# Patient Record
Sex: FEMALE | Race: WHITE | NOT HISPANIC OR LATINO | Employment: UNEMPLOYED | URBAN - METROPOLITAN AREA
[De-identification: names, ages, dates, MRNs, and addresses within clinical notes are randomized per-mention and may not be internally consistent; named-entity substitution may affect disease eponyms.]

---

## 2019-07-30 ENCOUNTER — HOSPITAL ENCOUNTER (OUTPATIENT)
Dept: URGENT CARE | Facility: CLINIC | Age: 64
Discharge: HOME | End: 2019-07-30

## 2019-08-06 ENCOUNTER — CONVERSION ENCOUNTER (OUTPATIENT)
Dept: FAMILY MEDICINE CLINIC | Facility: CLINIC | Age: 64
End: 2019-08-06

## 2019-08-06 ENCOUNTER — OFFICE VISIT CONVERTED (OUTPATIENT)
Dept: FAMILY MEDICINE CLINIC | Facility: CLINIC | Age: 64
End: 2019-08-06
Attending: NURSE PRACTITIONER

## 2019-08-12 ENCOUNTER — HOSPITAL ENCOUNTER (OUTPATIENT)
Dept: LAB | Facility: HOSPITAL | Age: 64
Discharge: HOME | End: 2019-08-12
Attending: NURSE PRACTITIONER

## 2019-08-12 LAB
25(OH)D3 SERPL-MCNC: 35.7 NG/ML (ref 30–100)
ALBUMIN SERPL-MCNC: 4.9 G/DL (ref 3.5–5)
ALBUMIN/GLOB SERPL: 1.7 {RATIO} (ref 1.4–2.6)
ALP SERPL-CCNC: 58 U/L (ref 43–160)
ALT SERPL-CCNC: 29 U/L (ref 10–40)
ANION GAP SERPL CALC-SCNC: 19 MMOL/L (ref 8–19)
AST SERPL-CCNC: 42 U/L (ref 15–50)
BASOPHILS # BLD AUTO: 0.1 10*3/UL (ref 0–0.2)
BASOPHILS NFR BLD AUTO: 1.6 % (ref 0–3)
BILIRUB SERPL-MCNC: 0.4 MG/DL (ref 0.2–1.3)
BUN SERPL-MCNC: 7 MG/DL (ref 5–25)
BUN/CREAT SERPL: 10 {RATIO} (ref 6–20)
CALCIUM SERPL-MCNC: 9.5 MG/DL (ref 8.7–10.4)
CHLORIDE SERPL-SCNC: 103 MMOL/L (ref 99–111)
CHOLEST SERPL-MCNC: 203 MG/DL (ref 107–200)
CHOLEST/HDLC SERPL: 4 {RATIO} (ref 3–6)
CONV ABS IMM GRAN: 0.02 10*3/UL (ref 0–0.2)
CONV CO2: 27 MMOL/L (ref 22–32)
CONV IMMATURE GRAN: 0.3 % (ref 0–1.8)
CONV TOTAL PROTEIN: 7.8 G/DL (ref 6.3–8.2)
CREAT UR-MCNC: 0.67 MG/DL (ref 0.5–0.9)
DEPRECATED RDW RBC AUTO: 46.1 FL (ref 36.4–46.3)
EOSINOPHIL # BLD AUTO: 0.18 10*3/UL (ref 0–0.7)
EOSINOPHIL # BLD AUTO: 2.9 % (ref 0–7)
ERYTHROCYTE [DISTWIDTH] IN BLOOD BY AUTOMATED COUNT: 12.3 % (ref 11.7–14.4)
EST. AVERAGE GLUCOSE BLD GHB EST-MCNC: 111 MG/DL
GFR SERPLBLD BASED ON 1.73 SQ M-ARVRAT: >60 ML/MIN/{1.73_M2}
GLOBULIN UR ELPH-MCNC: 2.9 G/DL (ref 2–3.5)
GLUCOSE SERPL-MCNC: 86 MG/DL (ref 65–99)
HBA1C MFR BLD: 5.5 % (ref 3.5–5.7)
HCT VFR BLD AUTO: 45.4 % (ref 37–47)
HDLC SERPL-MCNC: 51 MG/DL (ref 40–60)
HGB BLD-MCNC: 15.1 G/DL (ref 12–16)
LDLC SERPL CALC-MCNC: 118 MG/DL (ref 70–100)
LYMPHOCYTES # BLD AUTO: 1.54 10*3/UL (ref 1–5)
LYMPHOCYTES NFR BLD AUTO: 24.8 % (ref 20–45)
MCH RBC QN AUTO: 33.6 PG (ref 27–31)
MCHC RBC AUTO-ENTMCNC: 33.3 G/DL (ref 33–37)
MCV RBC AUTO: 100.9 FL (ref 81–99)
MONOCYTES # BLD AUTO: 0.77 10*3/UL (ref 0.2–1.2)
MONOCYTES NFR BLD AUTO: 12.4 % (ref 3–10)
NEUTROPHILS # BLD AUTO: 3.59 10*3/UL (ref 2–8)
NEUTROPHILS NFR BLD AUTO: 58 % (ref 30–85)
NRBC CBCN: 0 % (ref 0–0.7)
OSMOLALITY SERPL CALC.SUM OF ELEC: 295 MOSM/KG (ref 273–304)
PLATELET # BLD AUTO: 429 10*3/UL (ref 130–400)
PMV BLD AUTO: 10.3 FL (ref 9.4–12.3)
POTASSIUM SERPL-SCNC: 4.8 MMOL/L (ref 3.5–5.3)
RBC # BLD AUTO: 4.5 10*6/UL (ref 4.2–5.4)
SODIUM SERPL-SCNC: 144 MMOL/L (ref 135–147)
T4 FREE SERPL-MCNC: 1.3 NG/DL (ref 0.9–1.8)
TRIGL SERPL-MCNC: 171 MG/DL (ref 40–150)
TSH SERPL-ACNC: 2.29 M[IU]/L (ref 0.27–4.2)
VLDLC SERPL-MCNC: 34 MG/DL (ref 5–37)
WBC # BLD AUTO: 6.2 10*3/UL (ref 4.8–10.8)

## 2019-08-16 ENCOUNTER — CONVERSION ENCOUNTER (OUTPATIENT)
Dept: FAMILY MEDICINE CLINIC | Facility: CLINIC | Age: 64
End: 2019-08-16

## 2019-08-16 ENCOUNTER — HOSPITAL ENCOUNTER (OUTPATIENT)
Dept: FAMILY MEDICINE CLINIC | Facility: CLINIC | Age: 64
Discharge: HOME | End: 2019-08-16
Attending: NURSE PRACTITIONER

## 2019-08-16 ENCOUNTER — OFFICE VISIT CONVERTED (OUTPATIENT)
Dept: FAMILY MEDICINE CLINIC | Facility: CLINIC | Age: 64
End: 2019-08-16
Attending: NURSE PRACTITIONER

## 2019-08-20 LAB
CONV LAST MENSTURAL PERIOD: NORMAL
SPECIMEN SOURCE: NORMAL
SPECIMEN SOURCE: NORMAL
THIN PREP CVX: NORMAL

## 2019-09-20 ENCOUNTER — HOSPITAL ENCOUNTER (OUTPATIENT)
Dept: GENERAL RADIOLOGY | Facility: HOSPITAL | Age: 64
Discharge: HOME | End: 2019-09-20
Attending: NURSE PRACTITIONER

## 2019-09-24 ENCOUNTER — CONVERSION ENCOUNTER (OUTPATIENT)
Dept: FAMILY MEDICINE CLINIC | Facility: CLINIC | Age: 64
End: 2019-09-24

## 2019-09-24 ENCOUNTER — OFFICE VISIT CONVERTED (OUTPATIENT)
Dept: FAMILY MEDICINE CLINIC | Facility: CLINIC | Age: 64
End: 2019-09-24
Attending: NURSE PRACTITIONER

## 2019-09-25 ENCOUNTER — HOSPITAL ENCOUNTER (OUTPATIENT)
Dept: LAB | Facility: HOSPITAL | Age: 64
Discharge: HOME | End: 2019-09-25
Attending: NURSE PRACTITIONER

## 2019-09-25 LAB
BASOPHILS # BLD AUTO: 0.08 10*3/UL (ref 0–0.2)
BASOPHILS NFR BLD AUTO: 1 % (ref 0–3)
CHOLEST SERPL-MCNC: 227 MG/DL (ref 107–200)
CHOLEST/HDLC SERPL: 4.4 {RATIO} (ref 3–6)
CONV ABS IMM GRAN: 0.02 10*3/UL (ref 0–0.2)
CONV IMMATURE GRAN: 0.2 % (ref 0–1.8)
DEPRECATED RDW RBC AUTO: 46.8 FL (ref 36.4–46.3)
EOSINOPHIL # BLD AUTO: 0.14 10*3/UL (ref 0–0.7)
EOSINOPHIL # BLD AUTO: 1.7 % (ref 0–7)
ERYTHROCYTE [DISTWIDTH] IN BLOOD BY AUTOMATED COUNT: 12.7 % (ref 11.7–14.4)
HCT VFR BLD AUTO: 44 % (ref 37–47)
HDLC SERPL-MCNC: 52 MG/DL (ref 40–60)
HGB BLD-MCNC: 14.7 G/DL (ref 12–16)
LDLC SERPL CALC-MCNC: 139 MG/DL (ref 70–100)
LYMPHOCYTES # BLD AUTO: 1.88 10*3/UL (ref 1–5)
LYMPHOCYTES NFR BLD AUTO: 22.5 % (ref 20–45)
MCH RBC QN AUTO: 33.3 PG (ref 27–31)
MCHC RBC AUTO-ENTMCNC: 33.4 G/DL (ref 33–37)
MCV RBC AUTO: 99.5 FL (ref 81–99)
MONOCYTES # BLD AUTO: 0.81 10*3/UL (ref 0.2–1.2)
MONOCYTES NFR BLD AUTO: 9.7 % (ref 3–10)
NEUTROPHILS # BLD AUTO: 5.43 10*3/UL (ref 2–8)
NEUTROPHILS NFR BLD AUTO: 64.9 % (ref 30–85)
NRBC CBCN: 0 % (ref 0–0.7)
PLATELET # BLD AUTO: 487 10*3/UL (ref 130–400)
PMV BLD AUTO: 10.2 FL (ref 9.4–12.3)
RBC # BLD AUTO: 4.42 10*6/UL (ref 4.2–5.4)
TRIGL SERPL-MCNC: 181 MG/DL (ref 40–150)
VLDLC SERPL-MCNC: 36 MG/DL (ref 5–37)
WBC # BLD AUTO: 8.36 10*3/UL (ref 4.8–10.8)

## 2019-10-10 ENCOUNTER — OFFICE VISIT CONVERTED (OUTPATIENT)
Dept: FAMILY MEDICINE CLINIC | Facility: CLINIC | Age: 64
End: 2019-10-10
Attending: NURSE PRACTITIONER

## 2019-10-10 ENCOUNTER — CONVERSION ENCOUNTER (OUTPATIENT)
Dept: FAMILY MEDICINE CLINIC | Facility: CLINIC | Age: 64
End: 2019-10-10

## 2019-10-21 ENCOUNTER — HOSPITAL ENCOUNTER (OUTPATIENT)
Dept: GENERAL RADIOLOGY | Facility: HOSPITAL | Age: 64
Discharge: HOME | End: 2019-10-21
Attending: NURSE PRACTITIONER

## 2019-11-08 ENCOUNTER — CONVERSION ENCOUNTER (OUTPATIENT)
Dept: FAMILY MEDICINE CLINIC | Facility: CLINIC | Age: 64
End: 2019-11-08

## 2019-11-08 ENCOUNTER — OFFICE VISIT CONVERTED (OUTPATIENT)
Dept: FAMILY MEDICINE CLINIC | Facility: CLINIC | Age: 64
End: 2019-11-08
Attending: NURSE PRACTITIONER

## 2021-04-06 ENCOUNTER — CONVERSION ENCOUNTER (OUTPATIENT)
Dept: FAMILY MEDICINE CLINIC | Facility: CLINIC | Age: 66
End: 2021-04-06

## 2021-04-06 ENCOUNTER — OFFICE VISIT CONVERTED (OUTPATIENT)
Dept: FAMILY MEDICINE CLINIC | Facility: CLINIC | Age: 66
End: 2021-04-06
Attending: NURSE PRACTITIONER

## 2021-05-11 NOTE — H&P
History and Physical      Patient Name: Kiesha Pan   Patient ID: [id number]   Sex: Female   YOB: 1955        Visit Date: April 6, 2021    Provider: MIHAI Delgado   [place] [facility] [place] [address] [place] [address], [address] [place] [address]  [id number]   Location Phone: [phone]          Chief Complaint  · Follow up office visit within 7 calendar days of discharge from inpatient status (high complexity).      History Of Present Illness  FOLLOW UP OFFICE VISIT WITHIN 7 CALENDAR DAYS OF INPATIENT STATUS (SEVERE COMPLEXITY)  Kiesha Pan presents to office for follow up post discharge from inpatient status within 7 calendar days. Patient was contacted within 2 business days via phone conversation. Documentation of that phone contact is present in the patient's electronic chart. Patient was admitted to an inpatient faciliity on 03/25/2021 and discharged on 04/02/2021 due to: sepsis, uti, joni   Admitting MD: Severo Chin MD   Her discharge summary has been reviewed and placed in the patient's electronic chart.   Patient's problem list is: Allergic rhinitis due to allergen, Allergies, Anxiety, Asthma, C. difficile diarrhea, Cataracts, bilateral, COPD, COVID-19, Depression, Essential hypertension, Hernia, History of stroke, History of thyroid disorder, Hypertension, Hypothyroid, Reflux, Ringing in the ears, Shortness of Breath, and Sinus trouble   Patient's outpatient medication list has been reconciled with the medication list from the discharge summary and has been reviewed with the patient. Current medication list is: atorvastatin 40 mg oral tablet, carvedilol 3.125 mg oral tablet, lisinopril 20 mg oral tablet, and vancomycin 125 mg oral capsule   Kiesha Pan is a 65 year old /White female who presents for evaluation and treatment of:      She presents today as a new patient and a hospital follow-up.  She has not seen a primary care provider in a  long time.  She presented to the emergency room on 3/25/2021 after she fell and hit her head.  Her son brought her in because she was acting confused and weaker than normal.  She was positive for screening for sepsis with a white count of 18,400, fever, and a UTI.  She was also very hypertensive with her blood pressure being as high as 232/138.  She was admitted for further work-up and monitoring.  In the ER a CT scan of the head was negative.  She later had an MRI of her brain which showed a 5 cm subacute infarct of the right parietaloccipital region.  Neurology was consulted.  She denies any difficulty with ambulating or moving about.  She does complain that she is having difficulty finding her words.  Labs during her hospital visit did show elevated glucose and she was started on insulin per sliding scale however an A1c that was obtained was only 4.9%.  She has a history of thyroid disorder.  TSH in the hospital was 8.3 with a normal free T4 1.2.  She is not currently on any medication for thyroid.  While she was in the hospital she was tested for COVID-19 and was positive so she was put in isolation for Covid.  She denies any cough or shortness of breath.  She does have a history of COPD and asthma, she was a former smoker.  She denies any difficulty with breathing and she is not on any inhalers.  She also had been having diarrhea and she tested positive for C. difficile.  She was put on vancomycin and has responded well to it.  She is currently still taking vancomycin.  She denies any diarrhea now.    Hypertension: Her blood pressure today was slightly elevated at 162/98.  She is currently on Lisinopril 20 mg 2 tablets daily and carvedilol 3.125 mg 3 tablets twice daily.  Her  has a blood pressure log which shows her blood pressures in the 130s over 80s.  She was also started on atorvastatin 40 mg in the hospital and increased to 2 tablets at bedtime.    It was also noted on her discharge note that she  should continue aspirin 81 mg, vitamin B complex daily, vitamin E 400 units daily, calcium with vitamin D 617322 once daily and fexofenadine 180 mg daily.  The patient and  state that they did not get prescriptions for these but I advised them that these are over-the-counter medications that they need to get.  I wrote down these medications for them so that they can get them over-the-counter.  He was also noted that she was supposed to be continuing Nexium 20 mg daily but she states she does not need this medication now.       Past Medical History  Disease Name Date Onset Notes   Alcohol dependence --  --    Allergic rhinitis due to allergen 09/24/2019 --    Allergies --  --    Anxiety --  --    Asthma --  --    Cataracts, bilateral --  --    COPD --  --    Depression --  --    Hernia --  --    Hypertension 09/24/2019 --    Hypothyroid --  --    Reflux --  --    Ringing in the ears --  --    Shortness of Breath --  --    Sinus trouble --  --    Stroke --  --          Past Surgical History  Procedure Name Date Notes   Gallbladder removal --  --    nose --  --    Tubal ligation --  --          Medication List  Name Date Started Instructions   atorvastatin 40 mg oral tablet  take 1 tablet (40 mg) by oral route once daily at bedtime   carvedilol 3.125 mg oral tablet  take 3 tablets by oral route 2 times a day   lisinopril 20 mg oral tablet  take 1 tablet (20 mg) by oral route once daily   vancomycin 125 mg oral capsule  take 1 capsule (125 mg) by oral route every 6 hours         Allergy List  Allergen Name Date Reaction Notes   Benadryl --  --  --    Keflex --  --  --    Levaquin --  --  --    PENICILLINS --  --  --        Allergies Reconciled  Family Medical History  Disease Name Relative/Age Notes   Stroke Mother/   --    Heart Disease Father/   --    Diabetes Father/   --          Social History  Finding Status Start/Stop Quantity Notes   Tobacco Former --/-- --  --          [place]  [place] [facility]  "Trade Name Lot Number Route Inj VIS Given VIS Publication   InfluenzaRefused 10/10/2019 NE Not Entered  NE NE     Comments:    Tdap09/24/2019 [facility] BOOSTRIX [id number] IM RD 09/24/2019    Comments: pt tolerated injection well         Review of Systems  · Constitutional  o Denies  o : fever, weight gain, weight loss, fatigue  · Cardiovascular  o Denies  o : palpitation, chest pain, claudication, lower extremity edema  · Respiratory  o Denies  o : shortness of breath, hemoptysis, wheezing, dry cough, productive cough  · Gastrointestinal  o Denies  o : nausea, vomiting, diarrhea, constipation, abdominal pain  · Genitourinary  o Denies  o : urgency, frequency, dysuria  · Integument  o Denies  o : rash, itching  · Neurologic  o Admits  o : memory difficulties, speech delay  o Denies  o : altered mental status, speech difficulties, seizures  · Musculoskeletal  o Denies  o : joint pain, joint swelling  · Endocrine  o Denies  o : cold intolerance, central obesity  · Psychiatric  o Denies  o : anxiety, depression, suicidal ideation, homicidal ideation  · Allergic-Immunologic  o Admits  o : sinus allergy symptoms  o Denies  o : frequent illnesses      Vitals  Date Time BP Position Site L\R Cuff Size HR RR TEMP (F) WT  HT  BMI kg/m2 BSA m2 O2 Sat FR L/min FiO2 HC       04/06/2021 09:14 /98 Sitting    88 - R  98 149lbs 6oz 5'  5\" 24.86 1.76 97 %            Physical Examination  · Constitutional  o Appearance  o : no acute distress, well-nourished  · Head and Face  o Head  o :   § Inspection  § : atraumatic, normocephalic  · Neck  o Thyroid  o : thyromegaly present, right nodule present, symmetric  · Respiratory  o Respiratory Effort  o : breathing comfortably, symmetric chest rise  o Auscultation of Lungs  o : clear to asculatation bilaterally, no wheezes, rales, or rhonchii  · Cardiovascular  o Heart  o :   § Auscultation of Heart  § : regular rate and rhythm, no murmurs, rubs, or gallops  o Peripheral Vascular System  o : "   § Extremities  § : no edema  · Lymphatic  o Neck  o : no lymphadenopathy present  · Neurologic  o Mental Status Examination  o :   § Orientation  § : grossly oriented to person, place and time  o Gait and Station  o :   § Gait Screening  § : normal gait  · Psychiatric  o General  o : normal mood and affect          Assessment  · Essential hypertension     401.9/I10  Blood pressure slightly elevated in the office today but has been normal at home, will have her continue current doses of Lisinopril and carvedilol as prescribed per the hospital.  · Hospital discharge follow-up     V67.59/Z09  · C. difficile diarrhea     008.45/A04.72  Instructed patient to continue/finish all of her vancomycin. I discussed with her that if she starts having diarrhea again let me know and we would recheck her for C. difficile. I also discussed with her that this is a very contagious infection and advised them on how to prevent spread.  · COVID-19     079.89/U07.1  Discussed with patient to quarantine for at least 10 days from onset of symptoms and symptoms have resolved. COVID-19 care packet given to patient and discussed. Patient instructed not to go anywhere except to seek medical care. Instructed to seek medical care for any difficulty of breathing, unable to keep fluids down, or worsening of symptoms.  · History of stroke     V12.54/Z86.73  I will get her referred to neurology to follow-up on her stroke. I discussed with her that she may need to have some speech therapy. I advised her to start taking aspirin 81 mg once daily.  · Thyroid enlarged     240.9/E04.9  · History of thyroid disorder     V12.29/Z86.39  Due to her history of thyroid disorder and it was noted that her thyroid was slightly enlarged on exam today, I will order a thyroid ultrasound. We will plan to recheck her thyroid profile and thyroid antibodies on her follow-up.      Plan  · Orders  o Discharge medications reconciled with the current medication list (1111F)  - - 04/06/2021  o ACO-18: Negative screen for clinical depression using a standardized tool () - - 04/06/2021   4 pts  o ACO-39: Current medications updated and reviewed (, 1159F) - - 04/06/2021  o Thyroid Ultrasound. (38645) - V12.29/Z86.39, 240.9/E04.9 - 04/06/2021   Do not schedule until after 4/12/21 because pt is in quarantine d/t current covid  o NEUROLOGY CONSULTATION. (NEURO) - V12.54/Z86.73 - 04/06/2021   Estela MIHAI Adan, Do NOT schedule until after 4/12/21 d/t covid infection  · Medications  o Medications have been Reconciled  o Transition of Care or Provider Policy  · Instructions  o Patient advised to monitor blood pressure (B/P) at home and journal readings. Patient informed that a B/P reading at home of more than 130/80 is considered hypertension. For readings greater avma114/90 or higher patient is advised to follow up in the office with readings for management. Patient advised to limit sodium intake.  o Patient discharge summary has been reviewed and placed in patient's electronic medical record.  o Patient received a phone call from my office within 2 business days of discharge from inpatient status, and documented within the patient's chart.  o Also patient was seen (face to face) for follow up evaluation within 7 calendar days of discharge from inpatient status for a high complexity issue.  o Patient was educated on their diagnosis, treatment and any medication changes while being evaluated today.  o Patient was educated/instructed on their diagnosis, treatment and medications prior to discharge from the clinic today.  o Patient instructed to seek medical attention urgently for new or worsening symptoms.  o Call the office with any concerns or questions.  o Time spent with the patient was minutes, more than 50% face to face.  o Time In: 0845  o Time Out: 1020  o Discussed Covid-19 precautions including, but not limited to, social distancing, avoid touching your face, and hand  washing.   · Disposition  o Return to clinic in 3 weeks            Electronically Signed by: Kiesha Amaya APRN -Author on April 6, 2021 01:22:34 PM

## 2021-05-14 VITALS
HEART RATE: 88 BPM | HEIGHT: 65 IN | BODY MASS INDEX: 24.89 KG/M2 | DIASTOLIC BLOOD PRESSURE: 98 MMHG | SYSTOLIC BLOOD PRESSURE: 162 MMHG | WEIGHT: 149.37 LBS | OXYGEN SATURATION: 97 % | TEMPERATURE: 98 F

## 2021-05-15 VITALS
TEMPERATURE: 97.8 F | SYSTOLIC BLOOD PRESSURE: 211 MMHG | WEIGHT: 164.37 LBS | DIASTOLIC BLOOD PRESSURE: 88 MMHG | HEIGHT: 67 IN | DIASTOLIC BLOOD PRESSURE: 101 MMHG | SYSTOLIC BLOOD PRESSURE: 160 MMHG | OXYGEN SATURATION: 98 % | HEART RATE: 87 BPM | BODY MASS INDEX: 25.8 KG/M2

## 2021-05-15 VITALS
HEIGHT: 67 IN | SYSTOLIC BLOOD PRESSURE: 180 MMHG | OXYGEN SATURATION: 98 % | DIASTOLIC BLOOD PRESSURE: 121 MMHG | DIASTOLIC BLOOD PRESSURE: 92 MMHG | SYSTOLIC BLOOD PRESSURE: 230 MMHG | BODY MASS INDEX: 26.68 KG/M2 | TEMPERATURE: 97.5 F | WEIGHT: 170 LBS | HEART RATE: 78 BPM | HEART RATE: 94 BPM

## 2021-05-15 VITALS
DIASTOLIC BLOOD PRESSURE: 116 MMHG | WEIGHT: 163.56 LBS | SYSTOLIC BLOOD PRESSURE: 226 MMHG | TEMPERATURE: 98.8 F | HEIGHT: 67 IN | HEART RATE: 89 BPM | BODY MASS INDEX: 25.67 KG/M2 | OXYGEN SATURATION: 98 %

## 2021-05-15 VITALS
HEART RATE: 75 BPM | SYSTOLIC BLOOD PRESSURE: 189 MMHG | RESPIRATION RATE: 21 BRPM | HEIGHT: 67 IN | OXYGEN SATURATION: 97 % | TEMPERATURE: 98.3 F | WEIGHT: 165.37 LBS | DIASTOLIC BLOOD PRESSURE: 99 MMHG | BODY MASS INDEX: 25.96 KG/M2

## 2021-05-15 VITALS
TEMPERATURE: 98.5 F | SYSTOLIC BLOOD PRESSURE: 182 MMHG | DIASTOLIC BLOOD PRESSURE: 98 MMHG | HEART RATE: 87 BPM | OXYGEN SATURATION: 98 % | DIASTOLIC BLOOD PRESSURE: 96 MMHG | BODY MASS INDEX: 25.83 KG/M2 | WEIGHT: 164.56 LBS | SYSTOLIC BLOOD PRESSURE: 188 MMHG | HEIGHT: 67 IN

## 2021-05-15 VITALS — DIASTOLIC BLOOD PRESSURE: 88 MMHG | SYSTOLIC BLOOD PRESSURE: 180 MMHG

## 2021-10-02 ENCOUNTER — APPOINTMENT (OUTPATIENT)
Dept: CT IMAGING | Facility: HOSPITAL | Age: 66
End: 2021-10-02

## 2021-10-02 ENCOUNTER — HOSPITAL ENCOUNTER (EMERGENCY)
Facility: HOSPITAL | Age: 66
Discharge: HOME | End: 2021-10-02
Attending: EMERGENCY MEDICINE | Admitting: EMERGENCY MEDICINE

## 2021-10-02 ENCOUNTER — APPOINTMENT (OUTPATIENT)
Dept: GENERAL RADIOLOGY | Facility: HOSPITAL | Age: 66
End: 2021-10-02

## 2021-10-02 VITALS
OXYGEN SATURATION: 97 % | SYSTOLIC BLOOD PRESSURE: 148 MMHG | HEIGHT: 65 IN | BODY MASS INDEX: 24.61 KG/M2 | TEMPERATURE: 98.8 F | DIASTOLIC BLOOD PRESSURE: 92 MMHG | WEIGHT: 147.71 LBS | RESPIRATION RATE: 14 BRPM | HEART RATE: 82 BPM

## 2021-10-02 DIAGNOSIS — I10 ESSENTIAL HYPERTENSION: Primary | ICD-10-CM

## 2021-10-02 LAB
ALBUMIN SERPL-MCNC: 4.9 G/DL (ref 3.5–5.2)
ALBUMIN/GLOB SERPL: 1.7 G/DL
ALP SERPL-CCNC: 52 U/L (ref 39–117)
ALT SERPL W P-5'-P-CCNC: 17 U/L (ref 1–33)
ANION GAP SERPL CALCULATED.3IONS-SCNC: 14.1 MMOL/L (ref 5–15)
AST SERPL-CCNC: 33 U/L (ref 1–32)
BASOPHILS # BLD AUTO: 0.08 10*3/MM3 (ref 0–0.2)
BASOPHILS NFR BLD AUTO: 1.1 % (ref 0–1.5)
BILIRUB SERPL-MCNC: 0.9 MG/DL (ref 0–1.2)
BUN SERPL-MCNC: 9 MG/DL (ref 8–23)
BUN/CREAT SERPL: 13.2 (ref 7–25)
CALCIUM SPEC-SCNC: 9.6 MG/DL (ref 8.6–10.5)
CHLORIDE SERPL-SCNC: 95 MMOL/L (ref 98–107)
CO2 SERPL-SCNC: 25.9 MMOL/L (ref 22–29)
CREAT SERPL-MCNC: 0.68 MG/DL (ref 0.57–1)
DEPRECATED RDW RBC AUTO: 46.1 FL (ref 37–54)
EOSINOPHIL # BLD AUTO: 0 10*3/MM3 (ref 0–0.4)
EOSINOPHIL NFR BLD AUTO: 0 % (ref 0.3–6.2)
ERYTHROCYTE [DISTWIDTH] IN BLOOD BY AUTOMATED COUNT: 12.9 % (ref 12.3–15.4)
GFR SERPL CREATININE-BSD FRML MDRD: 87 ML/MIN/1.73
GLOBULIN UR ELPH-MCNC: 2.9 GM/DL
GLUCOSE SERPL-MCNC: 132 MG/DL (ref 65–99)
HCT VFR BLD AUTO: 43.8 % (ref 34–46.6)
HGB BLD-MCNC: 15.2 G/DL (ref 12–15.9)
HOLD SPECIMEN: NORMAL
HOLD SPECIMEN: NORMAL
IMM GRANULOCYTES # BLD AUTO: 0.01 10*3/MM3 (ref 0–0.05)
IMM GRANULOCYTES NFR BLD AUTO: 0.1 % (ref 0–0.5)
INR PPP: 1 (ref 2–3)
LYMPHOCYTES # BLD AUTO: 1.88 10*3/MM3 (ref 0.7–3.1)
LYMPHOCYTES NFR BLD AUTO: 26.1 % (ref 19.6–45.3)
MCH RBC QN AUTO: 34 PG (ref 26.6–33)
MCHC RBC AUTO-ENTMCNC: 34.7 G/DL (ref 31.5–35.7)
MCV RBC AUTO: 98 FL (ref 79–97)
MONOCYTES # BLD AUTO: 0.97 10*3/MM3 (ref 0.1–0.9)
MONOCYTES NFR BLD AUTO: 13.5 % (ref 5–12)
NEUTROPHILS NFR BLD AUTO: 4.26 10*3/MM3 (ref 1.7–7)
NEUTROPHILS NFR BLD AUTO: 59.2 % (ref 42.7–76)
NRBC BLD AUTO-RTO: 0.3 /100 WBC (ref 0–0.2)
PLATELET # BLD AUTO: 391 10*3/MM3 (ref 140–450)
PMV BLD AUTO: 10 FL (ref 6–12)
POTASSIUM SERPL-SCNC: 4.3 MMOL/L (ref 3.5–5.2)
PROT SERPL-MCNC: 7.8 G/DL (ref 6–8.5)
PROTHROMBIN TIME: 10.9 SECONDS (ref 9.4–12)
RBC # BLD AUTO: 4.47 10*6/MM3 (ref 3.77–5.28)
SODIUM SERPL-SCNC: 135 MMOL/L (ref 136–145)
WBC # BLD AUTO: 7.2 10*3/MM3 (ref 3.4–10.8)
WHOLE BLOOD HOLD SPECIMEN: NORMAL
WHOLE BLOOD HOLD SPECIMEN: NORMAL

## 2021-10-02 PROCEDURE — 70450 CT HEAD/BRAIN W/O DYE: CPT

## 2021-10-02 PROCEDURE — 71045 X-RAY EXAM CHEST 1 VIEW: CPT

## 2021-10-02 PROCEDURE — 96374 THER/PROPH/DIAG INJ IV PUSH: CPT

## 2021-10-02 PROCEDURE — 96375 TX/PRO/DX INJ NEW DRUG ADDON: CPT

## 2021-10-02 PROCEDURE — 99284 EMERGENCY DEPT VISIT MOD MDM: CPT

## 2021-10-02 PROCEDURE — 93005 ELECTROCARDIOGRAM TRACING: CPT | Performed by: EMERGENCY MEDICINE

## 2021-10-02 PROCEDURE — 25010000002 ONDANSETRON PER 1 MG: Performed by: EMERGENCY MEDICINE

## 2021-10-02 PROCEDURE — 85025 COMPLETE CBC W/AUTO DIFF WBC: CPT | Performed by: EMERGENCY MEDICINE

## 2021-10-02 PROCEDURE — 0 IOPAMIDOL PER 1 ML: Performed by: EMERGENCY MEDICINE

## 2021-10-02 PROCEDURE — 80053 COMPREHEN METABOLIC PANEL: CPT | Performed by: EMERGENCY MEDICINE

## 2021-10-02 PROCEDURE — 93010 ELECTROCARDIOGRAM REPORT: CPT | Performed by: INTERNAL MEDICINE

## 2021-10-02 PROCEDURE — 85610 PROTHROMBIN TIME: CPT | Performed by: EMERGENCY MEDICINE

## 2021-10-02 RX ORDER — CARVEDILOL 3.12 MG/1
TABLET ORAL
Qty: 180 TABLET | Refills: 1 | Status: SHIPPED | OUTPATIENT
Start: 2021-10-02 | End: 2021-12-03 | Stop reason: SDUPTHER

## 2021-10-02 RX ORDER — ATORVASTATIN CALCIUM 80 MG/1
TABLET, FILM COATED ORAL
Qty: 30 TABLET | Refills: 1 | Status: SHIPPED | OUTPATIENT
Start: 2021-10-02 | End: 2021-12-03 | Stop reason: SDUPTHER

## 2021-10-02 RX ORDER — SODIUM CHLORIDE 0.9 % (FLUSH) 0.9 %
10 SYRINGE (ML) INJECTION AS NEEDED
Status: DISCONTINUED | OUTPATIENT
Start: 2021-10-02 | End: 2021-10-02 | Stop reason: HOSPADM

## 2021-10-02 RX ORDER — LISINOPRIL 20 MG/1
40 TABLET ORAL DAILY
Qty: 60 TABLET | Refills: 1 | Status: SHIPPED | OUTPATIENT
Start: 2021-10-02 | End: 2021-12-03 | Stop reason: SDUPTHER

## 2021-10-02 RX ORDER — ONDANSETRON 2 MG/ML
4 INJECTION INTRAMUSCULAR; INTRAVENOUS ONCE
Status: COMPLETED | OUTPATIENT
Start: 2021-10-02 | End: 2021-10-02

## 2021-10-02 RX ORDER — LABETALOL HYDROCHLORIDE 5 MG/ML
20 INJECTION, SOLUTION INTRAVENOUS ONCE
Status: COMPLETED | OUTPATIENT
Start: 2021-10-02 | End: 2021-10-02

## 2021-10-02 RX ADMIN — ONDANSETRON 4 MG: 2 INJECTION INTRAMUSCULAR; INTRAVENOUS at 10:01

## 2021-10-02 RX ADMIN — LABETALOL 20 MG/4 ML (5 MG/ML) INTRAVENOUS SYRINGE 20 MG: at 10:01

## 2021-10-02 NOTE — ED PROVIDER NOTES
"Time: 9:33 AM EDT  Arrived by: Private car  Chief Complaint:   Chief Complaint   Patient presents with   • Hypertension     History provided by: Patient and relative  History is limited by: N/A     History of Present Illness:    Kiesha Pan is a 66 y.o. female with a history of hypertension who presents to the emergency department today with complaints of hypertension. She is accompanied by family today. The patient reports that she has not taken her blood pressure medication consistently ever since her stroke in March. She has not taken the medication at all for the past several months. The patient is unsure which medication she was taking previously.    Per triage, the patient seen by her dentist yesterday, at which time she was advised that her blood pressure was significantly elevated. However, in the room the patient states that she had checked her blood pressure herself and did have an elevated reading at that time. She then decided to come to the ED for further evaluation. Upon arrival, her blood pressure was found to be 232/134.    The patient presently denies a headache but has felt nauseous recently.    Along with her stroke and hypertension, the patient also has a history of asthma. She is a former smoker but denies alcohol or drug use. The patient is unsure who her PCP is. There are no other acute complaints at this time.      History provided by:  Patient and relative   used: No    Hypertension  Severity:  Moderate  Onset quality:  Sudden  Duration:  1 day  Timing:  Constant  Progression:  Unchanged  Chronicity:  Recurrent  Time since last dose of antihypertensive: \"Months\"  Notable PTA blood pressures:  232/134 upon arrival  Context: noncompliance    Relieved by:  None tried  Worsened by:  Nothing  Ineffective treatments:  None tried  Associated symptoms: nausea    Associated symptoms: no chest pain, no epistaxis, no fever, no headaches, no neck pain, no shortness of breath and not " "vomiting    Risk factors: no alcohol use and no tobacco use (former)        Similar Symptoms Previously: Yes.  Recently seen: Per triage, the patient went to the dentist yesterday and was informed that her blood pressure was elevated. She had been previously admitted for a stroke on 3/25/2021 and had a follow up visit with Kiesha Amaya on 2021.      Patient Care Team  Primary Care Provider: Provider, No Known    Past Medical History:     Allergies   Allergen Reactions   • Cephalexin Unknown - Low Severity   • Diphenhydramine Unknown - Low Severity   • Levofloxacin Unknown - Low Severity   • Penicillins Unknown - Low Severity     Past Medical History:   Diagnosis Date   • Asthma    • Hypertension    • Stroke (HCC) 2021     Past Surgical History:   Procedure Laterality Date   • CHOLECYSTECTOMY       History reviewed. No pertinent family history.    Home Medications:  Prior to Admission medications    Not on File        Social History:   Social History     Tobacco Use   • Smoking status: Former Smoker     Quit date:      Years since quittin.7   Substance Use Topics   • Alcohol use: Not Currently   • Drug use: Not Currently     Recent travel: not applicable     Review of Systems:  Review of Systems   Constitutional: Negative for chills and fever.   HENT: Negative for nosebleeds.    Eyes: Negative for redness.   Respiratory: Negative for cough and shortness of breath.    Cardiovascular: Negative for chest pain.   Gastrointestinal: Positive for nausea. Negative for diarrhea and vomiting.   Genitourinary: Negative for dysuria and frequency.   Musculoskeletal: Negative for back pain and neck pain.   Skin: Negative for rash.   Neurological: Negative for seizures, syncope and headaches.        Physical Exam:  /92   Pulse 82   Temp 98.8 °F (37.1 °C)   Resp 14   Ht 165.1 cm (65\")   Wt 67 kg (147 lb 11.3 oz)   SpO2 97%   BMI 24.58 kg/m²     Physical Exam  Vitals and nursing note reviewed. "   Constitutional:       General: She is not in acute distress.  HENT:      Head: Normocephalic and atraumatic.      Nose: Nose normal.      Mouth/Throat:      Mouth: Mucous membranes are moist.   Eyes:      General: No scleral icterus.  Cardiovascular:      Rate and Rhythm: Normal rate and regular rhythm.      Heart sounds: Normal heart sounds. No murmur heard.     Pulmonary:      Effort: No respiratory distress.      Breath sounds: Normal breath sounds.   Abdominal:      Palpations: Abdomen is soft.      Tenderness: There is no abdominal tenderness.   Musculoskeletal:         General: No tenderness. Normal range of motion.      Cervical back: Normal range of motion and neck supple. No tenderness.      Right lower leg: No edema.      Left lower leg: No edema.   Skin:     General: Skin is warm and dry.   Neurological:      Mental Status: She is alert. Mental status is at baseline.   Psychiatric:         Behavior: Behavior normal.                Medications in the Emergency Department:  Medications   labetalol (NORMODYNE,TRANDATE) injection 20 mg (20 mg Intravenous Given 10/2/21 1001)   ondansetron (ZOFRAN) injection 4 mg (4 mg Intravenous Given 10/2/21 1001)        Labs  Lab Results (last 24 hours)     ** No results found for the last 24 hours. **           Imaging:  No Radiology Exams Resulted Within Past 24 Hours    Procedures:  Critical Care  Performed by: Richard Urban DO  Authorized by: Richard Urban DO     Critical care provider statement:     Critical care time (minutes):  35    Critical care time was exclusive of:  Separately billable procedures and treating other patients    Critical care was necessary to treat or prevent imminent or life-threatening deterioration of the following conditions:  CNS failure or compromise    Critical care was time spent personally by me on the following activities:  Evaluation of patient's response to treatment, examination of patient, obtaining history from patient or  surrogate, ordering and performing treatments and interventions, ordering and review of laboratory studies, pulse oximetry, re-evaluation of patient's condition and review of old charts    I assumed direction of critical care for this patient from another provider in my specialty: no          Progress  ED Course as of Oct 04 1531   Sat Oct 02, 2021   0937 EKG:    Rhythm: nsr  Rate: 100  Intervals: normal  T-wave: normal  ST Segment: normal    Artifact present    EKG Comparison: 3/26/21 similar    Interpreted by me      [NL]   1000 Old records reviewed. Patient was considered to be non-compliant with her medications even prior to her admission for a stroke in March.    [RF]   1111 At bedside reevaluating the patient and updating on lab and imaging results. All questions addressed. Patient is agreeable with the plan to resume her medications. Will provide list of primary care providers as well as a refill for her prescriptions due to possible delay seeing a PCP.    [RF]      ED Course User Index  [NL] Richard Urban DO  [RF] Carmen Salgado                            Medical Decision Making:  MDM   66-year-old female patient presented with dangerously elevated blood pressure.  Patient had been admitted in late March of this year after being uroseptic and having a stroke due to uncontrolled blood pressure due to medication noncompliance.  Patient had been discharged with a regiment of blood pressure medication which she subsequently ran out of and has not followed up with a physician and has been out of her meds for many months.  Patient recently was taking her blood pressure and noted it was extremely elevated and came to the emergency department today.  On arrival the patient's blood pressure was 232/134.  Patient had an IV placed and was given labetalol IV which improved her blood pressure to 148/92. Patient will be discharged woth prescriptions for her blood pressure medications and was given a list of PCP's she can  follow up with.        Final diagnoses:   Essential hypertension        Disposition:  ED Disposition     ED Disposition Condition Comment    Discharge Stable           Documentation assistance provided by Carmen Salgado acting as scribe for Richard Urban DO. Information recorded by the scribe was done at my direction and has been verified and validated by me.        Carmen Salgado  10/02/21 0957       Carmen Salgado  10/02/21 1112       Carmen Salgado  10/02/21 1116       Richard Urban DO  10/04/21 1531

## 2021-10-03 LAB — QT INTERVAL: 385 MS

## 2021-11-06 ENCOUNTER — HOSPITAL ENCOUNTER (OUTPATIENT)
Facility: HOSPITAL | Age: 66
Setting detail: OBSERVATION
Discharge: HOME HEALTH | End: 2021-11-09
Attending: EMERGENCY MEDICINE | Admitting: INTERNAL MEDICINE

## 2021-11-06 ENCOUNTER — APPOINTMENT (OUTPATIENT)
Dept: GENERAL RADIOLOGY | Facility: HOSPITAL | Age: 66
End: 2021-11-06

## 2021-11-06 ENCOUNTER — APPOINTMENT (OUTPATIENT)
Dept: CT IMAGING | Facility: HOSPITAL | Age: 66
End: 2021-11-06

## 2021-11-06 DIAGNOSIS — Z78.9 DECREASED ACTIVITIES OF DAILY LIVING (ADL): ICD-10-CM

## 2021-11-06 DIAGNOSIS — I48.0 PAROXYSMAL ATRIAL FIBRILLATION WITH RVR (HCC): ICD-10-CM

## 2021-11-06 DIAGNOSIS — R11.2 NAUSEA VOMITING AND DIARRHEA: Primary | ICD-10-CM

## 2021-11-06 DIAGNOSIS — R19.7 NAUSEA VOMITING AND DIARRHEA: Primary | ICD-10-CM

## 2021-11-06 DIAGNOSIS — R13.12 DYSPHAGIA, OROPHARYNGEAL: ICD-10-CM

## 2021-11-06 DIAGNOSIS — R26.2 DIFFICULTY IN WALKING: ICD-10-CM

## 2021-11-06 DIAGNOSIS — E86.0 DEHYDRATION: ICD-10-CM

## 2021-11-06 DIAGNOSIS — N28.9 ACUTE RENAL INSUFFICIENCY: ICD-10-CM

## 2021-11-06 DIAGNOSIS — F10.931 ALCOHOL WITHDRAWAL SYNDROME, WITH DELIRIUM (HCC): ICD-10-CM

## 2021-11-06 LAB
ALBUMIN SERPL-MCNC: 4.3 G/DL (ref 3.5–5.2)
ALBUMIN/GLOB SERPL: 1.5 G/DL
ALP SERPL-CCNC: 62 U/L (ref 39–117)
ALT SERPL W P-5'-P-CCNC: 64 U/L (ref 1–33)
ANION GAP SERPL CALCULATED.3IONS-SCNC: 15.9 MMOL/L (ref 5–15)
ANION GAP SERPL CALCULATED.3IONS-SCNC: 18.9 MMOL/L (ref 5–15)
ARTERIAL PATENCY WRIST A: POSITIVE
AST SERPL-CCNC: 95 U/L (ref 1–32)
BASE EXCESS BLDA CALC-SCNC: -7.2 MMOL/L (ref -2–2)
BASOPHILS # BLD AUTO: 0.09 10*3/MM3 (ref 0–0.2)
BASOPHILS # BLD AUTO: 0.1 10*3/MM3 (ref 0–0.2)
BASOPHILS NFR BLD AUTO: 1 % (ref 0–1.5)
BASOPHILS NFR BLD AUTO: 1.1 % (ref 0–1.5)
BDY SITE: ABNORMAL
BILIRUB SERPL-MCNC: 0.3 MG/DL (ref 0–1.2)
BUN SERPL-MCNC: 14 MG/DL (ref 8–23)
BUN SERPL-MCNC: 15 MG/DL (ref 8–23)
BUN/CREAT SERPL: 10 (ref 7–25)
BUN/CREAT SERPL: 14.7 (ref 7–25)
CA-I BLDA-SCNC: 1.05 MMOL/L (ref 1.13–1.32)
CALCIUM SPEC-SCNC: 7.7 MG/DL (ref 8.6–10.5)
CALCIUM SPEC-SCNC: 9 MG/DL (ref 8.6–10.5)
CHLORIDE BLDA-SCNC: 103 MMOL/L (ref 98–106)
CHLORIDE SERPL-SCNC: 103 MMOL/L (ref 98–107)
CHLORIDE SERPL-SCNC: 97 MMOL/L (ref 98–107)
CK MB SERPL-CCNC: 1.69 NG/ML
CK SERPL-CCNC: 92 U/L (ref 20–180)
CO2 SERPL-SCNC: 22.1 MMOL/L (ref 22–29)
CO2 SERPL-SCNC: 24.1 MMOL/L (ref 22–29)
COHGB MFR BLD: 0.3 % (ref 0–1.5)
CREAT SERPL-MCNC: 1.02 MG/DL (ref 0.57–1)
CREAT SERPL-MCNC: 1.4 MG/DL (ref 0.57–1)
DEPRECATED RDW RBC AUTO: 48.7 FL (ref 37–54)
DEPRECATED RDW RBC AUTO: 50.4 FL (ref 37–54)
EOSINOPHIL # BLD AUTO: 0 10*3/MM3 (ref 0–0.4)
EOSINOPHIL # BLD AUTO: 0 10*3/MM3 (ref 0–0.4)
EOSINOPHIL NFR BLD AUTO: 0 % (ref 0.3–6.2)
EOSINOPHIL NFR BLD AUTO: 0 % (ref 0.3–6.2)
ERYTHROCYTE [DISTWIDTH] IN BLOOD BY AUTOMATED COUNT: 13.2 % (ref 12.3–15.4)
ERYTHROCYTE [DISTWIDTH] IN BLOOD BY AUTOMATED COUNT: 13.3 % (ref 12.3–15.4)
ETHANOL BLD-MCNC: 245 MG/DL (ref 0–10)
ETHANOL UR QL: 0.24 %
FHHB: 4 % (ref 0–5)
GAS FLOW AIRWAY: 2 LPM
GFR SERPL CREATININE-BSD FRML MDRD: 38 ML/MIN/1.73
GFR SERPL CREATININE-BSD FRML MDRD: 54 ML/MIN/1.73
GLOBULIN UR ELPH-MCNC: 2.9 GM/DL
GLUCOSE BLDA-MCNC: 75 MMOL/L (ref 65–99)
GLUCOSE BLDC GLUCOMTR-MCNC: 70 MG/DL (ref 70–99)
GLUCOSE SERPL-MCNC: 64 MG/DL (ref 65–99)
GLUCOSE SERPL-MCNC: 84 MG/DL (ref 65–99)
HCO3 BLDA-SCNC: 18 MMOL/L (ref 22–26)
HCT VFR BLD AUTO: 40.1 % (ref 34–46.6)
HCT VFR BLD AUTO: 44.4 % (ref 34–46.6)
HGB BLD-MCNC: 13.7 G/DL (ref 12–15.9)
HGB BLD-MCNC: 15.4 G/DL (ref 12–15.9)
HGB BLDA-MCNC: 15 G/DL (ref 11.7–14.6)
HOLD SPECIMEN: NORMAL
HOLD SPECIMEN: NORMAL
IMM GRANULOCYTES # BLD AUTO: 0.02 10*3/MM3 (ref 0–0.05)
IMM GRANULOCYTES # BLD AUTO: 0.03 10*3/MM3 (ref 0–0.05)
IMM GRANULOCYTES NFR BLD AUTO: 0.2 % (ref 0–0.5)
IMM GRANULOCYTES NFR BLD AUTO: 0.3 % (ref 0–0.5)
INHALED O2 CONCENTRATION: 28 %
INR PPP: 0.97 (ref 2–3)
LACTATE BLDA-SCNC: 3.4 MMOL/L (ref 0.5–2)
LIPASE SERPL-CCNC: 32 U/L (ref 13–60)
LYMPHOCYTES # BLD AUTO: 1.77 10*3/MM3 (ref 0.7–3.1)
LYMPHOCYTES # BLD AUTO: 2.82 10*3/MM3 (ref 0.7–3.1)
LYMPHOCYTES NFR BLD AUTO: 21.3 % (ref 19.6–45.3)
LYMPHOCYTES NFR BLD AUTO: 29 % (ref 19.6–45.3)
MAGNESIUM SERPL-MCNC: 1.4 MG/DL (ref 1.6–2.4)
MAGNESIUM SERPL-MCNC: 1.6 MG/DL (ref 1.6–2.4)
MCH RBC QN AUTO: 34.5 PG (ref 26.6–33)
MCH RBC QN AUTO: 34.9 PG (ref 26.6–33)
MCHC RBC AUTO-ENTMCNC: 34.2 G/DL (ref 31.5–35.7)
MCHC RBC AUTO-ENTMCNC: 34.7 G/DL (ref 31.5–35.7)
MCV RBC AUTO: 102 FL (ref 79–97)
MCV RBC AUTO: 99.3 FL (ref 79–97)
METHGB BLD QL: 0.4 % (ref 0–1.5)
MODALITY: ABNORMAL
MONOCYTES # BLD AUTO: 0.53 10*3/MM3 (ref 0.1–0.9)
MONOCYTES # BLD AUTO: 0.77 10*3/MM3 (ref 0.1–0.9)
MONOCYTES NFR BLD AUTO: 6.4 % (ref 5–12)
MONOCYTES NFR BLD AUTO: 7.9 % (ref 5–12)
NEUTROPHILS NFR BLD AUTO: 5.89 10*3/MM3 (ref 1.7–7)
NEUTROPHILS NFR BLD AUTO: 6 10*3/MM3 (ref 1.7–7)
NEUTROPHILS NFR BLD AUTO: 61.8 % (ref 42.7–76)
NEUTROPHILS NFR BLD AUTO: 71 % (ref 42.7–76)
NOTE: ABNORMAL
NRBC BLD AUTO-RTO: 0 /100 WBC (ref 0–0.2)
NRBC BLD AUTO-RTO: 0 /100 WBC (ref 0–0.2)
NT-PROBNP SERPL-MCNC: 391.2 PG/ML (ref 0–900)
OXYHGB MFR BLDV: 95.3 % (ref 94–99)
PCO2 BLDA: 35.4 MM HG (ref 35–45)
PH BLDA: 7.32 PH UNITS (ref 7.35–7.45)
PHOSPHATE SERPL-MCNC: 4.6 MG/DL (ref 2.5–4.5)
PLATELET # BLD AUTO: 323 10*3/MM3 (ref 140–450)
PLATELET # BLD AUTO: 366 10*3/MM3 (ref 140–450)
PMV BLD AUTO: 10 FL (ref 6–12)
PMV BLD AUTO: 10.1 FL (ref 6–12)
PO2 BLD: 356 MM[HG] (ref 0–500)
PO2 BLDA: 99.8 MM HG (ref 80–100)
POTASSIUM BLDA-SCNC: 3.26 MMOL/L (ref 3.5–5)
POTASSIUM SERPL-SCNC: 3.6 MMOL/L (ref 3.5–5.2)
POTASSIUM SERPL-SCNC: 3.9 MMOL/L (ref 3.5–5.2)
PROT SERPL-MCNC: 7.2 G/DL (ref 6–8.5)
PROTHROMBIN TIME: 10.2 SECONDS (ref 9.4–12)
RBC # BLD AUTO: 3.93 10*6/MM3 (ref 3.77–5.28)
RBC # BLD AUTO: 4.47 10*6/MM3 (ref 3.77–5.28)
SAO2 % BLDCOA: 96 % (ref 95–99)
SODIUM BLDA-SCNC: 139.6 MMOL/L (ref 136–146)
SODIUM SERPL-SCNC: 140 MMOL/L (ref 136–145)
SODIUM SERPL-SCNC: 141 MMOL/L (ref 136–145)
TROPONIN I SERPL-MCNC: 0.01 NG/ML (ref 0–0.6)
TROPONIN I SERPL-MCNC: 0.06 NG/ML (ref 0–0.6)
WBC # BLD AUTO: 8.3 10*3/MM3 (ref 3.4–10.8)
WBC # BLD AUTO: 9.72 10*3/MM3 (ref 3.4–10.8)
WHOLE BLOOD HOLD SPECIMEN: NORMAL
WHOLE BLOOD HOLD SPECIMEN: NORMAL

## 2021-11-06 PROCEDURE — 99284 EMERGENCY DEPT VISIT MOD MDM: CPT

## 2021-11-06 PROCEDURE — 83050 HGB METHEMOGLOBIN QUAN: CPT | Performed by: EMERGENCY MEDICINE

## 2021-11-06 PROCEDURE — 82553 CREATINE MB FRACTION: CPT | Performed by: EMERGENCY MEDICINE

## 2021-11-06 PROCEDURE — 82962 GLUCOSE BLOOD TEST: CPT

## 2021-11-06 PROCEDURE — 82375 ASSAY CARBOXYHB QUANT: CPT | Performed by: EMERGENCY MEDICINE

## 2021-11-06 PROCEDURE — 83690 ASSAY OF LIPASE: CPT | Performed by: EMERGENCY MEDICINE

## 2021-11-06 PROCEDURE — 84484 ASSAY OF TROPONIN QUANT: CPT

## 2021-11-06 PROCEDURE — 93005 ELECTROCARDIOGRAM TRACING: CPT | Performed by: EMERGENCY MEDICINE

## 2021-11-06 PROCEDURE — 36600 WITHDRAWAL OF ARTERIAL BLOOD: CPT | Performed by: EMERGENCY MEDICINE

## 2021-11-06 PROCEDURE — 25010000002 LORAZEPAM PER 2 MG: Performed by: EMERGENCY MEDICINE

## 2021-11-06 PROCEDURE — 96375 TX/PRO/DX INJ NEW DRUG ADDON: CPT

## 2021-11-06 PROCEDURE — 96361 HYDRATE IV INFUSION ADD-ON: CPT

## 2021-11-06 PROCEDURE — 84484 ASSAY OF TROPONIN QUANT: CPT | Performed by: GENERAL PRACTICE

## 2021-11-06 PROCEDURE — 94799 UNLISTED PULMONARY SVC/PX: CPT

## 2021-11-06 PROCEDURE — 84443 ASSAY THYROID STIM HORMONE: CPT | Performed by: GENERAL PRACTICE

## 2021-11-06 PROCEDURE — G0378 HOSPITAL OBSERVATION PER HR: HCPCS

## 2021-11-06 PROCEDURE — 80053 COMPREHEN METABOLIC PANEL: CPT | Performed by: EMERGENCY MEDICINE

## 2021-11-06 PROCEDURE — 84100 ASSAY OF PHOSPHORUS: CPT | Performed by: GENERAL PRACTICE

## 2021-11-06 PROCEDURE — 99220 PR INITIAL OBSERVATION CARE/DAY 70 MINUTES: CPT | Performed by: GENERAL PRACTICE

## 2021-11-06 PROCEDURE — 82077 ASSAY SPEC XCP UR&BREATH IA: CPT | Performed by: EMERGENCY MEDICINE

## 2021-11-06 PROCEDURE — 94761 N-INVAS EAR/PLS OXIMETRY MLT: CPT

## 2021-11-06 PROCEDURE — 71045 X-RAY EXAM CHEST 1 VIEW: CPT

## 2021-11-06 PROCEDURE — 74176 CT ABD & PELVIS W/O CONTRAST: CPT

## 2021-11-06 PROCEDURE — 25010000002 ONDANSETRON PER 1 MG: Performed by: GENERAL PRACTICE

## 2021-11-06 PROCEDURE — 93005 ELECTROCARDIOGRAM TRACING: CPT

## 2021-11-06 PROCEDURE — 96365 THER/PROPH/DIAG IV INF INIT: CPT

## 2021-11-06 PROCEDURE — 36415 COLL VENOUS BLD VENIPUNCTURE: CPT

## 2021-11-06 PROCEDURE — 83735 ASSAY OF MAGNESIUM: CPT | Performed by: EMERGENCY MEDICINE

## 2021-11-06 PROCEDURE — 82805 BLOOD GASES W/O2 SATURATION: CPT | Performed by: EMERGENCY MEDICINE

## 2021-11-06 PROCEDURE — 85610 PROTHROMBIN TIME: CPT | Performed by: GENERAL PRACTICE

## 2021-11-06 PROCEDURE — 83880 ASSAY OF NATRIURETIC PEPTIDE: CPT | Performed by: EMERGENCY MEDICINE

## 2021-11-06 PROCEDURE — 83735 ASSAY OF MAGNESIUM: CPT | Performed by: GENERAL PRACTICE

## 2021-11-06 PROCEDURE — 85025 COMPLETE CBC W/AUTO DIFF WBC: CPT | Performed by: EMERGENCY MEDICINE

## 2021-11-06 PROCEDURE — 82550 ASSAY OF CK (CPK): CPT | Performed by: EMERGENCY MEDICINE

## 2021-11-06 PROCEDURE — 85025 COMPLETE CBC W/AUTO DIFF WBC: CPT | Performed by: GENERAL PRACTICE

## 2021-11-06 PROCEDURE — 70450 CT HEAD/BRAIN W/O DYE: CPT

## 2021-11-06 RX ORDER — DILTIAZEM HCL IN NACL,ISO-OSM 125 MG/125
5-15 PLASTIC BAG, INJECTION (ML) INTRAVENOUS
Status: DISCONTINUED | OUTPATIENT
Start: 2021-11-06 | End: 2021-11-09

## 2021-11-06 RX ORDER — LORAZEPAM 2 MG/ML
1 INJECTION INTRAMUSCULAR
Status: DISCONTINUED | OUTPATIENT
Start: 2021-11-06 | End: 2021-11-09

## 2021-11-06 RX ORDER — ASPIRIN 81 MG/1
81 TABLET ORAL DAILY
COMMUNITY

## 2021-11-06 RX ORDER — SODIUM CHLORIDE 0.9 % (FLUSH) 0.9 %
10 SYRINGE (ML) INJECTION AS NEEDED
Status: DISCONTINUED | OUTPATIENT
Start: 2021-11-06 | End: 2021-11-09 | Stop reason: HOSPADM

## 2021-11-06 RX ORDER — LORAZEPAM 0.5 MG/1
1 TABLET ORAL
Status: DISCONTINUED | OUTPATIENT
Start: 2021-11-06 | End: 2021-11-09

## 2021-11-06 RX ORDER — AMOXICILLIN 250 MG
2 CAPSULE ORAL 2 TIMES DAILY
Status: DISCONTINUED | OUTPATIENT
Start: 2021-11-06 | End: 2021-11-09 | Stop reason: HOSPADM

## 2021-11-06 RX ORDER — ONDANSETRON 2 MG/ML
4 INJECTION INTRAMUSCULAR; INTRAVENOUS ONCE
Status: COMPLETED | OUTPATIENT
Start: 2021-11-06 | End: 2021-11-06

## 2021-11-06 RX ORDER — SODIUM CHLORIDE 9 MG/ML
75 INJECTION, SOLUTION INTRAVENOUS CONTINUOUS
Status: DISCONTINUED | OUTPATIENT
Start: 2021-11-06 | End: 2021-11-09

## 2021-11-06 RX ORDER — POLYETHYLENE GLYCOL 3350 17 G/17G
17 POWDER, FOR SOLUTION ORAL DAILY PRN
Status: DISCONTINUED | OUTPATIENT
Start: 2021-11-06 | End: 2021-11-09 | Stop reason: HOSPADM

## 2021-11-06 RX ORDER — ASPIRIN 81 MG/1
324 TABLET, CHEWABLE ORAL ONCE
Status: COMPLETED | OUTPATIENT
Start: 2021-11-06 | End: 2021-11-08

## 2021-11-06 RX ORDER — LORAZEPAM 2 MG/ML
0.5 INJECTION INTRAMUSCULAR
Status: DISCONTINUED | OUTPATIENT
Start: 2021-11-06 | End: 2021-11-09

## 2021-11-06 RX ORDER — ONDANSETRON 4 MG/1
4 TABLET, FILM COATED ORAL EVERY 6 HOURS PRN
Status: DISCONTINUED | OUTPATIENT
Start: 2021-11-06 | End: 2021-11-09 | Stop reason: HOSPADM

## 2021-11-06 RX ORDER — SODIUM CHLORIDE 0.9 % (FLUSH) 0.9 %
10 SYRINGE (ML) INJECTION EVERY 12 HOURS SCHEDULED
Status: DISCONTINUED | OUTPATIENT
Start: 2021-11-06 | End: 2021-11-09 | Stop reason: HOSPADM

## 2021-11-06 RX ORDER — LORAZEPAM 0.5 MG/1
0.5 TABLET ORAL
Status: DISCONTINUED | OUTPATIENT
Start: 2021-11-06 | End: 2021-11-09

## 2021-11-06 RX ORDER — BISACODYL 10 MG
10 SUPPOSITORY, RECTAL RECTAL DAILY PRN
Status: DISCONTINUED | OUTPATIENT
Start: 2021-11-06 | End: 2021-11-09 | Stop reason: HOSPADM

## 2021-11-06 RX ORDER — BISACODYL 5 MG/1
5 TABLET, DELAYED RELEASE ORAL DAILY PRN
Status: DISCONTINUED | OUTPATIENT
Start: 2021-11-06 | End: 2021-11-09 | Stop reason: HOSPADM

## 2021-11-06 RX ORDER — LORAZEPAM 2 MG/ML
1 INJECTION INTRAMUSCULAR ONCE
Status: COMPLETED | OUTPATIENT
Start: 2021-11-06 | End: 2021-11-06

## 2021-11-06 RX ORDER — SODIUM CHLORIDE 9 MG/ML
INJECTION, SOLUTION INTRAVENOUS
Status: COMPLETED
Start: 2021-11-06 | End: 2021-11-06

## 2021-11-06 RX ADMIN — Medication 5 MG/HR: at 19:31

## 2021-11-06 RX ADMIN — SODIUM CHLORIDE 125 ML/HR: 9 INJECTION, SOLUTION INTRAVENOUS at 23:17

## 2021-11-06 RX ADMIN — SODIUM CHLORIDE 2000 ML: 9 INJECTION, SOLUTION INTRAVENOUS at 18:38

## 2021-11-06 RX ADMIN — ONDANSETRON 4 MG: 2 INJECTION INTRAMUSCULAR; INTRAVENOUS at 23:17

## 2021-11-06 RX ADMIN — LORAZEPAM 1 MG: 2 INJECTION INTRAMUSCULAR; INTRAVENOUS at 19:29

## 2021-11-06 NOTE — ED PROVIDER NOTES
Caitlyn,Time: 6:27 PM EDT  Arrived by: private car  Chief Complaint: Altered Mental Ststus  History provided by: Patient, pt's family  History is limited by: Altered Mental Status     History of Present Illness:  Patient is a 66 y.o. year old female that presents to the emergency department with altered mental status that began today. Over the past few days, the patient has had nausea, vomiting, generalized weakness, diarrhea, and fevers.     She is usually an alcohol drinker but has not been able to drink today. Pt's  did not smell alcohol on her.     She has a history of stroke in 2021.     [facility]    Patient Care Team  Primary Care Provider: Provider, No Known    Past Medical History:     Allergies   Allergen Reactions   • Cephalexin Unknown - Low Severity   • Diphenhydramine Unknown - Low Severity   • Levofloxacin Unknown - Low Severity   • Penicillins Unknown - Low Severity     Past Medical History:   Diagnosis Date   • Asthma    • Hypertension    • Stroke (HCC) 2021     Past Surgical History:   Procedure Laterality Date   • CHOLECYSTECTOMY       History reviewed. No pertinent family history.    Home Medications:  Prior to Admission medications    Medication Sig Start Date End Date Taking? Authorizing Provider   atorvastatin (LIPITOR) 80 MG tablet Take 1 tablet nightly at bedtime 10/2/21   Richard Urban DO   carvedilol (COREG) 3.125 MG tablet Take 3 tablets by mouth twice daily 10/2/21   Richard Urban DO   lisinopril (PRINIVIL,ZESTRIL) 20 MG tablet Take 2 tablets by mouth Daily. 10/2/21   Richard Urban DO        Social History:   Social History     Tobacco Use   • Smoking status: Former Smoker     Quit date:      Years since quittin.8   • Smokeless tobacco: Never Used   Substance Use Topics   • Alcohol use: Yes   • Drug use: Not Currently     Recent travel: not applicable     Review of Systems:  Review of Systems   Constitutional: Positive for fever. Negative for chills.   HENT: Negative for  "congestion, ear pain and sore throat.    Eyes: Negative for pain.   Respiratory: Negative for cough, chest tightness and shortness of breath.    Cardiovascular: Positive for chest pain.   Gastrointestinal: Positive for diarrhea, nausea and vomiting. Negative for abdominal pain.   Genitourinary: Negative for flank pain and hematuria.   Musculoskeletal: Negative for joint swelling.   Skin: Negative for pallor.   Neurological: Positive for weakness (generalized). Negative for seizures and headaches.        Altered Mental Status   All other systems reviewed and are negative.       Physical Exam:  /73   Pulse 89   Temp 97.8 °F (36.6 °C) (Oral)   Resp 20   Ht 167.6 cm (66\")   Wt 66 kg (145 lb 8.1 oz)   LMP  (LMP Unknown)   SpO2 97%   Breastfeeding No   BMI 23.48 kg/m²     Physical Exam  Vitals and nursing note reviewed.   Constitutional:       Appearance: She is not toxic-appearing.   HENT:      Head: Normocephalic and atraumatic.      Mouth/Throat:      Mouth: Mucous membranes are dry.   Eyes:      General: No scleral icterus.     Extraocular Movements: Extraocular movements intact.      Pupils: Pupils are equal, round, and reactive to light.   Cardiovascular:      Rate and Rhythm: Tachycardia present.      Pulses: Normal pulses.      Heart sounds: Normal heart sounds.      Comments: Irregularrly irregular  Pulmonary:      Effort: Pulmonary effort is normal. No respiratory distress.      Breath sounds: Normal breath sounds.   Abdominal:      General: Abdomen is flat.      Palpations: Abdomen is soft.      Tenderness: There is abdominal tenderness (mild RUQ).   Musculoskeletal:         General: Normal range of motion.      Cervical back: Normal range of motion and neck supple.   Skin:     General: Skin is warm and dry.   Neurological:      Mental Status: She is alert.      Comments: Lethargic but arousable                Medications in the Emergency Department:  Medications   sodium chloride 0.9 % flush 10 " mL (has no administration in time range)   aspirin chewable tablet 324 mg (0 mg Oral Hold 11/6/21 1839)   dilTIAZem (CARDIZEM) 125 mg in 125 mL 0.7% sodium chloride  infusion (0 mg/hr Intravenous Stopped 11/6/21 2010)   sodium chloride 0.9 % flush 10 mL (10 mL Intravenous Not Given 11/6/21 2257)   sodium chloride 0.9 % flush 10 mL (has no administration in time range)   sennosides-docusate (PERICOLACE) 8.6-50 MG per tablet 2 tablet (2 tablets Oral Not Given 11/6/21 2256)     And   polyethylene glycol (MIRALAX) packet 17 g (has no administration in time range)     And   bisacodyl (DULCOLAX) EC tablet 5 mg (has no administration in time range)     And   bisacodyl (DULCOLAX) suppository 10 mg (has no administration in time range)   ondansetron (ZOFRAN) tablet 4 mg (has no administration in time range)   sodium chloride 0.9 % infusion (125 mL/hr Intravenous New Bag 11/6/21 2317)   LORazepam (ATIVAN) tablet 0.5 mg (has no administration in time range)     Or   LORazepam (ATIVAN) injection 0.5 mg (has no administration in time range)     Or   LORazepam (ATIVAN) tablet 1 mg (has no administration in time range)     Or   LORazepam (ATIVAN) injection 1 mg (has no administration in time range)     Or   LORazepam (ATIVAN) injection 1 mg (has no administration in time range)     Or   LORazepam (ATIVAN) injection 1 mg (has no administration in time range)   sodium chloride 0.9 % bolus 2,000 mL (0 mL Intravenous Stopped 11/6/21 2017)   LORazepam (ATIVAN) injection 1 mg (1 mg Intravenous Given 11/6/21 1929)   dilTIAZem (CARDIZEM) bolus from bag 1 mg/mL 15 mg (15 mg Intravenous Bolus from Bag 11/6/21 1931)   ondansetron (ZOFRAN) injection 4 mg (4 mg Intravenous Given 11/6/21 2317)        Labs  Lab Results (last 24 hours)     Procedure Component Value Units Date/Time    POC Troponin I with Hold Tube [[id number]] Collected: 11/06/21 1828    Specimen: Blood Updated: 11/06/21 1932    Narrative:      The following orders were created for  panel order POC Troponin I with Hold Tube.  Procedure                               Abnormality         Status                     ---------                               -----------         ------                     POC Troponin I[[id number]]                                                              HOLD Troponin-I Tube[[id number]]                                                          Please view results for these tests on the individual orders.    CBC & Differential [[id number]]  (Abnormal) Collected: 11/06/21 1829    Specimen: Blood Updated: 11/06/21 1845    Narrative:      The following orders were created for panel order CBC & Differential.  Procedure                               Abnormality         Status                     ---------                               -----------         ------                     CBC Auto Differential[[id number]]        Abnormal            Final result                 Please view results for these tests on the individual orders.    Comprehensive Metabolic Panel [[id number]]  (Abnormal) Collected: 11/06/21 1829    Specimen: Blood Updated: 11/06/21 1916     Glucose 84 mg/dL      BUN 14 mg/dL      Creatinine 1.40 mg/dL      Sodium 140 mmol/L      Potassium 3.6 mmol/L      Comment: Slight hemolysis detected by analyzer. Results may be affected.        Chloride 97 mmol/L      CO2 24.1 mmol/L      Calcium 9.0 mg/dL      Total Protein 7.2 g/dL      Albumin 4.30 g/dL      ALT (SGPT) 64 U/L      AST (SGOT) 95 U/L      Alkaline Phosphatase 62 U/L      Total Bilirubin 0.3 mg/dL      eGFR Non African Amer 38 mL/min/1.73      Globulin 2.9 gm/dL      A/G Ratio 1.5 g/dL      BUN/Creatinine Ratio 10.0     Anion Gap 18.9 mmol/L     Narrative:      GFR Normal >60  Chronic Kidney Disease <60  Kidney Failure <15      Lipase [[id number]]  (Normal) Collected: 11/06/21 1829    Specimen: Blood Updated: 11/06/21 1916     Lipase 32 U/L     BNP [[id number]]  (Normal) Collected: 11/06/21 1829    Specimen:  Blood Updated: 11/06/21 1913     proBNP 391.2 pg/mL     Narrative:      Among patients with dyspnea, NT-proBNP is highly sensitive for the detection of acute congestive heart failure. In addition NT-proBNP of <300 pg/ml effectively rules out acute congestive heart failure with 99% negative predictive value.    Results may be falsely decreased if patient taking Biotin.      Magnesium [[id number]]  (Normal) Collected: 11/06/21 1829    Specimen: Blood Updated: 11/06/21 1916     Magnesium 1.6 mg/dL     CK Total & CKMB [[id number]]  (Normal) Collected: 11/06/21 1829    Specimen: Blood Updated: 11/06/21 1916     CKMB 1.69 ng/mL      Creatine Kinase 92 U/L     Narrative:      CKMB results may be falsely decreased if patient taking Biotin.    CBC Auto Differential [[id number]]  (Abnormal) Collected: 11/06/21 1829    Specimen: Blood Updated: 11/06/21 1845     WBC 9.72 10*3/mm3      RBC 4.47 10*6/mm3      Hemoglobin 15.4 g/dL      Hematocrit 44.4 %      MCV 99.3 fL      MCH 34.5 pg      MCHC 34.7 g/dL      RDW 13.2 %      RDW-SD 48.7 fl      MPV 10.1 fL      Platelets 366 10*3/mm3      Neutrophil % 61.8 %      Lymphocyte % 29.0 %      Monocyte % 7.9 %      Eosinophil % 0.0 %      Basophil % 1.0 %      Immature Grans % 0.3 %      Neutrophils, Absolute 6.00 10*3/mm3      Lymphocytes, Absolute 2.82 10*3/mm3      Monocytes, Absolute 0.77 10*3/mm3      Eosinophils, Absolute 0.00 10*3/mm3      Basophils, Absolute 0.10 10*3/mm3      Immature Grans, Absolute 0.03 10*3/mm3      nRBC 0.0 /100 WBC     Ethanol [[id number]]  (Abnormal) Collected: 11/06/21 1829    Specimen: Blood Updated: 11/06/21 1923     Ethanol 245 mg/dL      Ethanol % 0.245 %     Narrative:      Ethanol (Plasma)  <10 Essentially Negative    Toxic Concentrations           mg/dL    Flushing, slowing of reflexes    Impaired visual activity         Depression of CNS              >100  Possible Coma                  >300       POC Glucose Once [[id number]]  (Normal)  Collected: 11/06/21 1831    Specimen: Blood Updated: 11/06/21 1832     Glucose 70 mg/dL      Comment: Serial Number: [id number]:  [id number]       POC Troponin I [[id number]]  (Normal) Collected: 11/06/21 1832    Specimen: Blood Updated: 11/06/21 1844     Troponin I 0.01 ng/mL      Comment: Serial Number: [id number]:  [id number]       ABG with Co-Ox and Electrolytes [[id number]]  (Abnormal) Collected: 11/06/21 1852    Specimen: Arterial Blood from Arm, Right Updated: 11/06/21 1856     pH, Arterial 7.323 pH units      pCO2, Arterial 35.4 mm Hg      pO2, Arterial 99.8 mm Hg      HCO3, Arterial 18.0 mmol/L      Base Excess, Arterial -7.2 mmol/L      O2 Saturation, Arterial 96.0 %      Hemoglobin, Blood Gas 15.0 g/dL      Carboxyhemoglobin 0.3 %      Methemoglobin 0.40 %      Oxyhemoglobin 95.3 %      FHHB 4.0 %      Pantera's Test Positive     Note --     Site Arterial: right radial     Modality Nasal Cannula     FIO2 28 %      Flow Rate 2 lpm      Sodium, Arterial 139.6 mmol/L      Potassium, Arterial 3.26 mmol/L      Ionized Calcium, Arterial 1.05 mmol/L      Chloride, Arterial 103 mmol/L      Glucose, Arterial 75 mmol/L      Lactate, Arterial 3.40 mmol/L      PO2/FIO2 356    POC Troponin I [[id number]]  (Normal) Collected: 11/06/21 2015    Specimen: Blood Updated: 11/06/21 2027     Troponin I 0.06 ng/mL      Comment: Serial Number: [id number]:  [id number]       Basic Metabolic Panel [[id number]]  (Abnormal) Collected: 11/06/21 2209    Specimen: Blood Updated: 11/06/21 2241     Glucose 64 mg/dL      BUN 15 mg/dL      Creatinine 1.02 mg/dL      Sodium 141 mmol/L      Potassium 3.9 mmol/L      Chloride 103 mmol/L      CO2 22.1 mmol/L      Calcium 7.7 mg/dL      eGFR Non African Amer 54 mL/min/1.73      BUN/Creatinine Ratio 14.7     Anion Gap 15.9 mmol/L     Narrative:      GFR Normal >60  Chronic Kidney Disease <60  Kidney Failure <15      CBC & Differential [[id number]]  (Abnormal) Collected: 11/06/21 2209    Specimen:  Blood Updated: 11/06/21 2217    Narrative:      The following orders were created for panel order CBC & Differential.  Procedure                               Abnormality         Status                     ---------                               -----------         ------                     CBC Auto Differential[[id number]]        Abnormal            Final result                 Please view results for these tests on the individual orders.    Magnesium [[id number]]  (Abnormal) Collected: 11/06/21 2209    Specimen: Blood Updated: 11/06/21 2241     Magnesium 1.4 mg/dL     Phosphorus [[id number]]  (Abnormal) Collected: 11/06/21 2209    Specimen: Blood Updated: 11/06/21 2241     Phosphorus 4.6 mg/dL     Protime-INR [[id number]]  (Abnormal) Collected: 11/06/21 2209    Specimen: Blood Updated: 11/06/21 2222     Protime 10.2 Seconds      INR 0.97    Narrative:      Suggested Therapeutic Ranges For Oral Anticoagulant Therapy:  Level of Therapy                      INR Target Range  Standard Dose                            2.0-3.0  High Dose                                2.5-3.5  Patients not receiving anticoagulant  Therapy Normal Range                     0.6-1.2    CBC Auto Differential [[id number]]  (Abnormal) Collected: 11/06/21 2209    Specimen: Blood Updated: 11/06/21 2217     WBC 8.30 10*3/mm3      RBC 3.93 10*6/mm3      Hemoglobin 13.7 g/dL      Hematocrit 40.1 %      .0 fL      MCH 34.9 pg      MCHC 34.2 g/dL      RDW 13.3 %      RDW-SD 50.4 fl      MPV 10.0 fL      Platelets 323 10*3/mm3      Neutrophil % 71.0 %      Lymphocyte % 21.3 %      Monocyte % 6.4 %      Eosinophil % 0.0 %      Basophil % 1.1 %      Immature Grans % 0.2 %      Neutrophils, Absolute 5.89 10*3/mm3      Lymphocytes, Absolute 1.77 10*3/mm3      Monocytes, Absolute 0.53 10*3/mm3      Eosinophils, Absolute 0.00 10*3/mm3      Basophils, Absolute 0.09 10*3/mm3      Immature Grans, Absolute 0.02 10*3/mm3      nRBC 0.0 /100 WBC             Imaging:  CT Abdomen Pelvis Without Contrast    Result Date: 11/6/2021  PROCEDURE: CT ABDOMEN PELVIS WO CONTRAST  COMPARISON: [facility], CT, CHEST W/ CONTRAST, 2/13/2005, 19:02.  INDICATIONS: Nausea/vomiting.  TECHNIQUE: 751 CT images were created without intravenous contrast.  No oral contrast agent was administered for the study.  Patient's upper extremities in the scan field of view obscure detail.  PROTOCOL:   Standard imaging protocol performed    RADIATION:   DLP: 572.8 mGy*cm   Automated exposure control was utilized to minimize radiation dose.  FINDINGS: The patient has undergone cholecystectomy.  There is diffuse hepatic steatosis.  There may be borderline hepatomegaly.  There is hyposplenism.  The spleen is diffusely small and diffusely calcified, as before (and as with autosplenectomy).  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  Renal cysts are suspected, which measure about 2.2 cm in greatest diameter.  These findings are incompletely evaluated by nonenhanced CT examination.  There may be nonobstructing left nephrolithiasis.  No ureterolithiasis.  No definite right nephrolithiasis.  No hydronephrosis or obstructive uropathy is suggested.  Renal cortical scarring is seen, greater on the left.  There is a small to moderate hiatal hernia.  Atherosclerotic change is seen, including involvement of the coronary arteries.  No cardiac enlargement.  No acute infiltrate is seen within the partially imaged lung bases.  There may be mild atelectasis.  No acute pancreatitis.  No mechanical bowel obstruction.  No definite bowel wall thickening.  The appendix is without acute abnormality.  No acute colitis or diverticulitis.  There are scattered colonic diverticula.  No pneumoperitoneum or pneumatosis.  No portal or mesenteric venous gas.  The uterus is enlarged and retroverted.  Uterine fibroids are suspected.  For instance, there may be an intramural 6 cm uterine fundal mass.  There may be  calcified uterine fibroids, as well, especially on the left.  Nonemergent pelvic ultrasound examination may be helpful in further imaging assessment if clinically indicated.  Consider Gynecology consultation.  Mild degenerative changes are seen throughout the imaged spine.  No acute fracture or aggressive osseous lesion is appreciated.  Again, the patient's upper extremities in the scan field of view obscure detail.  Atherosclerotic change involves the aortoiliac arterial system without aneurysmal dilatation.  No urinary bladder calculi are seen.  No urinary bladder wall thickening.  CONCLUSION: No acute findings are seen.  Prior cholecystectomy.  Autosplenectomy.  Uterine fibroids are suspected.  There is a small to moderate sized hiatal hernia.  Please see above comments for further detail.     MIRIAM CAMARA JR, MD       Electronically Signed and Approved By: MIRIAM CAMARA JR, MD on 11/06/2021 at 19:47             CT Head Without Contrast    Result Date: 11/6/2021  PROCEDURE: CT HEAD WO CONTRAST  COMPARISONS: [facility], CT, CT HEAD WO CONTRAST STROKE PROTOCOL, 10/02/2021, 10:34.    [facility], CT, HEAD W/O CONTRAST, 3/25/2021, 9:04.  INDICATIONS: Mental status change, unknown cause.  Uncontrollable shaking.  PROTOCOL:   Standard imaging protocol performed    RADIATION:   DLP: 2034.2 mGy*cm   MA and/or KV was adjusted to minimize radiation dose.    TECHNIQUE: After obtaining the patient's consent, 258 CT images were obtained without non-ionic intravenous contrast material.  Images were repeated due to motion artifact.  DISCUSSION: A routine nonenhanced head CT was performed. No acute brain abnormality is identified. No acute intracranial hemorrhage. No acute infarction. No acute skull fracture. No midline shift or acute intracranial mass effect is seen.  Mild chronic small vessel ischemia/infarction is suspected. There are arterial calcifications. The extra-axial spaces and the  ventricular system are prominent.  The patient has undergone left-sided cataract extraction with intra-ocular lens implantation.  The imaged middle ear clefts (that is, the bilateral mastoid air cell complexes, bilateral middle ear cavities, and bilateral external auditory canals) are well aerated.  No air-fluid interfaces are seen within the imaged paranasal sinuses.  There may be mild age-indeterminate mucosal thickening.  CONCLUSION: No acute brain abnormality is seen.     MIRIAM CAMARA JR, MD       Electronically Signed and Approved By: MIRIAM CAMARA JR, MD on 11/06/2021 at 19:38             XR Chest 1 View    Result Date: 11/6/2021  PROCEDURE: XR CHEST 1 VW  COMPARISON: [facility], CR, XR CHEST 1 VW, 10/02/2021, 9:41.  INDICATIONS: Chest Pain triage protocol/chest pain starting today.  FINDINGS:  A single AP semiupright portable chest radiograph was performed.  No cardiac enlargement is seen.  No acute infiltrate is appreciated.  No pleural effusion or pneumothorax is identified.  The thoracic aorta is atherosclerotic. External artifacts obscure detail. Chronic calcified granulomatous disease involves the chest.  There is slight pulmonary hypoinflation.  Otherwise, no significant interval change is seen since the prior study.  CONCLUSION:  No acute infiltrate is appreciated.       MIRIAM CAMARA JR, MD       Electronically Signed and Approved By: MIRIAM CAMARA JR, MD on 11/06/2021 at 19:18               Procedures:  Procedures    Progress  ED Course as of 11/06/21 2320   Sat Nov 06, 2021   1837 EKG: EKG interpreted by independently by Dr. Hu shows a Afib RVR with a rate of 155. No p-waves. QRS nml. Mild ST depression in V2-V6 and I. Changed since previous EKG on 10/2021.  [LG]      ED Course User Index  [LG] Paras Strong                            Medical Decision Making:  MDM  Number of Diagnoses or Management Options     Amount and/or Complexity of Data Reviewed  Clinical lab tests:  reviewed  Tests in the radiology section of CPT®: reviewed  Tests in the medicine section of CPT®: reviewed  Decide to obtain previous medical records or to obtain history from someone other than the patient: yes    Critical Care  Total time providing critical care: 30-74 minutes (I spent greater than 35 minutes in critical care for this patient, excluding any time spent on any billable procedures.    I reviewed the blood work and vital signs including pulse oximetry, cardiac output, chest x-ray and EKG.      I started IV diltiazem bolus and titrated diltiazem drip for rate control of her A. fib with RVR, and she converted to a sinus rhythm here.    I aggressively bolused her IV fluids for her dehydration and acute renal insufficiency.    I reassessed the patient at the bedside and she looks mild to moderately improved but still needs to be admitted, and I consulted another physician to be involved in this patient's medical care.)             For my differential diagnosis in this patient with altered mental status, I considered low blood sugar, head injury, alcohol intoxication, substance abuse, toxic or metabolic encephalopathy, less likely seizure.      This patient is a 66-year-old female with previous history of stroke and alcohol dependence who presents with nausea and vomiting and diarrhea the past 2 days and unable to tolerate p.o. including any alcohol.    She is presenting with some altered mental status and tachycardic and low blood pressure and looks severely dehydrated.    Lab work shows some signs of dehydration and some acute renal insufficiency, and we are aggressively hydrating her with fluids here.    On reassessment her blood pressure is, but I am giving her a dose of Ativan for presumed alcohol withdrawal symptoms.    CT of the head was performed for some altered mental status, and CT abdomen and pelvis performed for her acute vomiting and diarrhea.    Her CT scans of come back negative for any  acute findings.    I started her on IV diltiazem bolus and diltiazem drip and titrated upwards to achieve rate control of her paroxysmal rapid atrial fibrillation with RVR, and she ended up converting back to a sinus rhythm here in the ED.    She looks clinically stable at this time and will be admitted to a monitored bed.          Final diagnoses:   Nausea vomiting and diarrhea   Dehydration   Acute renal insufficiency   Alcohol withdrawal syndrome, with delirium (HCC)   Paroxysmal atrial fibrillation with RVR (HCC)        Disposition:  ED Disposition     ED Disposition Condition Comment    Decision to Admit  Level of Care: Med/Surg [1]   Diagnosis: Nausea vomiting and diarrhea [[id number]]   Admitting Physician: JAIRO JOSUE [[id number]]   Attending Physician: JAIRO JOSUE [[id number]]            This medical record created using voice recognition software and may contain unintended errors.    Documentation assistance provided by Paras Strong acting as scribe for Bonifacio Hu MD. Information recorded by the scribe was done at my direction and has been verified and validated by me.        Paras Strong  11/06/21 1840       Bonifacio Hu MD  11/06/21 0927

## 2021-11-07 LAB
ALBUMIN SERPL-MCNC: 3.7 G/DL (ref 3.5–5.2)
ALBUMIN/GLOB SERPL: 1.9 G/DL
ALP SERPL-CCNC: 47 U/L (ref 39–117)
ALT SERPL W P-5'-P-CCNC: 46 U/L (ref 1–33)
ANION GAP SERPL CALCULATED.3IONS-SCNC: 18.6 MMOL/L (ref 5–15)
APTT PPP: 23.7 SECONDS (ref 22.2–34.2)
AST SERPL-CCNC: 56 U/L (ref 1–32)
BACTERIA UR QL AUTO: ABNORMAL /HPF
BASOPHILS # BLD AUTO: 0.12 10*3/MM3 (ref 0–0.2)
BASOPHILS NFR BLD AUTO: 1.2 % (ref 0–1.5)
BILIRUB SERPL-MCNC: 0.6 MG/DL (ref 0–1.2)
BILIRUB UR QL STRIP: NEGATIVE
BUN SERPL-MCNC: 19 MG/DL (ref 8–23)
BUN/CREAT SERPL: 23.5 (ref 7–25)
CALCIUM SPEC-SCNC: 7.5 MG/DL (ref 8.6–10.5)
CHLORIDE SERPL-SCNC: 100 MMOL/L (ref 98–107)
CHOLEST SERPL-MCNC: 113 MG/DL (ref 0–200)
CLARITY UR: ABNORMAL
CO2 SERPL-SCNC: 17.4 MMOL/L (ref 22–29)
COD CRY URNS QL: ABNORMAL /HPF
COLOR UR: YELLOW
CREAT SERPL-MCNC: 0.81 MG/DL (ref 0.57–1)
DEPRECATED RDW RBC AUTO: 49 FL (ref 37–54)
EOSINOPHIL # BLD AUTO: 0 10*3/MM3 (ref 0–0.4)
EOSINOPHIL NFR BLD AUTO: 0 % (ref 0.3–6.2)
ERYTHROCYTE [DISTWIDTH] IN BLOOD BY AUTOMATED COUNT: 13.2 % (ref 12.3–15.4)
FOLATE SERPL-MCNC: 11.6 NG/ML (ref 4.78–24.2)
GFR SERPL CREATININE-BSD FRML MDRD: 71 ML/MIN/1.73
GLOBULIN UR ELPH-MCNC: 2 GM/DL
GLUCOSE SERPL-MCNC: 62 MG/DL (ref 65–99)
GLUCOSE UR STRIP-MCNC: NEGATIVE MG/DL
HBA1C MFR BLD: 5.1 % (ref 4.8–5.6)
HCT VFR BLD AUTO: 36.7 % (ref 34–46.6)
HDLC SERPL-MCNC: 47 MG/DL (ref 40–60)
HGB BLD-MCNC: 12.5 G/DL (ref 12–15.9)
HGB UR QL STRIP.AUTO: NEGATIVE
HYALINE CASTS UR QL AUTO: ABNORMAL /LPF
IMM GRANULOCYTES # BLD AUTO: 0.03 10*3/MM3 (ref 0–0.05)
IMM GRANULOCYTES NFR BLD AUTO: 0.3 % (ref 0–0.5)
INR PPP: 0.98 (ref 2–3)
KETONES UR QL STRIP: ABNORMAL
LDLC SERPL CALC-MCNC: 50 MG/DL (ref 0–100)
LDLC/HDLC SERPL: 1.07 {RATIO}
LEUKOCYTE ESTERASE UR QL STRIP.AUTO: ABNORMAL
LYMPHOCYTES # BLD AUTO: 2.36 10*3/MM3 (ref 0.7–3.1)
LYMPHOCYTES NFR BLD AUTO: 23.3 % (ref 19.6–45.3)
MAGNESIUM SERPL-MCNC: 1.2 MG/DL (ref 1.6–2.4)
MCH RBC QN AUTO: 34.2 PG (ref 26.6–33)
MCHC RBC AUTO-ENTMCNC: 34.1 G/DL (ref 31.5–35.7)
MCV RBC AUTO: 100.3 FL (ref 79–97)
MONOCYTES # BLD AUTO: 1.04 10*3/MM3 (ref 0.1–0.9)
MONOCYTES NFR BLD AUTO: 10.3 % (ref 5–12)
NEUTROPHILS NFR BLD AUTO: 6.57 10*3/MM3 (ref 1.7–7)
NEUTROPHILS NFR BLD AUTO: 64.9 % (ref 42.7–76)
NITRITE UR QL STRIP: POSITIVE
NRBC BLD AUTO-RTO: 0 /100 WBC (ref 0–0.2)
PH UR STRIP.AUTO: 5.5 [PH] (ref 5–8)
PHOSPHATE SERPL-MCNC: 4.1 MG/DL (ref 2.5–4.5)
PLATELET # BLD AUTO: 314 10*3/MM3 (ref 140–450)
PMV BLD AUTO: 10.4 FL (ref 6–12)
POTASSIUM SERPL-SCNC: 3.6 MMOL/L (ref 3.5–5.2)
PROT SERPL-MCNC: 5.7 G/DL (ref 6–8.5)
PROT UR QL STRIP: ABNORMAL
PROTHROMBIN TIME: 10.4 SECONDS (ref 9.4–12)
QT INTERVAL: 310 MS
QT INTERVAL: 420 MS
QT INTERVAL: 432 MS
RBC # BLD AUTO: 3.66 10*6/MM3 (ref 3.77–5.28)
RBC # UR: ABNORMAL /HPF
REF LAB TEST METHOD: ABNORMAL
SODIUM SERPL-SCNC: 136 MMOL/L (ref 136–145)
SP GR UR STRIP: 1.01 (ref 1–1.03)
SQUAMOUS #/AREA URNS HPF: ABNORMAL /HPF
TRIGL SERPL-MCNC: 79 MG/DL (ref 0–150)
TROPONIN T SERPL-MCNC: 0.08 NG/ML (ref 0–0.03)
TROPONIN T SERPL-MCNC: 0.09 NG/ML (ref 0–0.03)
TSH SERPL DL<=0.05 MIU/L-ACNC: 1.38 UIU/ML (ref 0.27–4.2)
UROBILINOGEN UR QL STRIP: ABNORMAL
VIT B12 BLD-MCNC: 235 PG/ML (ref 211–946)
VLDLC SERPL-MCNC: 16 MG/DL (ref 5–40)
WBC # BLD AUTO: 10.12 10*3/MM3 (ref 3.4–10.8)
WBC UR QL AUTO: ABNORMAL /HPF

## 2021-11-07 PROCEDURE — 36415 COLL VENOUS BLD VENIPUNCTURE: CPT | Performed by: GENERAL PRACTICE

## 2021-11-07 PROCEDURE — 87040 BLOOD CULTURE FOR BACTERIA: CPT | Performed by: GENERAL PRACTICE

## 2021-11-07 PROCEDURE — 83036 HEMOGLOBIN GLYCOSYLATED A1C: CPT | Performed by: GENERAL PRACTICE

## 2021-11-07 PROCEDURE — 25010000002 LORAZEPAM PER 2 MG: Performed by: GENERAL PRACTICE

## 2021-11-07 PROCEDURE — 80053 COMPREHEN METABOLIC PANEL: CPT | Performed by: GENERAL PRACTICE

## 2021-11-07 PROCEDURE — 93005 ELECTROCARDIOGRAM TRACING: CPT | Performed by: GENERAL PRACTICE

## 2021-11-07 PROCEDURE — 83735 ASSAY OF MAGNESIUM: CPT | Performed by: GENERAL PRACTICE

## 2021-11-07 PROCEDURE — 97161 PT EVAL LOW COMPLEX 20 MIN: CPT

## 2021-11-07 PROCEDURE — 96375 TX/PRO/DX INJ NEW DRUG ADDON: CPT

## 2021-11-07 PROCEDURE — 99226 PR SBSQ OBSERVATION CARE/DAY 35 MINUTES: CPT | Performed by: INTERNAL MEDICINE

## 2021-11-07 PROCEDURE — 85610 PROTHROMBIN TIME: CPT | Performed by: GENERAL PRACTICE

## 2021-11-07 PROCEDURE — 25010000002 CEFTRIAXONE PER 250 MG: Performed by: GENERAL PRACTICE

## 2021-11-07 PROCEDURE — 84100 ASSAY OF PHOSPHORUS: CPT | Performed by: GENERAL PRACTICE

## 2021-11-07 PROCEDURE — 97116 GAIT TRAINING THERAPY: CPT

## 2021-11-07 PROCEDURE — 85730 THROMBOPLASTIN TIME PARTIAL: CPT | Performed by: GENERAL PRACTICE

## 2021-11-07 PROCEDURE — G0378 HOSPITAL OBSERVATION PER HR: HCPCS

## 2021-11-07 PROCEDURE — 84484 ASSAY OF TROPONIN QUANT: CPT | Performed by: GENERAL PRACTICE

## 2021-11-07 PROCEDURE — 25010000002 HYDRALAZINE PER 20 MG: Performed by: GENERAL PRACTICE

## 2021-11-07 PROCEDURE — 81001 URINALYSIS AUTO W/SCOPE: CPT | Performed by: GENERAL PRACTICE

## 2021-11-07 PROCEDURE — 80061 LIPID PANEL: CPT | Performed by: GENERAL PRACTICE

## 2021-11-07 PROCEDURE — 82746 ASSAY OF FOLIC ACID SERUM: CPT | Performed by: GENERAL PRACTICE

## 2021-11-07 PROCEDURE — 0 MAGNESIUM SULFATE 4 GM/100ML SOLUTION: Performed by: INTERNAL MEDICINE

## 2021-11-07 PROCEDURE — 96361 HYDRATE IV INFUSION ADD-ON: CPT

## 2021-11-07 PROCEDURE — 25010000002 ENOXAPARIN PER 10 MG: Performed by: INTERNAL MEDICINE

## 2021-11-07 PROCEDURE — 25010000002 CALCIUM GLUCONATE-NACL 1-0.675 GM/50ML-% SOLUTION: Performed by: INTERNAL MEDICINE

## 2021-11-07 PROCEDURE — 82607 VITAMIN B-12: CPT | Performed by: GENERAL PRACTICE

## 2021-11-07 PROCEDURE — 63710000001 ONDANSETRON PER 8 MG: Performed by: GENERAL PRACTICE

## 2021-11-07 PROCEDURE — 96372 THER/PROPH/DIAG INJ SC/IM: CPT

## 2021-11-07 PROCEDURE — 96376 TX/PRO/DX INJ SAME DRUG ADON: CPT

## 2021-11-07 PROCEDURE — 85025 COMPLETE CBC W/AUTO DIFF WBC: CPT | Performed by: GENERAL PRACTICE

## 2021-11-07 RX ORDER — ASPIRIN 81 MG/1
81 TABLET ORAL DAILY
Status: DISCONTINUED | OUTPATIENT
Start: 2021-11-08 | End: 2021-11-09 | Stop reason: HOSPADM

## 2021-11-07 RX ORDER — HEPARIN SODIUM 10000 [USP'U]/100ML
12 INJECTION, SOLUTION INTRAVENOUS
Status: DISCONTINUED | OUTPATIENT
Start: 2021-11-07 | End: 2021-11-07

## 2021-11-07 RX ORDER — LORAZEPAM 2 MG/ML
1 INJECTION INTRAMUSCULAR ONCE
Status: COMPLETED | OUTPATIENT
Start: 2021-11-07 | End: 2021-11-07

## 2021-11-07 RX ORDER — ASPIRIN 81 MG/1
81 TABLET ORAL ONCE
Status: COMPLETED | OUTPATIENT
Start: 2021-11-07 | End: 2021-11-07

## 2021-11-07 RX ORDER — ACETAMINOPHEN 325 MG/1
650 TABLET ORAL EVERY 4 HOURS PRN
Status: DISCONTINUED | OUTPATIENT
Start: 2021-11-07 | End: 2021-11-09 | Stop reason: HOSPADM

## 2021-11-07 RX ORDER — CEFTRIAXONE SODIUM 1 G/50ML
1 INJECTION, SOLUTION INTRAVENOUS NIGHTLY
Status: DISCONTINUED | OUTPATIENT
Start: 2021-11-07 | End: 2021-11-09 | Stop reason: HOSPADM

## 2021-11-07 RX ORDER — ATORVASTATIN CALCIUM 40 MG/1
80 TABLET, FILM COATED ORAL NIGHTLY
Status: DISCONTINUED | OUTPATIENT
Start: 2021-11-07 | End: 2021-11-09 | Stop reason: HOSPADM

## 2021-11-07 RX ORDER — CALCIUM GLUCONATE 20 MG/ML
1 INJECTION, SOLUTION INTRAVENOUS ONCE
Status: COMPLETED | OUTPATIENT
Start: 2021-11-07 | End: 2021-11-07

## 2021-11-07 RX ORDER — HYDRALAZINE HYDROCHLORIDE 20 MG/ML
5 INJECTION INTRAMUSCULAR; INTRAVENOUS EVERY 4 HOURS PRN
Status: DISCONTINUED | OUTPATIENT
Start: 2021-11-07 | End: 2021-11-09

## 2021-11-07 RX ORDER — MAGNESIUM SULFATE HEPTAHYDRATE 40 MG/ML
4 INJECTION, SOLUTION INTRAVENOUS ONCE
Status: COMPLETED | OUTPATIENT
Start: 2021-11-07 | End: 2021-11-07

## 2021-11-07 RX ADMIN — CALCIUM GLUCONATE 1 G: 20 INJECTION, SOLUTION INTRAVENOUS at 08:13

## 2021-11-07 RX ADMIN — MAGNESIUM SULFATE 4 G: 4 INJECTION INTRAVENOUS at 08:13

## 2021-11-07 RX ADMIN — ASPIRIN 81 MG: 81 TABLET, COATED ORAL at 11:55

## 2021-11-07 RX ADMIN — CEFTRIAXONE SODIUM 1 G: 1 INJECTION, SOLUTION INTRAVENOUS at 21:14

## 2021-11-07 RX ADMIN — ACETAMINOPHEN 650 MG: 325 TABLET ORAL at 04:05

## 2021-11-07 RX ADMIN — SODIUM CHLORIDE 125 ML/HR: 9 INJECTION, SOLUTION INTRAVENOUS at 21:14

## 2021-11-07 RX ADMIN — ENOXAPARIN SODIUM 40 MG: 40 INJECTION SUBCUTANEOUS at 11:55

## 2021-11-07 RX ADMIN — ATORVASTATIN CALCIUM 80 MG: 40 TABLET, FILM COATED ORAL at 21:14

## 2021-11-07 RX ADMIN — LORAZEPAM 0.5 MG: 2 INJECTION INTRAMUSCULAR; INTRAVENOUS at 04:05

## 2021-11-07 RX ADMIN — CEFTRIAXONE SODIUM 1 G: 1 INJECTION, SOLUTION INTRAVENOUS at 06:03

## 2021-11-07 RX ADMIN — SODIUM CHLORIDE, PRESERVATIVE FREE 10 ML: 5 INJECTION INTRAVENOUS at 08:13

## 2021-11-07 RX ADMIN — SODIUM CHLORIDE 125 ML/HR: 9 INJECTION, SOLUTION INTRAVENOUS at 05:36

## 2021-11-07 RX ADMIN — ONDANSETRON HYDROCHLORIDE 4 MG: 4 TABLET, FILM COATED ORAL at 04:05

## 2021-11-07 RX ADMIN — LORAZEPAM 1 MG: 2 INJECTION INTRAMUSCULAR; INTRAVENOUS at 06:04

## 2021-11-07 RX ADMIN — HYDRALAZINE HYDROCHLORIDE 5 MG: 20 INJECTION INTRAMUSCULAR; INTRAVENOUS at 21:14

## 2021-11-07 RX ADMIN — SODIUM CHLORIDE 125 ML/HR: 9 INJECTION, SOLUTION INTRAVENOUS at 14:43

## 2021-11-07 RX ADMIN — SODIUM CHLORIDE, PRESERVATIVE FREE 10 ML: 5 INJECTION INTRAVENOUS at 21:14

## 2021-11-07 RX ADMIN — HYDRALAZINE HYDROCHLORIDE 5 MG: 20 INJECTION INTRAMUSCULAR; INTRAVENOUS at 11:27

## 2021-11-07 NOTE — THERAPY EVALUATION
Acute Care - Physical Therapy Initial Evaluation   Allie     Patient Name: Kiesha Pan  : 1955  MRN: [id number]  Today's Date: 2021   Onset of Illness/Injury or Date of Surgery: 21  Visit Dx:     ICD-10-CM ICD-9-CM   1. Nausea vomiting and diarrhea  R11.2 787.91    R19.7 787.01   2. Dehydration  E86.0 276.51   3. Acute renal insufficiency  N28.9 593.9   4. Alcohol withdrawal syndrome, with delirium ([facility])  F10.231 291.0   5. Paroxysmal atrial fibrillation with RVR ([facility])  I48.0 427.31   6. Difficulty in walking  R26.2 719.7     Patient Active Problem List   Diagnosis   • Nausea vomiting and diarrhea     Past Medical History:   Diagnosis Date   • Asthma    • Hypertension    • Stroke ([facility]) 2021     Past Surgical History:   Procedure Laterality Date   • CHOLECYSTECTOMY       PT Assessment (last 12 hours)     PT Evaluation and Treatment     Row Name 21 1058          Physical Therapy Time and Intention    Subjective Information complains of; fatigue  -DW     Document Type evaluation  -DW     Mode of Treatment individual therapy  -DW     Patient Effort good  -DW     Row Name 21 1058          General Information    Patient Profile Reviewed yes  -DW     Onset of Illness/Injury or Date of Surgery 21  -DW     Referring Physician Mike Stout  -DW     Patient Observations alert  -DW     Prior Level of Function independent:  -DW     Equipment Currently Used at Home cane, straight  -DW     Risks Reviewed patient:  -DW     Benefits Reviewed patient:; improve function; increase independence; increase strength; increase balance  -DW     Barriers to Rehab none identified  -DW     Row Name 21 1058          Previous Level of Function/Home Environm    BADLs, Premorbid Functional Level needs assistive device for safe performance  -DW     IADLs, Premorbid Functional Level needs assistive device for safe performance  -DW     Household Ambulation, Premorbid Functional Level needs assistive  device for safe performance  -     Community Ambulation, Premorbid Functional Level needs assistive device for safe performance  -     Row Name 11/07/21 1058          Living Environment    Current Living Arrangements home/apartment/condo  -     Row Name 11/07/21 1058          Home Use of Assistive/Adaptive Equipment    Equipment Currently Used at Home cane, straight  -     Row Name 11/07/21 1058          Cognition    Affect/Mental Status (Cognitive) WNL  -     Orientation Status (Cognition) oriented x 3  -     Row Name 11/07/21 1058          Range of Motion Comprehensive    General Range of Motion no range of motion deficits identified  -[facility] Name 11/07/21 1058          Strength Comprehensive (MMT)    General Manual Muscle Testing (MMT) Assessment lower extremity strength deficits identified  -     Comment, General Manual Muscle Testing (MMT) Assessment 4/5  -[facility] Name 11/07/21 1058          Bed Mobility    Bed Mobility bed mobility (all) activities  -     All Activities, [place] (Bed Mobility) minimum assist (75% patient effort); moderate assist (50% patient effort)  -[facility] Name 11/07/21 1058          Transfers    Transfers bed-chair transfer  -     Bed-Chair Burt (Transfers) minimum assist (75% patient effort); moderate assist (50% patient effort)  -     Row Name 11/07/21 1058          Gait/Stairs (Locomotion)    Gait/Stairs Locomotion gait/ambulation independence; gait/ambulation assistive device; distance ambulated  -     [place] Level (Gait) minimum assist (75% patient effort); moderate assist (50% patient effort)  -     Assistive Device (Gait) walker, front-wheeled  -     Distance in Feet (Gait) 20  -     Row Name 11/07/21 1058          Safety Issues, Functional Mobility    Impairments Affecting Function (Mobility) balance; endurance/activity tolerance; strength  -[facility] Name 11/07/21 1058          Balance    Balance Assessment standing dynamic  balance  -DW     Dynamic Standing Balance mild impairment  -DW     Row Name 11/07/21 1058          Plan of Care Review    Plan of Care Reviewed With patient  -DW     Progress improving  -DW     Outcome Summary Pt presents with impairments that contribute to poor functional mobility.  skilled PT needed to restore to PLOF  -DW     Row Name 11/07/21 1058          Physical Therapy Goals    Bed Mobility Goal Selection (PT) bed mobility, PT goal 1  -DW     Transfer Goal Selection (PT) transfer, PT goal 1  -DW     Gait Training Goal Selection (PT) gait training, PT goal 1  -DW     Row Name 11/07/21 1058          Bed Mobility Goal 1 (PT)    Activity/Assistive Device (Bed Mobility Goal 1, PT) bed mobility activities, all  -DW     [place] Level/Cues Needed (Bed Mobility Goal 1, PT) independent  -DW     Time Frame (Bed Mobility Goal 1, PT) long term goal (LTG); 10 days  -DW     Row Name 11/07/21 1058          Transfer Goal 1 (PT)    Activity/Assistive Device (Transfer Goal 1, PT) transfers, all  -DW     [place] Level/Cues Needed (Transfer Goal 1, PT) independent  -DW     Time Frame (Transfer Goal 1, PT) long term goal (LTG); 10 days  -DW     Row Name 11/07/21 1058          Gait Training Goal 1 (PT)    Activity/Assistive Device (Gait Training Goal 1, PT) gait (walking locomotion); assistive device use  -DW     [place] Level (Gait Training Goal 1, PT) standby assist  -DW     Distance (Gait Training Goal 1, PT) 20  -DW     Time Frame (Gait Training Goal 1, PT) long term goal (LTG); 10 days  -     Row Name 11/07/21 1058          Therapy Assessment/Plan (PT)    PT Diagnosis (PT) Difficulty in walking  -DW     Rehab Potential (PT) good, to achieve stated therapy goals  -DW     Criteria for Skilled Interventions Met (PT) yes; meets criteria; skilled treatment is necessary  -DW     Problem List (PT) problems related to; balance; strength  -DW     Activity Limitations Related to Problem List (PT) unable to ambulate  safely; unable to transfer safely; BADLs not performed adequately or safely; IADLs not performed adequately or safely  -     Row Name 11/07/21 1058          PT Evaluation Complexity    History, PT Evaluation Complexity no personal factors and/or comorbidities  -     Examination of Body Systems (PT Eval Complexity) 1-2 elements  -DW     Clinical Presentation (PT Evaluation Complexity) stable  -     Clinical Decision Making (PT Evaluation Complexity) low complexity  -     Overall Complexity (PT Evaluation Complexity) low complexity  -     Row Name 11/07/21 1058          Therapy Plan Review/Discharge Plan (PT)    Therapy Plan Review (PT) evaluation/treatment results reviewed  -           User Key  (r) = Recorded By, (t) = Taken By, (c) = Cosigned By    Initials Name Provider Type    Ori Moreno, KACEY Physical Therapist              Physical Therapy Education                 Title: PT OT SLP Therapies (Done)     Topic: Physical Therapy (Done)     Point: Mobility training (Done)     Learning Progress Summary           Patient Acceptance, E,TB, VU by  at 11/7/2021 1118                   Point: Home exercise program (Done)     Learning Progress Summary           Patient Acceptance, E,TB, VU by  at 11/7/2021 1118                   Point: Body mechanics (Done)     Learning Progress Summary           Patient Acceptance, E,TB, VU by  at 11/7/2021 1118                   Point: Precautions (Done)     Learning Progress Summary           Patient Acceptance, E,TB, VU by  at 11/7/2021 1118                               User Key     Initials Effective Dates Name Provider Type Discipline     04/25/21 -  Ori Hays, PT Physical Therapist PT              PT Recommendation and Plan  Anticipated Discharge Disposition (PT): home with home health  Planned Therapy Interventions (PT): balance training, gait training, strengthening, transfer training  Therapy Frequency (PT): daily  Plan of Care Reviewed With:  patient  Progress: improving  Outcome Summary: Pt presents with impairments that contribute to poor functional mobility.  skilled PT needed to restore to PLOF   Outcome Measures     Row Name 11/07/21 1100             How much help from another person do you currently need...    Turning from your back to your side while in flat bed without using bedrails? 3  -DW      Moving from lying on back to sitting on the side of a flat bed without bedrails? 3  -DW      Moving to and from a bed to a chair (including a wheelchair)? 3  -DW      Standing up from a chair using your arms (e.g., wheelchair, bedside chair)? 3  -DW      Climbing 3-5 steps with a railing? 2  -DW      To walk in hospital room? 2  -DW      AM-PAC 6 Clicks Score (PT) 16  -DW              Functional Assessment    Outcome Measure Options AM-PAC 6 Clicks Basic Mobility (PT)  -DW            User Key  (r) = Recorded By, (t) = Taken By, (c) = Cosigned By    Initials Name Provider Type     Ori Hays PT Physical Therapist                 Time Calculation:    PT Charges     Row Name 11/07/21 1120             Time Calculation    PT Received On 11/07/21  -DW      PT Goal Re-Cert Due Date 11/16/21  -DW              Timed Charges    23163 - Gait Training Minutes  15  -DW              Untimed Charges    PT Eval/Re-eval Minutes 10  -DW              Total Minutes    Timed Charges Total Minutes 15  -DW      Untimed Charges Total Minutes 10  -DW       Total Minutes 25  -DW            User Key  (r) = Recorded By, (t) = Taken By, (c) = Cosigned By    Initials Name Provider Type     Ori Hays, KACEY Physical Therapist              Therapy Charges for Today     Code Description Service Date Service Provider Modifiers Qty    93209010698 HC GAIT TRAINING EA 15 MIN 11/7/2021 Ori Hays, PT GP 1    56477978313 HC PT EVAL LOW COMPLEXITY 2 11/7/2021 Ori Hays, PT GP 1          PT G-Codes  Outcome Measure Options: AM-PAC 6 Clicks Basic Mobility (PT)  AM-PAC 6  Clicks Score (PT): 16    Ori Hays, PT  11/7/2021

## 2021-11-07 NOTE — PROGRESS NOTES
[facility]   Hospitalist Progress Note  Date: 2021  Patient Name: Kiesha Pan  : 1955  MRN: [id number]  Date of admission: 2021  Consultants:   -Cardiology: Dr. Josué Purdy    Subjective   Subjective     Chief Complaint: Nausea, vomiting and diarrhea    Summary:   Kiesha Pan is a 66 y.o. female with past medical history significant for hypertension, CVA, asthma, C. difficile colitis and alcohol abuse who presented to the ED with complaints of nausea, vomiting and diarrhea.  Patient also found to have altered mental status upon arrival.  Evaluation in the ED significant for labs showing elevated alcohol level, elevated creatinine, elevated troponin and electrolyte abnormalities.  Hospitalist service contacted for further evaluation and management.  Cardiology consulted to assist in care.  Initially patient started on heparin drip pending cardiology evaluation.  Urinalysis felt to be consistent with UTI per admitting physician, patient started on Rocephin.  Patient also started on CIWA protocol for alcohol withdrawal.    Interval Followup:   No acute events since admission.  Patient's mentation improving.  Troponin uptrending.  Patient denied any chest pain, shortness of breath, abdominal pain, nausea or vomiting.  Patient endorsed feeling very fatigued and weak.  Magnesium low on a.m. labs.  Nursing with no additional acute issues to report.    Antibiotics:   -Rocephin    Review of Systems   All systems reviewed and negative unless stated otherwise under subjective.    Objective   Objective     Vitals:   Temp:  [97.4 °F (36.3 °C)-99 °F (37.2 °C)] 98.2 °F (36.8 °C)  Heart Rate:  [] 84  Resp:  [16-22] 16  BP: ()/(40-78) 151/68  Flow (L/min):  [1.5-2] 1.5  Physical Exam   Gen: No acute distress, easily arousable, resting comfortably in bed  HEENT: MMM, Atraumatic  Neck: Supple, Trachea midline  Resp: CTAB, good aeration, no w/r/r, No respiratory distress appreciated, equal chest  rise bilaterally  Card: RRR, No m/r/g  Abd: Soft, Nontender, Nondistended, + bowel sounds  Ext: No cyanosis, No clubbing  Neuro: CN II-XII grossly intact, No focal deficits appreciated  Psych: AAO x 3, Normal mood, Normal affect    Result Review    Result Review:  I have personally reviewed the results from the time of this admission to 11/7/2021 11:05 EST and agree with these findings:  [x]  Laboratory: Magnesium low.  Troponin uptrending.  Calcium low.  WBC and hemoglobin stable.  [x]  Microbiology: Blood culture (11/07/2021): Pending  []  Radiology:   [x]  EKG/Telemetry:    []  Cardiology/Vascular:    []  Pathology:  []  Old records:  []  Other:    Assessment/Plan   Assessment / Plan     Assessment:  Acute metabolic encephalopathy  Urinary tract infection due to unknown organism  Acute renal failure  Elevated troponin  History of hypertension  Chronic alcohol abuse and concern for alcohol withdrawal  Hypocalcemia  Hypomagnesemia    Plan:  -Cardiology consulted and following, appreciate assistance and recommendations in the care of this patient.  -Discontinue heparin drip.  Trend troponins.  Patient will have stress test on 11/08/2021 per cardiology  -Replace calcium and magnesium IV  -Continue CIWA protocol  -Continue ceftriaxone, plan to treat for total of 5 days (Day 1/5)  -Continue IV fluids  -Creatinine improved on repeat labs, continue to monitor  -C. difficile testing pending  -Start appropriate home medications  -Will monitor electrolytes and renal function with BMP and magnesium level in the AM  -Will monitor WBC and Hgb with CBC in the AM  -Clinical course will dictate further management     DVT Prophylaxis: Lovenox  Diet: Cardiac  Dispo: PT/OT consulted  Code Status: Full code     Personally reviewed patients labs and imaging, discussed with patient and nurse at bedside. Discussed case with the following consultants: Cardiology.     Part of this note may be an electronic transcription/translation of  spoken language to printed text using the Dragon dictation system.    DVT prophylaxis:  Mechanical DVT prophylaxis orders are present.    CODE STATUS:   Code Status (Patient has no pulse and is not breathing): CPR (Attempt to Resuscitate)  Medical Interventions (Patient has pulse or is breathing): Full Support        Electronically signed by Mike García MD, 11/07/21, 11:05 AM EST.

## 2021-11-07 NOTE — CASE MANAGEMENT/SOCIAL WORK
Discharge Planning Assessment   Allie     Patient Name: Kiesha Pan  MRN: [id number]  Today's Date: 11/7/2021    Admit Date: 11/6/2021     Discharge Needs Assessment     Row Name 11/07/21 1118       Living Environment    Lives With spouse    Current Living Arrangements home/apartment/condo    Primary Care Provided by self    Family Caregiver if Needed spouse    Quality of Family Relationships helpful; involved; supportive    Able to Return to Prior Arrangements yes    Living Arrangement Comments Patient lives at home with her . Patient reported that she is independent at home.       Resource/Environmental Concerns    Resource/Environmental Concerns none       Transition Planning    Patient/Family Anticipates Transition to home; home with family    Patient/Family Anticipated Services at Transition none    Transportation Anticipated family or friend will provide       Discharge Needs Assessment    Equipment Currently Used at Home cane, quad    Anticipated Changes Related to Illness none    Equipment Needed After Discharge walker, rolling    Discharge Facility/Level of Care Needs home with home health    Discharge Coordination/Progress Patient with complaints of nausea vomiting and diarrhea. SW spoke with patient and  at bedside. Patient is alert and oriented. Patient very weak. Patient lives at home with her . Patient  reported that patient is slow and weak at home. Patient  reported that he assists patient with her needs. Patient  reported that they plan to return home at discharge.               Discharge Plan    No documentation.               Continued Care and Services - Admitted Since 11/6/2021    Coordination has not been started for this encounter.          Demographic Summary     Row Name 11/07/21 1116       General Information    Admission Type observation    Arrived From home    Referral Source admission list    Reason for Consult discharge planning    Preferred  Language English     Used During This Interaction no       Contact Information    Permission Granted to Share Info With                Functional Status     Row Name 11/07/21 1117       Functional Status    Usual Activity Tolerance excellent    Current Activity Tolerance excellent       Functional Status, IADL    Medications independent    Meal Preparation independent    Housekeeping independent    Laundry independent       Mental Status    General Appearance WDL WDL       Mental Status Summary    Recent Changes in Mental Status/Cognitive Functioning no changes       Employment/    Employment Status unemployed               Psychosocial    No documentation.                Abuse/Neglect    No documentation.                Legal     Row Name 11/07/21 1118       Legal    Criminal Activity/Legal Involvement none               Substance Abuse    No documentation.                Patient Forms    No documentation.                   JAYNE Alvarado

## 2021-11-07 NOTE — PLAN OF CARE
Goal Outcome Evaluation: Patient received hydralazine once today for increased B/P. Patient sat up in chair most of the day. No complaints or concerns at this time. Patients CIWA score at 4, no prn ativan needed this shift.

## 2021-11-07 NOTE — H&P
[facility] HISTORY AND PHYSICAL  Date: 2021   Patient Name: Kiesha Pan  : 1955  MRN: [id number]  Primary Care Physician:  Debra, No Known  Date of admission: 2021    Subjective   Subjective     Chief Complaint: Nausea vomiting and diarrhea.    HPI: Kiesha Pan is a 66 y.o. female who presents with complaints of nausea vomiting and diarrhea.  Patient has altered mental status lethargic and drowsy.  Patient typically drinks alcohol and had elevated alcohol level on admission.  Patient denies abdominal pain.  She was drowsy.  She denies suicidal ideations.  States she has had multiple days of diarrhea and decreased p.o. intake.  Patient does have a past medical history significant for C. difficile colitis.  She states she has been feeling weaker than normal she is slow to respond.         Personal History   ROS     Constitutional: No fever, unintentional weight loss, malaise  HEENT: No vision changes, loss of vision, double vision, difficulty swallowing, painful swallowing, or tinnitus  Respiratory: No cough, shortness of breath, sputum production, or hemoptysis  Cardiovascular: No chest pain, palpitations, orthopnea, or paroxysmal nocturnal dyspnea  Gastrointestinal: Abdominal pain, nausea, vomiting, diarrhea.  Genitourinary: No dysuria, hematuria, bladder incontinence, frequency, nocturia, or hesitancy  Musculoskeletal: No arthritis, joint swelling, deformities or joint pain  Endocrine: No fatigue, heat or cold intolerance  Hematologic: No excessive bruising or bleeding  Psychiatric: No Anxiety or depression  Neurologic: No Confusion, numbness, or weakness  Skin: No rash or open wounds         PMH  Past Medical History:   Diagnosis Date   • Asthma    • Hypertension    • Stroke ([facility]) 2021         [facility]  Past Surgical History:   Procedure Laterality Date   • CHOLECYSTECTOMY         FH  History reviewed. No pertinent family history.    SOCIAL HISTORY   reports that she quit  smoking about 24 years ago. She has never used smokeless tobacco. She reports current alcohol use. She reports previous drug use.     ALLERGIES   Allergies   Allergen Reactions   • Cephalexin Unknown - Low Severity   • Diphenhydramine Unknown - Low Severity   • Levofloxacin Unknown - Low Severity   • Penicillins Unknown - Low Severity       HOME MEDICINES  Prior to Admission Medications   Prescriptions Last Dose Informant Patient Reported? Taking?   aspirin 81 MG EC tablet 11/6/2021 at Unknown time  Yes Yes   Sig: Take 81 mg by mouth Daily.   atorvastatin (LIPITOR) 80 MG tablet 11/5/2021 at Unknown time  No Yes   Sig: Take 1 tablet nightly at bedtime   carvedilol (COREG) 3.125 MG tablet 11/6/2021 at Unknown time  No Yes   Sig: Take 3 tablets by mouth twice daily   lisinopril (PRINIVIL,ZESTRIL) 20 MG tablet 11/6/2021 at Unknown time  No Yes   Sig: Take 2 tablets by mouth Daily.      Facility-Administered Medications: None       Objective     Vitals:   Temp:  [97.4 °F (36.3 °C)-97.8 °F (36.6 °C)] 97.8 °F (36.6 °C)  Heart Rate:  [] 89  Resp:  [20-22] 20  BP: ()/(40-78) 131/73  Flow (L/min):  [2] 2    Physical Exam  Vital signs were reviewed.  General appearance - Patient appears well-developed and well-nourished.    Head - Normocephalic, atraumatic.  Pupils - Equal, round, reactive to light.  Extraocular muscles are intact.  Conjunctive is clear  Oral mucosa - Pink and moist without lesions or erythema.  Uvula is midline.  Neck - Supple.  Trachea was midline.  There is no palpable lymphadenopathy or thyromegaly.  There are no meningeal signs  Lungs -Breath sounds equal bilateral.  No crackles no rails.  Heart -Regular rate and rhythm no murmurs no gallops.  Abdomen -     There is no rebound, guarding, or rigidity.  There are no palpable masses.  There are no pulsatile masses.  Back - Spine is straight and midline.  There is no CVA tenderness.  Extremities - Intact x4 with full range of motion.  There is no  palpable edema.  Neurologic - Cranial nerves II through XII are grossly intact.  Patient has right-sided residual hemiparesis.  Cerebellar function was normal.  Integument - There are no rashes.  There are no petechia or purpura lesions noted.  There are no vesicular lesions noted.           Assessment / Plan     Assessment/Plan:   Metabolic encephalopathy.  -Multifactorial  -Patient has an alcohol level of 245.  -Urinary tract infection, was placed on Rocephin  -Acute dehydration and possible gastroenteritis    Acute renal failure.   -IV fluids ordered.    History of hypertension  -Hydralazine as needed.    UTI   -Rocephin and blood cultures obtained.    Elevated troponin  -Demand ischemia?  -Trend troponins  -Serial EKGs  -Deferred need for cardiology consult to a.. hospitalist.    Elevated MCV with a history of alcohol use.  -We will check vitamin B12 and folate.    Chronic history of alcohol use.  -We will start CIWA protocol with Ativan as needed.      DVT prophylaxis:  Mechanical DVT prophylaxis orders are present.    CODE STATUS:       Result Review:    I have personally reviewed the results from the time of this admission to 6/11/2021 06:16 EDT and agree with these findings:  [x]  Laboratory  [x]  Microbiology  [x]  Radiology  [x]  EKG/Telemetry   []  Cardiology/Vascular   []  Pathology  []  Old records  []  Other:    Admission Status:  I believe this patient meets inpatient status.    Electronically signed by Melissa David MD, 11/06/21, 11:49 PM EDT.

## 2021-11-08 ENCOUNTER — APPOINTMENT (OUTPATIENT)
Dept: NUCLEAR MEDICINE | Facility: HOSPITAL | Age: 66
End: 2021-11-08

## 2021-11-08 LAB
027 TOXIN: NORMAL
ANION GAP SERPL CALCULATED.3IONS-SCNC: 9.9 MMOL/L (ref 5–15)
BASOPHILS # BLD AUTO: 0.09 10*3/MM3 (ref 0–0.2)
BASOPHILS NFR BLD AUTO: 1.1 % (ref 0–1.5)
BH CV IMMEDIATE POST TECH DATA BLOOD PRESSURE: NORMAL MMHG
BH CV IMMEDIATE POST TECH DATA HEART RATE: 115 BPM
BH CV IMMEDIATE POST TECH DATA OXYGEN SATS: 97 %
BH CV REST NUCLEAR ISOTOPE DOSE: 9.8 MCI
BH CV SIX MINUTE RECOVERY TECH DATA BLOOD PRESSURE: NORMAL
BH CV SIX MINUTE RECOVERY TECH DATA HEART RATE: 118 BPM
BH CV SIX MINUTE RECOVERY TECH DATA OXYGEN SATURATION: 94 %
BH CV SIX MINUTE RECOVERY TECH DATA SYMPTOMS: NORMAL
BH CV STRESS BP STAGE 1: NORMAL
BH CV STRESS COMMENTS STAGE 1: NORMAL
BH CV STRESS DOSE REGADENOSON STAGE 1: 0.4
BH CV STRESS DURATION MIN STAGE 1: 0
BH CV STRESS DURATION SEC STAGE 1: 10
BH CV STRESS HR STAGE 1: 102
BH CV STRESS NUCLEAR ISOTOPE DOSE: 37.5 MCI
BH CV STRESS O2 STAGE 1: 94
BH CV STRESS PROTOCOL 1: NORMAL
BH CV STRESS RECOVERY BP: NORMAL MMHG
BH CV STRESS RECOVERY HR: 118 BPM
BH CV STRESS RECOVERY O2: 94 %
BH CV STRESS STAGE 1: 1
BH CV THREE MINUTE POST TECH DATA BLOOD PRESSURE: NORMAL MMHG
BH CV THREE MINUTE POST TECH DATA HEART RATE: 120 BPM
BH CV THREE MINUTE POST TECH DATA OXYGEN SATURATION: 95 %
BUN SERPL-MCNC: 12 MG/DL (ref 8–23)
BUN/CREAT SERPL: 16.4 (ref 7–25)
C DIFF TOX GENS STL QL NAA+PROBE: NEGATIVE
CALCIUM SPEC-SCNC: 8.3 MG/DL (ref 8.6–10.5)
CHLORIDE SERPL-SCNC: 105 MMOL/L (ref 98–107)
CO2 SERPL-SCNC: 22.1 MMOL/L (ref 22–29)
CREAT SERPL-MCNC: 0.73 MG/DL (ref 0.57–1)
DEPRECATED RDW RBC AUTO: 47.8 FL (ref 37–54)
EOSINOPHIL # BLD AUTO: 0 10*3/MM3 (ref 0–0.4)
EOSINOPHIL NFR BLD AUTO: 0 % (ref 0.3–6.2)
ERYTHROCYTE [DISTWIDTH] IN BLOOD BY AUTOMATED COUNT: 12.9 % (ref 12.3–15.4)
GFR SERPL CREATININE-BSD FRML MDRD: 80 ML/MIN/1.73
GLUCOSE SERPL-MCNC: 149 MG/DL (ref 65–99)
HCT VFR BLD AUTO: 34.7 % (ref 34–46.6)
HGB BLD-MCNC: 12.1 G/DL (ref 12–15.9)
IMM GRANULOCYTES # BLD AUTO: 0.03 10*3/MM3 (ref 0–0.05)
IMM GRANULOCYTES NFR BLD AUTO: 0.4 % (ref 0–0.5)
LV EF NUC BP: 63 %
LYMPHOCYTES # BLD AUTO: 1.36 10*3/MM3 (ref 0.7–3.1)
LYMPHOCYTES NFR BLD AUTO: 16.5 % (ref 19.6–45.3)
MAGNESIUM SERPL-MCNC: 1.8 MG/DL (ref 1.6–2.4)
MAXIMAL PREDICTED HEART RATE: 154 BPM
MCH RBC QN AUTO: 35 PG (ref 26.6–33)
MCHC RBC AUTO-ENTMCNC: 34.9 G/DL (ref 31.5–35.7)
MCV RBC AUTO: 100.3 FL (ref 79–97)
MONOCYTES # BLD AUTO: 0.87 10*3/MM3 (ref 0.1–0.9)
MONOCYTES NFR BLD AUTO: 10.5 % (ref 5–12)
NEUTROPHILS NFR BLD AUTO: 5.91 10*3/MM3 (ref 1.7–7)
NEUTROPHILS NFR BLD AUTO: 71.5 % (ref 42.7–76)
NRBC BLD AUTO-RTO: 0 /100 WBC (ref 0–0.2)
PERCENT MAX PREDICTED HR: 77.27 %
PHOSPHATE SERPL-MCNC: 2.1 MG/DL (ref 2.5–4.5)
PLATELET # BLD AUTO: 297 10*3/MM3 (ref 140–450)
PMV BLD AUTO: 10.6 FL (ref 6–12)
POTASSIUM SERPL-SCNC: 3.6 MMOL/L (ref 3.5–5.2)
RBC # BLD AUTO: 3.46 10*6/MM3 (ref 3.77–5.28)
SODIUM SERPL-SCNC: 137 MMOL/L (ref 136–145)
STRESS BASELINE BP: NORMAL MMHG
STRESS BASELINE HR: 99 BPM
STRESS O2 SAT REST: 93 %
STRESS PERCENT HR: 91 %
STRESS POST O2 SAT PEAK: 97 %
STRESS POST PEAK BP: NORMAL MMHG
STRESS POST PEAK HR: 119 BPM
STRESS TARGET HR: 131 BPM
WBC # BLD AUTO: 8.26 10*3/MM3 (ref 3.4–10.8)

## 2021-11-08 PROCEDURE — 97116 GAIT TRAINING THERAPY: CPT

## 2021-11-08 PROCEDURE — G0378 HOSPITAL OBSERVATION PER HR: HCPCS

## 2021-11-08 PROCEDURE — 99214 OFFICE O/P EST MOD 30 MIN: CPT | Performed by: SPECIALIST

## 2021-11-08 PROCEDURE — 92610 EVALUATE SWALLOWING FUNCTION: CPT

## 2021-11-08 PROCEDURE — 99226 PR SBSQ OBSERVATION CARE/DAY 35 MINUTES: CPT | Performed by: INTERNAL MEDICINE

## 2021-11-08 PROCEDURE — 80048 BASIC METABOLIC PNL TOTAL CA: CPT | Performed by: GENERAL PRACTICE

## 2021-11-08 PROCEDURE — 78452 HT MUSCLE IMAGE SPECT MULT: CPT

## 2021-11-08 PROCEDURE — 25010000002 ENOXAPARIN PER 10 MG: Performed by: INTERNAL MEDICINE

## 2021-11-08 PROCEDURE — 78452 HT MUSCLE IMAGE SPECT MULT: CPT | Performed by: SPECIALIST

## 2021-11-08 PROCEDURE — 25010000002 CEFTRIAXONE PER 250 MG: Performed by: GENERAL PRACTICE

## 2021-11-08 PROCEDURE — 87493 C DIFF AMPLIFIED PROBE: CPT | Performed by: INTERNAL MEDICINE

## 2021-11-08 PROCEDURE — 97165 OT EVAL LOW COMPLEX 30 MIN: CPT

## 2021-11-08 PROCEDURE — 0 TECHNETIUM TETROFOSMIN KIT: Performed by: INTERNAL MEDICINE

## 2021-11-08 PROCEDURE — 93016 CV STRESS TEST SUPVJ ONLY: CPT | Performed by: NURSE PRACTITIONER

## 2021-11-08 PROCEDURE — 96376 TX/PRO/DX INJ SAME DRUG ADON: CPT

## 2021-11-08 PROCEDURE — 84100 ASSAY OF PHOSPHORUS: CPT | Performed by: GENERAL PRACTICE

## 2021-11-08 PROCEDURE — 96361 HYDRATE IV INFUSION ADD-ON: CPT

## 2021-11-08 PROCEDURE — 93017 CV STRESS TEST TRACING ONLY: CPT

## 2021-11-08 PROCEDURE — 25010000002 HYDRALAZINE PER 20 MG: Performed by: GENERAL PRACTICE

## 2021-11-08 PROCEDURE — 83735 ASSAY OF MAGNESIUM: CPT | Performed by: GENERAL PRACTICE

## 2021-11-08 PROCEDURE — 85025 COMPLETE CBC W/AUTO DIFF WBC: CPT | Performed by: GENERAL PRACTICE

## 2021-11-08 PROCEDURE — A9502 TC99M TETROFOSMIN: HCPCS | Performed by: INTERNAL MEDICINE

## 2021-11-08 PROCEDURE — 25010000002 REGADENOSON 0.4 MG/5ML SOLUTION

## 2021-11-08 PROCEDURE — 96372 THER/PROPH/DIAG INJ SC/IM: CPT

## 2021-11-08 PROCEDURE — 93018 CV STRESS TEST I&R ONLY: CPT | Performed by: SPECIALIST

## 2021-11-08 RX ORDER — CARVEDILOL 6.25 MG/1
6.25 TABLET ORAL 2 TIMES DAILY
Status: DISCONTINUED | OUTPATIENT
Start: 2021-11-08 | End: 2021-11-09 | Stop reason: HOSPADM

## 2021-11-08 RX ORDER — LISINOPRIL 20 MG/1
40 TABLET ORAL DAILY
Status: DISCONTINUED | OUTPATIENT
Start: 2021-11-08 | End: 2021-11-09 | Stop reason: HOSPADM

## 2021-11-08 RX ADMIN — HYDRALAZINE HYDROCHLORIDE 5 MG: 20 INJECTION INTRAMUSCULAR; INTRAVENOUS at 10:00

## 2021-11-08 RX ADMIN — TETROFOSMIN 1 DOSE: 1.38 INJECTION, POWDER, LYOPHILIZED, FOR SOLUTION INTRAVENOUS at 09:30

## 2021-11-08 RX ADMIN — SODIUM CHLORIDE, PRESERVATIVE FREE 10 ML: 5 INJECTION INTRAVENOUS at 20:16

## 2021-11-08 RX ADMIN — ATORVASTATIN CALCIUM 80 MG: 40 TABLET, FILM COATED ORAL at 20:15

## 2021-11-08 RX ADMIN — REGADENOSON 0.4 MG: 0.08 INJECTION, SOLUTION INTRAVENOUS at 09:30

## 2021-11-08 RX ADMIN — CEFTRIAXONE SODIUM 1 G: 1 INJECTION, SOLUTION INTRAVENOUS at 20:16

## 2021-11-08 RX ADMIN — LISINOPRIL 40 MG: 20 TABLET ORAL at 11:55

## 2021-11-08 RX ADMIN — HYDRALAZINE HYDROCHLORIDE 5 MG: 20 INJECTION INTRAMUSCULAR; INTRAVENOUS at 16:44

## 2021-11-08 RX ADMIN — ASPIRIN 324 MG: 81 TABLET, CHEWABLE ORAL at 10:53

## 2021-11-08 RX ADMIN — SODIUM CHLORIDE 125 ML/HR: 9 INJECTION, SOLUTION INTRAVENOUS at 04:28

## 2021-11-08 RX ADMIN — CARVEDILOL 6.25 MG: 6.25 TABLET, FILM COATED ORAL at 20:15

## 2021-11-08 RX ADMIN — SODIUM CHLORIDE, PRESERVATIVE FREE 10 ML: 5 INJECTION INTRAVENOUS at 10:54

## 2021-11-08 RX ADMIN — TETROFOSMIN 1 DOSE: 1.38 INJECTION, POWDER, LYOPHILIZED, FOR SOLUTION INTRAVENOUS at 08:00

## 2021-11-08 RX ADMIN — HYDRALAZINE HYDROCHLORIDE 5 MG: 20 INJECTION INTRAMUSCULAR; INTRAVENOUS at 02:00

## 2021-11-08 RX ADMIN — ENOXAPARIN SODIUM 40 MG: 40 INJECTION SUBCUTANEOUS at 10:54

## 2021-11-08 RX ADMIN — CARVEDILOL 6.25 MG: 6.25 TABLET, FILM COATED ORAL at 11:55

## 2021-11-08 RX ADMIN — SODIUM CHLORIDE 75 ML/HR: 9 INJECTION, SOLUTION INTRAVENOUS at 16:44

## 2021-11-08 NOTE — CONSULTS
"Nutrition Services    Patient Name: Kiesha Pan  YOB: 1955  MRN: [id number]  Admission date: 11/6/2021      CLINICAL NUTRITION ASSESSMENT      Reason for Assessment  MST score 2+     H&P:    Past Medical History:   Diagnosis Date   • Asthma    • Hypertension    • Stroke (HCC) 03/2021        Current Problems:   Active Hospital Problems    Diagnosis    • Nausea vomiting and diarrhea         Nutrition/Diet History         Narrative     AMS/metabolic encephalopathy, EtOH abuse. history of C. difficile colitis.  CIWA protocol.  Pt states she is having so many problems with her  creating stress and it is affecting her intake; she is for example eating at [facility] one plate instead of two  RN notes 50% or less since admission. She anticipates d/c and is packing her things. It is difficult to get pt redirected to talk about nutritional intake.  Will trial Boost + bid     Anthropometrics        Current Height, Weight Height: 167.6 cm (66\")  Weight: 67.4 kg (148 lb 9.4 oz)   Current BMI Body mass index is 23.98 kg/m².       Weight Hx  Wt Readings from Last 30 Encounters:   11/06/21 2315 67.4 kg (148 lb 9.4 oz)   11/06/21 1824 66 kg (145 lb 8.1 oz)   11/06/21 1822 66 kg (145 lb 8.1 oz)   10/02/21 0925 67 kg (147 lb 11.3 oz)   04/06/21 0000 67.8 kg (149 lb 6 oz)   11/08/19 0000 77.1 kg (170 lb)   10/10/19 0000 74.6 kg (164 lb 9 oz)   09/24/19 0000 74.2 kg (163 lb 9 oz)   08/16/19 0000 74.6 kg (164 lb 6 oz)   08/06/19 0000 75 kg (165 lb 6 oz)            Wt Change Observation Wt stable since April when she was dx with covid 19.     Estimated/Assessed Needs       Energy Requirements    EST Needs (kcal/day) 1800kcal       Protein Requirements    EST Daily Needs (g/day) 67-80g       Fluid Requirements     Estimated Needs (mL/day) 1800ml     Labs/Medications         Pertinent Labs Reviewed.   Results from last 7 days   Lab Units 11/08/21  0426 11/07/21  0440 11/06/21  2209 11/06/21  1852 11/06/21  1829 "   SODIUM mmol/L 137 136 141   < > 140   SODIUM, ARTERIAL   --   --   --    < >  --    POTASSIUM mmol/L 3.6 3.6 3.9   < > 3.6   CHLORIDE mmol/L 105 100 103   < > 97*   CO2 mmol/L 22.1 17.4* 22.1   < > 24.1   BUN mg/dL 12 19 15   < > 14   CREATININE mg/dL 0.73 0.81 1.02*   < > 1.40*   CALCIUM mg/dL 8.3* 7.5* 7.7*   < > 9.0   BILIRUBIN mg/dL  --  0.6  --   --  0.3   ALK PHOS U/L  --  47  --   --  62   ALT (SGPT) U/L  --  46*  --   --  64*   AST (SGOT) U/L  --  56*  --   --  95*   GLUCOSE mg/dL 149* 62* 64*   < > 84   GLUCOSE, ARTERIAL   --   --   --    < >  --     < > = values in this interval not displayed.     Results from last 7 days   Lab Units 11/08/21  0426 11/07/21  0440 11/07/21  0440 11/06/21  2209 11/06/21  2209   MAGNESIUM mg/dL 1.8  --  1.2*  --  1.4*   PHOSPHORUS mg/dL 2.1*   < > 4.1   < > 4.6*   HEMOGLOBIN g/dL 12.1   < > 12.5   < > 13.7   HEMATOCRIT % 34.7   < > 36.7   < > 40.1   TRIGLYCERIDES mg/dL  --   --  79  --   --     < > = values in this interval not displayed.     Coronavirus (COVID-19)   Date Value Ref Range Status   04/01/2021 DETECTED (A) NA Final     Comment:     The SARS-CoV-2 assay is a real-time, RT-PCR test intended  for the qualitative detection of nucleic acid from the  SARS-CoV-2 in respiratory specimens from individuals,  testing performed at [facility].       Lab Results   Component Value Date    HGBA1C 5.10 11/07/2021         Pertinent Medications Reviewed.     Current Nutrition Orders & Evaluation of Intake       Oral Nutrition     Current PO Diet Diet Regular; Cardiac   Supplement No active supplement orders       Nutrition Diagnosis         Nutrition Dx Problem 1 Inadequate oral Intake related to decreased ability to consume sufficient energy 2' AMS as evidenced by patient report.       Nutrition Intervention         Trial of boost Plus (14g protein 360kcal) twice daily.  Patient asked to discontinue complains of lactose intolerance.     Medical Nutrition  Therapy/Nutrition Education          Learner     Readiness Patient  Non-acceptance     Method     Response Explanation  Other     Monitor/Evaluation        Monitor PO intake       Nutrition Discharge Plan         regular diet       Electronically signed by:  Chen Torrez RD  11/08/21 07:13 EST

## 2021-11-08 NOTE — PROGRESS NOTES
[facility]   Hospitalist Progress Note  Date: 2021  Patient Name: Kiesha Pan  : 1955  MRN: [id number]  Date of admission: 2021  Consultants:   -Cardiology: Dr. Josué Purdy    Subjective   Subjective     Chief Complaint: Nausea, vomiting and diarrhea    Summary:   Kiesha Pan is a 66 y.o. female with past medical history significant for hypertension, CVA, asthma, C. difficile colitis and alcohol abuse who presented to the ED with complaints of nausea, vomiting and diarrhea.  Patient also found to have altered mental status upon arrival.  Evaluation in the ED significant for labs showing elevated alcohol level, elevated creatinine, elevated troponin and electrolyte abnormalities.  Hospitalist service contacted for further evaluation and management.  Cardiology consulted to assist in care.  Initially patient started on heparin drip pending cardiology evaluation.  Urinalysis felt to be consistent with UTI per admitting physician, patient started on Rocephin.  Patient also started on CIWA protocol for alcohol withdrawal.    Interval Followup:   No acute events overnight.  Patient hypertensive.  Patient denied any chest pain, shortness breath, abdominal pain.  She did endorse nausea.  Patient continue to have loose bowel movements.  Patient had a stress test done today (2021).  Nursing with no additional acute issues to report.    Antibiotics:   -Rocephin    Review of Systems   All systems reviewed and negative unless stated otherwise under subjective.    Objective   Objective     Vitals:   Temp:  [98 °F (36.7 °C)-99.6 °F (37.6 °C)] 99.6 °F (37.6 °C)  Heart Rate:  [] 120  Resp:  [18] 18  BP: (149-210)/() 190/117  Physical Exam   Gen: No acute distress, easily arousable, sitting up in bed  HEENT: MMM, Atraumatic  Neck: Supple, Trachea midline  Resp: CTAB, good aeration, no w/r/r, equal chest rise bilaterally, no increased work of breathing  Card: Regular rhythm, tachycardic, No  m/r/g  Abd: Soft, Nontender, Nondistended, + bowel sounds  Ext: No cyanosis, No clubbing  Neuro: CN II-XII grossly intact, No focal deficits appreciated  Psych: AAO x 3, Normal mood, Normal affect    Result Review    Result Review:  I have personally reviewed the results from the time of this admission to 11/8/2021 12:14 EST and agree with these findings:  [x]  Laboratory: Creatinine and electrolytes within normal limits.  WBC and hemoglobin stable.  [x]  Microbiology: Blood culture (11/07/2021): No growth to date.  []  Radiology:   [x]  EKG/Telemetry: Tachycardia.  Sinus rhythm.  []  Cardiology/Vascular:    []  Pathology:  []  Old records:  []  Other:    Assessment/Plan   Assessment / Plan     Assessment:  Acute metabolic encephalopathy  Urinary tract infection due to unknown organism  Acute renal failure  Elevated troponin  History of hypertension  Chronic alcohol abuse and concern for alcohol withdrawal  Acute alcohol intoxication  Hypocalcemia  Hypomagnesemia    Plan:  -Cardiology consulted and following, appreciate assistance and recommendations in the care of this patient.  -Patient had stress test today and per cardiology no evidence of ischemia  -Start home carvedilol and lisinopril.  Monitor blood pressure closely.  -Continue CIWA protocol  -Continue ceftriaxone, plan to treat for total of 5 days (Day 2/5)  -Continue IV fluids, decrease rate  -C. difficile testing pending  -Continue appropriate home medications  -Monitor electrolytes and renal function with BMP and magnesium level in the AM  -Monitor WBC and Hgb with CBC in the AM  -Clinical course will dictate further management     DVT Prophylaxis: Lovenox  Diet: Cardiac  Dispo: PT/OT consulted  Code Status: Full code     Personally reviewed patients labs and imaging, discussed with patient and nurse at bedside. Discussed case with the following consultants: Cardiology.     Part of this note may be an electronic transcription/translation of spoken  language to printed text using the Dragon dictation system.    DVT prophylaxis:  Medical and mechanical DVT prophylaxis orders are present.    CODE STATUS:   Code Status (Patient has no pulse and is not breathing): CPR (Attempt to Resuscitate)  Medical Interventions (Patient has pulse or is breathing): Full Support      Electronically signed by Mike García MD, 11/08/21, 12:14 PM EST.

## 2021-11-08 NOTE — PROGRESS NOTES
[facility]     Cardiology Progress Note    Patient Name: Kiesha Pan  : 1955  MRN: [id number]  Primary Care Physician:  Provider, No Known  Date of admission: 2021    Subjective   Subjective   CC: Nausea vomiting, diarrhea, atypical chest pain    HPI:    Kiesha Pan is a 66 y.o. female with history of history of hypertension, C. difficile colitis, heavy alcohol use.  Admitted with nausea vomiting and diarrhea.  Had some atypical chest pain.  Troponins are slightly increased.  No chest pain at present.    Objective     ROS:  Respiratory: No Cough, No Dyspnea  Cardiovascular: No CP Classic for Angina    Vitals:   Temp:  [98 °F (36.7 °C)-98.6 °F (37 °C)] 98.2 °F (36.8 °C)  Heart Rate:  [] 116  Resp:  [18] 18  BP: (149-186)/() 149/73  Flow (L/min):  [1.5] 1.5  Physical Exam    Constitutional: Awake, alert, No acute distress   Eyes: PERRLA, sclerae anicteric, no conjunctival injection   HENT: NCAT, mucous membranes moist   Neck: Supple, no thyromegaly, no lymphadenopathy, trachea midline   Respiratory: Clear to auscultation bilaterally, nonlabored respirations    Cardiovascular: RRR, no murmurs, rubs, or gallops, palpable pedal pulses bilaterally   Musculoskeletal: No bilateral ankle edema, no clubbing or cyanosis to extremities   Neurologic: Oriented x 3, strength symmetric in all extremities, speech clear  Current medications:  aspirin, 324 mg, Oral, Once  aspirin, 81 mg, Oral, Daily  atorvastatin, 80 mg, Oral, Nightly  cefTRIAXone, 1 g, Intravenous, Nightly  enoxaparin, 40 mg, Subcutaneous, Daily  regadenoson, , ,   senna-docusate sodium, 2 tablet, Oral, BID  sodium chloride, 10 mL, Intravenous, Q12H      Current IV drips:  dilTIAZem, 5-15 mg/hr, Last Rate: Stopped (21)  sodium chloride, 125 mL/hr, Last Rate: 125 mL/hr (21 0428)         Result Review    Result Review:  I have personally reviewed the results from the time of this admission to 2021 09:04 EST and agree  with these findings:  [x]  Laboratory  []  Microbiology  [x]  Radiology  [x]  EKG  [x]  Cardiology/Vascular   CBC    CBC 11/6/21 11/6/21 11/7/21 11/8/21    1829 2209     WBC 9.72 8.30 10.12 8.26   RBC 4.47 3.93 3.66 (A) 3.46 (A)   Hemoglobin 15.4 13.7 12.5 12.1   Hematocrit 44.4 40.1 36.7 34.7   MCV 99.3 (A) 102.0 (A) 100.3 (A) 100.3 (A)   MCH 34.5 (A) 34.9 (A) 34.2 (A) 35.0 (A)   MCHC 34.7 34.2 34.1 34.9   RDW 13.2 13.3 13.2 12.9   Platelets 366 323 314 297   (A) Abnormal value            CMP    CMP 11/6/21 11/6/21 11/6/21 11/7/21 11/8/21    1829 1852 2209     Glucose 84 75 64 (A) 62 (A) 149 (A)   BUN 14  15 19 12   Creatinine 1.40 (A)  1.02 (A) 0.81 0.73   eGFR Non African Am 38 (A)  54 (A) 71 80   Sodium 140 139.6 141 136 137   Potassium 3.6  3.9 3.6 3.6   Chloride 97 (A)  103 100 105   Calcium 9.0  7.7 (A) 7.5 (A) 8.3 (A)   Albumin 4.30   3.70    Total Bilirubin 0.3   0.6    Alkaline Phosphatase 62   47    AST (SGOT) 95 (A)   56 (A)    ALT (SGPT) 64 (A)   46 (A)    (A) Abnormal value       Comments are available for some flowsheets but are not being displayed.                      Telemetry reviewed shows sinus rhythm.  Cardiac Medications Reviewed.    Assessment/Plan   Assessment / Plan   1.  Acute metabolic encephalopathy.  2.  Acute renal failure.  3.  Chronic alcohol use.  4.  Slightly increased troponin is probably secondary to above.  5.  UTI/colitis.  Plan:  1.  Sestamibi stress test to evaluate for any significant ischemia.  2.  Echocardiogram.  3.  Continue current management.         Electronically signed by Josué Purdy MD, 11/08/21, 9:04 AM EST.

## 2021-11-08 NOTE — THERAPY EVALUATION
Acute Care - Speech Language Pathology   Swallow Initial Evaluation  [facility]     Patient Name: Kiesha Pan  : 1955  MRN: [id number]  Today's Date: 2021  Onset of Illness/Injury or Date of Surgery: 21     Referring Physician: Mike Stout      Admit Date: 2021    Visit Dx:     ICD-10-CM ICD-9-CM   1. Nausea vomiting and diarrhea  R11.2 787.91    R19.7 787.01   2. Dehydration  E86.0 276.51   3. Acute renal insufficiency  N28.9 593.9   4. Alcohol withdrawal syndrome, with delirium ([facility])  F10.231 291.0   5. Paroxysmal atrial fibrillation with RVR ([facility])  I48.0 427.31   6. Difficulty in walking  R26.2 719.7   7. Dysphagia, oropharyngeal  R13.12 787.22     Patient Active Problem List   Diagnosis   • Nausea vomiting and diarrhea     Past Medical History:   Diagnosis Date   • Asthma    • Hypertension    • Stroke ([facility]) 2021     Past Surgical History:   Procedure Laterality Date   • CHOLECYSTECTOMY       PAIN SCALE: None reported    PRECAUTIONS/CONTRAINDICATIONS: Contact precautions    SUSPECTED ABUSE/NEGLECT/EXPLOITATION: None noted    SOCIAL/PSYCHOLOGICAL NEEDS/BARRIERS: None noted    PAST SOCIAL HISTORY: Daily alcohol use-CIWA protocol currently    PRIOR FUNCTION: Independent    PATIENT GOALS/EXPECTATIONS: Did not report    HISTORY: This patient is a 66-year-old white female admitted with nausea vomiting and diarrhea.    CURRENT DIET LEVEL: Regular diet    OBJECTIVE:    TEST ADMINISTERED: Clinical dysphagia evaluation    COGNITION/SAFETY AWARENESS: Alert and oriented    BEHAVIORAL OBSERVATIONS: Pleasant cooperative    ORAL MOTOR EXAM: Lingual and labial strength and range of motion within functional limits.    VOICE QUALITY: Adequate voicing    REFLEX EXAM: Deferred    POSTURE: 90 degrees upright    FEEDING/SWALLOWING FUNCTION: Assessed with thin liquids from spoon cup and straw, purée consistencies and regular solids    CLINICAL OBSERVATIONS: Oral stage is characterized by mildly prolonged  mastication likely due to missing dentition.  Appears to have timely oral transit.  Pharyngeal phase is timely with good laryngeal elevation per palpation.  No clinical signs or symptoms of aspiration are noted.  Vocal quality remained clear to cervical auscultation.    DYSPHAGIA CRITERIA: N/A    FUNCTIONAL ASSESSMENT INSTRUMENT: Patient currently scored a level 7 of 7 on Functional Communication Measures for swallowing indicating a 0% limitation in function.    ASSESSMENT/ PLAN OF CARE:  Pt presents with functional oropharyngeal swallow.  No clinical signs or symptoms of aspiration are noted.      PROBLEMS:  1.  N/A   LTG 1: N/A   STG 1a:N/A    FREQUENCY/DURATION: N/A    REHAB POTENTIAL:  Pt has good rehab potential.  The following limitations may influence improvement/ length of tx medical status.    RECOMMENDATIONS:   1.   DIET: Regular diet-thin liquids, cut solids into small bites    2.  POSITION: 90 degrees upright    3.  COMPENSATORY STRATEGIES: Alternate solids and liquids, small bites/drinks, oral meds in applesauce if needed    YES: Pt/responsible party agrees with plan of care and has been informed of all alternatives, risks and benefits.      EDUCATION  The patient has been educated in the following areas:   Dysphagia (Swallowing Impairment).                Marcie Gutierrez, SLP  11/8/2021

## 2021-11-08 NOTE — THERAPY TREATMENT NOTE
Acute Care - Physical Therapy Treatment Note  [facility]     Patient Name: Kiesha Pan  : 1955  MRN: [id number]  Today's Date: 2021   Onset of Illness/Injury or Date of Surgery: 21  Visit Dx:     ICD-10-CM ICD-9-CM   1. Nausea vomiting and diarrhea  R11.2 787.91    R19.7 787.01   2. Dehydration  E86.0 276.51   3. Acute renal insufficiency  N28.9 593.9   4. Alcohol withdrawal syndrome, with delirium ([facility])  F10.231 291.0   5. Paroxysmal atrial fibrillation with RVR ([facility])  I48.0 427.31   6. Difficulty in walking  R26.2 719.7   7. Dysphagia, oropharyngeal  R13.12 787.22     Patient Active Problem List   Diagnosis   • Nausea vomiting and diarrhea     Past Medical History:   Diagnosis Date   • Asthma    • Hypertension    • Stroke ([facility]) 2021     Past Surgical History:   Procedure Laterality Date   • CHOLECYSTECTOMY       PT Assessment (last 12 hours)     PT Evaluation and Treatment     Row Name 21 1321          Physical Therapy Time and Intention    Subjective Information no complaints  -     Document Type therapy note (daily note)  -     Mode of Treatment physical therapy; individual therapy  -     Patient Effort good  -     Row Name 21 1321          Bed Mobility    All Activities, [place] (Bed Mobility) supervision  -     Assistive Device (Bed Mobility) bed rails  -     Row Name 21 1321          Transfers    Transfers sit-stand transfer; stand-sit transfer  -     Sit-Stand [place] (Transfers) contact guard  -     Stand-Sit [place] (Transfers) contact guard  -     Row Name 21 1321          Sit-Stand Transfer    Assistive Device (Sit-Stand Transfers) walker, front-wheeled  -     Row Name 21 1321          Stand-Sit Transfer    Assistive Device (Stand-Sit Transfers) walker, front-wheeled  -     Row Name 21 1321          Gait/Stairs (Locomotion)    Gait/Stairs Locomotion gait/ambulation independence; gait/ambulation assistive device;  distance ambulated  -RH     [place] Level (Gait) contact guard  -RH     Assistive Device (Gait) walker, front-wheeled  -RH     Distance in Feet (Gait) 55  -RH     Row Name 11/08/21 1321          Progress Summary (PT)    Progress Toward Functional Goals (PT) progress toward functional goals is fair  -RH           User Key  (r) = Recorded By, (t) = Taken By, (c) = Cosigned By    Initials Name Provider Type     David Bee PTA Physical Therapy Assistant              Physical Therapy Education                 Title: PT OT SLP Therapies (Done)     Topic: Physical Therapy (Done)     Point: Mobility training (Done)     Learning Progress Summary           Patient Acceptance, E,TB, VU by  at 11/7/2021 1118                   Point: Home exercise program (Done)     Learning Progress Summary           Patient Acceptance, E,TB, VU by  at 11/7/2021 1118                   Point: Body mechanics (Done)     Learning Progress Summary           Patient Acceptance, E,TB, VU by  at 11/7/2021 1118                   Point: Precautions (Done)     Learning Progress Summary           Patient Acceptance, E,TB, VU by  at 11/7/2021 1118                               User Key     Initials Effective Dates Name Provider Type Discipline     04/25/21 -  Ori Hays, PT Physical Therapist PT              PT Recommendation and Plan     Progress Summary (PT)  Progress Toward Functional Goals (PT): progress toward functional goals is fair   Outcome Measures     Row Name 11/08/21 1300 11/07/21 1100          How much help from another person do you currently need...    Turning from your back to your side while in flat bed without using bedrails? 3  -RH 3  -DW     Moving from lying on back to sitting on the side of a flat bed without bedrails? 3  -RH 3  -DW     Moving to and from a bed to a chair (including a wheelchair)? 3  -RH 3  -DW     Standing up from a chair using your arms (e.g., wheelchair, bedside chair)? 3  -RH 3  -DW      Climbing 3-5 steps with a railing? 2  -RH 2  -DW     To walk in hospital room? 3  -RH 2  -DW     AM-PAC 6 Clicks Score (PT) 17  -RH 16  -DW            Functional Assessment    Outcome Measure Options -- AM-PAC 6 Clicks Basic Mobility (PT)  -DW           User Key  (r) = Recorded By, (t) = Taken By, (c) = Cosigned By    Initials Name Provider Type     David Bee PTA Physical Therapy Assistant    Ori Moreno, KACEY Physical Therapist                 Time Calculation:    PT Charges     Row Name 11/08/21 1320             Time Calculation    PT Received On 11/08/21  -RH              Timed Charges    48237 - Gait Training Minutes  15  -RH              Total Minutes    Timed Charges Total Minutes 15  -RH       Total Minutes 15  -RH            User Key  (r) = Recorded By, (t) = Taken By, (c) = Cosigned By    Initials Name Provider Type     David Bee PTA Physical Therapy Assistant              Therapy Charges for Today     Code Description Service Date Service Provider Modifiers Qty    [id number] HC GAIT TRAINING EA 15 MIN 11/8/2021 David Bee PTA GP 1          PT G-Codes  Outcome Measure Options: AM-PAC 6 Clicks Basic Mobility (PT)  AM-PAC 6 Clicks Score (PT): 17    David Bee PTA  11/8/2021

## 2021-11-08 NOTE — PLAN OF CARE
Goal Outcome Evaluation:  Plan of Care Reviewed With: patient        Progress: no change (initial evaluation)  Outcome Summary: Patient has experienced decline in function from baseline status, presenting w/ deficits related to functional balance, activity tolerance, UE strength, ADL transfers and task completion.  Patient would benefit from skilled OT intervention to maxamize patient safety, prevent further debility and promote return to optimal level of independence w/ ADL transfers and task completion.

## 2021-11-08 NOTE — THERAPY EVALUATION
Patient Name: Kiesha Pan  : 1955    MRN: [id number]                              Today's Date: 2021       Admit Date: 2021    Visit Dx:     ICD-10-CM ICD-9-CM   1. Nausea vomiting and diarrhea  R11.2 787.91    R19.7 787.01   2. Dehydration  E86.0 276.51   3. Acute renal insufficiency  N28.9 593.9   4. Alcohol withdrawal syndrome, with delirium ([facility])  F10.231 291.0   5. Paroxysmal atrial fibrillation with RVR ([facility])  I48.0 427.31   6. Difficulty in walking  R26.2 719.7   7. Dysphagia, oropharyngeal  R13.12 787.22   8. Decreased activities of daily living (ADL)  Z78.9 V49.89     Patient Active Problem List   Diagnosis   • Nausea vomiting and diarrhea     Past Medical History:   Diagnosis Date   • Asthma    • Hypertension    • Stroke ([facility]) 2021     Past Surgical History:   Procedure Laterality Date   • CHOLECYSTECTOMY        General Information     Row Name 21 1418          OT Time and Intention    Document Type evaluation  -ES     Mode of Treatment individual therapy; occupational therapy  -ES     Row Name 21 1418          General Information    Prior Level of Function independent:  independent with B/IADLs at baseline. Functional mobility with no device, has cane if needed. Walk in shower, denies DME. No home O2.  -ES     Row Name 21 1418          Occupational Profile    Reason for Services/Referral (Occupational Profile) Patient is 66 yr old female admitted to [facility] on 2021 with c/o nausea, vomiting, diarrhea. Patient w/ AMS on arrival to ED. Patient PMH significant for ETOH abuse, previous stroke. OT evaluation and treatment ordered d/t recent decline in ADLs/transfer ability. No previous OT services for current condition.  -ES     Row Name 21 1418          Living Environment    Lives With spouse  -ES     Row Name 21 1418          Home Main Entrance    Number of Stairs, Main Entrance three  -ES     Stair Railings, Main Entrance none  -ES     Row  Name 11/08/21 1418          Stairs Within Home, Primary    Number of Stairs, Within Home, Primary none  -ES     Row Name 11/08/21 1418          Cognition    Orientation Status (Cognition) oriented x 3; other (see comments)  Pt pleasant and cooperative  -ES     Row Name 11/08/21 1418          Safety Issues, Functional Mobility    Safety Issues Affecting Function (Mobility) impulsivity; awareness of need for assistance  -ES     Impairments Affecting Function (Mobility) balance; endurance/activity tolerance; strength  -ES           User Key  (r) = Recorded By, (t) = Taken By, (c) = Cosigned By    Initials Name Provider Type    Ina Zaman OT Occupational Therapist                 Mobility/ADL's     Row Name 11/08/21 1423          Bed Mobility    Bed Mobility supine-sit; sit-supine  -ES     Supine-Sit [place] (Bed Mobility) supervision; verbal cues  -ES     Sit-Supine [place] (Bed Mobility) supervision; verbal cues  -ES     Comment (Bed Mobility) patient seated EOB for UE evaluation  -ES     Row Name 11/08/21 1423          Transfers    Transfers sit-stand transfer  -ES     Comment (Transfers) sit to stand x2. CGA with HHA with sit to stand.  -ES     Row Name 11/08/21 1423          Functional Mobility    Functional Mobility- Comment Patient takes side steps towards HOB, CGA with HHA for side steps.  -ES     Row Name 11/08/21 1423          Activities of Daily Living    BADL Assessment/Intervention --  I feeding. S/U UB and grooming. Min A LB dressing. Min A toileting.  -ES           User Key  (r) = Recorded By, (t) = Taken By, (c) = Cosigned By    Initials Name Provider Type    Ina Zaman OT Occupational Therapist               Obj/Interventions     Row Name 11/08/21 1425          Sensory Assessment (Somatosensory)    Sensory Assessment (Somatosensory) UE sensation intact  -ES     Row Name 11/08/21 1425          Vision Assessment/Intervention    Visual Impairment/Limitations WNL  -ES     Row Name  11/08/21 1425          Range of Motion Comprehensive    General Range of Motion bilateral upper extremity ROM WFL  -ES     Row Name 11/08/21 1425          Strength Comprehensive (MMT)    General Manual Muscle Testing (MMT) Assessment upper extremity strength deficits identified  -ES     Comment, General Manual Muscle Testing (MMT) Assessment 4/5 grossly  -ES     Row Name 11/08/21 1425          Motor Skills    Motor Skills coordination; functional endurance  -ES     Coordination WFL  patient with BUE tremors noted  -ES     Functional Endurance fair  -ES     Row Name 11/08/21 1425          Balance    Balance Assessment standing dynamic balance  -ES     Dynamic Standing Balance mild impairment  -ES     Balance Interventions occupation based/functional task; supported; dynamic; minimal challenge; step overs  -ES           User Key  (r) = Recorded By, (t) = Taken By, (c) = Cosigned By    Initials Name Provider Type    ES Ina Solorio, OT Occupational Therapist               Goals/Plan     Row Name 11/08/21 1428          Bed Mobility Goal 1 (OT)    Activity/Assistive Device (Bed Mobility Goal 1, OT) bed mobility activities, all  -ES     [place] Level/Cues Needed (Bed Mobility Goal 1, OT) modified independence  -ES     Time Frame (Bed Mobility Goal 1, OT) long term goal (LTG); 10 days  -ES     Row Name 11/08/21 1428          Transfer Goal 1 (OT)    Activity/Assistive Device (Transfer Goal 1, OT) transfers, all  -ES     [place] Level/Cues Needed (Transfer Goal 1, OT) modified independence  -ES     Time Frame (Transfer Goal 1, OT) long term goal (LTG); 10 days  -ES     Row Name 11/08/21 1428          Bathing Goal 1 (OT)    Activity/Device (Bathing Goal 1, OT) bathing skills, all  -ES     [place] Level/Cues Needed (Bathing Goal 1, OT) modified independence  -ES     Time Frame (Bathing Goal 1, OT) long term goal (LTG); 10 days  -ES     Row Name 11/08/21 1428          Dressing Goal 1 (OT)    Activity/Device  (Dressing Goal 1, OT) dressing skills, all  -ES     Barber/Cues Needed (Dressing Goal 1, OT) modified independence  -ES     Time Frame (Dressing Goal 1, OT) long term goal (LTG); 10 days  -ES     Row Name 11/08/21 1428          Toileting Goal 1 (OT)    Activity/Device (Toileting Goal 1, OT) toileting skills, all  -ES     [place] Level/Cues Needed (Toileting Goal 1, OT) modified independence  -ES     Time Frame (Toileting Goal 1, OT) long term goal (LTG); 10 days  -ES     Row Name 11/08/21 1428          Grooming Goal 1 (OT)    Activity/Device (Grooming Goal 1, OT) grooming skills, all  -ES     Barber (Grooming Goal 1, OT) modified independence  -ES     Time Frame (Grooming Goal 1, OT) long term goal (LTG); 10 days  -ES     Row Name 11/08/21 1428          Strength Goal 1 (OT)    Strength Goal 1 (OT) Pt will increase BUE strength 1/2 muscle grade w/ HEP provided handouts and demo for increased UE strength required for ADL transfers and task completion  -ES     Time Frame (Strength Goal 1, OT) long term goal (LTG); 10 days  -ES     Row Name 11/08/21 1428          Therapy Assessment/Plan (OT)    Planned Therapy Interventions (OT) activity tolerance training; BADL retraining; functional balance retraining; occupation/activity based interventions; patient/caregiver education/training; ROM/therapeutic exercise; strengthening exercise; transfer/mobility retraining  -ES           User Key  (r) = Recorded By, (t) = Taken By, (c) = Cosigned By    Initials Name Provider Type    ES Ina Solorio OT Occupational Therapist               Clinical Impression     Row Name 11/08/21 1427          Plan of Care Review    Plan of Care Reviewed With patient  -ES     Progress no change  initial evaluation  -ES     Outcome Summary Patient has experienced decline in function from baseline status, presenting w/ deficits related to functional balance, activity tolerance, UE strength, ADL transfers and task completion.  Patient  would benefit from skilled OT intervention to maxamize patient safety, prevent further debility and promote return to optimal level of independence w/ ADL transfers and task completion.  -ES     Row Name 11/08/21 1427          Therapy Assessment/Plan (OT)    Rehab Potential (OT) good, to achieve stated therapy goals  -ES     Criteria for Skilled Therapeutic Interventions Met (OT) yes; meets criteria; skilled treatment is necessary  -ES     Therapy Frequency (OT) 5 times/wk  -ES     Row Name 11/08/21 1427          Therapy Plan Review/Discharge Plan (OT)    Anticipated Discharge Disposition (OT) home with home health  -ES     Row Name 11/08/21 1427          Positioning and Restraints    Pre-Treatment Position in bed  -ES     Post Treatment Position bed  -ES           User Key  (r) = Recorded By, (t) = Taken By, (c) = Cosigned By    Initials Name Provider Type    ES Ina Solorio, OT Occupational Therapist               Outcome Measures     Row Name 11/08/21 1431          How much help from another is currently needed...    Putting on and taking off regular lower body clothing? 3  -ES     Bathing (including washing, rinsing, and drying) 3  -ES     Toileting (which includes using toilet bed pan or urinal) 3  -ES     Putting on and taking off regular upper body clothing 4  -ES     Taking care of personal grooming (such as brushing teeth) 4  -ES     Eating meals 4  -ES     AM-PAC 6 Clicks Score (OT) 21  -ES     Row Name 11/08/21 1300          How much help from another person do you currently need...    Turning from your back to your side while in flat bed without using bedrails? 3  -RH     Moving from lying on back to sitting on the side of a flat bed without bedrails? 3  -RH     Moving to and from a bed to a chair (including a wheelchair)? 3  -RH     Standing up from a chair using your arms (e.g., wheelchair, bedside chair)? 3  -RH     Climbing 3-5 steps with a railing? 2  -RH     To walk in hospital room? 3  -RH      AM-PAC 6 Clicks Score (PT) 17  -RH     Row Name 11/08/21 1431          Functional Assessment    Outcome Measure Options AM-PAC 6 Clicks Daily Activity (OT); Optimal Instrument  -ES     Row Name 11/08/21 1431          Optimal Instrument    Optimal Instrument Optimal - 3  -ES     Bending/Stooping 3  -ES     Standing 2  -ES     Reaching 1  -ES     From the list, choose the 3 activities you would most like to be able to do without any difficulty Bending/stooping; Standing; Reaching  -ES     Total Score Optimal - 3 6  -ES           User Key  (r) = Recorded By, (t) = Taken By, (c) = Cosigned By    Initials Name Provider Type    RH David Bee, ANA Physical Therapy Assistant    Ina Zaman OT Occupational Therapist                Occupational Therapy Education                 Title: PT OT SLP Therapies (Done)     Topic: Occupational Therapy (Done)     Point: ADL training (Done)     Description:   Instruct learner(s) on proper safety adaptation and remediation techniques during self care or transfers.   Instruct in proper use of assistive devices.              Learning Progress Summary           Patient Acceptance, E, VU by  at 11/8/2021 1431                   Point: Home exercise program (Done)     Description:   Instruct learner(s) on appropriate technique for monitoring, assisting and/or progressing therapeutic exercises/activities.              Learning Progress Summary           Patient Acceptance, E, VU by  at 11/8/2021 1431                   Point: Precautions (Done)     Description:   Instruct learner(s) on prescribed precautions during self-care and functional transfers.              Learning Progress Summary           Patient Acceptance, E, VU by  at 11/8/2021 1431                   Point: Body mechanics (Done)     Description:   Instruct learner(s) on proper positioning and spine alignment during self-care, functional mobility activities and/or exercises.              Learning Progress Summary            Patient Acceptance, E, VU by  at 11/8/2021 1431                               User Key     Initials Effective Dates Name Provider Type Discipline     09/13/21 -  Ina Solorio OT Occupational Therapist OT              OT Recommendation and Plan  Planned Therapy Interventions (OT): activity tolerance training, BADL retraining, functional balance retraining, occupation/activity based interventions, patient/caregiver education/training, ROM/therapeutic exercise, strengthening exercise, transfer/mobility retraining  Therapy Frequency (OT): 5 times/wk  Plan of Care Review  Plan of Care Reviewed With: patient  Progress: no change (initial evaluation)  Outcome Summary: Patient has experienced decline in function from baseline status, presenting w/ deficits related to functional balance, activity tolerance, UE strength, ADL transfers and task completion.  Patient would benefit from skilled OT intervention to maxamize patient safety, prevent further debility and promote return to optimal level of independence w/ ADL transfers and task completion.     Time Calculation:    Time Calculation- OT     Row Name 11/08/21 1432 11/08/21 1320          Time Calculation- OT    OT Received On 11/08/21  -ES --     OT Goal Re-Cert Due Date 11/17/21  -ES --            Timed Charges    56348 - Gait Training Minutes  -- 15  -RH            Untimed Charges    OT Eval/Re-eval Minutes 32  -ES --            Total Minutes    Timed Charges Total Minutes -- 15  -RH     Untimed Charges Total Minutes 32  -ES --      Total Minutes 32  -ES 15  -RH           User Key  (r) = Recorded By, (t) = Taken By, (c) = Cosigned By    Initials Name Provider Type     David Bee PTA Physical Therapy Assistant    Ina Zaman OT Occupational Therapist              Therapy Charges for Today     Code Description Service Date Service Provider Modifiers Qty    51970247030  OT EVAL LOW COMPLEXITY 3 11/8/2021 Ina Solorio OT GO 1               Ina  ANGELICA Solorio  11/8/2021

## 2021-11-09 ENCOUNTER — READMISSION MANAGEMENT (OUTPATIENT)
Dept: CALL CENTER | Facility: HOSPITAL | Age: 66
End: 2021-11-09

## 2021-11-09 VITALS
HEART RATE: 98 BPM | OXYGEN SATURATION: 97 % | WEIGHT: 156.75 LBS | BODY MASS INDEX: 25.19 KG/M2 | DIASTOLIC BLOOD PRESSURE: 116 MMHG | TEMPERATURE: 98.1 F | SYSTOLIC BLOOD PRESSURE: 180 MMHG | RESPIRATION RATE: 18 BRPM | HEIGHT: 66 IN

## 2021-11-09 PROCEDURE — G0008 ADMIN INFLUENZA VIRUS VAC: HCPCS | Performed by: GENERAL PRACTICE

## 2021-11-09 PROCEDURE — 25010000002 INFLUENZA VAC SPLIT QUAD 0.5 ML SUSPENSION PREFILLED SYRINGE: Performed by: GENERAL PRACTICE

## 2021-11-09 PROCEDURE — 99213 OFFICE O/P EST LOW 20 MIN: CPT | Performed by: SPECIALIST

## 2021-11-09 PROCEDURE — 96376 TX/PRO/DX INJ SAME DRUG ADON: CPT

## 2021-11-09 PROCEDURE — G0378 HOSPITAL OBSERVATION PER HR: HCPCS

## 2021-11-09 PROCEDURE — 25010000002 HYDRALAZINE PER 20 MG: Performed by: GENERAL PRACTICE

## 2021-11-09 PROCEDURE — 99217 PR OBSERVATION CARE DISCHARGE MANAGEMENT: CPT | Performed by: INTERNAL MEDICINE

## 2021-11-09 PROCEDURE — 90686 IIV4 VACC NO PRSV 0.5 ML IM: CPT | Performed by: GENERAL PRACTICE

## 2021-11-09 PROCEDURE — 96372 THER/PROPH/DIAG INJ SC/IM: CPT

## 2021-11-09 PROCEDURE — 25010000002 ENOXAPARIN PER 10 MG: Performed by: INTERNAL MEDICINE

## 2021-11-09 RX ORDER — AMLODIPINE BESYLATE 5 MG/1
5 TABLET ORAL DAILY
Qty: 30 TABLET | Refills: 0 | Status: SHIPPED | OUTPATIENT
Start: 2021-11-09 | End: 2021-12-03 | Stop reason: SDUPTHER

## 2021-11-09 RX ADMIN — ASPIRIN 81 MG: 81 TABLET, COATED ORAL at 09:54

## 2021-11-09 RX ADMIN — LISINOPRIL 40 MG: 20 TABLET ORAL at 09:54

## 2021-11-09 RX ADMIN — SODIUM CHLORIDE 75 ML/HR: 9 INJECTION, SOLUTION INTRAVENOUS at 05:59

## 2021-11-09 RX ADMIN — HYDRALAZINE HYDROCHLORIDE 5 MG: 20 INJECTION INTRAMUSCULAR; INTRAVENOUS at 01:58

## 2021-11-09 RX ADMIN — INFLUENZA VIRUS VACCINE 0.5 ML: 15; 15; 15; 15 SUSPENSION INTRAMUSCULAR at 11:38

## 2021-11-09 RX ADMIN — CARVEDILOL 6.25 MG: 6.25 TABLET, FILM COATED ORAL at 09:54

## 2021-11-09 RX ADMIN — ENOXAPARIN SODIUM 40 MG: 40 INJECTION SUBCUTANEOUS at 09:54

## 2021-11-09 NOTE — THERAPY DISCHARGE NOTE
Acute Care - Physical Therapy Treatment Note/Discharge  SARIKA Kelley     Patient Name: Kiesha Pan  : 1955  MRN: [id number]  Today's Date: 2021   Onset of Illness/Injury or Date of Surgery: 21     Referring Physician: Mike Stout      Admit Date: 2021    Visit Dx:    ICD-10-CM ICD-9-CM   1. Nausea vomiting and diarrhea  R11.2 787.91    R19.7 787.01   2. Dehydration  E86.0 276.51   3. Acute renal insufficiency  N28.9 593.9   4. Alcohol withdrawal syndrome, with delirium ([facility])  F10.231 291.0   5. Paroxysmal atrial fibrillation with RVR ([facility])  I48.0 427.31   6. Difficulty in walking  R26.2 719.7   7. Dysphagia, oropharyngeal  R13.12 787.22   8. Decreased activities of daily living (ADL)  Z78.9 V49.89     Patient Active Problem List   Diagnosis   • Nausea vomiting and diarrhea     Past Medical History:   Diagnosis Date   • Asthma    • Hypertension    • Stroke ([facility]) 2021     Past Surgical History:   Procedure Laterality Date   • CHOLECYSTECTOMY         PT Assessment (last 12 hours)     PT Evaluation and Treatment     Row Name 21 1500          Progress Summary (PT)    Reason for Discharge (PT) patient discharged from this facility  -DP     Row Name 21 1500          Discharge Summary (PT)    Additional Documentation Discharge Summary (PT) (Group)  -DP     Row Name 21 1500          Discharge Summary (PT)    Outcomes Achieved/Progress Made Upon Discharge (PT) goals partially achieved prior to discharge  -DP     Transfer to Another Level of Care or Facility (PT) recommend continued therapy following discharge  -DP           User Key  (r) = Recorded By, (t) = Taken By, (c) = Cosigned By    Initials Name Provider Type    DP Isaiah Cotton, PT Physical Therapist                Physical Therapy Education                 Title: PT OT SLP Therapies (Done)     Topic: Physical Therapy (Done)     Point: Mobility training (Done)     Learning Progress Summary           Patient Acceptance,  E,TB, VU by  at 11/7/2021 1118                   Point: Home exercise program (Done)     Learning Progress Summary           Patient Acceptance, E,TB, VU by  at 11/7/2021 1118                   Point: Body mechanics (Done)     Learning Progress Summary           Patient Acceptance, E,TB, VU by  at 11/7/2021 1118                   Point: Precautions (Done)     Learning Progress Summary           Patient Acceptance, E,TB, VU by  at 11/7/2021 1118                               User Key     Initials Effective Dates Name Provider Type Discipline     04/25/21 -  Ori Hays, PT Physical Therapist PT                PT Recommendation and Plan           Outcome Measures     Row Name 11/08/21 1300 11/07/21 1100          How much help from another person do you currently need...    Turning from your back to your side while in flat bed without using bedrails? 3  -RH 3  -DW     Moving from lying on back to sitting on the side of a flat bed without bedrails? 3  -RH 3  -DW     Moving to and from a bed to a chair (including a wheelchair)? 3  -RH 3  -DW     Standing up from a chair using your arms (e.g., wheelchair, bedside chair)? 3  -RH 3  -DW     Climbing 3-5 steps with a railing? 2  -RH 2  -DW     To walk in hospital room? 3  -RH 2  -DW     AM-PAC 6 Clicks Score (PT) 17  -RH 16  -DW            Functional Assessment    Outcome Measure Options -- AM-PAC 6 Clicks Basic Mobility (PT)  -           User Key  (r) = Recorded By, (t) = Taken By, (c) = Cosigned By    Initials Name Provider Type     David Bee, PTA Physical Therapy Assistant     Ori Hays, PT Physical Therapist                 Time Calculation:         PT G-Codes  Outcome Measure Options: AM-PAC 6 Clicks Daily Activity (OT), Optimal Instrument  AM-PAC 6 Clicks Score (PT): 20  AM-PAC 6 Clicks Score (OT): 21         Isaiah Cotton PT  11/9/2021

## 2021-11-09 NOTE — THERAPY DISCHARGE NOTE
Acute Care - Occupational Therapy /Discharge   Allie     Patient Name: Kiesha Pan  : 1955  MRN: [id number]  Today's Date: 2021  Onset of Illness/Injury or Date of Surgery: 21     Referring Physician: Mike Stout      Admit Date: 2021       ICD-10-CM ICD-9-CM   1. Nausea vomiting and diarrhea  R11.2 787.91    R19.7 787.01   2. Dehydration  E86.0 276.51   3. Acute renal insufficiency  N28.9 593.9   4. Alcohol withdrawal syndrome, with delirium ([facility])  F10.231 291.0   5. Paroxysmal atrial fibrillation with RVR ([facility])  I48.0 427.31   6. Difficulty in walking  R26.2 719.7   7. Dysphagia, oropharyngeal  R13.12 787.22   8. Decreased activities of daily living (ADL)  Z78.9 V49.89     Patient Active Problem List   Diagnosis   • Nausea vomiting and diarrhea     Past Medical History:   Diagnosis Date   • Asthma    • Hypertension    • Stroke (HCC) 2021     Past Surgical History:   Procedure Laterality Date   • CHOLECYSTECTOMY         OT ASSESSMENT FLOWSHEET (last 12 hours)     OT Evaluation and Treatment    No documentation.                 Occupational Therapy Education                 Title: PT OT SLP Therapies (Done)     Topic: Occupational Therapy (Done)     Point: ADL training (Done)     Description:   Instruct learner(s) on proper safety adaptation and remediation techniques during self care or transfers.   Instruct in proper use of assistive devices.              Learning Progress Summary           Patient Acceptance, E, VU by ES at 2021 1431                   Point: Home exercise program (Done)     Description:   Instruct learner(s) on appropriate technique for monitoring, assisting and/or progressing therapeutic exercises/activities.              Learning Progress Summary           Patient Acceptance, E, VU by ES at 2021 1431                   Point: Precautions (Done)     Description:   Instruct learner(s) on prescribed precautions during self-care and functional transfers.               Learning Progress Summary           Patient Acceptance, E, VU by  at 11/8/2021 1431                   Point: Body mechanics (Done)     Description:   Instruct learner(s) on proper positioning and spine alignment during self-care, functional mobility activities and/or exercises.              Learning Progress Summary           Patient Acceptance, E, VU by  at 11/8/2021 1431                               User Key     Initials Effective Dates Name Provider Type Discipline     09/13/21 -  Ina Solorio, OT Occupational Therapist OT                OT Recommendation and Plan  Planned Therapy Interventions (OT): activity tolerance training, BADL retraining, functional balance retraining, occupation/activity based interventions, patient/caregiver education/training, ROM/therapeutic exercise, strengthening exercise, transfer/mobility retraining  Therapy Frequency (OT): 5 times/wk  Plan of Care Review  Plan of Care Reviewed With: patient  Progress: no change (initial evaluation)  Outcome Summary: Patient has experienced decline in function from baseline status, presenting w/ deficits related to functional balance, activity tolerance, UE strength, ADL transfers and task completion.  Patient would benefit from skilled OT intervention to maxamize patient safety, prevent further debility and promote return to optimal level of independence w/ ADL transfers and task completion.  Plan of Care Reviewed With: patient  Outcome Summary: Patient has experienced decline in function from baseline status, presenting w/ deficits related to functional balance, activity tolerance, UE strength, ADL transfers and task completion.  Patient would benefit from skilled OT intervention to maxamize patient safety, prevent further debility and promote return to optimal level of independence w/ ADL transfers and task completion.      OT Rehab Goals     Row Name 11/09/21 1449             Bed Mobility Goal 1 (OT)    Activity/Assistive  Device (Bed Mobility Goal 1, OT) bed mobility activities, all  -ES      [place] Level/Cues Needed (Bed Mobility Goal 1, OT) modified independence  -ES      Time Frame (Bed Mobility Goal 1, OT) long term goal (LTG); 10 days  -ES      Progress/Outcomes (Bed Mobility Goal 1, OT) goal ongoing  -ES              Transfer Goal 1 (OT)    Activity/Assistive Device (Transfer Goal 1, OT) transfers, all  -ES      [place] Level/Cues Needed (Transfer Goal 1, OT) modified independence  -ES      Time Frame (Transfer Goal 1, OT) long term goal (LTG); 10 days  -ES      Progress/Outcome (Transfer Goal 1, OT) goal ongoing  -ES              Bathing Goal 1 (OT)    Activity/Device (Bathing Goal 1, OT) bathing skills, all  -ES      [place] Level/Cues Needed (Bathing Goal 1, OT) modified independence  -ES      Time Frame (Bathing Goal 1, OT) long term goal (LTG); 10 days  -ES      Progress/Outcomes (Bathing Goal 1, OT) goal ongoing  -ES              Dressing Goal 1 (OT)    Activity/Device (Dressing Goal 1, OT) dressing skills, all  -ES      [place]/Cues Needed (Dressing Goal 1, OT) modified independence  -ES      Time Frame (Dressing Goal 1, OT) long term goal (LTG); 10 days  -ES      Progress/Outcome (Dressing Goal 1, OT) goal ongoing  -ES              Toileting Goal 1 (OT)    Activity/Device (Toileting Goal 1, OT) toileting skills, all  -ES      [place] Level/Cues Needed (Toileting Goal 1, OT) modified independence  -ES      Time Frame (Toileting Goal 1, OT) long term goal (LTG); 10 days  -ES      Progress/Outcome (Toileting Goal 1, OT) goal ongoing  -ES              Grooming Goal 1 (OT)    Activity/Device (Grooming Goal 1, OT) grooming skills, all  -ES      [place] (Grooming Goal 1, OT) modified independence  -ES      Time Frame (Grooming Goal 1, OT) long term goal (LTG); 10 days  -ES      Progress/Outcome (Grooming Goal 1, OT) goal ongoing  -ES              Strength Goal 1 (OT)    Strength Goal 1 (OT) Pt  will increase BUE strength 1/2 muscle grade w/ HEP provided handouts and demo for increased UE strength required for ADL transfers and task completion  -ES      Time Frame (Strength Goal 1, OT) long term goal (LTG); 10 days  -ES      Progress/Outcome (Strength Goal 1, OT) goal ongoing  -ES            User Key  (r) = Recorded By, (t) = Taken By, (c) = Cosigned By    Initials Name Provider Type Discipline    ES Ina Solorio, OT Occupational Therapist OT                 Outcome Measures     Row Name 11/08/21 1300 11/07/21 1100          How much help from another person do you currently need...    Turning from your back to your side while in flat bed without using bedrails? 3  -RH 3  -DW     Moving from lying on back to sitting on the side of a flat bed without bedrails? 3  -RH 3  -DW     Moving to and from a bed to a chair (including a wheelchair)? 3  -RH 3  -DW     Standing up from a chair using your arms (e.g., wheelchair, bedside chair)? 3  -RH 3  -DW     Climbing 3-5 steps with a railing? 2  -RH 2  -DW     To walk in hospital room? 3  -RH 2  -DW     AM-PAC 6 Clicks Score (PT) 17  -RH 16  -DW            Functional Assessment    Outcome Measure Options -- AM-PAC 6 Clicks Basic Mobility (PT)  -DW           User Key  (r) = Recorded By, (t) = Taken By, (c) = Cosigned By    Initials Name Provider Type     David Bee, PTA Physical Therapy Assistant    Ori Moreno, PT Physical Therapist                Time Calculation:       Therapy Charges for Today     Code Description Service Date Service Provider Modifiers Qty    [id number]  OT EVAL LOW COMPLEXITY 3 11/8/2021 Ina Solorio OT GO 1               OT Discharge Summary  Anticipated Discharge Disposition (OT): home with home health, home with assist    Ina Solorio OT  11/9/2021

## 2021-11-09 NOTE — DISCHARGE SUMMARY
[facility]         HOSPITALIST  DISCHARGE SUMMARY    Patient Name: Kiesha Pan  : 1955  MRN: [id number]    Date of Admission: 2021  Date of Discharge:  21  Primary Care Physician: Provider, No Known    Consultants:  Cardiology Dr. Purdy    Discharge Diagnosis:  Acute metabolic encephalopathy  Urinary tract infection due to unknown organism  Acute renal failure  NSTEMI  Uncontrolled Hypertension  Chronic alcohol abuse and concern for alcohol withdrawal  Acute alcohol intoxication  Hypocalcemia  Hypomagnesemia    Hospital Course     Hospital Course:  66-year-old female with hypertension, history of alcohol abuse presents to the emergency department with nausea vomiting and diarrhea.  Patient with altered mentation on arrival, elevated alcohol level and elevated creatinine as well as troponin.  Patient was admitted given supplemental fluids and electrolyte replacements, CIWA protocol.  Patient empirically treated for UTI during her stay, received 3 doses of Rocephin completing her antibiotics.  Due to elevated troponins cardiology was consulted and patient underwent stress test on  without signs of reversible ischemia.  Patient is feeling much better today and is being discharged home into the care of her  with home health.  Did have elevated blood pressures during her stay and I am adding amlodipine to her blood pressure regimen at day of discharge.    DISCHARGE Follow Up Recommendations:   Monitor blood pressure at home  Continue lisinopril, Coreg, new amlodipine  Follow-up with PCP in regards to blood pressure  Follow up with cardiology  Maintain sobriety      Day of Discharge     Vital Signs:  Temp:  [98.2 °F (36.8 °C)-99.6 °F (37.6 °C)] 98.2 °F (36.8 °C)  Heart Rate:  [] 98  Resp:  [14-18] 14  BP: (152-198)/() 198/116  Physical Exam:   Gen: NAD, WDWN  Resp: no dyspnea  CV: no LE edema  GI: Abdomen soft +bs  Psych: AOx3, normal mood and  affect    Discharge Details        Discharge Medications      New Medications      Instructions Start Date   amLODIPine 5 MG tablet  Commonly known as: NORVASC   5 mg, Oral, Daily         Continue These Medications      Instructions Start Date   aspirin 81 MG EC tablet   81 mg, Oral, Daily      atorvastatin 80 MG tablet  Commonly known as: LIPITOR   Take 1 tablet nightly at bedtime      carvedilol 3.125 MG tablet  Commonly known as: COREG   Take 3 tablets by mouth twice daily      lisinopril 20 MG tablet  Commonly known as: PRINIVIL,ZESTRIL   40 mg, Oral, Daily             Discharge Disposition:  Home-Health Care [place]    Discharge Condition: Stable    Diet:  Hospital:  Diet Order   Procedures   • Diet Regular; Cardiac       Discharge Activity: As tolerated      No future appointments.        Pertinent  and/or Most Recent Results     PROCEDURES:   Nuclear stress test  Interpretation Summary    · Left ventricular ejection fraction is normal. (Calculated EF = 63%).  · Myocardial perfusion imaging indicates a normal myocardial perfusion study with no evidence of ischemia.  · Impressions are consistent with a low risk study.  · Findings consistent with a normal ECG stress test.        LAB RESULTS:      Lab 11/08/21 0426 11/07/21 0440 11/06/21 2209 11/06/21 1852 11/06/21  1829   WBC 8.26 10.12 8.30  --  9.72   HEMOGLOBIN 12.1 12.5 13.7  --  15.4   HEMATOCRIT 34.7 36.7 40.1  --  44.4   PLATELETS 297 314 323  --  366   NEUTROS ABS 5.91 6.57 5.89  --  6.00   IMMATURE GRANS (ABS) 0.03 0.03 0.02  --  0.03   LYMPHS ABS 1.36 2.36 1.77  --  2.82   MONOS ABS 0.87 1.04* 0.53  --  0.77   EOS ABS 0.00 0.00 0.00  --  0.00   .3* 100.3* 102.0*  --  99.3*   LACTATE, ARTERIAL  --   --   --  3.40*  --    PROTIME  --  10.4 10.2  --   --    APTT  --  23.7  --   --   --          Lab 11/08/21 0426 11/07/21 0440 11/06/21 2209 11/06/21 1852 11/06/21  1829   SODIUM 137 136 141  --  140   SODIUM, ARTERIAL  --   --   --  139.6  --     POTASSIUM 3.6 3.6 3.9  --  3.6   CHLORIDE 105 100 103  --  97*   CO2 22.1 17.4* 22.1  --  24.1   ANION GAP 9.9 18.6* 15.9*  --  18.9*   BUN 12 19 15  --  14   CREATININE 0.73 0.81 1.02*  --  1.40*   GLUCOSE 149* 62* 64*  --  84   GLUCOSE, ARTERIAL  --   --   --  75  --    CALCIUM 8.3* 7.5* 7.7*  --  9.0   IONIZED CALCIUM  --   --   --  1.05*  --    MAGNESIUM 1.8 1.2* 1.4*  --  1.6   PHOSPHORUS 2.1* 4.1 4.6*  --   --    HEMOGLOBIN A1C  --  5.10  --   --   --    TSH  --   --  1.380  --   --          Lab 11/07/21  0440 11/06/21  1829   TOTAL PROTEIN 5.7* 7.2   ALBUMIN 3.70 4.30   GLOBULIN 2.0 2.9   ALT (SGPT) 46* 64*   AST (SGOT) 56* 95*   BILIRUBIN 0.6 0.3   ALK PHOS 47 62   LIPASE  --  32         Lab 11/07/21  0440 11/06/21  2209 11/06/21  1829   PROBNP  --   --  391.2   TROPONIN T 0.087* 0.078*  --    PROTIME 10.4 10.2  --    INR 0.98* 0.97*  --          Lab 11/07/21  0440   CHOLESTEROL 113   LDL CHOL 50   HDL CHOL 47   TRIGLYCERIDES 79     Brief Urine Lab Results  (Last result in the past 365 days)      Color   Clarity   Blood   Leuk Est   Nitrite   Protein   CREAT   Urine HCG        11/07/21 0047 Yellow   Cloudy   Negative   Small (1+)   Positive   Trace               Microbiology Results (last 10 days)     Procedure Component Value - Date/Time    Clostridium Difficile Toxin, PCR - Stool, Per Rectum [[id number]] Collected: 11/08/21 1213    Lab Status: Final result Specimen: Stool from Per Rectum Updated: 11/08/21 1413     C. Difficile Toxins by PCR Negative     027 Toxin Presumptive Negative    Blood Culture - Blood, Arm, Right [[id number]]  (Normal) Collected: 11/07/21 0751    Lab Status: Preliminary result Specimen: Blood from Arm, Right Updated: 11/09/21 0830     Blood Culture No growth at 2 days             Time spent on Discharge including face to face service: Greater than 35 minutes    Electronically signed by Janae Spencer MD, 11/09/21, 10:23 AM EST.

## 2021-11-09 NOTE — PROGRESS NOTES
[facility]     Cardiology Progress Note    Patient Name: Kiesha Pan  : 1955  MRN: [id number]  Primary Care Physician:  Provider, No Known  Date of admission: 2021    Subjective   Subjective   CC: Nausea/vomiting    HPI:    Kiesha Pan is a 66 y.o. female with history of admitted with nausea vomiting and atypical chest pain.    Objective     ROS:  Respiratory: No Cough, No Dyspnea  Cardiovascular: No CP Classic for Angina    Vitals:   Temp:  [98.2 °F (36.8 °C)-99.6 °F (37.6 °C)] 98.2 °F (36.8 °C)  Heart Rate:  [] 106  Resp:  [18] 18  BP: (152-210)/() 152/109  Physical Exam    Constitutional: Awake, alert, No acute distress   Eyes: PERRLA, sclerae anicteric, no conjunctival injection   HENT: NCAT, mucous membranes moist   Neck: Supple, no thyromegaly, no lymphadenopathy, trachea midline   Respiratory: Clear to auscultation bilaterally, nonlabored respirations    Cardiovascular: RRR, no murmurs, rubs, or gallops, palpable pedal pulses bilaterally   Musculoskeletal: No bilateral ankle edema, no clubbing or cyanosis to extremities   Neurologic: Oriented x 3, strength symmetric in all extremities, speech clear  Current medications:  aspirin, 81 mg, Oral, Daily  atorvastatin, 80 mg, Oral, Nightly  carvedilol, 6.25 mg, Oral, BID  cefTRIAXone, 1 g, Intravenous, Nightly  enoxaparin, 40 mg, Subcutaneous, Daily  lisinopril, 40 mg, Oral, Daily  senna-docusate sodium, 2 tablet, Oral, BID  sodium chloride, 10 mL, Intravenous, Q12H      Current IV drips:  dilTIAZem, 5-15 mg/hr, Last Rate: Stopped (21)  sodium chloride, 75 mL/hr, Last Rate: 75 mL/hr (21 0559)         Result Review    Result Review:  I have personally reviewed the results from the time of this admission to 2021 08:35 EST and agree with these findings:  [x]  Laboratory  []  Microbiology  [x]  Radiology  [x]  EKG  [x]  Cardiology/Vascular   CBC    CBC 21    1829 2209     WBC 9.72 8.30 10.12  8.26   RBC 4.47 3.93 3.66 (A) 3.46 (A)   Hemoglobin 15.4 13.7 12.5 12.1   Hematocrit 44.4 40.1 36.7 34.7   MCV 99.3 (A) 102.0 (A) 100.3 (A) 100.3 (A)   MCH 34.5 (A) 34.9 (A) 34.2 (A) 35.0 (A)   MCHC 34.7 34.2 34.1 34.9   RDW 13.2 13.3 13.2 12.9   Platelets 366 323 314 297   (A) Abnormal value            CMP    CMP 11/6/21 11/6/21 11/6/21 11/7/21 11/8/21    1829 1852 2209     Glucose 84 75 64 (A) 62 (A) 149 (A)   BUN 14  15 19 12   Creatinine 1.40 (A)  1.02 (A) 0.81 0.73   eGFR Non African Am 38 (A)  54 (A) 71 80   Sodium 140 139.6 141 136 137   Potassium 3.6  3.9 3.6 3.6   Chloride 97 (A)  103 100 105   Calcium 9.0  7.7 (A) 7.5 (A) 8.3 (A)   Albumin 4.30   3.70    Total Bilirubin 0.3   0.6    Alkaline Phosphatase 62   47    AST (SGOT) 95 (A)   56 (A)    ALT (SGPT) 64 (A)   46 (A)    (A) Abnormal value       Comments are available for some flowsheets but are not being displayed.                      Telemetry reviewed  Cardiac Medications Reviewed.    Assessment/Plan   Assessment / Plan      1.  Acute metabolic encephalopathy.  2.  Acute renal failure.  3.  Chronic alcohol use.  4.  Slightly increased troponin is probably secondary to above.  5.  UTI/colitis.  Plan:  1.  Sestamibi stress test negative for ischemia.  2.  Continue current management.  3.  We will sign off.      Electronically signed by Josué Purdy MD, 11/09/21, 8:35 AM EST.

## 2021-11-10 NOTE — OUTREACH NOTE
Prep Survey      Responses   [facility] patient discharged from? [facility]   Is LACE score < 7 ? Yes   Emergency Room discharge w/ pulse ox? No   Eligibility Not Eligible   What are the reasons patient is not eligible? Other   Does the patient have one of the following disease processes/diagnoses(primary or secondary)? Other   Prep survey completed? Yes          Shahnaz Hein RN

## 2021-11-12 LAB — BACTERIA SPEC AEROBE CULT: NORMAL

## 2021-12-03 ENCOUNTER — OFFICE VISIT (OUTPATIENT)
Dept: CARDIOLOGY | Facility: CLINIC | Age: 66
End: 2021-12-03

## 2021-12-03 VITALS
DIASTOLIC BLOOD PRESSURE: 80 MMHG | HEART RATE: 72 BPM | HEIGHT: 66 IN | BODY MASS INDEX: 23.3 KG/M2 | WEIGHT: 145 LBS | SYSTOLIC BLOOD PRESSURE: 146 MMHG

## 2021-12-03 DIAGNOSIS — R77.8 ELEVATED TROPONIN LEVEL NOT DUE TO ACUTE CORONARY SYNDROME: Primary | ICD-10-CM

## 2021-12-03 DIAGNOSIS — I10 ESSENTIAL HYPERTENSION: ICD-10-CM

## 2021-12-03 DIAGNOSIS — E78.2 MIXED HYPERLIPIDEMIA: ICD-10-CM

## 2021-12-03 PROBLEM — F10.20 ALCOHOL DEPENDENCE: Status: ACTIVE | Noted: 2021-12-03

## 2021-12-03 PROBLEM — J30.9 ALLERGIC RHINITIS DUE TO ALLERGEN: Status: ACTIVE | Noted: 2019-09-24

## 2021-12-03 PROBLEM — J44.89 OTHER SPECIFIED CHRONIC OBSTRUCTIVE AIRWAYS DISEASE: Status: ACTIVE | Noted: 2021-12-03

## 2021-12-03 PROBLEM — Z86.73 HISTORY OF STROKE: Status: ACTIVE | Noted: 2021-04-06

## 2021-12-03 PROBLEM — H26.9 CATARACTS, BILATERAL: Status: ACTIVE | Noted: 2021-12-03

## 2021-12-03 PROBLEM — F41.9 ANXIETY: Status: ACTIVE | Noted: 2021-12-03

## 2021-12-03 PROBLEM — E03.9 HYPOTHYROID: Status: ACTIVE | Noted: 2021-12-03

## 2021-12-03 PROBLEM — R11.2 NAUSEA VOMITING AND DIARRHEA: Status: RESOLVED | Noted: 2021-11-06 | Resolved: 2021-12-03

## 2021-12-03 PROBLEM — U07.1 COVID-19: Status: ACTIVE | Noted: 2021-04-06

## 2021-12-03 PROBLEM — J45.909 ASTHMA: Status: ACTIVE | Noted: 2021-12-03

## 2021-12-03 PROBLEM — R19.7 NAUSEA VOMITING AND DIARRHEA: Status: RESOLVED | Noted: 2021-11-06 | Resolved: 2021-12-03

## 2021-12-03 PROBLEM — J44.9 OTHER SPECIFIED CHRONIC OBSTRUCTIVE AIRWAYS DISEASE: Status: ACTIVE | Noted: 2021-12-03

## 2021-12-03 PROBLEM — R79.89 ELEVATED TROPONIN LEVEL NOT DUE TO ACUTE CORONARY SYNDROME: Status: ACTIVE | Noted: 2021-12-03

## 2021-12-03 PROBLEM — F32.A DEPRESSION: Status: ACTIVE | Noted: 2021-12-03

## 2021-12-03 PROCEDURE — 99214 OFFICE O/P EST MOD 30 MIN: CPT | Performed by: NURSE PRACTITIONER

## 2021-12-03 RX ORDER — CARVEDILOL 3.12 MG/1
TABLET ORAL
Qty: 270 TABLET | Refills: 3 | Status: SHIPPED | OUTPATIENT
Start: 2021-12-03 | End: 2022-06-17 | Stop reason: HOSPADM

## 2021-12-03 RX ORDER — ATORVASTATIN CALCIUM 80 MG/1
TABLET, FILM COATED ORAL
Qty: 90 TABLET | Refills: 3 | Status: SHIPPED | OUTPATIENT
Start: 2021-12-03

## 2021-12-03 RX ORDER — AMLODIPINE BESYLATE 5 MG/1
5 TABLET ORAL DAILY
Qty: 90 TABLET | Refills: 3 | Status: SHIPPED | OUTPATIENT
Start: 2021-12-03 | End: 2022-06-17 | Stop reason: HOSPADM

## 2021-12-03 RX ORDER — LISINOPRIL 40 MG/1
40 TABLET ORAL DAILY
Qty: 90 TABLET | Refills: 1 | Status: SHIPPED | OUTPATIENT
Start: 2021-12-03 | End: 2022-06-17 | Stop reason: HOSPADM

## 2021-12-03 NOTE — PROGRESS NOTES
Chief Complaint  Hospital Follow Up Visit and Hypertension    Subjective            History of Present Illness  Kiesha Pan is a 66-year-old white/ female patient who presents to the office today for hospital follow-up. She was admitted to [facility] on 2021 and was discharged on 2021. She was diagnosed with acute metabolic encephalopathy, UTI, had alcohol withdrawal, and had elevated troponin level. She had nuclear stress test on 2021 that was negative for any ischemia. She was discharged to home with recommendation to follow-up with cardiology. Today she denies any chest pain, ports occasional shortness of breath with exertion, and denies any lightheadedness/dizziness, palpitations, or edema. She reports compliance with all of her medications.    [facility]  Past Medical History:   Diagnosis Date   • Asthma    • Elevated troponin level not due to acute coronary syndrome 12/3/2021   • Hypertension    • Mixed hyperlipidemia 12/3/2021   • Stroke (HCC) 2021         ALLERGY  Allergies   Allergen Reactions   • Cephalexin Unknown - Low Severity   • Diphenhydramine Unknown - Low Severity   • Levofloxacin Unknown - Low Severity   • Penicillins Unknown - Low Severity          SURGICALHX  Past Surgical History:   Procedure Laterality Date   • CHOLECYSTECTOMY            SOC  Social History     Socioeconomic History   • Marital status:    Tobacco Use   • Smoking status: Former Smoker     Quit date:      Years since quittin.9   • Smokeless tobacco: Never Used   Vaping Use   • Vaping Use: Never used   Substance and Sexual Activity   • Alcohol use: Yes   • Drug use: Not Currently   • Sexual activity: Defer         FAMHX  Family History   Family history unknown: Yes      Current outpatient and discharge medications have been reconciled for the patient.  Reviewed by: MIHAI Biggs  Current Outpatient Medications on File Prior to Visit   Medication Sig   •  "amLODIPine (NORVASC) 5 MG tablet Take 1 tablet by mouth Daily for 30 days.   • aspirin 81 MG EC tablet Take 81 mg by mouth Daily.   • atorvastatin (LIPITOR) 80 MG tablet Take 1 tablet nightly at bedtime   • carvedilol (COREG) 3.125 MG tablet Take 3 tablets by mouth twice daily   • lisinopril (PRINIVIL,ZESTRIL) 20 MG tablet Take 2 tablets by mouth Daily.     No current facility-administered medications on file prior to visit.         Objective   /80   Pulse 72   Ht 167.6 cm (66\")   Wt 65.8 kg (145 lb)   BMI 23.40 kg/m²       Physical Exam  Constitutional:       Appearance: She is normal weight.   HENT:      Head: Normocephalic.   Neck:      Vascular: No carotid bruit.   Cardiovascular:      Rate and Rhythm: Normal rate and regular rhythm.      Pulses: Normal pulses.      Heart sounds: Normal heart sounds. No murmur heard.      Pulmonary:      Effort: Pulmonary effort is normal.      Breath sounds: Normal breath sounds.   Musculoskeletal:      Cervical back: Neck supple.      Right lower leg: No edema.      Left lower leg: No edema.   Skin:     General: Skin is dry.      Capillary Refill: Capillary refill takes less than 2 seconds.   Neurological:      Mental Status: She is alert and oriented to person, place, and time.       Result Review :   The following data was reviewed by: MIHAI Mayer on 12/03/2021:  proBNP   Date Value Ref Range Status   11/06/2021 391.2 0.0 - 900.0 pg/mL Final     CMP    CMP 11/8/21       Glucose 149 (A)   BUN 12   Creatinine 0.73   eGFR Non African Am 80   Sodium 137   Potassium 3.6   Chloride 105   Calcium 8.3 (A)   Albumin    Total Bilirubin    Alkaline Phosphatase    AST (SGOT)    ALT (SGPT)    (A) Abnormal value       Comments are available for some flowsheets but are not being displayed.           CBC w/diff    CBC w/Diff 11/8/21       WBC 8.26   RBC 3.46 (A)   Hemoglobin 12.1   Hematocrit 34.7   .3 (A)   MCH 35.0 (A)   MCHC 34.9   RDW 12.9   Platelets 297 "   Neutrophil Rel % 71.5   Immature Granulocyte Rel % 0.4   Lymphocyte Rel % 16.5 (A)   Monocyte Rel % 10.5   Eosinophil Rel % 0.0 (A)   Basophil Rel % 1.1   (A) Abnormal value             Lab Results   Component Value Date    TSH 1.380 11/06/2021      Lab Results   Component Value Date    FREET4 1.2 03/28/2021      No results found for: DDIMERQUANT  Magnesium   Date Value Ref Range Status   11/08/2021 1.8 1.6 - 2.4 mg/dL Final      No results found for: DIGOXIN   Lab Results   Component Value Date    TROPONINT 0.087 (C) 11/07/2021           Lipid Panel    Lipid Panel 11/7/21   Total Cholesterol 113   Triglycerides 79   HDL Cholesterol 47   VLDL Cholesterol 16   LDL Cholesterol  50   LDL/HDL Ratio 1.07   (A) Abnormal value       Comments are available for some flowsheets but are not being displayed.                  Assessment and Plan    Diagnoses and all orders for this visit:    1. Elevated troponin level not due to acute coronary syndrome (Primary)  She currently denies any anginal symptoms, continue aspirin 81 mg daily.    2. Essential hypertension  Blood pressure only mildly elevated today, continue amlodipine 5 mg daily, Coreg 3.125 mg twice daily, and lisinopril 40 mg daily. Advised to check blood pressure 2-3 times a week at home and low-sodium diet discussed.     3. Mixed hyperlipidemia  Lipid panel on 11/7/2021 with LDL of 50 which is within her goal range, continue atorvastatin 80 mg nightly.      Other orders  -     lisinopril (PRINIVIL,ZESTRIL) 40 MG tablet; Take 1 tablet by mouth Daily.  Dispense: 90 tablet; Refill: 1  -     carvedilol (COREG) 3.125 MG tablet; Take 3 tablets by mouth twice daily  Dispense: 270 tablet; Refill: 3  -     atorvastatin (LIPITOR) 80 MG tablet; Take 1 tablet nightly at bedtime  Dispense: 90 tablet; Refill: 3  -     amLODIPine (NORVASC) 5 MG tablet; Take 1 tablet by mouth Daily.  Dispense: 90 tablet; Refill: 3         Follow Up   Return in about 6 months (around 6/3/2022) for  Follow up with Dr Dunn.    Patient was given instructions and counseling regarding her condition or for health maintenance advice. Please see specific information pulled into the AVS if appropriate.     Kiesha Pan  reports that she quit smoking about 24 years ago. She has never used smokeless tobacco.      Ave Benson, MIHAI  12/03/21  10:04 EST    Dictated Utilizing Dragon Dictation

## 2021-12-03 NOTE — PATIENT INSTRUCTIONS
Hypertension, Adult  Hypertension is another name for high blood pressure. High blood pressure forces your heart to work harder to pump blood. This can cause problems over time.  There are two numbers in a blood pressure reading. There is a top number (systolic) over a bottom number (diastolic). It is best to have a blood pressure that is below 120/80. Healthy choices can help lower your blood pressure, or you may need medicine to help lower it.  What are the causes?  The cause of this condition is not known. Some conditions may be related to high blood pressure.  What increases the risk?  · Smoking.  · Having type 2 diabetes mellitus, high cholesterol, or both.  · Not getting enough exercise or physical activity.  · Being overweight.  · Having too much fat, sugar, calories, or salt (sodium) in your diet.  · Drinking too much alcohol.  · Having long-term (chronic) kidney disease.  · Having a family history of high blood pressure.  · Age. Risk increases with age.  · Race. You may be at higher risk if you are .  · Gender. Men are at higher risk than women before age 45. After age 65, women are at higher risk than men.  · Having obstructive sleep apnea.  · Stress.  What are the signs or symptoms?  · High blood pressure may not cause symptoms. Very high blood pressure (hypertensive crisis) may cause:  ? Headache.  ? Feelings of worry or nervousness (anxiety).  ? Shortness of breath.  ? Nosebleed.  ? A feeling of being sick to your stomach (nausea).  ? Throwing up (vomiting).  ? Changes in how you see.  ? Very bad chest pain.  ? Seizures.  How is this treated?  · This condition is treated by making healthy lifestyle changes, such as:  ? Eating healthy foods.  ? Exercising more.  ? Drinking less alcohol.  · Your health care provider may prescribe medicine if lifestyle changes are not enough to get your blood pressure under control, and if:  ? Your top number is above 130.  ? Your bottom number is above  80.  · Your personal target blood pressure may vary.  Follow these instructions at home:  Eating and drinking    · If told, follow the DASH eating plan. To follow this plan:  ? Fill one half of your plate at each meal with fruits and vegetables.  ? Fill one fourth of your plate at each meal with whole grains. Whole grains include whole-wheat pasta, brown rice, and whole-grain bread.  ? Eat or drink low-fat dairy products, such as skim milk or low-fat yogurt.  ? Fill one fourth of your plate at each meal with low-fat (lean) proteins. Low-fat proteins include fish, chicken without skin, eggs, beans, and tofu.  ? Avoid fatty meat, cured and processed meat, or chicken with skin.  ? Avoid pre-made or processed food.  · Eat less than 1,500 mg of salt each day.  · Do not drink alcohol if:  ? Your doctor tells you not to drink.  ? You are pregnant, may be pregnant, or are planning to become pregnant.  · If you drink alcohol:  ? Limit how much you use to:  § 0-1 drink a day for women.  § 0-2 drinks a day for men.  ? Be aware of how much alcohol is in your drink. In the [place] one drink equals one 12 oz bottle of beer (355 mL), one 5 oz glass of wine (148 mL), or one 1½ oz glass of hard liquor (44 mL).    Lifestyle    · Work with your doctor to stay at a healthy weight or to lose weight. Ask your doctor what the best weight is for you.  · Get at least 30 minutes of exercise most days of the week. This may include walking, swimming, or biking.  · Get at least 30 minutes of exercise that strengthens your muscles (resistance exercise) at least 3 days a week. This may include lifting weights or doing Pilates.  · Do not use any products that contain nicotine or tobacco, such as cigarettes, e-cigarettes, and chewing tobacco. If you need help quitting, ask your doctor.  · Check your blood pressure at home as told by your doctor.  · Keep all follow-up visits as told by your doctor. This is important.    Medicines  · Take  over-the-counter and prescription medicines only as told by your doctor. Follow directions carefully.  · Do not skip doses of blood pressure medicine. The medicine does not work as well if you skip doses. Skipping doses also puts you at risk for problems.  · Ask your doctor about side effects or reactions to medicines that you should watch for.  Contact a doctor if you:  · Think you are having a reaction to the medicine you are taking.  · Have headaches that keep coming back (recurring).  · Feel dizzy.  · Have swelling in your ankles.  · Have trouble with your vision.  Get help right away if you:  · Get a very bad headache.  · Start to feel mixed up (confused).  · Feel weak or numb.  · Feel faint.  · Have very bad pain in your:  ? Chest.  ? Belly (abdomen).  · Throw up more than once.  · Have trouble breathing.  Summary  · Hypertension is another name for high blood pressure.  · High blood pressure forces your heart to work harder to pump blood.  · For most people, a normal blood pressure is less than 120/80.  · Making healthy choices can help lower blood pressure. If your blood pressure does not get lower with healthy choices, you may need to take medicine.  This information is not intended to replace advice given to you by your health care provider. Make sure you discuss any questions you have with your health care provider.  Document Revised: 08/28/2019 Document Reviewed: 08/28/2019  [employer] Patient Education © 2021 [employer]      Low-Sodium Eating Plan  Sodium, which is an element that makes up salt, helps you maintain a healthy balance of fluids in your body. Too much sodium can increase your blood pressure and cause fluid and waste to be held in your body.  Your health care provider or dietitian may recommend following this plan if you have high blood pressure (hypertension), kidney disease, liver disease, or heart failure. Eating less sodium can help lower your blood pressure, reduce swelling, and  "protect your heart, liver, and kidneys.  What are tips for following this plan?  Reading food labels  · The Nutrition Facts label lists the amount of sodium in one serving of the food. If you eat more than one serving, you must multiply the listed amount of sodium by the number of servings.  · Choose foods with less than 140 mg of sodium per serving.  · Avoid foods with 300 mg of sodium or more per serving.  Shopping    · Look for lower-sodium products, often labeled as \"low-sodium\" or \"no salt added.\"  · Always check the sodium content, even if foods are labeled as \"unsalted\" or \"no salt added.\"  · Buy fresh foods.  ? Avoid canned foods and pre-made or frozen meals.  ? Avoid canned, cured, or processed meats.  · Buy breads that have less than 80 mg of sodium per slice.    Cooking    · Eat more home-cooked food and less restaurant, buffet, and fast food.  · Avoid adding salt when cooking. Use salt-free seasonings or herbs instead of table salt or sea salt. Check with your health care provider or pharmacist before using salt substitutes.  · Cook with plant-based oils, such as canola, sunflower, or olive oil.    Meal planning  · When eating at a restaurant, ask that your food be prepared with less salt or no salt, if possible. Avoid dishes labeled as brined, pickled, cured, smoked, or made with soy sauce, miso, or teriyaki sauce.  · Avoid foods that contain MSG (monosodium glutamate). MSG is sometimes added to Chinese food, bouillon, and some canned foods.  · Make meals that can be grilled, baked, poached, roasted, or steamed. These are generally made with less sodium.  General information  Most people on this plan should limit their sodium intake to 1,500-2,000 mg (milligrams) of sodium each day.  What foods should I eat?  Fruits  Fresh, frozen, or canned fruit. Fruit juice.  Vegetables  Fresh or frozen vegetables. \"No salt added\" canned vegetables. \"No salt added\" tomato sauce and paste. Low-sodium or reduced-sodium " tomato and vegetable juice.  Grains  Low-sodium cereals, including oats, puffed wheat and rice, and shredded wheat. Low-sodium crackers. Unsalted rice. Unsalted pasta. Low-sodium bread. Whole-grain breads and whole-grain pasta.  Meats and other proteins  Fresh or frozen (no salt added) meat, poultry, seafood, and fish. Low-sodium canned tuna and salmon. Unsalted nuts. Dried peas, beans, and lentils without added salt. Unsalted canned beans. Eggs. Unsalted nut butters.  Dairy  Milk. Soy milk. Cheese that is naturally low in sodium, such as ricotta cheese, fresh mozzarella, or Swiss cheese. Low-sodium or reduced-sodium cheese. Cream cheese. Yogurt.  Seasonings and condiments  Fresh and dried herbs and spices. Salt-free seasonings. Low-sodium mustard and ketchup. Sodium-free salad dressing. Sodium-free light mayonnaise. Fresh or refrigerated horseradish. Lemon juice. Vinegar.  Other foods  Homemade, reduced-sodium, or low-sodium soups. Unsalted popcorn and pretzels. Low-salt or salt-free chips.  The items listed above may not be a complete list of foods and beverages you can eat. Contact a dietitian for more information.  What foods should I avoid?  Vegetables  Sauerkraut, pickled vegetables, and relishes. Olives. French fries. Onion rings. Regular canned vegetables (not low-sodium or reduced-sodium). Regular canned tomato sauce and paste (not low-sodium or reduced-sodium). Regular tomato and vegetable juice (not low-sodium or reduced-sodium). Frozen vegetables in sauces.  Grains  Instant hot cereals. Bread stuffing, pancake, and biscuit mixes. Croutons. Seasoned rice or pasta mixes. Noodle soup cups. Boxed or frozen macaroni and cheese. Regular salted crackers. Self-rising flour.  Meats and other proteins  Meat or fish that is salted, canned, smoked, spiced, or pickled. Precooked or cured meat, such as sausages or meat loaves. Wal[place]. Ham. Pepperoni. Hot dogs. Corned beef. Chipped beef. Salt pork. Jerky. Pickled  herring. Anchovies and sardines. Regular canned tuna. Salted nuts.  Dairy  Processed cheese and cheese spreads. Hard cheeses. Cheese curds. Blue cheese. Feta cheese. String cheese. Regular cottage cheese. Buttermilk. Canned milk.  Fats and oils  Salted butter. Regular margarine. Ghee. Walters fat.  Seasonings and condiments  Onion salt, garlic salt, seasoned salt, table salt, and sea salt. Canned and packaged gravies. Worcestershire sauce. Tartar sauce. Barbecue sauce. Teriyaki sauce. Soy sauce, including reduced-sodium. Steak sauce. Fish sauce. Oyster sauce. Cocktail sauce. Horseradish that you find on the shelf. Regular ketchup and mustard. Meat flavorings and tenderizers. Bouillon cubes. Hot sauce. Pre-made or packaged marinades. Pre-made or packaged taco seasonings. Relishes. Regular salad dressings. Salsa.  Other foods  Salted popcorn and pretzels. Corn chips and puffs. Potato and tortilla chips. Canned or dried soups. Pizza. Frozen entrees and pot pies.  The items listed above may not be a complete list of foods and beverages you should avoid. Contact a dietitian for more information.  Summary  · Eating less sodium can help lower your blood pressure, reduce swelling, and protect your heart, liver, and kidneys.  · Most people on this plan should limit their sodium intake to 1,500-2,000 mg (milligrams) of sodium each day.  · Canned, boxed, and frozen foods are high in sodium. Restaurant foods, fast foods, and pizza are also very high in sodium. You also get sodium by adding salt to food.  · Try to cook at home, eat more fresh fruits and vegetables, and eat less fast food and canned, processed, or prepared foods.  This information is not intended to replace advice given to you by your health care provider. Make sure you discuss any questions you have with your health care provider.  Document Revised: 01/22/2021 Document Reviewed: 11/18/2020  [employer] Patient Education © 2021 [employer]      Cholesterol Content  in Foods  Cholesterol is a waxy, fat-like substance that helps to carry fat in the blood. The body needs cholesterol in small amounts, but too much cholesterol can cause damage to the arteries and heart.  Most people should eat less than 200 milligrams (mg) of cholesterol a day.  Foods with cholesterol    Cholesterol is found in animal-based foods, such as meat, seafood, and dairy. Generally, low-fat dairy and lean meats have less cholesterol than full-fat dairy and fatty meats. The milligrams of cholesterol per serving (mg per serving) of common cholesterol-containing foods are listed below.  Meat and other proteins  · Egg -- one large whole egg has 186 mg.  · Veal shank -- 4 oz has 141 mg.  · Lean ground turkey (93% lean) -- 4 oz has 118 mg.  · Fat-trimmed lamb loin -- 4 oz has 106 mg.  · Lean ground beef (90% lean) -- 4 oz has 100 mg.  · Lobster -- 3.5 oz has 90 mg.  · Pork loin chops -- 4 oz has 86 mg.  · Canned salmon -- 3.5 oz has 83 mg.  · Fat-trimmed beef top loin -- 4 oz has 78 mg.  · Frankfurter -- 1 cesar (3.5 oz) has 77 mg.  · Crab -- 3.5 oz has 71 mg.  · Roasted chicken without skin, white meat -- 4 oz has 66 mg.  · Light bologna -- 2 oz has 45 mg.  · Deli-cut turkey -- 2 oz has 31 mg.  · Canned tuna -- 3.5 oz has 31 mg.  · Walters -- 1 oz has 29 mg.  · Oysters and mussels (raw) -- 3.5 oz has 25 mg.  · Mackerel -- 1 oz has 22 mg.  · Trout -- 1 oz has 20 mg.  · Pork sausage -- 1 link (1 oz) has 17 mg.  · [place] -- 1 oz has 16 mg.  · Tilapia -- 1 oz has 14 mg.  Dairy  · Soft-serve ice cream -- ½ cup (4 oz) has 103 mg.  · Whole-milk yogurt -- 1 cup (8 oz) has 29 mg.  · Cheddar cheese -- 1 oz has 28 mg.  · American cheese -- 1 oz has 28 mg.  · Whole milk -- 1 cup (8 oz) has 23 mg.  · 2% milk -- 1 cup (8 oz) has 18 mg.  · Cream cheese -- 1 tablespoon (Tbsp) has 15 mg.  · Cottage cheese -- ½ cup (4 oz) has 14 mg.  · Low-fat (1%) milk -- 1 cup (8 oz) has 10 mg.  · Sour cream -- 1 Tbsp has 8.5 mg.  · Low-fat  yogurt -- 1 cup (8 oz) has 8 mg.  · Nonfat Greek yogurt -- 1 cup (8 oz) has 7 mg.  · Half-and-half cream -- 1 Tbsp has 5 mg.  Fats and oils  · Cod liver oil -- 1 tablespoon (Tbsp) has 82 mg.  · Butter -- 1 Tbsp has 15 mg.  · Lard -- 1 Tbsp has 14 mg.  · Walters grease -- 1 Tbsp has 14 mg.  · Mayonnaise -- 1 Tbsp has 5-10 mg.  · Margarine -- 1 Tbsp has 3-10 mg.  Exact amounts of cholesterol in these foods may vary depending on specific ingredients and brands.  Foods without cholesterol  Most plant-based foods do not have cholesterol unless you combine them with a food that has cholesterol. Foods without cholesterol include:  · Grains and cereals.  · Vegetables.  · Fruits.  · Vegetable oils, such as olive, canola, and sunflower oil.  · Legumes, such as peas, beans, and lentils.  · Nuts and seeds.  · Egg whites.  Summary  · The body needs cholesterol in small amounts, but too much cholesterol can cause damage to the arteries and heart.  · Most people should eat less than 200 milligrams (mg) of cholesterol a day.  This information is not intended to replace advice given to you by your health care provider. Make sure you discuss any questions you have with your health care provider.  Document Revised: 05/10/2021 Document Reviewed: 05/10/2021  [employer] Patient Education © 2021 [employer]

## 2022-01-20 ENCOUNTER — HOSPITAL ENCOUNTER (EMERGENCY)
Facility: HOSPITAL | Age: 67
Discharge: HOME | End: 2022-01-21
Attending: EMERGENCY MEDICINE | Admitting: EMERGENCY MEDICINE

## 2022-01-20 ENCOUNTER — APPOINTMENT (OUTPATIENT)
Dept: CT IMAGING | Facility: HOSPITAL | Age: 67
End: 2022-01-20

## 2022-01-20 DIAGNOSIS — N15.9 KIDNEY INFECTION: Primary | ICD-10-CM

## 2022-01-20 DIAGNOSIS — R10.9 RIGHT FLANK PAIN: ICD-10-CM

## 2022-01-20 LAB
ALBUMIN SERPL-MCNC: 4.8 G/DL (ref 3.5–5.2)
ALBUMIN/GLOB SERPL: 2 G/DL
ALP SERPL-CCNC: 73 U/L (ref 39–117)
ALT SERPL W P-5'-P-CCNC: 15 U/L (ref 1–33)
ANION GAP SERPL CALCULATED.3IONS-SCNC: 11.8 MMOL/L (ref 5–15)
AST SERPL-CCNC: 26 U/L (ref 1–32)
BACTERIA UR QL AUTO: ABNORMAL /HPF
BASOPHILS # BLD AUTO: 0.09 10*3/MM3 (ref 0–0.2)
BASOPHILS NFR BLD AUTO: 1.1 % (ref 0–1.5)
BILIRUB SERPL-MCNC: 0.5 MG/DL (ref 0–1.2)
BILIRUB UR QL STRIP: NEGATIVE
BUN SERPL-MCNC: 8 MG/DL (ref 8–23)
BUN/CREAT SERPL: 12.3 (ref 7–25)
CALCIUM SPEC-SCNC: 9.2 MG/DL (ref 8.6–10.5)
CHLORIDE SERPL-SCNC: 103 MMOL/L (ref 98–107)
CLARITY UR: CLEAR
CO2 SERPL-SCNC: 26.2 MMOL/L (ref 22–29)
COLOR UR: YELLOW
CREAT SERPL-MCNC: 0.65 MG/DL (ref 0.57–1)
DEPRECATED RDW RBC AUTO: 52.9 FL (ref 37–54)
EOSINOPHIL # BLD AUTO: 0.13 10*3/MM3 (ref 0–0.4)
EOSINOPHIL NFR BLD AUTO: 1.6 % (ref 0.3–6.2)
ERYTHROCYTE [DISTWIDTH] IN BLOOD BY AUTOMATED COUNT: 14.2 % (ref 12.3–15.4)
GFR SERPL CREATININE-BSD FRML MDRD: 91 ML/MIN/1.73
GLOBULIN UR ELPH-MCNC: 2.4 GM/DL
GLUCOSE SERPL-MCNC: 108 MG/DL (ref 65–99)
GLUCOSE UR STRIP-MCNC: NEGATIVE MG/DL
HCT VFR BLD AUTO: 35.9 % (ref 34–46.6)
HGB BLD-MCNC: 12.5 G/DL (ref 12–15.9)
HGB UR QL STRIP.AUTO: NEGATIVE
HOLD SPECIMEN: NORMAL
HOLD SPECIMEN: NORMAL
HYALINE CASTS UR QL AUTO: ABNORMAL /LPF
IMM GRANULOCYTES # BLD AUTO: 0.03 10*3/MM3 (ref 0–0.05)
IMM GRANULOCYTES NFR BLD AUTO: 0.4 % (ref 0–0.5)
KETONES UR QL STRIP: NEGATIVE
LEUKOCYTE ESTERASE UR QL STRIP.AUTO: ABNORMAL
LIPASE SERPL-CCNC: 55 U/L (ref 13–60)
LYMPHOCYTES # BLD AUTO: 1.55 10*3/MM3 (ref 0.7–3.1)
LYMPHOCYTES NFR BLD AUTO: 19.5 % (ref 19.6–45.3)
MCH RBC QN AUTO: 35.7 PG (ref 26.6–33)
MCHC RBC AUTO-ENTMCNC: 34.8 G/DL (ref 31.5–35.7)
MCV RBC AUTO: 102.6 FL (ref 79–97)
MONOCYTES # BLD AUTO: 0.72 10*3/MM3 (ref 0.1–0.9)
MONOCYTES NFR BLD AUTO: 9 % (ref 5–12)
NEUTROPHILS NFR BLD AUTO: 5.44 10*3/MM3 (ref 1.7–7)
NEUTROPHILS NFR BLD AUTO: 68.4 % (ref 42.7–76)
NITRITE UR QL STRIP: NEGATIVE
NRBC BLD AUTO-RTO: 1.1 /100 WBC (ref 0–0.2)
PH UR STRIP.AUTO: 5.5 [PH] (ref 5–8)
PLATELET # BLD AUTO: 356 10*3/MM3 (ref 140–450)
PMV BLD AUTO: 10.2 FL (ref 6–12)
POTASSIUM SERPL-SCNC: 4 MMOL/L (ref 3.5–5.2)
PROT SERPL-MCNC: 7.2 G/DL (ref 6–8.5)
PROT UR QL STRIP: NEGATIVE
RBC # BLD AUTO: 3.5 10*6/MM3 (ref 3.77–5.28)
RBC # UR STRIP: ABNORMAL /HPF
REF LAB TEST METHOD: ABNORMAL
SODIUM SERPL-SCNC: 141 MMOL/L (ref 136–145)
SP GR UR STRIP: 1.01 (ref 1–1.03)
SQUAMOUS #/AREA URNS HPF: ABNORMAL /HPF
UROBILINOGEN UR QL STRIP: ABNORMAL
WBC # UR STRIP: ABNORMAL /HPF
WBC NRBC COR # BLD: 7.96 10*3/MM3 (ref 3.4–10.8)
WHOLE BLOOD HOLD SPECIMEN: NORMAL
WHOLE BLOOD HOLD SPECIMEN: NORMAL

## 2022-01-20 PROCEDURE — 85025 COMPLETE CBC W/AUTO DIFF WBC: CPT | Performed by: EMERGENCY MEDICINE

## 2022-01-20 PROCEDURE — 99283 EMERGENCY DEPT VISIT LOW MDM: CPT

## 2022-01-20 PROCEDURE — 96375 TX/PRO/DX INJ NEW DRUG ADDON: CPT

## 2022-01-20 PROCEDURE — 81001 URINALYSIS AUTO W/SCOPE: CPT

## 2022-01-20 PROCEDURE — 96365 THER/PROPH/DIAG IV INF INIT: CPT

## 2022-01-20 PROCEDURE — 25010000002 MORPHINE PER 10 MG: Performed by: EMERGENCY MEDICINE

## 2022-01-20 PROCEDURE — 80053 COMPREHEN METABOLIC PANEL: CPT | Performed by: EMERGENCY MEDICINE

## 2022-01-20 PROCEDURE — 83690 ASSAY OF LIPASE: CPT | Performed by: EMERGENCY MEDICINE

## 2022-01-20 PROCEDURE — 25010000002 CEFTRIAXONE PER 250 MG: Performed by: EMERGENCY MEDICINE

## 2022-01-20 PROCEDURE — 25010000002 ONDANSETRON PER 1 MG: Performed by: EMERGENCY MEDICINE

## 2022-01-20 PROCEDURE — 36415 COLL VENOUS BLD VENIPUNCTURE: CPT

## 2022-01-20 PROCEDURE — 74176 CT ABD & PELVIS W/O CONTRAST: CPT

## 2022-01-20 RX ORDER — CEPHALEXIN 500 MG/1
500 CAPSULE ORAL 4 TIMES DAILY
Qty: 28 CAPSULE | Refills: 0 | Status: SHIPPED | OUTPATIENT
Start: 2022-01-20 | End: 2022-01-27

## 2022-01-20 RX ORDER — ONDANSETRON 4 MG/1
4 TABLET, ORALLY DISINTEGRATING ORAL 4 TIMES DAILY PRN
Qty: 12 TABLET | Refills: 0 | Status: SHIPPED | OUTPATIENT
Start: 2022-01-20

## 2022-01-20 RX ORDER — SODIUM CHLORIDE 0.9 % (FLUSH) 0.9 %
10 SYRINGE (ML) INJECTION AS NEEDED
Status: DISCONTINUED | OUTPATIENT
Start: 2022-01-20 | End: 2022-01-21 | Stop reason: HOSPADM

## 2022-01-20 RX ORDER — ONDANSETRON 2 MG/ML
4 INJECTION INTRAMUSCULAR; INTRAVENOUS ONCE
Status: COMPLETED | OUTPATIENT
Start: 2022-01-20 | End: 2022-01-20

## 2022-01-20 RX ORDER — CEFTRIAXONE SODIUM 1 G/50ML
1 INJECTION, SOLUTION INTRAVENOUS ONCE
Status: COMPLETED | OUTPATIENT
Start: 2022-01-20 | End: 2022-01-21

## 2022-01-20 RX ORDER — HYDROCODONE BITARTRATE AND ACETAMINOPHEN 5; 325 MG/1; MG/1
1 TABLET ORAL EVERY 6 HOURS PRN
Status: DISCONTINUED | OUTPATIENT
Start: 2022-01-20 | End: 2022-01-21 | Stop reason: HOSPADM

## 2022-01-20 RX ADMIN — CEFTRIAXONE SODIUM 1 G: 1 INJECTION, SOLUTION INTRAVENOUS at 23:31

## 2022-01-20 RX ADMIN — SODIUM CHLORIDE 1000 ML: 9 INJECTION, SOLUTION INTRAVENOUS at 23:28

## 2022-01-20 RX ADMIN — ONDANSETRON 4 MG: 2 INJECTION INTRAMUSCULAR; INTRAVENOUS at 23:28

## 2022-01-20 RX ADMIN — MORPHINE SULFATE 4 MG: 4 INJECTION INTRAVENOUS at 23:30

## 2022-01-21 VITALS
BODY MASS INDEX: 23.22 KG/M2 | HEART RATE: 87 BPM | SYSTOLIC BLOOD PRESSURE: 141 MMHG | DIASTOLIC BLOOD PRESSURE: 87 MMHG | WEIGHT: 147.93 LBS | OXYGEN SATURATION: 95 % | TEMPERATURE: 98.2 F | RESPIRATION RATE: 21 BRPM | HEIGHT: 67 IN

## 2022-01-21 RX ADMIN — HYDROCODONE BITARTRATE AND ACETAMINOPHEN 1 TABLET: 5; 325 TABLET ORAL at 00:45

## 2022-01-21 NOTE — DISCHARGE INSTRUCTIONS
CT scan was negative for any acute intra-abdominal emergency and showed no sign of obstructive kidney stone.    Urine as we discussed shows infection so we will treat that and treat your symptomatic pain.    Take medications as prescribed.    Follow-up with PCP in the next 3 days if not improving.    Return to emergency department for any new or worsening symptoms

## 2022-01-21 NOTE — ED PROVIDER NOTES
Time: 23:47 EST  Arrived by: Private vehicle  Chief Complaint: Flank pain  History provided by: Patient  History is limited by: N/A    History of Present Illness:  Patient is a 66 y.o. year old female that presents to the emergency department with gradually worsening right flank pain since yesterday      Flank Pain  Pain location:  R flank  Pain quality: sharp, stabbing and throbbing    Pain radiates to:  Does not radiate  Pain severity:  Severe  Onset quality:  Gradual  Duration:  24 hours  Timing:  Constant  Progression:  Worsening  Chronicity:  New  Context comment:  No known cause patient just started suddenly having pain yesterday and has gradually worsened  Relieved by:  Nothing  Worsened by:  Movement and palpation  Ineffective treatments:  Acetaminophen  Associated symptoms: nausea and sore throat    Associated symptoms: no anorexia, no belching, no chest pain, no chills, no constipation, no cough, no diarrhea, no dysuria, no fatigue, no fever, no flatus, no hematemesis, no hematochezia, no hematuria, no melena, no shortness of breath, no vaginal bleeding and no vomiting    Risk factors: being elderly      Similar Symptoms Previously: No  Recently seen: No      Patient Care Team  Primary Care Provider: No    Past Medical History:     Allergies   Allergen Reactions   • Cephalexin Unknown - Low Severity   • Diphenhydramine Unknown - Low Severity   • Levofloxacin Unknown - Low Severity   • Penicillins Unknown - Low Severity     Past Medical History:   Diagnosis Date   • Asthma    • Elevated troponin level not due to acute coronary syndrome 12/3/2021   • Hypertension    • Mixed hyperlipidemia 12/3/2021   • Stroke (HCC) 03/2021     Past Surgical History:   Procedure Laterality Date   • CHOLECYSTECTOMY       Family History   Family history unknown: Yes       Home Medications:  Prior to Admission medications    Medication Sig Start Date End Date Taking? Authorizing Provider   amLODIPine (NORVASC) 5 MG tablet Take 1  "tablet by mouth Daily. 12/3/21   King MIHAI Rivas   aspirin 81 MG EC tablet Take 81 mg by mouth Daily.    Provider, MD Jesi   atorvastatin (LIPITOR) 80 MG tablet Take 1 tablet nightly at bedtime 12/3/21   Ave Benson APRN   carvedilol (COREG) 3.125 MG tablet Take 3 tablets by mouth twice daily 12/3/21   Ave Benson APRN   lisinopril (PRINIVIL,ZESTRIL) 40 MG tablet Take 1 tablet by mouth Daily. 12/3/21   Ave Benson APRN        Social History:   PT  reports that she quit smoking about 25 years ago. She has never used smokeless tobacco. She reports current alcohol use. She reports previous drug use.    Record Review:  I have reviewed the patient's records in [employer].     Review of Systems  Review of Systems   Constitutional: Negative for chills, fatigue and fever.   HENT: Positive for sore throat.    Respiratory: Negative for cough and shortness of breath.    Cardiovascular: Negative for chest pain.   Gastrointestinal: Positive for nausea. Negative for anorexia, constipation, diarrhea, flatus, hematemesis, hematochezia, melena and vomiting.   Genitourinary: Positive for flank pain. Negative for dysuria, hematuria and vaginal bleeding.   Musculoskeletal: Positive for back pain ( right side).   Skin: Negative.    Neurological: Negative.    Hematological: Negative.    Psychiatric/Behavioral: Negative.         Physical Exam  /92   Pulse 85   Temp 98.2 °F (36.8 °C) (Oral)   Resp 21   Ht 170.2 cm (67\")   Wt 67.1 kg (147 lb 14.9 oz)   LMP  (LMP Unknown)   SpO2 99%   BMI 23.17 kg/m²     Physical Exam  Vitals and nursing note reviewed.   Constitutional:       General: She is in acute distress ( Patient appears in pain).      Appearance: She is not toxic-appearing.   HENT:      Head: Atraumatic.      Mouth/Throat:      Mouth: Mucous membranes are moist.   Eyes:      General: No scleral icterus.  Cardiovascular:      Rate and Rhythm: Normal rate and regular rhythm.      " "Pulses: Normal pulses.      Heart sounds: Normal heart sounds.   Pulmonary:      Effort: Pulmonary effort is normal. No respiratory distress.      Breath sounds: Normal breath sounds.   Abdominal:      General: Bowel sounds are normal.      Palpations: Abdomen is soft.      Tenderness: There is no abdominal tenderness. There is right CVA tenderness. There is no left CVA tenderness.   Musculoskeletal:         General: Normal range of motion.      Cervical back: Normal range of motion.   Skin:     General: Skin is warm and dry.   Neurological:      Mental Status: She is alert and oriented to person, place, and time.   Psychiatric:         Mood and Affect: Mood normal.         Behavior: Behavior normal.                  ED Course  /92   Pulse 85   Temp 98.2 °F (36.8 °C) (Oral)   Resp 21   Ht 170.2 cm (67\")   Wt 67.1 kg (147 lb 14.9 oz)   LMP  (LMP Unknown)   SpO2 99%   BMI 23.17 kg/m²   Results for orders placed or performed during the hospital encounter of 01/20/22   Comprehensive Metabolic Panel    Specimen: Blood   Result Value Ref Range    Glucose 108 (H) 65 - 99 mg/dL    BUN 8 8 - 23 mg/dL    Creatinine 0.65 0.57 - 1.00 mg/dL    Sodium 141 136 - 145 mmol/L    Potassium 4.0 3.5 - 5.2 mmol/L    Chloride 103 98 - 107 mmol/L    CO2 26.2 22.0 - 29.0 mmol/L    Calcium 9.2 8.6 - 10.5 mg/dL    Total Protein 7.2 6.0 - 8.5 g/dL    Albumin 4.80 3.50 - 5.20 g/dL    ALT (SGPT) 15 1 - 33 U/L    AST (SGOT) 26 1 - 32 U/L    Alkaline Phosphatase 73 39 - 117 U/L    Total Bilirubin 0.5 0.0 - 1.2 mg/dL    eGFR Non African Amer 91 >60 mL/min/1.73    Globulin 2.4 gm/dL    A/G Ratio 2.0 g/dL    BUN/Creatinine Ratio 12.3 7.0 - 25.0    Anion Gap 11.8 5.0 - 15.0 mmol/L   Lipase    Specimen: Blood   Result Value Ref Range    Lipase 55 13 - 60 U/L   Urinalysis With Microscopic If Indicated (No Culture) -    Specimen: Urine   Result Value Ref Range    Color, UA Yellow Yellow, Straw    Appearance, UA Clear Clear    pH, UA 5.5 5.0 " - 8.0    Specific Gravity, UA 1.011 1.005 - 1.030    Glucose, UA Negative Negative    Ketones, UA Negative Negative    Bilirubin, UA Negative Negative    Blood, UA Negative Negative    Protein, UA Negative Negative    Leuk Esterase, UA Trace (A) Negative    Nitrite, UA Negative Negative    Urobilinogen, UA 1.0 E.U./dL 0.2 - 1.0 E.U./dL   CBC Auto Differential    Specimen: Blood   Result Value Ref Range    WBC 7.96 3.40 - 10.80 10*3/mm3    RBC 3.50 (L) 3.77 - 5.28 10*6/mm3    Hemoglobin 12.5 12.0 - 15.9 g/dL    Hematocrit 35.9 34.0 - 46.6 %    .6 (H) 79.0 - 97.0 fL    MCH 35.7 (H) 26.6 - 33.0 pg    MCHC 34.8 31.5 - 35.7 g/dL    RDW 14.2 12.3 - 15.4 %    RDW-SD 52.9 37.0 - 54.0 fl    MPV 10.2 6.0 - 12.0 fL    Platelets 356 140 - 450 10*3/mm3    Neutrophil % 68.4 42.7 - 76.0 %    Lymphocyte % 19.5 (L) 19.6 - 45.3 %    Monocyte % 9.0 5.0 - 12.0 %    Eosinophil % 1.6 0.3 - 6.2 %    Basophil % 1.1 0.0 - 1.5 %    Immature Grans % 0.4 0.0 - 0.5 %    Neutrophils, Absolute 5.44 1.70 - 7.00 10*3/mm3    Lymphocytes, Absolute 1.55 0.70 - 3.10 10*3/mm3    Monocytes, Absolute 0.72 0.10 - 0.90 10*3/mm3    Eosinophils, Absolute 0.13 0.00 - 0.40 10*3/mm3    Basophils, Absolute 0.09 0.00 - 0.20 10*3/mm3    Immature Grans, Absolute 0.03 0.00 - 0.05 10*3/mm3    nRBC 1.1 (H) 0.0 - 0.2 /100 WBC   Urinalysis, Microscopic Only - Urine, Clean Catch    Specimen: Urine   Result Value Ref Range    RBC, UA 0-2 (A) None Seen /HPF    WBC, UA 6-12 (A) None Seen /HPF    Bacteria, UA 2+ (A) None Seen /HPF    Squamous Epithelial Cells, UA 0-2 None Seen, 0-2 /HPF    Hyaline Casts, UA None Seen None Seen /LPF    Methodology Automated Microscopy    Green Top (Gel)   Result Value Ref Range    Extra Tube Hold for add-ons.    Lavender Top   Result Value Ref Range    Extra Tube hold for add-on    Gold Top - SST   Result Value Ref Range    Extra Tube Hold for add-ons.    Light Blue Top   Result Value Ref Range    Extra Tube hold for add-on       Medications   sodium chloride 0.9 % flush 10 mL (has no administration in time range)   cefTRIAXone (ROCEPHIN) IVPB 1 g (1 g Intravenous New Bag 1/20/22 2331)   sodium chloride 0.9 % bolus 1,000 mL (1,000 mL Intravenous New Bag 1/20/22 2328)   morphine injection 4 mg (4 mg Intravenous Given 1/20/22 2330)   ondansetron (ZOFRAN) injection 4 mg (4 mg Intravenous Given 1/20/22 2328)     CT Abdomen Pelvis Without Contrast    Result Date: 1/20/2022  Narrative: PROCEDURE: CT ABDOMEN PELVIS WO CONTRAST  COMPARISON: 11/6/2021.  INDICATIONS: RIGHT FLANK PAIN. HISTORY OF STONES.  TECHNIQUE: 655 CT images were created without intravenous contrast.   PROTOCOL:   Standard CT imaging protocol performed.    RADIATION:   DLP: 404.2 mGy*cm   Automated exposure control was utilized to minimize radiation dose.  FINDINGS: No acute findings are seen by nonenhanced CT examination of the abdomen and pelvis.  No hydronephrosis or obstructive uropathy.  No nephrolithiasis or ureterolithiasis.  No urinary bladder calculi.  There may be renal cortical scarring bilaterally.  The other incidental nonemergent findings are as described in the prior CT exam report from 11/6/2021.      Impression:  No acute findings are seen by nonenhanced CT examination of the abdomen and pelvis.  No renal or ureteral calculi.  No hydronephrosis.  No obstructive uropathy.      MIRIAM CAMARA JR, MD       Electronically Signed and Approved By: MIRIAM CAMARA JR, MD on 1/20/2022 at 23:38               Medical Decision Making:                     MDM  Number of Diagnoses or Management Options  Kidney infection  Right flank pain  Diagnosis management comments: I have spoken with the patient. I have explained the patient´s condition, diagnoses and treatment plan based on the information available to me at this time. I have answered the patient's questions and addressed any concerns. The patient has a good  understanding of the patient´s diagnosis, condition, and  treatment plan as can be expected at this point. The vital signs have been stable. The patient´s condition is stable and appropriate for discharge from the emergency department.      The patient will pursue further outpatient evaluation with the primary care physician or other designated or consulting physician as outlined in the discharge instructions. They are agreeable to this plan of care and follow-up instructions have been explained in detail. The patient has received these instructions in written format and have expressed an understanding of the discharge instructions. The patient is aware that any significant change in condition or worsening of symptoms should prompt an immediate return to this or the closest emergency department or call to 911.         Amount and/or Complexity of Data Reviewed  Clinical lab tests: reviewed and ordered  Tests in the radiology section of CPT®: reviewed and ordered  Tests in the medicine section of CPT®: reviewed and ordered    Risk of Complications, Morbidity, and/or Mortality  Presenting problems: moderate  Diagnostic procedures: moderate  Management options: low    Patient Progress  Patient progress: stable       Final diagnoses:   Kidney infection   Right flank pain        Disposition:  ED Disposition     ED Disposition Condition Comment    Discharge Stable            Shelli Garcia, APRN  01/20/22 8910

## 2022-06-10 ENCOUNTER — APPOINTMENT (OUTPATIENT)
Dept: MRI IMAGING | Facility: HOSPITAL | Age: 67
End: 2022-06-10

## 2022-06-10 ENCOUNTER — APPOINTMENT (OUTPATIENT)
Dept: GENERAL RADIOLOGY | Facility: HOSPITAL | Age: 67
End: 2022-06-10

## 2022-06-10 ENCOUNTER — APPOINTMENT (OUTPATIENT)
Dept: CT IMAGING | Facility: HOSPITAL | Age: 67
End: 2022-06-10

## 2022-06-10 ENCOUNTER — HOSPITAL ENCOUNTER (INPATIENT)
Facility: HOSPITAL | Age: 67
LOS: 7 days | Discharge: NURSING, REHAB OR LONG-TERM CARE | End: 2022-06-17
Attending: EMERGENCY MEDICINE | Admitting: INTERNAL MEDICINE

## 2022-06-10 ENCOUNTER — APPOINTMENT (OUTPATIENT)
Dept: CARDIOLOGY | Facility: HOSPITAL | Age: 67
End: 2022-06-10

## 2022-06-10 DIAGNOSIS — I63.9 CEREBROVASCULAR ACCIDENT (CVA), UNSPECIFIED MECHANISM: ICD-10-CM

## 2022-06-10 DIAGNOSIS — Z78.9 DECREASED ACTIVITIES OF DAILY LIVING (ADL): ICD-10-CM

## 2022-06-10 DIAGNOSIS — N39.0 URINARY TRACT INFECTION IN FEMALE: Primary | ICD-10-CM

## 2022-06-10 DIAGNOSIS — R13.12 DYSPHAGIA, OROPHARYNGEAL: ICD-10-CM

## 2022-06-10 DIAGNOSIS — R26.2 DIFFICULTY WALKING: ICD-10-CM

## 2022-06-10 PROBLEM — I48.0 PAROXYSMAL ATRIAL FIBRILLATION WITH RVR: Status: ACTIVE | Noted: 2022-06-10

## 2022-06-10 PROBLEM — R41.82 AMS (ALTERED MENTAL STATUS): Status: ACTIVE | Noted: 2022-06-10

## 2022-06-10 LAB
ABO GROUP BLD: NORMAL
ABO GROUP BLD: NORMAL
ALBUMIN SERPL-MCNC: 5 G/DL (ref 3.5–5.2)
ALBUMIN/GLOB SERPL: 1.7 G/DL
ALP SERPL-CCNC: 55 U/L (ref 39–117)
ALT SERPL W P-5'-P-CCNC: 24 U/L (ref 1–33)
ANION GAP SERPL CALCULATED.3IONS-SCNC: 20.3 MMOL/L (ref 5–15)
ANISOCYTOSIS BLD QL: NORMAL
APTT PPP: 25.7 SECONDS (ref 24.2–34.2)
AST SERPL-CCNC: 47 U/L (ref 1–32)
BACTERIA UR QL AUTO: ABNORMAL /HPF
BASOPHILS # BLD AUTO: 0.14 10*3/MM3 (ref 0–0.2)
BASOPHILS NFR BLD AUTO: 1.9 % (ref 0–1.5)
BILIRUB SERPL-MCNC: 0.2 MG/DL (ref 0–1.2)
BILIRUB UR QL STRIP: NEGATIVE
BLD GP AB SCN SERPL QL: NEGATIVE
BUN SERPL-MCNC: 10 MG/DL (ref 8–23)
BUN/CREAT SERPL: 15.9 (ref 7–25)
CALCIUM SPEC-SCNC: 9 MG/DL (ref 8.6–10.5)
CHLORIDE SERPL-SCNC: 105 MMOL/L (ref 98–107)
CLARITY UR: CLEAR
CO2 SERPL-SCNC: 21.7 MMOL/L (ref 22–29)
COLOR UR: YELLOW
CREAT SERPL-MCNC: 0.63 MG/DL (ref 0.57–1)
DEPRECATED RDW RBC AUTO: 55.2 FL (ref 37–54)
EGFRCR SERPLBLD CKD-EPI 2021: 98 ML/MIN/1.73
EOSINOPHIL # BLD AUTO: 1.13 10*3/MM3 (ref 0–0.4)
EOSINOPHIL NFR BLD AUTO: 15.5 % (ref 0.3–6.2)
ERYTHROCYTE [DISTWIDTH] IN BLOOD BY AUTOMATED COUNT: 14.6 % (ref 12.3–15.4)
ETHANOL BLD-MCNC: 328 MG/DL (ref 0–10)
ETHANOL UR QL: 0.33 %
GLOBULIN UR ELPH-MCNC: 2.9 GM/DL
GLUCOSE BLDC GLUCOMTR-MCNC: 182 MG/DL (ref 70–99)
GLUCOSE BLDC GLUCOMTR-MCNC: 85 MG/DL (ref 70–99)
GLUCOSE SERPL-MCNC: 92 MG/DL (ref 65–99)
GLUCOSE UR STRIP-MCNC: NEGATIVE MG/DL
HCT VFR BLD AUTO: 42.2 % (ref 34–46.6)
HGB BLD-MCNC: 14.4 G/DL (ref 12–15.9)
HGB UR QL STRIP.AUTO: ABNORMAL
HOLD SPECIMEN: NORMAL
HYALINE CASTS UR QL AUTO: ABNORMAL /LPF
IMM GRANULOCYTES # BLD AUTO: 0.02 10*3/MM3 (ref 0–0.05)
IMM GRANULOCYTES NFR BLD AUTO: 0.3 % (ref 0–0.5)
INR PPP: 0.98 (ref 0.86–1.15)
KETONES UR QL STRIP: NEGATIVE
LEUKOCYTE ESTERASE UR QL STRIP.AUTO: ABNORMAL
LYMPHOCYTES # BLD AUTO: 1.93 10*3/MM3 (ref 0.7–3.1)
LYMPHOCYTES NFR BLD AUTO: 26.5 % (ref 19.6–45.3)
MACROCYTES BLD QL SMEAR: NORMAL
MAGNESIUM SERPL-MCNC: 1.7 MG/DL (ref 1.6–2.4)
MCH RBC QN AUTO: 35 PG (ref 26.6–33)
MCHC RBC AUTO-ENTMCNC: 34.1 G/DL (ref 31.5–35.7)
MCV RBC AUTO: 102.4 FL (ref 79–97)
MONOCYTES # BLD AUTO: 0.65 10*3/MM3 (ref 0.1–0.9)
MONOCYTES NFR BLD AUTO: 8.9 % (ref 5–12)
NEUTROPHILS NFR BLD AUTO: 3.4 10*3/MM3 (ref 1.7–7)
NEUTROPHILS NFR BLD AUTO: 46.9 % (ref 42.7–76)
NITRITE UR QL STRIP: POSITIVE
NRBC BLD AUTO-RTO: 0 /100 WBC (ref 0–0.2)
PH UR STRIP.AUTO: <=5 [PH] (ref 5–8)
PLAT MORPH BLD: NORMAL
PLATELET # BLD AUTO: 355 10*3/MM3 (ref 140–450)
PMV BLD AUTO: 9.1 FL (ref 6–12)
POTASSIUM SERPL-SCNC: 4 MMOL/L (ref 3.5–5.2)
PROT SERPL-MCNC: 7.9 G/DL (ref 6–8.5)
PROT UR QL STRIP: NEGATIVE
PROTHROMBIN TIME: 13.1 SECONDS (ref 11.8–14.9)
RBC # BLD AUTO: 4.12 10*6/MM3 (ref 3.77–5.28)
RBC # UR STRIP: ABNORMAL /HPF
REF LAB TEST METHOD: ABNORMAL
RH BLD: POSITIVE
RH BLD: POSITIVE
SODIUM SERPL-SCNC: 147 MMOL/L (ref 136–145)
SP GR UR STRIP: >1.03 (ref 1–1.03)
SQUAMOUS #/AREA URNS HPF: ABNORMAL /HPF
T&S EXPIRATION DATE: NORMAL
TROPONIN T SERPL-MCNC: <0.01 NG/ML (ref 0–0.03)
TROPONIN T SERPL-MCNC: <0.01 NG/ML (ref 0–0.03)
TSH SERPL DL<=0.05 MIU/L-ACNC: 3.52 UIU/ML (ref 0.27–4.2)
UROBILINOGEN UR QL STRIP: ABNORMAL
WBC # UR STRIP: ABNORMAL /HPF
WBC MORPH BLD: NORMAL
WBC NRBC COR # BLD: 7.27 10*3/MM3 (ref 3.4–10.8)
WHOLE BLOOD HOLD COAG: NORMAL
WHOLE BLOOD HOLD SPECIMEN: NORMAL

## 2022-06-10 PROCEDURE — 85007 BL SMEAR W/DIFF WBC COUNT: CPT | Performed by: EMERGENCY MEDICINE

## 2022-06-10 PROCEDURE — 0042T HC CT CEREBRAL PERFUSION W/WO CONTRAST: CPT

## 2022-06-10 PROCEDURE — 0 IOPAMIDOL PER 1 ML: Performed by: EMERGENCY MEDICINE

## 2022-06-10 PROCEDURE — 83735 ASSAY OF MAGNESIUM: CPT | Performed by: FAMILY MEDICINE

## 2022-06-10 PROCEDURE — 84484 ASSAY OF TROPONIN QUANT: CPT | Performed by: EMERGENCY MEDICINE

## 2022-06-10 PROCEDURE — 99223 1ST HOSP IP/OBS HIGH 75: CPT | Performed by: FAMILY MEDICINE

## 2022-06-10 PROCEDURE — 70450 CT HEAD/BRAIN W/O DYE: CPT

## 2022-06-10 PROCEDURE — 93306 TTE W/DOPPLER COMPLETE: CPT

## 2022-06-10 PROCEDURE — 25010000002 ONDANSETRON PER 1 MG: Performed by: FAMILY MEDICINE

## 2022-06-10 PROCEDURE — 82077 ASSAY SPEC XCP UR&BREATH IA: CPT | Performed by: FAMILY MEDICINE

## 2022-06-10 PROCEDURE — 25010000002 LORAZEPAM PER 2 MG: Performed by: FAMILY MEDICINE

## 2022-06-10 PROCEDURE — 87086 URINE CULTURE/COLONY COUNT: CPT | Performed by: FAMILY MEDICINE

## 2022-06-10 PROCEDURE — 86900 BLOOD TYPING SEROLOGIC ABO: CPT

## 2022-06-10 PROCEDURE — 84443 ASSAY THYROID STIM HORMONE: CPT | Performed by: FAMILY MEDICINE

## 2022-06-10 PROCEDURE — 86850 RBC ANTIBODY SCREEN: CPT | Performed by: EMERGENCY MEDICINE

## 2022-06-10 PROCEDURE — 85730 THROMBOPLASTIN TIME PARTIAL: CPT | Performed by: EMERGENCY MEDICINE

## 2022-06-10 PROCEDURE — 81001 URINALYSIS AUTO W/SCOPE: CPT | Performed by: EMERGENCY MEDICINE

## 2022-06-10 PROCEDURE — 92610 EVALUATE SWALLOWING FUNCTION: CPT

## 2022-06-10 PROCEDURE — 70551 MRI BRAIN STEM W/O DYE: CPT

## 2022-06-10 PROCEDURE — 93005 ELECTROCARDIOGRAM TRACING: CPT | Performed by: EMERGENCY MEDICINE

## 2022-06-10 PROCEDURE — 36415 COLL VENOUS BLD VENIPUNCTURE: CPT | Performed by: EMERGENCY MEDICINE

## 2022-06-10 PROCEDURE — 70498 CT ANGIOGRAPHY NECK: CPT

## 2022-06-10 PROCEDURE — 80053 COMPREHEN METABOLIC PANEL: CPT | Performed by: EMERGENCY MEDICINE

## 2022-06-10 PROCEDURE — 93306 TTE W/DOPPLER COMPLETE: CPT | Performed by: INTERNAL MEDICINE

## 2022-06-10 PROCEDURE — 86901 BLOOD TYPING SEROLOGIC RH(D): CPT

## 2022-06-10 PROCEDURE — 82962 GLUCOSE BLOOD TEST: CPT

## 2022-06-10 PROCEDURE — 85610 PROTHROMBIN TIME: CPT | Performed by: EMERGENCY MEDICINE

## 2022-06-10 PROCEDURE — 85025 COMPLETE CBC W/AUTO DIFF WBC: CPT | Performed by: EMERGENCY MEDICINE

## 2022-06-10 PROCEDURE — 99285 EMERGENCY DEPT VISIT HI MDM: CPT

## 2022-06-10 PROCEDURE — 70496 CT ANGIOGRAPHY HEAD: CPT

## 2022-06-10 PROCEDURE — 93010 ELECTROCARDIOGRAM REPORT: CPT | Performed by: INTERNAL MEDICINE

## 2022-06-10 PROCEDURE — 84484 ASSAY OF TROPONIN QUANT: CPT | Performed by: FAMILY MEDICINE

## 2022-06-10 PROCEDURE — 87077 CULTURE AEROBIC IDENTIFY: CPT | Performed by: FAMILY MEDICINE

## 2022-06-10 PROCEDURE — 25010000002 ENOXAPARIN PER 10 MG: Performed by: FAMILY MEDICINE

## 2022-06-10 PROCEDURE — 25010000002 THIAMINE PER 100 MG: Performed by: FAMILY MEDICINE

## 2022-06-10 PROCEDURE — 86900 BLOOD TYPING SEROLOGIC ABO: CPT | Performed by: EMERGENCY MEDICINE

## 2022-06-10 PROCEDURE — 87186 SC STD MICRODIL/AGAR DIL: CPT | Performed by: FAMILY MEDICINE

## 2022-06-10 PROCEDURE — 86901 BLOOD TYPING SEROLOGIC RH(D): CPT | Performed by: EMERGENCY MEDICINE

## 2022-06-10 PROCEDURE — 71045 X-RAY EXAM CHEST 1 VIEW: CPT

## 2022-06-10 PROCEDURE — 25010000002 MAGNESIUM SULFATE IN D5W 1G/100ML (PREMIX) 1-5 GM/100ML-% SOLUTION: Performed by: FAMILY MEDICINE

## 2022-06-10 PROCEDURE — 99254 IP/OBS CNSLTJ NEW/EST MOD 60: CPT | Performed by: INTERNAL MEDICINE

## 2022-06-10 PROCEDURE — 36415 COLL VENOUS BLD VENIPUNCTURE: CPT

## 2022-06-10 PROCEDURE — 93005 ELECTROCARDIOGRAM TRACING: CPT | Performed by: FAMILY MEDICINE

## 2022-06-10 RX ORDER — SODIUM CHLORIDE 0.9 % (FLUSH) 0.9 %
10 SYRINGE (ML) INJECTION EVERY 12 HOURS SCHEDULED
Status: DISCONTINUED | OUTPATIENT
Start: 2022-06-10 | End: 2022-06-17 | Stop reason: HOSPADM

## 2022-06-10 RX ORDER — MULTIPLE VITAMINS W/ MINERALS TAB 9MG-400MCG
1 TAB ORAL DAILY
Status: DISCONTINUED | OUTPATIENT
Start: 2022-06-11 | End: 2022-06-17 | Stop reason: HOSPADM

## 2022-06-10 RX ORDER — ENOXAPARIN SODIUM 100 MG/ML
40 INJECTION SUBCUTANEOUS EVERY 24 HOURS
Status: DISCONTINUED | OUTPATIENT
Start: 2022-06-11 | End: 2022-06-10

## 2022-06-10 RX ORDER — LORAZEPAM 2 MG/ML
1 INJECTION INTRAMUSCULAR
Status: DISCONTINUED | OUTPATIENT
Start: 2022-06-10 | End: 2022-06-14

## 2022-06-10 RX ORDER — MAGNESIUM SULFATE 1 G/100ML
1 INJECTION INTRAVENOUS ONCE
Status: COMPLETED | OUTPATIENT
Start: 2022-06-10 | End: 2022-06-10

## 2022-06-10 RX ORDER — CARVEDILOL 6.25 MG/1
6.25 TABLET ORAL 2 TIMES DAILY WITH MEALS
Status: DISCONTINUED | OUTPATIENT
Start: 2022-06-10 | End: 2022-06-16

## 2022-06-10 RX ORDER — ONDANSETRON 4 MG/1
4 TABLET, FILM COATED ORAL EVERY 6 HOURS PRN
Status: DISCONTINUED | OUTPATIENT
Start: 2022-06-10 | End: 2022-06-17 | Stop reason: HOSPADM

## 2022-06-10 RX ORDER — CARVEDILOL 3.12 MG/1
3.12 TABLET ORAL 2 TIMES DAILY WITH MEALS
Status: DISCONTINUED | OUTPATIENT
Start: 2022-06-10 | End: 2022-06-10

## 2022-06-10 RX ORDER — CEFTRIAXONE SODIUM 1 G/50ML
1 INJECTION, SOLUTION INTRAVENOUS ONCE
Status: DISCONTINUED | OUTPATIENT
Start: 2022-06-10 | End: 2022-06-10

## 2022-06-10 RX ORDER — LABETALOL HYDROCHLORIDE 5 MG/ML
20 INJECTION, SOLUTION INTRAVENOUS
Status: DISCONTINUED | OUTPATIENT
Start: 2022-06-10 | End: 2022-06-17 | Stop reason: HOSPADM

## 2022-06-10 RX ORDER — ASPIRIN 81 MG/1
81 TABLET ORAL DAILY
Status: DISCONTINUED | OUTPATIENT
Start: 2022-06-11 | End: 2022-06-11

## 2022-06-10 RX ORDER — SODIUM CHLORIDE 0.9 % (FLUSH) 0.9 %
10 SYRINGE (ML) INJECTION EVERY 12 HOURS SCHEDULED
Status: DISCONTINUED | OUTPATIENT
Start: 2022-06-10 | End: 2022-06-10

## 2022-06-10 RX ORDER — SULFAMETHOXAZOLE AND TRIMETHOPRIM 800; 160 MG/1; MG/1
1 TABLET ORAL EVERY 12 HOURS SCHEDULED
Status: COMPLETED | OUTPATIENT
Start: 2022-06-10 | End: 2022-06-17

## 2022-06-10 RX ORDER — ASPIRIN 325 MG
325 TABLET ORAL ONCE
Status: COMPLETED | OUTPATIENT
Start: 2022-06-10 | End: 2022-06-10

## 2022-06-10 RX ORDER — ASPIRIN 300 MG/1
300 SUPPOSITORY RECTAL DAILY
Status: DISCONTINUED | OUTPATIENT
Start: 2022-06-10 | End: 2022-06-10

## 2022-06-10 RX ORDER — SODIUM CHLORIDE 0.9 % (FLUSH) 0.9 %
10 SYRINGE (ML) INJECTION AS NEEDED
Status: DISCONTINUED | OUTPATIENT
Start: 2022-06-10 | End: 2022-06-17 | Stop reason: HOSPADM

## 2022-06-10 RX ORDER — DILTIAZEM HCL IN NACL,ISO-OSM 125 MG/125
5-15 PLASTIC BAG, INJECTION (ML) INTRAVENOUS
Status: DISCONTINUED | OUTPATIENT
Start: 2022-06-10 | End: 2022-06-11

## 2022-06-10 RX ORDER — ASPIRIN 325 MG
325 TABLET ORAL DAILY
Status: DISCONTINUED | OUTPATIENT
Start: 2022-06-10 | End: 2022-06-10

## 2022-06-10 RX ORDER — LORAZEPAM 2 MG/ML
0.5 INJECTION INTRAMUSCULAR
Status: DISCONTINUED | OUTPATIENT
Start: 2022-06-10 | End: 2022-06-14

## 2022-06-10 RX ORDER — THIAMINE HYDROCHLORIDE 100 MG/ML
100 INJECTION, SOLUTION INTRAMUSCULAR; INTRAVENOUS ONCE
Status: COMPLETED | OUTPATIENT
Start: 2022-06-10 | End: 2022-06-10

## 2022-06-10 RX ORDER — ENOXAPARIN SODIUM 100 MG/ML
60 INJECTION SUBCUTANEOUS EVERY 12 HOURS
Status: DISCONTINUED | OUTPATIENT
Start: 2022-06-10 | End: 2022-06-13

## 2022-06-10 RX ORDER — LORAZEPAM 0.5 MG/1
1 TABLET ORAL
Status: DISCONTINUED | OUTPATIENT
Start: 2022-06-10 | End: 2022-06-14

## 2022-06-10 RX ORDER — ATORVASTATIN CALCIUM 40 MG/1
80 TABLET, FILM COATED ORAL NIGHTLY
Status: DISCONTINUED | OUTPATIENT
Start: 2022-06-10 | End: 2022-06-17 | Stop reason: HOSPADM

## 2022-06-10 RX ORDER — METHION/INOS/CHOL BT/B COM/LIV 110MG-86MG
100 CAPSULE ORAL DAILY
Status: DISCONTINUED | OUTPATIENT
Start: 2022-06-11 | End: 2022-06-17 | Stop reason: HOSPADM

## 2022-06-10 RX ORDER — ACETAMINOPHEN 325 MG/1
650 TABLET ORAL EVERY 4 HOURS PRN
Status: DISCONTINUED | OUTPATIENT
Start: 2022-06-10 | End: 2022-06-17 | Stop reason: HOSPADM

## 2022-06-10 RX ORDER — LORAZEPAM 0.5 MG/1
0.5 TABLET ORAL
Status: DISCONTINUED | OUTPATIENT
Start: 2022-06-10 | End: 2022-06-14

## 2022-06-10 RX ORDER — ACETAMINOPHEN 650 MG/1
650 SUPPOSITORY RECTAL EVERY 4 HOURS PRN
Status: DISCONTINUED | OUTPATIENT
Start: 2022-06-10 | End: 2022-06-17 | Stop reason: HOSPADM

## 2022-06-10 RX ORDER — FOLIC ACID 1 MG/1
1 TABLET ORAL DAILY
Status: DISCONTINUED | OUTPATIENT
Start: 2022-06-11 | End: 2022-06-17 | Stop reason: HOSPADM

## 2022-06-10 RX ORDER — ONDANSETRON 2 MG/ML
4 INJECTION INTRAMUSCULAR; INTRAVENOUS EVERY 6 HOURS PRN
Status: DISCONTINUED | OUTPATIENT
Start: 2022-06-10 | End: 2022-06-17 | Stop reason: HOSPADM

## 2022-06-10 RX ORDER — DILTIAZEM HYDROCHLORIDE 5 MG/ML
INJECTION INTRAVENOUS
Status: COMPLETED
Start: 2022-06-10 | End: 2022-06-10

## 2022-06-10 RX ORDER — METHION/INOS/CHOL BT/B COM/LIV 110MG-86MG
100 CAPSULE ORAL ONCE
Status: COMPLETED | OUTPATIENT
Start: 2022-06-10 | End: 2022-06-10

## 2022-06-10 RX ORDER — DEXTROSE, SODIUM CHLORIDE, AND POTASSIUM CHLORIDE 5; .45; .15 G/100ML; G/100ML; G/100ML
125 INJECTION INTRAVENOUS CONTINUOUS
Status: DISCONTINUED | OUTPATIENT
Start: 2022-06-10 | End: 2022-06-10

## 2022-06-10 RX ADMIN — Medication 10 ML: at 16:37

## 2022-06-10 RX ADMIN — LORAZEPAM 1 MG: 2 INJECTION INTRAMUSCULAR; INTRAVENOUS at 19:08

## 2022-06-10 RX ADMIN — IOPAMIDOL 150 ML: 755 INJECTION, SOLUTION INTRAVENOUS at 09:38

## 2022-06-10 RX ADMIN — LORAZEPAM 1 MG: 2 INJECTION INTRAMUSCULAR; INTRAVENOUS at 17:08

## 2022-06-10 RX ADMIN — Medication 5 MG/HR: at 13:25

## 2022-06-10 RX ADMIN — MAGNESIUM SULFATE 1 G: 1 INJECTION INTRAVENOUS at 17:21

## 2022-06-10 RX ADMIN — THIAMINE HYDROCHLORIDE 100 MG: 100 INJECTION, SOLUTION INTRAMUSCULAR; INTRAVENOUS at 17:08

## 2022-06-10 RX ADMIN — ATORVASTATIN CALCIUM 80 MG: 40 TABLET, FILM COATED ORAL at 21:48

## 2022-06-10 RX ADMIN — LORAZEPAM 1 MG: 2 INJECTION INTRAMUSCULAR; INTRAVENOUS at 21:48

## 2022-06-10 RX ADMIN — Medication 10 ML: at 17:05

## 2022-06-10 RX ADMIN — POTASSIUM CHLORIDE, DEXTROSE MONOHYDRATE AND SODIUM CHLORIDE 125 ML/HR: 150; 5; 450 INJECTION, SOLUTION INTRAVENOUS at 17:04

## 2022-06-10 RX ADMIN — CARVEDILOL 6.25 MG: 6.25 TABLET, FILM COATED ORAL at 18:05

## 2022-06-10 RX ADMIN — DILTIAZEM HYDROCHLORIDE: 5 INJECTION INTRAVENOUS at 12:56

## 2022-06-10 RX ADMIN — ONDANSETRON 4 MG: 2 INJECTION INTRAMUSCULAR; INTRAVENOUS at 17:07

## 2022-06-10 RX ADMIN — ASPIRIN 325 MG ORAL TABLET 325 MG: 325 PILL ORAL at 18:05

## 2022-06-10 RX ADMIN — ENOXAPARIN SODIUM 60 MG: 100 INJECTION SUBCUTANEOUS at 18:05

## 2022-06-10 RX ADMIN — SULFAMETHOXAZOLE AND TRIMETHOPRIM 257.6 MG OF TRIMETHOPRIM: 80; 16 INJECTION, SOLUTION, CONCENTRATE INTRAVENOUS at 13:33

## 2022-06-10 NOTE — ED PROVIDER NOTES
Time: 9:09 AM EDT  Arrived by: private car  Chief Complaint: stroke  History provided by: son, pt  History is limited by: clinical condition    History of Present Illness:  Patient is a 66 y.o. year old female with history of CVA that presents to the emergency department with STROKE SYMPTOMS. The patient's son last saw the pt yesterday at 1730 and the pt was already asleep. Last known well is unknown.     Son reports when he arrived home the patient was asleep and he woke her up. He states the patient became confused and did not know where she was.     The patient reports her face feels numb.      Son reports the patient had a stroke about 1 year ago.     HPI        Patient Care Team  Primary Care Provider: Provider, No Known    Past Medical History:     Allergies   Allergen Reactions   • Cephalexin Unknown - Low Severity   • Diphenhydramine Unknown - Low Severity   • Levofloxacin Unknown - Low Severity   • Penicillins Unknown - Low Severity     Past Medical History:   Diagnosis Date   • Asthma    • Elevated troponin level not due to acute coronary syndrome 12/3/2021   • Hypertension    • Mixed hyperlipidemia 12/3/2021   • Stroke (HCC) 03/2021     Past Surgical History:   Procedure Laterality Date   • CHOLECYSTECTOMY       Family History   Problem Relation Age of Onset   • Kidney disease Mother        Home Medications:  Prior to Admission medications    Medication Sig Start Date End Date Taking? Authorizing Provider   amLODIPine (NORVASC) 5 MG tablet Take 1 tablet by mouth Daily. 12/3/21   Ave Benson APRN   aspirin 81 MG EC tablet Take 81 mg by mouth Daily.    Provider, MD Jesi   atorvastatin (LIPITOR) 80 MG tablet Take 1 tablet nightly at bedtime 12/3/21   Ave Benson APRN   carvedilol (COREG) 3.125 MG tablet Take 3 tablets by mouth twice daily 12/3/21   Ave Benson APRN   diclofenac (VOLTAREN) 50 MG EC tablet Take 1 tablet by mouth 3 (Three) Times a Day. 1/20/22   Radha  "MIHAI Marques   lisinopril (PRINIVIL,ZESTRIL) 40 MG tablet Take 1 tablet by mouth Daily. 12/3/21   Ave Benson APRN   ondansetron ODT (ZOFRAN-ODT) 4 MG disintegrating tablet Place 1 tablet on the tongue 4 (Four) Times a Day As Needed for Nausea or Vomiting. 22   Shelli Garcia APRN        Social History:   Social History     Tobacco Use   • Smoking status: Former Smoker     Quit date:      Years since quittin.4   • Smokeless tobacco: Never Used   Vaping Use   • Vaping Use: Never used   Substance Use Topics   • Alcohol use: Yes     Alcohol/week: 8.0 standard drinks     Types: 4 Shots of liquor, 4 Drinks containing 0.5 oz of alcohol per week     Comment: [place]   • Drug use: Not Currently       Review of Systems:  Review of Systems   Unable to perform ROS: Other (clinical condition)   Neurological: Positive for numbness.   Psychiatric/Behavioral: Positive for confusion.        Physical Exam:  /99   Pulse 113   Temp 98.2 °F (36.8 °C) (Oral)   Resp 20   Ht 160 cm (63\")   Wt 64.5 kg (142 lb 3.2 oz)   LMP  (LMP Unknown)   SpO2 98%   BMI 25.19 kg/m²     Physical Exam  Vitals and nursing note reviewed.   Constitutional:       General: She is not in acute distress.  HENT:      Head: Normocephalic and atraumatic.   Cardiovascular:      Rate and Rhythm: Normal rate and regular rhythm.      Heart sounds: No murmur heard.  Pulmonary:      Effort: No respiratory distress.      Breath sounds: Normal breath sounds.   Abdominal:      Palpations: Abdomen is soft.      Tenderness: There is no abdominal tenderness.   Musculoskeletal:      Cervical back: Neck supple.   Skin:     General: Skin is warm and dry.      Findings: No rash.   Neurological:      General: No focal deficit present.      Mental Status: She is alert.      Sensory: Sensory deficit present.      Comments: Moderate expressive aphasia. Numbness to right side of face.   NIH: 2                Medications in the Emergency " Department:  Medications   sodium chloride 0.9 % flush 10 mL (has no administration in time range)   sodium chloride 0.9 % flush 10 mL (has no administration in time range)   sodium chloride 0.9 % flush 10 mL (has no administration in time range)   atorvastatin (LIPITOR) tablet 80 mg (has no administration in time range)   dextrose 5 % and sodium chloride 0.45 % with KCl 20 mEq/L infusion (has no administration in time range)   labetalol (NORMODYNE,TRANDATE) injection 20 mg (has no administration in time range)   acetaminophen (TYLENOL) tablet 650 mg (has no administration in time range)     Or   acetaminophen (TYLENOL) suppository 650 mg (has no administration in time range)   dilTIAZem (CARDIZEM) 125 mg in 125 mL 0.7% sodium chloride  infusion (10 mg/hr Intravenous Restarted 6/10/22 1336)   sodium chloride 0.9 % flush 10 mL (has no administration in time range)   sodium chloride 0.9 % flush 10 mL (has no administration in time range)   Enoxaparin Sodium (LOVENOX) syringe 60 mg (has no administration in time range)   sulfamethoxazole-trimethoprim (BACTRIM DS,SEPTRA DS) 800-160 MG per tablet 1 tablet (has no administration in time range)   carvedilol (COREG) tablet 3.125 mg (has no administration in time range)   aspirin tablet 325 mg (has no administration in time range)   aspirin EC tablet 81 mg (has no administration in time range)   iopamidol (ISOVUE-370) 76 % injection 150 mL (150 mL Intravenous Given 6/10/22 0938)   dilTIAZem (CARDIZEM) 25 MG/5ML injection  - ADS Override Pull (  Given 6/10/22 1256)        Labs  Lab Results (last 24 hours)     Procedure Component Value Units Date/Time    POC Glucose Once [[id number]]  (Normal) Collected: 06/10/22 0909    Specimen: Blood Updated: 06/10/22 0910     Glucose 85 mg/dL      Comment: Serial Number: [id number]:  [id number]       CBC & Differential [[id number]]  (Abnormal) Collected: 06/10/22 0910    Specimen: Blood Updated: 06/10/22 0953    Narrative:      The  following orders were created for panel order CBC & Differential.  Procedure                               Abnormality         Status                     ---------                               -----------         ------                     CBC Auto Differential[[id number]]        Abnormal            Final result               Scan Slide[[id number]]                                       Final result                 Please view results for these tests on the individual orders.    Comprehensive Metabolic Panel [[id number]]  (Abnormal) Collected: 06/10/22 0910    Specimen: Blood Updated: 06/10/22 0940     Glucose 92 mg/dL      BUN 10 mg/dL      Creatinine 0.63 mg/dL      Sodium 147 mmol/L      Potassium 4.0 mmol/L      Chloride 105 mmol/L      CO2 21.7 mmol/L      Calcium 9.0 mg/dL      Total Protein 7.9 g/dL      Albumin 5.00 g/dL      ALT (SGPT) 24 U/L      AST (SGOT) 47 U/L      Alkaline Phosphatase 55 U/L      Total Bilirubin 0.2 mg/dL      Globulin 2.9 gm/dL      A/G Ratio 1.7 g/dL      BUN/Creatinine Ratio 15.9     Anion Gap 20.3 mmol/L      eGFR 98.0 mL/min/1.73      Comment: National Kidney Foundation and American Society of Nephrology (ASN) Task Force recommended calculation based on the Chronic Kidney Disease Epidemiology Collaboration (CKD-EPI) equation refit without adjustment for race.       Narrative:      GFR Normal >60  Chronic Kidney Disease <60  Kidney Failure <15      Protime-INR [[id number]]  (Normal) Collected: 06/10/22 0910    Specimen: Blood Updated: 06/10/22 0932     Protime 13.1 Seconds      INR 0.98    Narrative:      Suggested Therapeutic Ranges For Oral Anticoagulant Therapy:  Level of Therapy                      INR Target Range  Standard Dose                            2.0-3.0  High Dose                                2.5-3.5  Patients not receiving anticoagulant  Therapy Normal Range                     0.86-1.15    aPTT [[id number]]  (Normal) Collected: 06/10/22 0910    Specimen: Blood  Updated: 06/10/22 0932     PTT 25.7 seconds     Troponin [[id number]]  (Normal) Collected: 06/10/22 0910    Specimen: Blood Updated: 06/10/22 0940     Troponin T <0.010 ng/mL     Narrative:      Troponin T Reference Range:  <= 0.03 ng/mL-   Negative for AMI  >0.03 ng/mL-     Abnormal for myocardial necrosis.  Clinicians would have to utilize clinical acumen, EKG, Troponin and serial changes to determine if it is an Acute Myocardial Infarction or myocardial injury due to an underlying chronic condition.       Results may be falsely decreased if patient taking Biotin.      POC Creatinine with Hold Tube [[id number]] Collected: 06/10/22 0910    Specimen: Blood Updated: 06/10/22 0922    Narrative:      The following orders were created for panel order POC Creatinine with Hold Tube.  Procedure                               Abnormality         Status                     ---------                               -----------         ------                     POC Creatinine[[id number]]                                                              Hold Creatinine Tube[[id number]]                             Final result                 Please view results for these tests on the individual orders.    CBC Auto Differential [[id number]]  (Abnormal) Collected: 06/10/22 0910    Specimen: Blood Updated: 06/10/22 0953     WBC 7.27 10*3/mm3      RBC 4.12 10*6/mm3      Hemoglobin 14.4 g/dL      Hematocrit 42.2 %      .4 fL      MCH 35.0 pg      MCHC 34.1 g/dL      RDW 14.6 %      RDW-SD 55.2 fl      MPV 9.1 fL      Platelets 355 10*3/mm3      Neutrophil % 46.9 %      Lymphocyte % 26.5 %      Monocyte % 8.9 %      Eosinophil % 15.5 %      Basophil % 1.9 %      Immature Grans % 0.3 %      Neutrophils, Absolute 3.40 10*3/mm3      Lymphocytes, Absolute 1.93 10*3/mm3      Monocytes, Absolute 0.65 10*3/mm3      Eosinophils, Absolute 1.13 10*3/mm3      Basophils, Absolute 0.14 10*3/mm3      Immature Grans, Absolute 0.02 10*3/mm3      nRBC 0.0  /100 WBC     Scan Slide [[id number]] Collected: 06/10/22 0910    Specimen: Blood Updated: 06/10/22 0953     Anisocytosis Slight/1+     Macrocytes Slight/1+     WBC Morphology Normal     Platelet Morphology Normal    Magnesium [[id number]]  (Normal) Collected: 06/10/22 0910    Specimen: Blood Updated: 06/10/22 1346     Magnesium 1.7 mg/dL     Ethanol [[id number]]  (Abnormal) Collected: 06/10/22 0910    Specimen: Blood Updated: 06/10/22 1416     Ethanol 328 mg/dL      Ethanol % 0.328 %     Narrative:      Ethanol (Plasma)  <10 Essentially Negative    Toxic Concentrations           mg/dL    Flushing, slowing of reflexes    Impaired visual activity         Depression of CNS              >100  Possible Coma                  >300       Urinalysis With Microscopic If Indicated (No Culture) - Urine, Clean Catch [[id number]]  (Abnormal) Collected: 06/10/22 1033    Specimen: Urine, Clean Catch Updated: 06/10/22 1046     Color, UA Yellow     Appearance, UA Clear     pH, UA <=5.0     Specific Gravity, UA >1.030     Glucose, UA Negative     Ketones, UA Negative     Bilirubin, UA Negative     Blood, UA Trace     Protein, UA Negative     Leuk Esterase, UA Small (1+)     Nitrite, UA Positive     Urobilinogen, UA 1.0 E.U./dL    Urinalysis, Microscopic Only - Urine, Clean Catch [[id number]]  (Abnormal) Collected: 06/10/22 1033    Specimen: Urine, Clean Catch Updated: 06/10/22 1046     RBC, UA 0-2 /HPF      WBC, UA 13-20 /HPF      Bacteria, UA 4+ /HPF      Squamous Epithelial Cells, UA 0-2 /HPF      Hyaline Casts, UA 0-2 /LPF      Methodology Automated Microscopy    Urine Culture - Urine, Urine, Clean Catch [[id number]] Collected: 06/10/22 1033    Specimen: Urine, Clean Catch Updated: 06/10/22 1138           Imaging:  CT Angiogram Neck    Result Date: 6/10/2022  PROCEDURE: CT ANGIOGRAM NECK  COMPARISON: None  INDICATIONS: CVA  PROTOCOL:   Standard imaging protocol performed    RADIATION:   DLP: 230.1 mGy*cm   Automated exposure  control was utilized to minimize radiation dose. CONTRAST: 65 cc Isovue 370 I.V.  TECHNIQUE: After obtaining the patient's consent, CT images of the neck were obtained without and with non-ionic intravenous contrast material. Multi-planar reformatted/3-D images were created to optimize visualization of vascular anatomy. Unless otherwise stated in this report, all vascular stenoses involving the internal carotid arteries reported for this examination are derived by dividing the lesion diameter by the diameter of the normal internal carotid artery more distally.  FINDINGS:  There is mild calcified plaque of the aortic arch.  Origins of the great vessels are widely patent.  Right brachiocephalic artery is patent without focal stenosis.  Right subclavian artery is patent without focal stenosis.  Right common, internal common external carotid arteries are patent without focal stenosis.  The left common carotid artery is patent without focal stenosis.  Left internal external carotid arteries are also patent without focal stenosis.  Left subclavian artery appears patent without focal stenosis.  The left vertebral artery is dominant and patent without focal stenosis.  The right vertebral artery is hypoplastic and appears to terminate in a PICA, seen as an anatomic variant.  The soft tissues of the neck are within normal limits.  There are degenerative changes of the cervical spine.  The visualized lung apices are clear.  There are multiple borderline sized upper mediastinal lymph nodes which may be reactive in etiology.        1. No hemodynamically significant stenosis of the carotid or vertebral arteries.     REY LEE MD       Electronically Signed and Approved By: REY LEE MD on 6/10/2022 at 10:41             XR Chest 1 View    Result Date: 6/10/2022  PROCEDURE: XR CHEST 1 VW  COMPARISON: [facility], CR, XR CHEST 1 VW, 11/06/2021, 18:23.  INDICATIONS: POSSIBLE STROKE  FINDINGS:  Heart size and  pulmonary vessels are within normal limits.  Lungs are clear bilaterally.  No pleural effusion identified.        1. No acute cardiopulmonary disease.       REY LEE MD       Electronically Signed and Approved By: REY LEE MD on 6/10/2022 at 11:05             CT Head Without Contrast Stroke Protocol    Result Date: 6/10/2022  PROCEDURE: CT HEAD WO CONTRAST STROKE PROTOCOL  COMPARISON:  11/06/2021 INDICATIONS: Stroke, follow up  PROTOCOL:   Standard imaging protocol performed    RADIATION:   DLP: 1461.5 mGy*cm   MA and/or KV was adjusted to minimize radiation dose.     TECHNIQUE: After obtaining the patient's consent, CT images were obtained without non-ionic intravenous contrast material.  FINDINGS:  No evidence of intracranial hemorrhage.  No areas of mass effect appreciated on noncontrast imaging the brain.  No midline shift.  No hydrocephalus.  No fracture or other acute osseous findings.  No definitive noncontrast CT evidence of acute ischemic infarct.  No evidence of sinusitis or mastoid effusion.       Negative head CT.  No evidence of acute intracranial abnormality.  Stroke alert results called to Dr. Urban at 9:32 a.m..     HAYDEE SOL MD       Electronically Signed and Approved By: HAYDEE SOL MD on 6/10/2022 at 9:33             CT Angiogram Head w AI Analysis of LVO    Result Date: 6/10/2022  PROCEDURE: CT ANGIOGRAM HEAD W AI ANALYSIS OF LVO  COMPARISON: [facility], CT, CT HEAD WO CONTRAST STROKE PROTOCOL, 6/10/2022, 9:15.  INDICATIONS: Acute Stroke  PROTOCOL:   Standard imaging protocol performed    RADIATION:   DLP: 230.1 mGy*cm   Automated exposure control was utilized to minimize radiation dose. CONTRAST: 65 cc Isovue 370 I.V. RAPID: CTA imaging was analyzed using RAPID AI to enable computer assisted triage notification to rapidly detect a large vessel occlusion (LVO) and shorten notification time  TECHNIQUE: After obtaining the patient's consent, CT images of the head were  obtained without and with non-ionic contrast, and multi-planar/3-D imaging were created and interpreted to optimize visualization of vascular anatomy.   FINDINGS:  Visualized intracranial internal carotid arteries are patent without significant stenosis.  The bilateral anterior cerebral and middle cerebral arteries appear patent without focal stenosis.  No anterior circulation aneurysm identified.  A tubular arteries are patent at the skull base.  The right vertebral artery is hypoplastic and appears to terminate in a PICA seen as an anatomic variant.  Left vertebral artery is patent without stenosis.  Basilar artery is patent without focal stenosis.  Bilateral superior cerebellar and posterior cerebral arteries are patent without focal stenosis.  There is no posterior circulation aneurysm identified.  No pathologic intracranial contrast enhancement.        1. No intracranial vascular stenosis or occlusion.     REY LEE MD       Electronically Signed and Approved By: REY LEE MD on 6/10/2022 at 10:32             CT CEREBRAL PERFUSION WITH & WITHOUT CONTRAST    Result Date: 6/10/2022  PROCEDURE: CT CEREBRAL PERFUSION W WO CONTRAST  COMPARISON: [facility], CT, CT ANGIOGRAM HEAD W AI ANALYSIS OF LVO, 6/10/2022, 9:46.  INDICATIONS: CVA  PROTOCOL:   Standard imaging protocol performed    RADIATION:   DLP: 1299.6 mGy*cm   Automated exposure control was utilized to minimize radiation dose. CONTRAST: 100 cc Isovue 370 I.V.   FINDINGS: Perfusion images demonstrate demonstrate no definite areas of increased T max or mean transit time.  Cerebral blood flow and blood volume maps demonstrate no abnormality.         1. No evidence of cerebral ischemia or core infarct.   Findings personally called to Dr. Urban of the emergency department at 1010 hours on 6/10/2022      REY LEE MD       Electronically Signed and Approved By: REY LEE MD on 6/10/2022 at 10:25                Procedures:  Procedures    Progress  ED Course as of 06/10/22 1553   Fri Tom 10, 2022   0951 EKG:    Rhythm: Normal sinus rhythm  Rate: 99  Intervals: Normal  T-wave: Normal  ST Segment: Nonspecific    EKG Comparison: 11/7/2021 similar    Interpreted by me   [NL]      ED Course User Index  [NL] Richard Urban DO                         NIHSS (NIH Stroke Scale/Score) reviewed and/or performed as part of the patient evaluation and treatment planning process.  The result associated with this review/performance is: 2        Medical Decision Making:  MDM   66-year-old female patient presented with strokelike symptoms.  Patient was not a thrombolytic candidate due to onset of symptoms being  4-1/2 hours.  Patient's last known well was 530 yesterday after noon.  Patient is having an expressive aphasia and right facial numbness at time of exam.  Her son at suggest that she was having a hard time walking earlier but her extremities have full motion and no weakness.  CT and CTA scans did not show acute findings.  Patient also does have a urinary tract infection.  The patient will be admitted to the hospitalist service.  IV antibiotics for the urinary tract infection were started.        Final diagnoses:   Urinary tract infection in female   Cerebrovascular accident (CVA), unspecified mechanism (HCC)        Disposition:  ED Disposition     ED Disposition   Decision to Admit    Condition   --    Comment   Level of Care: Progressive Care [20]   Diagnosis: AMS (altered mental status) [[id number]]   Admitting Physician: PHILLIP THAKKAR [[id number]]   Attending Physician: PHILLIP THAKKAR [[id number]]   Isolate for COVID?: No [0]   Certification: I Certify That Inpatient Hospital Services Are Medically Necessary For Greater Than 2 Midnights               Documentation assistance provided by Teri Ashley acting as scribe for Richard Urban DO. Information recorded by the scribe was done at my direction and has been verified and validated by  me.        Dion, Teri  06/10/22 0937       Richard Urban DO  06/11/22 0854

## 2022-06-10 NOTE — CONSULTS
[facility]   Cardiology Consult Note    Patient Name: Kiesha Pan  : 1955  MRN: [id number]  Primary Care Physician:  Provider, No Known  Referring Physician: Severo Riggs MD  Date of admission: 6/10/2022    Subjective   Subjective     Reason for Consultation : A. fib with RVR    Chief Complaint/reason for admission: Altered mental status      HPI:  Kiesha Pan is a 66 y.o. female with reported previous stroke, hypertension, hyperlipidemia who presented to the emergency room because of confusion altered mental status.  There were concerns regarding facial numbness and facial droop.  In the ER her rhythm changed to A. fib with RVR and was briefly on Cardizem infusion.  She was admitted for evaluation of encephalopathy, acute alcohol intoxication and possible UTI.  CT head was negative for any acute changes.  Her rhythm eventually converted to normal sinus rhythm.  Cardiology was consulted for further evaluation management    She was previously seen by Dr Purdy, during a previous admission due to elevated troponin.  Subsequent SPECT stress test was negative for ischemia.  Her last visit to cardiology office was on 12/3/2021.    During my interview this afternoon, patient appeared more alert and awake.  She denies having any chest pain or palpitations.  She reports some shortness of breath but at her baseline.  Her major problem is nausea and some headache.    Review of Systems   All systems were reviewed and negative except for nausea and headache.  Negative for chest pain, palpitations.  Review of systems may be incomplete since patient is still mildly confused.    Personal History     Past Medical History:   Diagnosis Date   • Asthma    • Elevated troponin level not due to acute coronary syndrome 12/3/2021   • Hypertension    • Mixed hyperlipidemia 12/3/2021   • Stroke (HCC) 2021          Family History: family history includes Kidney disease in her mother. Otherwise pertinent FHx was reviewed  and not pertinent to current issue.    Social History:  reports that she quit smoking about 25 years ago. She has never used smokeless tobacco. She reports current alcohol use of about 8.0 standard drinks of alcohol per week. She reports previous drug use.    Home Medications:  amLODIPine, aspirin, atorvastatin, carvedilol, diclofenac, lisinopril, and ondansetron ODT    Allergies:  Allergies   Allergen Reactions   • Cephalexin Unknown - Low Severity   • Diphenhydramine Unknown - Low Severity   • Levofloxacin Unknown - Low Severity   • Penicillins Unknown - Low Severity       Objective    Objective     Vitals:   Temp:  [98.2 °F (36.8 °C)-98.4 °F (36.9 °C)] 98.4 °F (36.9 °C)  Heart Rate:  [113-144] 144  Resp:  [20] 20  BP: (134-177)/(87-99) 137/87  Flow (L/min):  [2] 2      Physical Exam:   Constitutional: Awake, alert, No acute distress    Eyes: PERRLA, sclerae anicteric, no conjunctival injection   HENT: NCAT, mucous membranes moist   Neck: Supple, no thyromegaly, no lymphadenopathy, trachea midline   Respiratory: Clear to auscultation bilaterally, nonlabored respirations    Cardiovascular: Tachycardic, regular,  no murmurs, rubs, or gallops, palpable pedal pulses bilaterally   Gastrointestinal: Positive bowel sounds, soft, nontender, nondistended   Musculoskeletal: No bilateral ankle edema, no clubbing or cyanosis to extremities   Psychiatric: Appropriate affect, cooperative   Neurologic: Oriented x 2, strength symmetric in all extremities, speech clear, but slow to speak   Skin: No rashes     Result Review    Result Review:  I have personally reviewed the results from the time of this admission to 6/10/2022 17:41 EDT and agree with these findings:  [x]  Laboratory  []  Microbiology  [x]  Radiology  [x]  EKG/Telemetry   [x]  Cardiology/Vascular   []  Pathology  [x]  Old records  []  Other:  Most notable findings include:     CMP    CMP 11/8/21 1/20/22 6/10/22   Glucose 149 (A) 108 (A) 92   BUN 12 8 10   Creatinine  0.73 0.65 0.63   eGFR Non African Am 80 91    Sodium 137 141 147 (A)   Potassium 3.6 4.0 4.0   Chloride 105 103 105   Calcium 8.3 (A) 9.2 9.0   Albumin  4.80 5.00   Total Bilirubin  0.5 0.2   Alkaline Phosphatase  73 55   AST (SGOT)  26 47 (A)   ALT (SGPT)  15 24   (A) Abnormal value             CBC    CBC 11/8/21 1/20/22 6/10/22   WBC 8.26 7.96 7.27   RBC 3.46 (A) 3.50 (A) 4.12   Hemoglobin 12.1 12.5 14.4   Hematocrit 34.7 35.9 42.2   .3 (A) 102.6 (A) 102.4 (A)   MCH 35.0 (A) 35.7 (A) 35.0 (A)   MCHC 34.9 34.8 34.1   RDW 12.9 14.2 14.6   Platelets 297 356 355   (A) Abnormal value             Lab Results   Component Value Date    TROPONINT <0.010 06/10/2022         EKG on admission showed atrial fibrillation with rapid ventricular rate    Echocardiogram on 3/25/2021 showed    1.  Normal biventricular systolic function, ejection fraction 55-60%.    2.  Mild left ventricular hypertrophy with grade 1 diastolic dysfunction.    3.  Mild left atrial enlargement.    4.  Trace tricuspid regurgitation.     Results for orders placed during the hospital encounter of 11/06/21    Stress Test With Myocardial Perfusion One Day    Interpretation Summary  · Left ventricular ejection fraction is normal. (Calculated EF = 63%).  · Myocardial perfusion imaging indicates a normal myocardial perfusion study with no evidence of ischemia.  · Impressions are consistent with a low risk study.  · Findings consistent with a normal ECG stress test.         Assessment & Plan   Assessment / Plan     Brief Patient Summary:  Kiesha Pan is a 66 y.o. female with reported previous stroke, hypertension, hyperlipidemia who was admitted with altered mental status.  She was noted to be in atrial fibrillation with RVR which is new diagnosis.    Active Hospital Problems:  Active Hospital Problems    Diagnosis    • AMS (altered mental status)    • Paroxysmal atrial fibrillation with RVR (HCC)      Paroxysmal A. fib with RVR : She is already back in  sinus rhythm and currently in sinus tachycardia.  [id number] 2 score high mostly because of previous stroke.     Altered mental status : Metabolic encephalopathy versus acute alcohol intoxication  Urinary tract infection    Plan:     We will increase the dose of carvedilol to 6.25 mg twice daily  She will need long-term anticoagulation, currently on Lovenox.  The medication may be changed to oral anticoagulant agents once she is cleared for oral intake.    We will follow echocardiogram reports    Management of UTI, alcohol intoxication, metabolic encephalopathy per primary team    Electronically signed by Jace Navarro MD, 06/10/22, 5:30 PM EDT.

## 2022-06-10 NOTE — H&P
[facility]  Date: 6/10/2022   Patient Name: Kiesha Pan  : 1955  MRN: [id number]  Primary Care Physician:  Debra, No Known  Date of admission: 6/10/2022    Subjective   Subjective     Chief Complaint: Altered mental status    HPI:    Kiesha Pan is a 66 y.o. female past medical history significant for asthma, hypertension, hyperlipidemia, stroke about 1 year ago presents after being found confused with difficulty speaking and facial numbness upon waking on the morning of presentation.  Patient was reportedly in her usual state of health the day prior to presentation.  On the morning of presentation patient's son woke her up patient was having slurred speech difficulty word finding and endorsed facial numbness.  Patient also had confusion unable to answer orientation questions which is much below her baseline.  Patient was brought to the emergency department for further evaluation and treatment.  In the emergency department patient is afebrile heart rate 110s in sinus rhythm initially converting to A. fib with RVR 160s on telemetry review.  Respiratory rate within normal limits.  Satting well on 2 L nasal cannula.  Blood pressure elevated 170s over 90s.  Patient is tearful complaining of generalized headache.  Patient's breath smells of alcohol.  Serum glucose within normal limits.  Troponin within normal limits.  Serum sodium slightly elevated.  Serum magnesium slightly low.  Bicarb slightly low.  Anion gap slightly elevated.  Blood cell count within normal limits.  Hemoglobin within normal limits.  Urinalysis positive for nitrates leukocytes 13-20 white blood cells and 4+ bacteria.  Ethanol level 328.  Chest x-ray negative for acute infiltrate or effusion.  CT head negative for any acute intracranial abnormalities.  CT perfusion scan negative for any cerebral ischemia.  CT angiogram of the head and neck negative for any hemodynamically significant stenosis.  Patient  admitted for further evaluation and treatment of A. reinaldo with RVR complicated by encephalopathy due to alcohol intoxication versus metabolic encephalopathy due to urinary tract infection.  Cardiology consulted.      Personal History     Past Medical History:  Past Medical History:   Diagnosis Date   • Asthma    • Elevated troponin level not due to acute coronary syndrome 12/3/2021   • Hypertension    • Mixed hyperlipidemia 12/3/2021   • Stroke (HCC) 2021        Past Surgical History:  Past Surgical History:   Procedure Laterality Date   • CHOLECYSTECTOMY          Family History:   Family History   Problem Relation Age of Onset   • Kidney disease Mother         Social History:   Social History     Socioeconomic History   • Marital status:    Tobacco Use   • Smoking status: Former Smoker     Quit date:      Years since quittin.4   • Smokeless tobacco: Never Used   Vaping Use   • Vaping Use: Never used   Substance and Sexual Activity   • Alcohol use: Yes     Alcohol/week: 8.0 standard drinks     Types: 4 Shots of liquor, 4 Drinks containing 0.5 oz of alcohol per week     Comment: [place]   • Drug use: Not Currently   • Sexual activity: Defer        Home Medications:  amLODIPine, aspirin, atorvastatin, carvedilol, diclofenac, lisinopril, and ondansetron ODT    Allergies:  Allergies   Allergen Reactions   • Cephalexin Unknown - Low Severity   • Diphenhydramine Unknown - Low Severity   • Levofloxacin Unknown - Low Severity   • Penicillins Unknown - Low Severity       Review of Systems   Constitutional: Positive for fatigue. Negative for fever.   HENT: Negative for sore throat and trouble swallowing.    Eyes: Negative for pain and discharge.   Respiratory: Negative for cough and shortness of breath.    Cardiovascular: Negative for chest pain and palpitations.   Gastrointestinal: Negative for abdominal pain, nausea and vomiting.   Endocrine: Negative for cold intolerance and heat intolerance.    Genitourinary: Positive for dysuria. Negative for difficulty urinating.   Musculoskeletal: Negative for back pain and neck stiffness.   Skin: Negative for color change and rash.   Neurological: Positive for speech difficulty, numbness and headaches. Negative for syncope.   Hematological: Negative for adenopathy.   Psychiatric/Behavioral: Positive for confusion. Negative for hallucinations.        Objective   Objective     Vitals:   Temp:  [98.2 °F (36.8 °C)] 98.2 °F (36.8 °C)  Heart Rate:  [113] 113  Resp:  [20] 20  BP: (134-177)/(94-99) 134/99  Flow (L/min):  [2] 2    Physical Exam   Gen. well-developed appearing stated age in no acute distress  HEENT: Normocephalic atraumatic moist membranes pupils equal round reactive light, no scleral icterus no conjunctival injection  Cardiovascular: Irregularly irregular no lower extremity edema appreciated  Pulmonary: Clear to auscultation bilaterally no wheezes rales or rhonchi symmetric chest expansion, unlabored, no conversational dyspnea appreciated  Gastrointestinal: Soft nontender nondistended positive bowel sounds all 4 quadrants no rebound or guarding  Musculoskeletal: No clubbing cyanosis, warm and well-perfused, calves soft symmetric nontender bilaterally  Skin: Clean dry without rashs  Neuro: Slight right facial droop speech is slurred patient is having some word finding difficulty no sensorimotor deficits appreciated bilateral upper and lower extremities  Psych: Patient is impulsive tangential expansive cooperative and appropriate with exam not responding to internal stimuli  : No Watson catheter no bladder distention positive suprapubic tenderness    Result Review    Result Review:  I have personally reviewed the results from the time of this admission to 6/10/2022 16:05 EDT and agree with these findings:  [x]  Laboratory  []  Microbiology  [x]  Radiology  [x]  EKG/Telemetry   []  Cardiology/Vascular   []  Pathology  []  Old records  []  Other:      Assessment  & Plan   Assessment / Plan     Assessment/Plan:   New A. fib with RVR  Right facial numbness with right facial droop  Encephalopathy due to alcohol intoxication versus metabolic encephalopathy due to urinary tract infection  Alcohol intoxication with chronic alcohol use and history of withdrawal  Urinary tract infection  Mild Hypernatremia  Mild anion gap metabolic acidosis  Hypomagnesemia  Hypertension      Patient admitted for further evaluation and treatment  Cardiology consulted thank you for your assistance  Get MRI to rule out stroke due to facial numbness and facial droop  Continue Cardizem drip  Continue home carvedilol twice daily and titrate per cardiology  Start Lovenox therapeutic dose transition to oral anticoagulation when okay with cardiology  Get echocardiogram  Start maintenance fluids  Start thiamine and folic acid and multivitamin  Start CIWA protocol with as needed Ativan  Consult speech physical therapy occupational therapy  Advance diet per speech  Start aspirin atorvastatin due to history of stroke  Start Bactrim  Repeat BMP mag phos in a.m.      Discussed case with patient's nurse at bedside.  Discussed case with emergency department attending.  Discussed case with cardiology attending.  Further inpatient orders recommendations pending clinical course.      DVT prophylaxis:  Medical and mechanical DVT prophylaxis orders are present.    CODE STATUS:         Admission Status:  I believe this patient meets inpatient status.

## 2022-06-10 NOTE — ED TRIAGE NOTES
Patient son advises that he came home at 745 and patient was asleep and he woke her up.  Then at 0800 patient became confused and did not know where she was.  Patient has had a previous stroke a year ago.  Patient is unable to stand and was not able to answer simple questions like her age and the month.  Son said that she normally knows that information.

## 2022-06-10 NOTE — PLAN OF CARE
Goal Outcome Evaluation:   New patient to unit.  NIH 0 upon arrival.  MRI ordered.  Failed swallow study, modified diet and nectar thick liquids.  Cardizem gtt continues on 12.5 in left AC

## 2022-06-10 NOTE — THERAPY EVALUATION
Acute Care - Speech Language Pathology   Swallow Initial Evaluation SARIKA Kelley     Patient Name: Kiesha Pan  : 1955  MRN: [id number]  Today's Date: 6/10/2022               Admit Date: 6/10/2022    Visit Dx:     ICD-10-CM ICD-9-CM   1. Urinary tract infection in female  N39.0 599.0   2. Cerebrovascular accident (CVA), unspecified mechanism (HCC)  I63.9 434.91   3. Dysphagia, oropharyngeal  R13.12 787.22     Patient Active Problem List   Diagnosis   • Alcohol dependence (HCC)   • Allergic rhinitis due to allergen   • Anxiety   • Asthma   • Cataracts, bilateral   • COVID-19   • Depression   • Essential hypertension   • History of stroke   • Hypothyroid   • Other specified chronic obstructive airways disease   • Elevated troponin level not due to acute coronary syndrome   • Mixed hyperlipidemia   • AMS (altered mental status)     Past Medical History:   Diagnosis Date   • Asthma    • Elevated troponin level not due to acute coronary syndrome 12/3/2021   • Hypertension    • Mixed hyperlipidemia 12/3/2021   • Stroke (HCC) 2021     Past Surgical History:   Procedure Laterality Date   • CHOLECYSTECTOMY       PAIN SCALE: None reported    PRECAUTIONS/CONTRAINDICATIONS: Contact precautions    SUSPECTED ABUSE/NEGLECT/EXPLOITATION: None noted    SOCIAL/PSYCHOLOGICAL NEEDS/BARRIERS: None noted    PAST SOCIAL HISTORY: h/o alcohol use    PRIOR FUNCTION: Lives with son    PATIENT GOALS/EXPECTATIONS: Did not report    HISTORY: This patient is a 66-year-old white female admitted with confusion and rule out CVA.  CT and CXR are negative for any acute findings.      CURRENT DIET LEVEL: NPO    OBJECTIVE:    TEST ADMINISTERED: Clinical dysphagia evaluation    COGNITION/SAFETY AWARENESS: Confused, impulsive    BEHAVIORAL OBSERVATIONS: Cooperative, reduced attention to task    ORAL MOTOR EXAM: Lingual and labial strength and range of motion within functional limits.  Mild residual left facial asymmetry reported.     VOICE QUALITY:  Adequate voicing    REFLEX EXAM: Deferred    POSTURE: 90 degrees upright    FEEDING/SWALLOWING FUNCTION: Assessed with thin liquids from spoon cup and straw, purée consistencies and regular solids    CLINICAL OBSERVATIONS: Oral stage is characterized by mildly prolonged mastication with regular solids. .  Appears to have timely oral transit.  Very disorganized swallow with patient speaking with bolus in oral cavity before completing swallow.  Patient with reduced bolus control when speaking with bolus still in oral cavity with clinical signs of aspiration noted at times with thin liquids.  Tolerating nectar thick liquids well.  Patient needs continued redirection to complete swallow before speaking.  Appears to have good laryngeal elevation per palpation.      DYSPHAGIA CRITERIA: N/A    FUNCTIONAL ASSESSMENT INSTRUMENT: Patient currently scored a level 5 of 7 on Functional Communication Measures for swallowing indicating a 20-39% limitation in function.    ASSESSMENT/ PLAN OF CARE:  Pt presents with limitations, noted below, that impede her ability to swallow safely. The skills of a therapist will be required to safely and effectively implement the following treatment plan to restore maximal level of function.    PROBLEMS:  1.  Dysphagia   LTG 1: (30 days) patient will increase ability to tolerate least restrictive diet and improve functional communication measure for swallowing to a 7 of 7   STG 1a: (14 days) patient will tolerate mechanical soft and nectar thick liquids without clinical sign or symptom of aspiration with 8 of 10 trials   STG 1b: (14 days) patient will tolerate therapeutic trials of regular solids and thin liquids without clinical sign or symptom of aspiration with 8 of 10 trials.   STG 1c: (14 days) patient/family teaching regarding diet recommendations, safe swallow strategies and signs and symptoms of aspiration.     TREATMENT: Dysphagia therapy to ensure diet tolerance, advance to least  restrictive diet and analyze for aspiration risk    FREQUENCY/DURATION: Daily 5 times a week    REHAB POTENTIAL:  Pt has good rehab potential.  The following limitations may influence improvement/ length of tx medical status.    RECOMMENDATIONS:   1.   DIET: Mechanical soft diet-Nectar thick liquids    2.  POSITION: 90 degrees upright for all intake    3.  COMPENSATORY STRATEGIES: Reduce distractions during meals, small bites/drinks, oral meds in applesauce crushed if needed,    Pt/responsible party agrees with plan of care and has been informed of all alternatives, risks and benefits.       Marcie Gutierrez, SLP  6/10/2022

## 2022-06-11 LAB
AMMONIA BLD-SCNC: 28 UMOL/L (ref 11–51)
ANION GAP SERPL CALCULATED.3IONS-SCNC: 11 MMOL/L (ref 5–15)
AORTIC DIMENSIONLESS INDEX: 0.7 (DI)
BH CV ECHO MEAS - AO MAX PG: 31 MMHG
BH CV ECHO MEAS - AO MEAN PG: 19 MMHG
BH CV ECHO MEAS - AO ROOT DIAM: 3 CM
BH CV ECHO MEAS - AO V2 MAX: 278 CM/SEC
BH CV ECHO MEAS - AO V2 VTI: 28.8 CM
BH CV ECHO MEAS - EDV(MOD-SP2): 44.2 ML
BH CV ECHO MEAS - EDV(MOD-SP4): 44.1 ML
BH CV ECHO MEAS - EF(MOD-BP): 65 %
BH CV ECHO MEAS - ESV(MOD-SP2): 15.4 ML
BH CV ECHO MEAS - ESV(MOD-SP4): 15.4 ML
BH CV ECHO MEAS - IVSD: 1.1 CM
BH CV ECHO MEAS - LA A2CS (ATRIAL LENGTH): 5.8 CM
BH CV ECHO MEAS - LA A4C LENGTH: 5.8 CM
BH CV ECHO MEAS - LA DIMENSION: 2.3 CM
BH CV ECHO MEAS - LAT PEAK E' VEL: 11.6 CM/SEC
BH CV ECHO MEAS - LV MAX PG: 11 MMHG
BH CV ECHO MEAS - LV MEAN PG: 5 MMHG
BH CV ECHO MEAS - LV V1 MAX: 167 CM/SEC
BH CV ECHO MEAS - LV V1 VTI: 20.1 CM
BH CV ECHO MEAS - LVIDD: 3.6 CM
BH CV ECHO MEAS - LVIDS: 2.4 CM
BH CV ECHO MEAS - LVOT DIAM: 2 CM
BH CV ECHO MEAS - LVPWD: 1.1 CM
BH CV ECHO MEAS - MED PEAK E' VEL: 10.3 CM/SEC
BH CV ECHO MEAS - MV DEC SLOPE: 929 CM/SEC2
BH CV ECHO MEAS - MV DEC TIME: 110 MSEC
BH CV ECHO MEAS - MV E MAX VEL: 102 CM/SEC
BH CV ECHO MEAS - MV MAX PG: 8 MMHG
BH CV ECHO MEAS - MV MEAN PG: 3 MMHG
BH CV ECHO MEAS - MV P1/2T: 71 MSEC
BH CV ECHO MEAS - MV V2 VTI: 21.2 CM
BH CV ECHO MEAS - MVA(P1/2T): 3.1 CM2
BH CV ECHO MEAS - PA V2 MAX: 84 CM/SEC
BH CV ECHO MEAS - RAP SYSTOLE: 3 MMHG
BH CV ECHO MEAS - RVSP: 27 MMHG
BH CV ECHO MEAS - TR MAX PG: 24 MMHG
BH CV ECHO MEAS - TR MAX VEL: 247 CM/SEC
BH CV ECHO MEASUREMENTS AVERAGE E/E' RATIO: 9.32
BUN SERPL-MCNC: 6 MG/DL (ref 8–23)
BUN/CREAT SERPL: 11.1 (ref 7–25)
CALCIUM SPEC-SCNC: 8.9 MG/DL (ref 8.6–10.5)
CHLORIDE SERPL-SCNC: 100 MMOL/L (ref 98–107)
CHOLEST SERPL-MCNC: 181 MG/DL (ref 0–200)
CO2 SERPL-SCNC: 27 MMOL/L (ref 22–29)
CREAT SERPL-MCNC: 0.54 MG/DL (ref 0.57–1)
EGFRCR SERPLBLD CKD-EPI 2021: 101.7 ML/MIN/1.73
GLUCOSE BLDC GLUCOMTR-MCNC: 141 MG/DL (ref 70–99)
GLUCOSE SERPL-MCNC: 128 MG/DL (ref 65–99)
HBA1C MFR BLD: 5 % (ref 4.8–5.6)
HDLC SERPL-MCNC: 67 MG/DL (ref 40–60)
IVRT: 77 MSEC
LDLC SERPL CALC-MCNC: 95 MG/DL (ref 0–100)
LDLC/HDLC SERPL: 1.39 {RATIO}
LEFT ATRIUM VOLUME INDEX: 32.5 ML/M2
LEFT ATRIUM VOLUME: 54.2 ML
MAGNESIUM SERPL-MCNC: 1.7 MG/DL (ref 1.6–2.4)
MAXIMAL PREDICTED HEART RATE: 154 BPM
PHOSPHATE SERPL-MCNC: 2.2 MG/DL (ref 2.5–4.5)
POTASSIUM SERPL-SCNC: 4.3 MMOL/L (ref 3.5–5.2)
QT INTERVAL: 302 MS
QT INTERVAL: 316 MS
QT INTERVAL: 363 MS
QT INTERVAL: 385 MS
SODIUM SERPL-SCNC: 138 MMOL/L (ref 136–145)
STRESS TARGET HR: 131 BPM
TRIGL SERPL-MCNC: 104 MG/DL (ref 0–150)
VLDLC SERPL-MCNC: 19 MG/DL (ref 5–40)
WHOLE BLOOD HOLD SPECIMEN: NORMAL

## 2022-06-11 PROCEDURE — 25010000002 ENOXAPARIN PER 10 MG: Performed by: FAMILY MEDICINE

## 2022-06-11 PROCEDURE — 99232 SBSQ HOSP IP/OBS MODERATE 35: CPT | Performed by: INTERNAL MEDICINE

## 2022-06-11 PROCEDURE — 97165 OT EVAL LOW COMPLEX 30 MIN: CPT

## 2022-06-11 PROCEDURE — 83036 HEMOGLOBIN GLYCOSYLATED A1C: CPT | Performed by: FAMILY MEDICINE

## 2022-06-11 PROCEDURE — 82140 ASSAY OF AMMONIA: CPT | Performed by: INTERNAL MEDICINE

## 2022-06-11 PROCEDURE — 80061 LIPID PANEL: CPT | Performed by: FAMILY MEDICINE

## 2022-06-11 PROCEDURE — 25010000002 LORAZEPAM PER 2 MG: Performed by: FAMILY MEDICINE

## 2022-06-11 PROCEDURE — 80048 BASIC METABOLIC PNL TOTAL CA: CPT | Performed by: FAMILY MEDICINE

## 2022-06-11 PROCEDURE — 82962 GLUCOSE BLOOD TEST: CPT

## 2022-06-11 PROCEDURE — 25010000002 ONDANSETRON PER 1 MG: Performed by: FAMILY MEDICINE

## 2022-06-11 PROCEDURE — 99233 SBSQ HOSP IP/OBS HIGH 50: CPT | Performed by: FAMILY MEDICINE

## 2022-06-11 PROCEDURE — 83735 ASSAY OF MAGNESIUM: CPT | Performed by: FAMILY MEDICINE

## 2022-06-11 PROCEDURE — 84100 ASSAY OF PHOSPHORUS: CPT | Performed by: FAMILY MEDICINE

## 2022-06-11 RX ORDER — CEFTRIAXONE SODIUM 1 G/50ML
1 INJECTION, SOLUTION INTRAVENOUS EVERY 24 HOURS
Status: DISCONTINUED | OUTPATIENT
Start: 2022-06-12 | End: 2022-06-13

## 2022-06-11 RX ADMIN — CARVEDILOL 6.25 MG: 6.25 TABLET, FILM COATED ORAL at 09:11

## 2022-06-11 RX ADMIN — ONDANSETRON 4 MG: 2 INJECTION INTRAMUSCULAR; INTRAVENOUS at 00:07

## 2022-06-11 RX ADMIN — LORAZEPAM 1 MG: 2 INJECTION INTRAMUSCULAR; INTRAVENOUS at 00:07

## 2022-06-11 RX ADMIN — ENOXAPARIN SODIUM 60 MG: 100 INJECTION SUBCUTANEOUS at 06:09

## 2022-06-11 RX ADMIN — SULFAMETHOXAZOLE AND TRIMETHOPRIM 1 TABLET: 800; 160 TABLET ORAL at 09:11

## 2022-06-11 RX ADMIN — POTASSIUM & SODIUM PHOSPHATES POWDER PACK 280-160-250 MG 1 PACKET: 280-160-250 PACK at 11:37

## 2022-06-11 RX ADMIN — ATORVASTATIN CALCIUM 80 MG: 40 TABLET, FILM COATED ORAL at 20:40

## 2022-06-11 RX ADMIN — MULTIPLE VITAMINS W/ MINERALS TAB 1 TABLET: TAB at 09:11

## 2022-06-11 RX ADMIN — Medication 100 MG: at 09:12

## 2022-06-11 RX ADMIN — Medication 10 ML: at 20:40

## 2022-06-11 RX ADMIN — Medication 10 ML: at 08:57

## 2022-06-11 RX ADMIN — MAGNESIUM OXIDE TAB 400 MG (241.3 MG ELEMENTAL MG) 400 MG: 400 (241.3 MG) TAB at 11:37

## 2022-06-11 RX ADMIN — CARVEDILOL 6.25 MG: 6.25 TABLET, FILM COATED ORAL at 17:28

## 2022-06-11 RX ADMIN — LORAZEPAM 1 MG: 2 INJECTION INTRAMUSCULAR; INTRAVENOUS at 03:33

## 2022-06-11 RX ADMIN — FOLIC ACID 1 MG: 1 TABLET ORAL at 09:11

## 2022-06-11 RX ADMIN — SULFAMETHOXAZOLE AND TRIMETHOPRIM 1 TABLET: 800; 160 TABLET ORAL at 20:40

## 2022-06-11 RX ADMIN — POTASSIUM & SODIUM PHOSPHATES POWDER PACK 280-160-250 MG 1 PACKET: 280-160-250 PACK at 17:28

## 2022-06-11 RX ADMIN — SULFAMETHOXAZOLE AND TRIMETHOPRIM 1 TABLET: 800; 160 TABLET ORAL at 00:07

## 2022-06-11 RX ADMIN — ENOXAPARIN SODIUM 60 MG: 100 INJECTION SUBCUTANEOUS at 17:28

## 2022-06-11 RX ADMIN — ASPIRIN 81 MG: 81 TABLET, COATED ORAL at 09:11

## 2022-06-11 NOTE — PROGRESS NOTES
[facility]   Hospitalist Progress Note  Date: 2022  Patient Name: Kiesha Pan  : 1955  MRN: [id number]  Date of admission: 6/10/2022      Subjective   Subjective     Chief Complaint: Altered mental status    Summary: Kiesha Pan is a 66 y.o. female past medical history significant for asthma, hypertension, hyperlipidemia, stroke about 1 year ago presents after being found confused with difficulty speaking and facial numbness upon waking on the morning of presentation.  Patient was reportedly in her usual state of health the day prior to presentation.  On the morning of presentation patient's son woke her up patient was having slurred speech difficulty word finding and endorsed facial numbness.  Patient also had confusion unable to answer orientation questions which is much below her baseline.  Patient was brought to the emergency department for further evaluation and treatment.  In the emergency department patient is afebrile heart rate 110s in sinus rhythm initially converting to A. fib with RVR 160s on telemetry review.  Respiratory rate within normal limits.  Satting well on 2 L nasal cannula.  Blood pressure elevated 170s over 90s.  Patient is tearful complaining of generalized headache.  Patient's breath smells of alcohol.  Serum glucose within normal limits.  Troponin within normal limits.  Serum sodium slightly elevated.  Serum magnesium slightly low.  Bicarb slightly low.  Anion gap slightly elevated.  Blood cell count within normal limits.  Hemoglobin within normal limits.  Urinalysis positive for nitrates leukocytes 13-20 white blood cells and 4+ bacteria.  Ethanol level 328.  Chest x-ray negative for acute infiltrate or effusion.  CT head negative for any acute intracranial abnormalities.  CT perfusion scan negative for any cerebral ischemia.  CT angiogram of the head and neck negative for any hemodynamically significant stenosis.  Patient admitted for further evaluation and treatment of  A. fib with RVR complicated by encephalopathy due to alcohol intoxication versus metabolic encephalopathy due to urinary tract infection.    MRI negative for any acute hemorrhage or infarction.  Cardiology consulted.  Patient converted back to sinus rhythm and Cardizem weaned off continue home beta-blocker.    Interval Followup:  Per nursing patient developed visual hallucinations overnight and was agitated requiring multiple doses of Ativan.  Patient also noted to have urinary retention and clean intermittent catheterization initiated.  On interview this morning patient lying in bed appears to be resting comfortably very sleepy not responding to voice did wake up to touch refused to open eyes continues to have some slurred speech.  Patient has been able to take oral medications this morning but quickly falls back to sleep per nursing.  Patient more alert this evening when dinner arrived per nursing.  Afebrile overnight.'s sinus rhythm 80s to 110s on telemetry review.  Blood pressure within normal limits.  Satting well on 2 L nasal cannula.  Blood sugars improved.  A1c within normal limits.  Serum sodium within normal limits.  Serum potassium within normal limits.  Creatinine stable.  Anion gap closed.  Phosphorus low this morning.  Magnesium low.  Ammonia within normal limits.  Urine culture growing greater than 100,000 colony-forming units gram-negative bacilli.  MRI negative for any acute hemorrhage or infarction.  Did demonstrate chronic small vessel disease.  No other issues per nursing.    Review of Systems   Constitutional: Positive for fatigue. Negative for fever.   HENT: Positive for trouble swallowing. Negative for sore throat.    Eyes: Negative for pain and discharge.   Respiratory: Negative for cough and shortness of breath.    Cardiovascular: Negative for chest pain and palpitations.   Gastrointestinal: Negative for abdominal pain, nausea and vomiting.   Endocrine: Negative for cold intolerance and heat  intolerance.   Genitourinary: Positive for difficulty urinating, dysuria and frequency.        Urinary retention   Musculoskeletal: Negative for back pain and neck stiffness.   Skin: Negative for color change and rash.   Neurological: Positive for headaches. Negative for syncope.   Hematological: Negative for adenopathy.   Psychiatric/Behavioral: Positive for agitation, confusion and hallucinations.       Objective   Objective     Vitals:   Temp:  [97.7 °F (36.5 °C)-99.7 °F (37.6 °C)] 97.7 °F (36.5 °C)  Heart Rate:  [] 83  Resp:  [19-21] 19  BP: (117-154)/(68-89) 149/81  Flow (L/min):  [2] 2  Physical Exam   Gen. well-developed appearing stated age in no acute distress, somnolent  HEENT: Normocephalic atraumatic moist membranes pupils equal round reactive light, no scleral icterus no conjunctival injection  Cardiovascular: Regular rate and rhythm S1-S2 no murmurs rubs or gallops no lower extremity edema appreciated  Pulmonary: Clear to auscultation bilaterally no wheezes rales or rhonchi symmetric chest expansion, unlabored, no conversational dyspnea appreciated  Gastrointestinal: Soft nontender nondistended positive bowel sounds all 4 quadrants no rebound or guarding  Musculoskeletal: No clubbing cyanosis, warm and well-perfused, calves soft symmetric nontender bilaterally  Skin: Clean dry without rashs  Neuro: Slight right facial droop speech is slurred patien paucity of speech patient not able to cooperate with rest of exam  psych: Patient somnolent does not appear to be responding to internal stimuli  : No Watson catheter no bladder distention positive suprapubic tenderness    Result Review    Result Review:  I have personally reviewed the results from the time of this admission to 6/11/2022 17:24 EDT and agree with these findings:  [x]  Laboratory  [x]  Microbiology  [x]  Radiology  [x]  EKG/Telemetry echocardiogram within normal limits  []  Cardiology/Vascular   []  Pathology  []  Old records  []   Other:    Assessment & Plan   Assessment / Plan     Assessment/Plan:  New A. fib with RVR  Right facial numbness with right facial droop  Encephalopathy due to alcohol intoxication versus metabolic encephalopathy due to urinary tract infection  Alcohol intoxication with chronic alcohol use and history of withdrawal present on admission  Delirium tremens  Urinary tract infection  Mild Hypernatremia resolved  Mild anion gap metabolic acidosis resolved  Hypomagnesemia  Hypophosphatemia  Hypertension   History of CVA        Patient admitted for further evaluation and treatment  Cardiology consulted thank you for your assistance  MRI negative for ischemia or hemorrhage  Continue home carvedilol twice daily and titrate per cardiology  Continue Lovenox therapeutic dose transition to oral anticoagulation when more alert  Echocardiogram within normal limits  Continue thiamine and folic acid and multivitamin  Continue CIWA protocol with as needed Ativan  Consult speech therapy physical therapy occupational therapy  Advance diet per speech  Start atorvastatin due to history of stroke  Continue Bactrim due to multiple drug allergies and tailor based on sensitivities  Repeat BMP mag phos in a.m.        Discussed case with patient's nurse at bedside. Discussed case with cardiology attending.  Further inpatient orders recommendations pending clinical course.          DVT prophylaxis:  Medical and mechanical DVT prophylaxis orders are present.    CODE STATUS:   Code Status (Patient has no pulse and is not breathing): CPR (Attempt to Resuscitate)  Medical Interventions (Patient has pulse or is breathing): Full Support

## 2022-06-11 NOTE — PLAN OF CARE
Goal Outcome Evaluation:     Pt resting on and off throughout the night. A&Ox1 to self, pt having visual hallucinations. Pt needed to be frequently reoriented throughout the shift, CIWA  scores ranging 11-17 consistently, requiring prn ativan q2h. Pt converted NSR at around 1700, cardizem weaned & turned off at 0000. Pt frequently stated urge to void & unable to; bladder scan at 0530 showed 610mL, straight cath once with 550mL clear yellow urine out. Pt frequently having episodes of apnea and snoring when sleeping. Denies pain/discomfort at this time. VSS.

## 2022-06-11 NOTE — CONSULTS
"Nutrition Services    Patient Name: Kiesha Pan  YOB: 1955  MRN: [id number]  Admission date: 6/10/2022      CLINICAL NUTRITION ASSESSMENT      Reason for Assessment  MST score 2+, Physician consult     H&P:    Past Medical History:   Diagnosis Date   • Asthma    • Elevated troponin level not due to acute coronary syndrome 12/3/2021   • Hypertension    • Mixed hyperlipidemia 12/3/2021   • Stroke (HCC) 03/2021        Current Problems:   Active Hospital Problems    Diagnosis    • AMS (altered mental status)    • Paroxysmal atrial fibrillation with RVR (HCC)         Nutrition/Diet History         Narrative     Consult for MST score of 5 for reported wt loss of 34 lbs or more and report of decreased appetite/oral intake. Pt has not had a clinically significant wt loss over the last year. No PO intake recorded at this time, and nutrition interview not appropriate at this time due to pt's altered mental status/having visual hallucinations. RD will order Magic Cup BID to help meet estimated energy needs and CTM.      Anthropometrics        Current Height, Weight Height: 160 cm (63\")  Weight: 64.5 kg (142 lb 3.2 oz)   Current BMI Body mass index is 25.19 kg/m².       Weight Hx  Wt Readings from Last 30 Encounters:   06/10/22 0903 64.5 kg (142 lb 3.2 oz)   01/20/22 2211 67.1 kg (147 lb 14.9 oz)   12/03/21 0955 65.8 kg (145 lb)   11/09/21 1033 71.1 kg (156 lb 12 oz)   11/09/21 0500 23.2 kg (51 lb 3.8 oz)   11/06/21 2315 67.4 kg (148 lb 9.4 oz)   11/06/21 1824 66 kg (145 lb 8.1 oz)   11/06/21 1822 66 kg (145 lb 8.1 oz)   10/02/21 0925 67 kg (147 lb 11.3 oz)   04/06/21 0000 67.8 kg (149 lb 6 oz)   11/08/19 0000 77.1 kg (170 lb)   10/10/19 0000 74.6 kg (164 lb 9 oz)   09/24/19 0000 74.2 kg (163 lb 9 oz)   08/16/19 0000 74.6 kg (164 lb 6 oz)   08/06/19 0000 75 kg (165 lb 6 oz)            Wt Change Observation 4.8% wt loss/1 year     Estimated/Assessed Needs       Energy Requirements 25-30 kcal/kg IBW   EST Needs " (kcal/day) 2408-9091       Protein Requirements 0.8-1.0 g/kg   EST Daily Needs (g/day) 52-65       Fluid Requirements 1 ml/kcal    Estimated Needs (mL/day) 1773-1106     Labs/Medications         Pertinent Labs Reviewed.   Results from last 7 days   Lab Units 06/11/22  0425 06/10/22  0910   SODIUM mmol/L 138 147*   POTASSIUM mmol/L 4.3 4.0   CHLORIDE mmol/L 100 105   CO2 mmol/L 27.0 21.7*   BUN mg/dL 6* 10   CREATININE mg/dL 0.54* 0.63   CALCIUM mg/dL 8.9 9.0   BILIRUBIN mg/dL  --  0.2   ALK PHOS U/L  --  55   ALT (SGPT) U/L  --  24   AST (SGOT) U/L  --  47*   GLUCOSE mg/dL 128* 92     Results from last 7 days   Lab Units 06/11/22  0425 06/10/22  0910   MAGNESIUM mg/dL 1.7 1.7   PHOSPHORUS mg/dL 2.2*  --    HEMOGLOBIN g/dL  --  14.4   HEMATOCRIT %  --  42.2   TRIGLYCERIDES mg/dL 104  --      Coronavirus (COVID-19)   Date Value Ref Range Status   04/01/2021 DETECTED (A) NA Final     Comment:     The SARS-CoV-2 assay is a real-time, RT-PCR test intended  for the qualitative detection of nucleic acid from the  SARS-CoV-2 in respiratory specimens from individuals,  testing performed at [facility].       Lab Results   Component Value Date    HGBA1C 5.10 11/07/2021         Pertinent Medications Reviewed.     Current Nutrition Orders & Evaluation of Intake       Oral Nutrition     Current PO Diet Diet Dysphagia; IV - Mechanical Soft No Mixed Consistencies; Nectar / Syrup Thick; Cardiac   Supplement No active supplement orders       Malnutrition Severity Assessment                Nutrition Diagnosis         Nutrition Dx Problem 1 Inadequate energy Intake related to inadequate oral Intake as evidenced by decreased appetite., patient report., per nursing documentation. and report of minimal PO intake.       Nutrition Intervention         Magic Cup BID (+580 kcal, 18 g protein)     Medical Nutrition Therapy/Nutrition Education          Learner     Readiness Patient  Education not appropriate at this time     Method      Response Other  Other     Monitor/Evaluation        Monitor Per protocol, PO intake, Supplement intake, Pertinent labs, Weight       Nutrition Discharge Plan         To be determined       Electronically signed by:  Maricarmen Lima RD  06/11/22 08:49 EDT

## 2022-06-11 NOTE — THERAPY EVALUATION
Patient Name: Kiesha Pan  : 1955    MRN: [id number]                              Today's Date: 2022       Admit Date: 6/10/2022    Visit Dx:     ICD-10-CM ICD-9-CM   1. Urinary tract infection in female  N39.0 599.0   2. Cerebrovascular accident (CVA), unspecified mechanism (HCC)  I63.9 434.91   3. Dysphagia, oropharyngeal  R13.12 787.22   4. Decreased activities of daily living (ADL)  Z78.9 V49.89     Patient Active Problem List   Diagnosis   • Alcohol dependence (HCC)   • Allergic rhinitis due to allergen   • Anxiety   • Asthma   • Cataracts, bilateral   • COVID-19   • Depression   • Essential hypertension   • History of stroke   • Hypothyroid   • Other specified chronic obstructive airways disease   • Elevated troponin level not due to acute coronary syndrome   • Mixed hyperlipidemia   • AMS (altered mental status)   • Paroxysmal atrial fibrillation with RVR (HCC)     Past Medical History:   Diagnosis Date   • Asthma    • Elevated troponin level not due to acute coronary syndrome 12/3/2021   • Hypertension    • Mixed hyperlipidemia 12/3/2021   • Stroke (HCC) 2021     Past Surgical History:   Procedure Laterality Date   • CHOLECYSTECTOMY        General Information     Row Name 22 1136          OT Time and Intention    Document Type evaluation  -LF     Mode of Treatment individual therapy;occupational therapy  -LF     Row Name 22 1136          General Information    Patient Profile Reviewed yes  -LF     Prior Level of Function --  Patient lethargic and unable to answer subjective questions consistently. States she was independent with ADLs and has a STC in room at time of evaluation however patient reported using a RW. No family present to verify.  -LF     Existing Precautions/Restrictions fall  -LF     Barriers to Rehab none identified  -LF     Row Name 22 1136          Occupational Profile    Reason for Services/Referral (Occupational Profile) Occupational therapy consulted due  to recent decline in ADLs/functional transfers. No previous occupational therapy services for current condition.  -     Row Name 06/11/22 1136          Living Environment    People in Home spouse;child(enrique), adult  -     Row Name 06/11/22 1136          Cognition    Orientation Status (Cognition) oriented to;person  Lethargic requiring max verbal/tactile cues throughout  -     Row Name 06/11/22 1136          Safety Issues, Functional Mobility    Safety Issues Affecting Function (Mobility) awareness of need for assistance;insight into deficits/self-awareness  -     Impairments Affecting Function (Mobility) balance;cognition;endurance/activity tolerance  -     Comment, Safety Issues/Impairments (Mobility) Therapeutic exercises to address endurance.  -           User Key  (r) = Recorded By, (t) = Taken By, (c) = Cosigned By    Initials Name Provider Type     Josette Hernandez OT Occupational Therapist                 Mobility/ADL's     Row Name 06/11/22 1139          Bed Mobility    Bed Mobility supine-sit-supine  -     Supine-Sit-Supine [place] (Bed Mobility) moderate assist (50% patient effort)  -     Bed Mobility, Safety Issues decreased use of arms for pushing/pulling;decreased use of legs for bridging/pushing  -     Comment, (Bed Mobility) Max verbal/tactile cues for initiation of supine to sit. Unable to tolerate sitting EOB without external support.  -     Row Name 06/11/22 1139          Transfers    Transfers sit-stand transfer;stand-sit transfer  -     Sit-Stand [place] (Transfers) minimum assist (75% patient effort);moderate assist (50% patient effort)  -     Stand-Sit [place] (Transfers) minimum assist (75% patient effort);moderate assist (50% patient effort)  -     Row Name 06/11/22 1139          Sit-Stand Transfer    Assistive Device (Sit-Stand Transfers) walker, front-wheeled  -     Row Name 06/11/22 1139          Stand-Sit Transfer    Assistive Device  (Stand-Sit Transfers) walker, front-wheeled  -     Row Name 06/11/22 1139          Functional Mobility    Functional Mobility- Ind. Level minimum assist (75% patient effort);moderate assist (50% patient effort)  -     Functional Mobility- Device walker, front-wheeled  -     Functional Mobility- Safety Issues weight-shifting ability decreased  -     Functional Mobility- Comment Difficulty advancing LLE to HOB, requiring min/mod assist.  -     Row Name 06/11/22 1139          Activities of Daily Living    BADL Assessment/Intervention bathing;upper body dressing;lower body dressing;grooming;feeding;toileting  -[facility] Name 06/11/22 1139          Bathing Assessment/Intervention    [place] Level (Bathing) bathing skills;upper body;lower body;maximum assist (25% patient effort)  -[facility] Name 06/11/22 1139          Upper Body Dressing Assessment/Training    [place] Level (Upper Body Dressing) upper body dressing skills;maximum assist (25% patient effort)  -[facility] Name 06/11/22 1139          Lower Body Dressing Assessment/Training    [place] Level (Lower Body Dressing) lower body dressing skills;maximum assist (25% patient effort)  -[facility] Name 06/11/22 1139          Grooming Assessment/Training    [place] Level (Grooming) grooming skills;maximum assist (25% patient effort)  -[facility] Name 06/11/22 1139          Self-Feeding Assessment/Training    [place] Level (Feeding) feeding skills;set up  -[facility] Name 06/11/22 1139          Toileting Assessment/Training    [place] Level (Toileting) toileting skills;maximum assist (25% patient effort);dependent (less than 25% patient effort)  -     Comment, (Toileting) [place] currently in place.  -           User Key  (r) = Recorded By, (t) = Taken By, (c) = Cosigned By    Initials Name Provider Type     Josette Hernandez OT Occupational Therapist               Obj/Interventions     Row Name 06/11/22 1143           Sensory Assessment (Somatosensory)    Sensory Assessment (Somatosensory) unable/difficult to assess  Unable to formally assess due to difficulty following commands secondary to lethargy. Arouses to firm touch/sternal rub.  -LF     Row Name 06/11/22 1141          Vision Assessment/Intervention    Visual Impairment/Limitations unable/difficult to assess;corrective lenses full-time  Unable to formally assess due to difficulty following commands secondary to lethargy.  -     Row Name 06/11/22 1141          Range of Motion Comprehensive    General Range of Motion bilateral upper extremity ROM WFL  -     Row Name 06/11/22 1141          Strength Comprehensive (MMT)    Comment, General Manual Muscle Testing (MMT) Assessment 4/5 bilateral upper extremities  -     Row Name 06/11/22 1141          Motor Skills    Motor Skills coordination;functional endurance  -LF     Coordination other (see comments)  Unable to formally assess due to difficulty following commands secondary to lethargy.  -LF     Functional Endurance Poor  -     Row Name 06/11/22 1141          Balance    Balance Assessment sitting static balance;standing dynamic balance  -     Static Sitting Balance minimal assist  -LF     Position, Sitting Balance supported;sitting edge of bed  -     Dynamic Standing Balance minimal assist;moderate assist  -LF     Position/Device Used, Standing Balance supported;walker, front-wheeled  -     Comment, Balance Unable to sit statically on EOB without external support and presents with decrease ability to weight-shift/advance LLE using RW.  -LF           User Key  (r) = Recorded By, (t) = Taken By, (c) = Cosigned By    Initials Name Provider Type     Josette Hernandez OT Occupational Therapist               Goals/Plan     Row Name 06/11/22 1144          Bed Mobility Goal 1 (OT)    Activity/Assistive Device (Bed Mobility Goal 1, OT) bed mobility activities, all  -LF     [place] Level/Cues Needed (Bed Mobility  Goal 1, OT) supervision required  -LF     Time Frame (Bed Mobility Goal 1, OT) long term goal (LTG);10 days  -     Row Name 06/11/22 1144          Transfer Goal 1 (OT)    Activity/Assistive Device (Transfer Goal 1, OT) transfers, all;walker, rolling  -LF     [place] Level/Cues Needed (Transfer Goal 1, OT) contact guard required;standby assist  -LF     Time Frame (Transfer Goal 1, OT) long term goal (LTG);10 days  -     Row Name 06/11/22 1144          Bathing Goal 1 (OT)    Activity/Device (Bathing Goal 1, OT) bathing skills, all  -LF     [place] Level/Cues Needed (Bathing Goal 1, OT) minimum assist (75% or more patient effort);contact guard required  -LF     Time Frame (Bathing Goal 1, OT) long term goal (LTG);10 days  -     Row Name 06/11/22 1144          Dressing Goal 1 (OT)    Activity/Device (Dressing Goal 1, OT) dressing skills, all  -LF     [place]/Cues Needed (Dressing Goal 1, OT) contact guard required;minimum assist (75% or more patient effort)  -LF     Time Frame (Dressing Goal 1, OT) long term goal (LTG);10 days  -     Row Name 06/11/22 1144          Toileting Goal 1 (OT)    Activity/Device (Toileting Goal 1, OT) toileting skills, all  -LF     [place] Level/Cues Needed (Toileting Goal 1, OT) contact guard required;minimum assist (75% or more patient effort)  -LF     Time Frame (Toileting Goal 1, OT) long term goal (LTG);10 days  -     Row Name 06/11/22 1144          Grooming Goal 1 (OT)    Activity/Device (Grooming Goal 1, OT) grooming skills, all  -LF     [place] (Grooming Goal 1, OT) set-up required  -LF     Time Frame (Grooming Goal 1, OT) long term goal (LTG);10 days  -     Row Name 06/11/22 1144          Problem Specific Goal 1 (OT)    Problem Specific Goal 1 (OT) Patient will demonstrate fair endurance to support ADLs/functional transfers.  -LF     Time Frame (Problem Specific Goal 1, OT) long term goal (LTG);10 days  -     Row Name 06/11/22 1144           Therapy Assessment/Plan (OT)    Planned Therapy Interventions (OT) activity tolerance training;patient/caregiver education/training;BADL retraining;functional balance retraining;occupation/activity based interventions;ROM/therapeutic exercise;transfer/mobility retraining;strengthening exercise  -           User Key  (r) = Recorded By, (t) = Taken By, (c) = Cosigned By    Initials Name Provider Type     Josette Hernandez, ANGELICA Occupational Therapist               Clinical Impression     Row Name 06/11/22 1143          Pain Assessment    Additional Documentation Pain Scale: FACES Pre/Post-Treatment (Group)  -LF     Row Name 06/11/22 1143          Pain Scale: FACES Pre/Post-Treatment    Pain: FACES Scale, Pretreatment 0-->no hurt  -     Posttreatment Pain Rating 0-->no hurt  -LF     Row Name 06/11/22 1143          Plan of Care Review    Plan of Care Reviewed With patient  -     Progress no change  -     Outcome Evaluation Patient presents with limitations in self-care, functional transfers, balance, and endurance. She would benefit from continued skilled occupational therapy services to maximize ADL performance and return home safely and independently.  -[facility] Name 06/11/22 1143          Therapy Assessment/Plan (OT)    Patient/Family Therapy Goal Statement (OT) To maximize independence.  -     Rehab Potential (OT) good, to achieve stated therapy goals  -     Criteria for Skilled Therapeutic Interventions Met (OT) yes;meets criteria;skilled treatment is necessary  -     Therapy Frequency (OT) 5 times/wk  -[facility] Name 06/11/22 1143          Therapy Plan Review/Discharge Plan (OT)    Equipment Needs Upon Discharge (OT) walker, rolling;commode chair  -     Anticipated Discharge Disposition (OT) sub acute care setting;home with home health;home with assist  -[facility] Name 06/11/22 1143          Vital Signs    O2 Delivery Pre Treatment room air  -     O2 Delivery Intra Treatment room air  -      O2 Delivery Post Treatment room air  -LF           User Key  (r) = Recorded By, (t) = Taken By, (c) = Cosigned By    Initials Name Provider Type    Josette Alva, ANGELICA Occupational Therapist               Outcome Measures     Row Name 06/11/22 1145          How much help from another is currently needed...    Putting on and taking off regular lower body clothing? 2  -LF     Bathing (including washing, rinsing, and drying) 2  -LF     Toileting (which includes using toilet bed pan or urinal) 1  -LF     Putting on and taking off regular upper body clothing 2  -LF     Taking care of personal grooming (such as brushing teeth) 2  -LF     Eating meals 3  -LF     AM-PAC 6 Clicks Score (OT) 12  -LF     Row Name 06/11/22 0800          How much help from another person do you currently need...    Turning from your back to your side while in flat bed without using bedrails? 2  -JH     Moving from lying on back to sitting on the side of a flat bed without bedrails? 2  -JH     Moving to and from a bed to a chair (including a wheelchair)? 1  -JH     Standing up from a chair using your arms (e.g., wheelchair, bedside chair)? 1  -JH     Climbing 3-5 steps with a railing? 1  -JH     To walk in hospital room? 1  -JH     AM-PAC 6 Clicks Score (PT) 8  -JH     Highest level of mobility 3 --> Sat at edge of bed  -     Row Name 06/11/22 1145          Functional Assessment    Outcome Measure Options AM-PAC 6 Clicks Daily Activity (OT);Optimal Instrument  -     Row Name 06/11/22 1145          Optimal Instrument    Optimal Instrument Optimal - 3  -LF     Bending/Stooping 4  -LF     Standing 3  -LF     Reaching 1  -LF     From the list, choose the 3 activities you would most like to be able to do without any difficulty Bending/stooping;Standing;Reaching  -LF     Total Score Optimal - 3 8  -LF           User Key  (r) = Recorded By, (t) = Taken By, (c) = Cosigned By    Initials Name Provider Type    Kiesha Dawn, RN Registered Nurse      Josette Hernandez OT Occupational Therapist                Occupational Therapy Education                 Title: PT OT SLP Therapies (In Progress)     Topic: Occupational Therapy (In Progress)     Point: ADL training (In Progress)     Description:   Instruct learner(s) on proper safety adaptation and remediation techniques during self care or transfers.   Instruct in proper use of assistive devices.              Learning Progress Summary           Patient Acceptance, E,TB, NL by  at 6/11/2022 1145                   Point: Home exercise program (Not Started)     Description:   Instruct learner(s) on appropriate technique for monitoring, assisting and/or progressing therapeutic exercises/activities.              Learner Progress:  Not documented in this visit.          Point: Precautions (In Progress)     Description:   Instruct learner(s) on prescribed precautions during self-care and functional transfers.              Learning Progress Summary           Patient Acceptance, E,TB, NL by  at 6/11/2022 1145                   Point: Body mechanics (In Progress)     Description:   Instruct learner(s) on proper positioning and spine alignment during self-care, functional mobility activities and/or exercises.              Learning Progress Summary           Patient Acceptance, E,TB, NL by  at 6/11/2022 1145                               User Key     Initials Effective Dates Name Provider Type Discipline     06/16/21 -  Josette Hernandez OT Occupational Therapist OT              OT Recommendation and Plan  Planned Therapy Interventions (OT): activity tolerance training, patient/caregiver education/training, BADL retraining, functional balance retraining, occupation/activity based interventions, ROM/therapeutic exercise, transfer/mobility retraining, strengthening exercise  Therapy Frequency (OT): 5 times/wk  Plan of Care Review  Plan of Care Reviewed With: patient  Progress: no change  Outcome Evaluation:  Patient presents with limitations in self-care, functional transfers, balance, and endurance. She would benefit from continued skilled occupational therapy services to maximize ADL performance and return home safely and independently.     Time Calculation:    Time Calculation- OT     Row Name 06/11/22 1146             Time Calculation- OT    OT Received On 06/11/22  -LF      OT Goal Re-Cert Due Date 06/20/22  -LF              Untimed Charges    OT Eval/Re-eval Minutes 40  -LF              Total Minutes    Untimed Charges Total Minutes 40  -LF       Total Minutes 40  -LF            User Key  (r) = Recorded By, (t) = Taken By, (c) = Cosigned By    Initials Name Provider Type    LF Josette Hernandez OT Occupational Therapist              Therapy Charges for Today     Code Description Service Date Service Provider Modifiers Qty    [id number] HC OT EVAL LOW COMPLEXITY 3 6/11/2022 Josette Hernandez OT GO 1               Josette Hernandez OT  6/11/2022

## 2022-06-11 NOTE — PROGRESS NOTES
[facility]     Cardiology Progress Note    Patient Name: Kiesha Pan  : 1955  MRN: [id number]  Primary Care Physician:  Provider, No Known  Date of admission: 6/10/2022    Subjective   Subjective     Chief Complaint: Follow-up visit for atrial fibrillation    Interval HPI:    Patient is lethargic and sleepy this morning, not answering questions appropriately.  She remains in normal sinus rhythm and sinus tachycardia.    Review of Systems   Unable to do full review of systems due to altered mental status.    Objective   Objective     Vitals:   Temp:  [98.1 °F (36.7 °C)-99.7 °F (37.6 °C)] 98.1 °F (36.7 °C)  Heart Rate:  [] 83  Resp:  [20-21] 21  BP: (117-154)/(68-99) 128/73  Flow (L/min):  [2] 2  Physical Exam      General : Lethargic, sleepy  CVS : Regular rate and rhythm, no murmur, rubs or gallops  Lungs: Clear to auscultation bilaterally, no crackles or rhonchi  Abdomen: Soft, nontender, bowel sounds heard in all 4 quadrants  Extremities: Warm, well-perfused, no pedal edema    Scheduled Meds:aspirin, 81 mg, Oral, Daily  atorvastatin, 80 mg, Oral, Nightly  carvedilol, 6.25 mg, Oral, BID With Meals  enoxaparin, 60 mg, Subcutaneous, Q12H  vitamin B-1, 100 mg, Oral, Daily   And  multivitamin with minerals, 1 tablet, Oral, Daily   And  folic acid, 1 mg, Oral, Daily  magnesium oxide, 400 mg, Oral, Daily  potassium & sodium phosphates, 1 packet, Oral, TID AC  sodium chloride, 10 mL, Intravenous, Q12H  sulfamethoxazole-trimethoprim, 1 tablet, Oral, Q12H           Result Review    Result Review:  I have personally reviewed the results from the time of this admission to 2022 11:04 EDT and agree with these findings:  [x]  Laboratory  []  Microbiology  [x]  Radiology  [x]  EKG/Telemetry   [x]  Cardiology/Vascular   []  Pathology  []  Old records  []  Other:  Most notable findings include:     CBC    CBC 11/8/21 1/20/22 6/10/22   WBC 8.26 7.96 7.27   RBC 3.46 (A) 3.50 (A) 4.12   Hemoglobin 12.1 12.5 14.4    Hematocrit 34.7 35.9 42.2   .3 (A) 102.6 (A) 102.4 (A)   MCH 35.0 (A) 35.7 (A) 35.0 (A)   MCHC 34.9 34.8 34.1   RDW 12.9 14.2 14.6   Platelets 297 356 355   (A) Abnormal value            CMP    CMP 1/20/22 6/10/22 6/11/22   Glucose 108 (A) 92 128 (A)   BUN 8 10 6 (A)   Creatinine 0.65 0.63 0.54 (A)   eGFR Non African Am 91     Sodium 141 147 (A) 138   Potassium 4.0 4.0 4.3   Chloride 103 105 100   Calcium 9.2 9.0 8.9   Albumin 4.80 5.00    Total Bilirubin 0.5 0.2    Alkaline Phosphatase 73 55    AST (SGOT) 26 47 (A)    ALT (SGPT) 15 24    (A) Abnormal value             CARDIAC LABS:      Lab 06/10/22  2001 06/10/22  0910   TROPONIN T <0.010 <0.010   PROTIME  --  13.1   INR  --  0.98        Assessment & Plan   Assessment / Plan     Brief Patient Summary:  Kiesha Pan is a 66 y.o. female with reported previous stroke, hypertension, hyperlipidemia who was admitted with altered mental status.  She was noted to be in atrial fibrillation with RVR which is new diagnosis.    Active Hospital Problems:  Active Hospital Problems    Diagnosis    • AMS (altered mental status)    • Paroxysmal atrial fibrillation with RVR (HCC)        Paroxysmal atrial fibrillation ; currently back in sinus rhythm, high [id number] 2 score mostly because of previous stroke     Alcohol withdrawal  Altered mental status  Urinary tract infection    Plan:     Continue carvedilol 6.25 mg twice daily  Continue anticoagulation.  Lovenox may be changed to one of the newer oral anticoagulant agents    We will follow echocardiogram reports, otherwise follow-up as needed.       CODE STATUS:   Code Status (Patient has no pulse and is not breathing): CPR (Attempt to Resuscitate)  Medical Interventions (Patient has pulse or is breathing): Full Support      Electronically signed by Jace Navarro MD, 06/11/22, 11:04 AM EDT.

## 2022-06-12 LAB
ANION GAP SERPL CALCULATED.3IONS-SCNC: 9.8 MMOL/L (ref 5–15)
BACTERIA SPEC AEROBE CULT: ABNORMAL
BUN SERPL-MCNC: 7 MG/DL (ref 8–23)
BUN/CREAT SERPL: 11.9 (ref 7–25)
CALCIUM SPEC-SCNC: 9.2 MG/DL (ref 8.6–10.5)
CHLORIDE SERPL-SCNC: 97 MMOL/L (ref 98–107)
CO2 SERPL-SCNC: 27.2 MMOL/L (ref 22–29)
CREAT SERPL-MCNC: 0.59 MG/DL (ref 0.57–1)
EGFRCR SERPLBLD CKD-EPI 2021: 99.5 ML/MIN/1.73
GLUCOSE SERPL-MCNC: 118 MG/DL (ref 65–99)
MAGNESIUM SERPL-MCNC: 1.7 MG/DL (ref 1.6–2.4)
PHOSPHATE SERPL-MCNC: 2.2 MG/DL (ref 2.5–4.5)
POTASSIUM SERPL-SCNC: 4.4 MMOL/L (ref 3.5–5.2)
SODIUM SERPL-SCNC: 134 MMOL/L (ref 136–145)

## 2022-06-12 PROCEDURE — 99233 SBSQ HOSP IP/OBS HIGH 50: CPT | Performed by: FAMILY MEDICINE

## 2022-06-12 PROCEDURE — 25010000002 MAGNESIUM SULFATE 2 GM/50ML SOLUTION: Performed by: FAMILY MEDICINE

## 2022-06-12 PROCEDURE — 97161 PT EVAL LOW COMPLEX 20 MIN: CPT

## 2022-06-12 PROCEDURE — 80048 BASIC METABOLIC PNL TOTAL CA: CPT | Performed by: FAMILY MEDICINE

## 2022-06-12 PROCEDURE — 25010000002 CEFTRIAXONE PER 250 MG: Performed by: FAMILY MEDICINE

## 2022-06-12 PROCEDURE — 25010000002 ONDANSETRON PER 1 MG: Performed by: FAMILY MEDICINE

## 2022-06-12 PROCEDURE — 83735 ASSAY OF MAGNESIUM: CPT | Performed by: FAMILY MEDICINE

## 2022-06-12 PROCEDURE — 25010000002 ENOXAPARIN PER 10 MG: Performed by: FAMILY MEDICINE

## 2022-06-12 PROCEDURE — 84100 ASSAY OF PHOSPHORUS: CPT | Performed by: FAMILY MEDICINE

## 2022-06-12 RX ORDER — MAGNESIUM SULFATE HEPTAHYDRATE 40 MG/ML
2 INJECTION, SOLUTION INTRAVENOUS ONCE
Status: COMPLETED | OUTPATIENT
Start: 2022-06-12 | End: 2022-06-12

## 2022-06-12 RX ADMIN — MULTIPLE VITAMINS W/ MINERALS TAB 1 TABLET: TAB at 08:36

## 2022-06-12 RX ADMIN — MAGNESIUM SULFATE 2 G: 2 INJECTION INTRAVENOUS at 09:54

## 2022-06-12 RX ADMIN — Medication 100 MG: at 08:36

## 2022-06-12 RX ADMIN — ENOXAPARIN SODIUM 60 MG: 100 INJECTION SUBCUTANEOUS at 06:10

## 2022-06-12 RX ADMIN — ATORVASTATIN CALCIUM 80 MG: 40 TABLET, FILM COATED ORAL at 20:56

## 2022-06-12 RX ADMIN — CEFTRIAXONE SODIUM 1 G: 1 INJECTION, SOLUTION INTRAVENOUS at 00:05

## 2022-06-12 RX ADMIN — SULFAMETHOXAZOLE AND TRIMETHOPRIM 1 TABLET: 800; 160 TABLET ORAL at 08:36

## 2022-06-12 RX ADMIN — ENOXAPARIN SODIUM 60 MG: 100 INJECTION SUBCUTANEOUS at 18:21

## 2022-06-12 RX ADMIN — MAGNESIUM OXIDE TAB 400 MG (241.3 MG ELEMENTAL MG) 400 MG: 400 (241.3 MG) TAB at 08:36

## 2022-06-12 RX ADMIN — CEFTRIAXONE SODIUM 1 G: 1 INJECTION, SOLUTION INTRAVENOUS at 23:27

## 2022-06-12 RX ADMIN — ONDANSETRON 4 MG: 2 INJECTION INTRAMUSCULAR; INTRAVENOUS at 10:14

## 2022-06-12 RX ADMIN — SULFAMETHOXAZOLE AND TRIMETHOPRIM 1 TABLET: 800; 160 TABLET ORAL at 20:56

## 2022-06-12 RX ADMIN — POTASSIUM & SODIUM PHOSPHATES POWDER PACK 280-160-250 MG 1 PACKET: 280-160-250 PACK at 18:21

## 2022-06-12 RX ADMIN — CARVEDILOL 6.25 MG: 6.25 TABLET, FILM COATED ORAL at 08:36

## 2022-06-12 RX ADMIN — Medication 10 ML: at 20:57

## 2022-06-12 RX ADMIN — FOLIC ACID 1 MG: 1 TABLET ORAL at 08:36

## 2022-06-12 RX ADMIN — POTASSIUM & SODIUM PHOSPHATES POWDER PACK 280-160-250 MG 1 PACKET: 280-160-250 PACK at 10:53

## 2022-06-12 RX ADMIN — CARVEDILOL 6.25 MG: 6.25 TABLET, FILM COATED ORAL at 18:21

## 2022-06-12 RX ADMIN — Medication 10 ML: at 08:36

## 2022-06-12 NOTE — THERAPY EVALUATION
Acute Care - Physical Therapy Initial Evaluation   Allie     Patient Name: Kiesha Pan  : 1955  MRN: [id number]  Today's Date: 2022      Visit Dx:     ICD-10-CM ICD-9-CM   1. Urinary tract infection in female  N39.0 599.0   2. Cerebrovascular accident (CVA), unspecified mechanism (HCC)  I63.9 434.91   3. Dysphagia, oropharyngeal  R13.12 787.22   4. Decreased activities of daily living (ADL)  Z78.9 V49.89   5. Difficulty walking  R26.2 719.7     Patient Active Problem List   Diagnosis   • Alcohol dependence (HCC)   • Allergic rhinitis due to allergen   • Anxiety   • Asthma   • Cataracts, bilateral   • COVID-19   • Depression   • Essential hypertension   • History of stroke   • Hypothyroid   • Other specified chronic obstructive airways disease   • Elevated troponin level not due to acute coronary syndrome   • Mixed hyperlipidemia   • AMS (altered mental status)   • Paroxysmal atrial fibrillation with RVR ([facility])     Past Medical History:   Diagnosis Date   • Asthma    • Elevated troponin level not due to acute coronary syndrome 12/3/2021   • Hypertension    • Mixed hyperlipidemia 12/3/2021   • Stroke (HCC) 2021     Past Surgical History:   Procedure Laterality Date   • CHOLECYSTECTOMY       PT Assessment (last 12 hours)     PT Evaluation and Treatment     Row Name 22 1100          Physical Therapy Time and Intention    Subjective Information no complaints  -CS     Document Type evaluation  -CS     Mode of Treatment individual therapy;physical therapy  -CS     Patient Effort poor  -CS     Row Name 22 1100          General Information    Patient Profile Reviewed yes  -CS     Patient Observations lethargic  -CS     Prior Level of Function independent:;all household mobility;gait;transfer;bed mobility;ADL's  Pt had difficulty answering subjective questions due to intermittently falling asleep and inconsistencies in information  -CS     Equipment Currently Used at Home cane, straight;walker,  "rolling  -CS     Existing Precautions/Restrictions fall  -CS     Barriers to Rehab none identified  -CS     Row Name 06/12/22 1100          Living Environment    Current Living Arrangements home  -CS     People in Home spouse;child(enrique), adult  -CS     Primary Care Provided by self  -CS     Row Name 06/12/22 1100          Cognition    Orientation Status (Cognition) oriented to;person  -CS     Row Name 06/12/22 1100          Range of Motion Comprehensive    General Range of Motion bilateral lower extremity ROM WFL  AAROM with maximal verbal cues  -CS     Row Name 06/12/22 1100          Strength Comprehensive (MMT)    General Manual Muscle Testing (MMT) Assessment lower extremity strength deficits identified  -CS     Comment, General Manual Muscle Testing (MMT) Assessment Bilateral lower extremities grossly assessed at 3/5  -CS     Row Name 06/12/22 1100          Lower Extremity (Manual Muscle Testing)    Lower Extremity: Manual Muscle Testing (MMT) left LE strength is WFL  -CS     Comment, MMT: Lower Extremity Patient was stating left leg was feeling \"funny\" but unable to elaborate  -CS     Row Name 06/12/22 1100          Bed Mobility    Bed Mobility bed mobility (all) activities  -CS     All Activities, Inez (Bed Mobility) verbal cues;minimum assist (75% patient effort);moderate assist (50% patient effort)  -CS     Bed Mobility, Safety Issues decreased use of arms for pushing/pulling;decreased use of legs for bridging/pushing  -CS     Assistive Device (Bed Mobility) bed rails;draw sheet  -CS     Comment, (Bed Mobility) Maximal cues for participation and alertness to initate movements; kept falling asleep so no further transfers completed due to decreased safety  -CS     Row Name 06/12/22 1100          Safety Issues, Functional Mobility    Safety Issues Affecting Function (Mobility) awareness of need for assistance;ability to follow commands;insight into deficits/self-awareness  -CS     Impairments Affecting " Function (Mobility) balance;cognition;endurance/activity tolerance  -CS     Row Name 06/12/22 1100          Balance    Balance Assessment sitting static balance  -CS     Static Sitting Balance minimal assist;moderate assist  -CS     Position, Sitting Balance supported;long sitting  -CS     Row Name 06/12/22 1100          Plan of Care Review    Plan of Care Reviewed With patient  -CS     Progress no change  -CS     Outcome Evaluation Pt presents with limitations that impede their ability to safely and independently transfer and ambulate. The skills of a therapist will be required to safely and effectively implement the following treatment plan to restore maximal level of function.  -     Row Name 06/12/22 1100          Therapy Assessment/Plan (PT)    Rehab Potential (PT) fair, will monitor progress closely  -CS     Criteria for Skilled Interventions Met (PT) yes;skilled treatment is necessary  -CS     Therapy Frequency (PT) daily  -CS     Predicted Duration of Therapy Intervention (PT) 10 days  -CS     Problem List (PT) problems related to;balance;mobility;cognition;strength;sensation  -CS     Activity Limitations Related to Problem List (PT) unable to ambulate safely;unable to transfer safely  -CS     Row Name 06/12/22 1100          PT Evaluation Complexity    History, PT Evaluation Complexity no personal factors and/or comorbidities  -CS     Examination of Body Systems (PT Eval Complexity) total of 4 or more elements  -CS     Clinical Presentation (PT Evaluation Complexity) stable  -CS     Clinical Decision Making (PT Evaluation Complexity) low complexity  -CS     Overall Complexity (PT Evaluation Complexity) low complexity  -     Row Name 06/12/22 1100          Therapy Plan Review/Discharge Plan (PT)    Therapy Plan Review (PT) evaluation/treatment results reviewed;patient  -CS     Row Name 06/12/22 1100          Physical Therapy Goals    Bed Mobility Goal Selection (PT) bed mobility, PT goal 1  -CS      Transfer Goal Selection (PT) transfer, PT goal 1  -CS     Gait Training Goal Selection (PT) gait training, PT goal 1  -CS     Row Name 06/12/22 1100          Bed Mobility Goal 1 (PT)    Activity/Assistive Device (Bed Mobility Goal 1, PT) bed mobility activities, all  -CS     Fowler Level/Cues Needed (Bed Mobility Goal 1, PT) independent  -CS     Time Frame (Bed Mobility Goal 1, PT) long term goal (LTG);10 days  -CS     Row Name 06/12/22 1100          Transfer Goal 1 (PT)    Activity/Assistive Device (Transfer Goal 1, PT) sit-to-stand/stand-to-sit;bed-to-chair/chair-to-bed;walker, rolling  -CS     Fowler Level/Cues Needed (Transfer Goal 1, PT) modified independence  -CS     Time Frame (Transfer Goal 1, PT) long term goal (LTG);10 days  -CS     Row Name 06/12/22 1100          Gait Training Goal 1 (PT)    Activity/Assistive Device (Gait Training Goal 1, PT) gait (walking locomotion);assistive device use;cane, straight;walker, rolling  -CS     Fowler Level (Gait Training Goal 1, PT) supervision required  -CS     Distance (Gait Training Goal 1, PT) 150  -CS     Time Frame (Gait Training Goal 1, PT) long term goal (LTG);10 days  -CS           User Key  (r) = Recorded By, (t) = Taken By, (c) = Cosigned By    Initials Name Provider Type    CS Suly Moralez PT Physical Therapist                Physical Therapy Education                 Title: PT OT SLP Therapies (In Progress)     Topic: Physical Therapy (Not Started)     Point: Mobility training (Not Started)     Learner Progress:  Not documented in this visit.          Point: Home exercise program (Not Started)     Learner Progress:  Not documented in this visit.          Point: Body mechanics (Not Started)     Learner Progress:  Not documented in this visit.          Point: Precautions (Not Started)     Learner Progress:  Not documented in this visit.                          PT Recommendation and Plan  Anticipated Discharge Disposition (PT): home,  home with assist, home with home health  Planned Therapy Interventions (PT): balance training, bed mobility training, gait training, transfer training, strengthening, neuromuscular re-education  Therapy Frequency (PT): daily  Plan of Care Reviewed With: patient  Progress: no change  Outcome Evaluation: Pt presents with limitations that impede their ability to safely and independently transfer and ambulate. The skills of a therapist will be required to safely and effectively implement the following treatment plan to restore maximal level of function.   Outcome Measures     Row Name 06/12/22 1100             How much help from another person do you currently need...    Turning from your back to your side while in flat bed without using bedrails? 2  -CS      Moving from lying on back to sitting on the side of a flat bed without bedrails? 2  -CS      Moving to and from a bed to a chair (including a wheelchair)? 2  -CS      Standing up from a chair using your arms (e.g., wheelchair, bedside chair)? 2  -CS      Climbing 3-5 steps with a railing? 2  -CS      To walk in hospital room? 2  -CS      AM-PAC 6 Clicks Score (PT) 12  -CS              Functional Assessment    Outcome Measure Options AM-PAC 6 Clicks Basic Mobility (PT)  -CS            User Key  (r) = Recorded By, (t) = Taken By, (c) = Cosigned By    Initials Name Provider Type    Suly Pedroza PT Physical Therapist                 Time Calculation:    PT Charges     Row Name 06/12/22 1142             Time Calculation    PT Received On 06/12/22  -CS      PT Goal Re-Cert Due Date 06/21/22  -CS              Untimed Charges    PT Eval/Re-eval Minutes 25  -CS              Total Minutes    Untimed Charges Total Minutes 25  -CS       Total Minutes 25  -CS            User Key  (r) = Recorded By, (t) = Taken By, (c) = Cosigned By    Initials Name Provider Type    uSly Pedroza PT Physical Therapist              Therapy Charges for Today     Code Description  Service Date Service Provider Modifiers Qty    14871768185 HC PT EVAL LOW COMPLEXITY 2 6/12/2022 Suly Moralez, PT GP 1          PT G-Codes  Outcome Measure Options: AM-PAC 6 Clicks Basic Mobility (PT)  AM-PAC 6 Clicks Score (PT): 12  AM-PAC 6 Clicks Score (OT): 12    Suly Moralez PT  6/12/2022

## 2022-06-12 NOTE — PROGRESS NOTES
[facility]   Hospitalist Progress Note  Date: 2022  Patient Name: Kiesha Pan  : 1955  MRN: [id number]  Date of admission: 6/10/2022      Subjective   Subjective     Chief Complaint: Altered mental status    Summary: Kiesha Pan is a 66 y.o. female past medical history significant for asthma, hypertension, hyperlipidemia, stroke about 1 year ago presents after being found confused with difficulty speaking and facial numbness upon waking on the morning of presentation.  Patient was reportedly in her usual state of health the day prior to presentation.  On the morning of presentation patient's son woke her up patient was having slurred speech difficulty word finding and endorsed facial numbness.  Patient also had confusion unable to answer orientation questions which is much below her baseline.  Patient was brought to the emergency department for further evaluation and treatment.  In the emergency department patient is afebrile heart rate 110s in sinus rhythm initially converting to A. fib with RVR 160s on telemetry review.  Respiratory rate within normal limits.  Satting well on 2 L nasal cannula.  Blood pressure elevated 170s over 90s.  Patient is tearful complaining of generalized headache.  Patient's breath smells of alcohol.  Serum glucose within normal limits.  Troponin within normal limits.  Serum sodium slightly elevated.  Serum magnesium slightly low.  Bicarb slightly low.  Anion gap slightly elevated.  Blood cell count within normal limits.  Hemoglobin within normal limits.  Urinalysis positive for nitrates leukocytes 13-20 white blood cells and 4+ bacteria.  Ethanol level 328.  Chest x-ray negative for acute infiltrate or effusion.  CT head negative for any acute intracranial abnormalities.  CT perfusion scan negative for any cerebral ischemia.  CT angiogram of the head and neck negative for any hemodynamically significant stenosis.  Patient admitted for further evaluation and treatment of  A. fib with RVR complicated by encephalopathy due to alcohol intoxication versus metabolic encephalopathy due to urinary tract infection.    MRI negative for any acute hemorrhage or infarction.  Cardiology consulted.  Patient converted back to sinus rhythm and Cardizem weaned off continue home beta-blocker.  Patient very agitated interacting with visual hallucinations overnight hospital day 1 requiring Ativan per CIWA protocol.  Mentation slowly improving.  Patient remains very weak.  Urine culture growing E. coli.    Interval Followup:  Per nursing patient mentation much improved no longer interacting with visual hallucinations not requiring Ativan per CIWA protocol.  No further issues with urinary retention.  On interview this morning patient lying in bed appears to be resting comfortably wakes easily to voice cooperative with exam interactive oriented to person and place not time.  Patient has been able to take oral medications this morning and is eating more.  Patient worked with physical therapy occupational therapy.  Patient more alert this evening when dinner arrived per nursing.  Afebrile overnight.'s sinus rhythm 80s to 110s on telemetry review.  Blood pressure within normal limits.  Satting well on room air.  Blood sugars within normal limits.  Serum sodium trending down serum potassium within normal limits.  Creatinine stable.  Phosphorus low this morning.  Magnesium low. Urine culture growing E. coli.  MRI negative for any acute hemorrhage or infarction.   No other issues per nursing.    Review of Systems   Constitutional: Positive for fatigue. Negative for fever.   HENT: Positive for trouble swallowing. Negative for sore throat.    Eyes: Negative for pain and discharge.   Respiratory: Negative for cough and shortness of breath.    Cardiovascular: Negative for chest pain and palpitations.   Gastrointestinal: Negative for abdominal pain, nausea and vomiting.   Endocrine: Negative for cold intolerance and  heat intolerance.   Genitourinary: Negative for difficulty urinating, dysuria and frequency.   Musculoskeletal: Negative for back pain and neck stiffness.   Skin: Negative for color change and rash.   Neurological: Negative for syncope and headaches.   Hematological: Negative for adenopathy.   Psychiatric/Behavioral: Positive for confusion. Negative for agitation and hallucinations.       Objective   Objective     Vitals:   Temp:  [98.2 °F (36.8 °C)-99.3 °F (37.4 °C)] 99 °F (37.2 °C)  Heart Rate:  [] 91  Resp:  [15-19] 17  BP: (108-136)/(67-87) 108/67  Flow (L/min):  [2] 2  Physical Exam   Gen. well-developed appearing stated age in no acute distress  HEENT: Normocephalic atraumatic moist membranes pupils equal round reactive light, no scleral icterus no conjunctival injection  Cardiovascular: Regular rate and rhythm S1-S2 no murmurs rubs or gallops no lower extremity edema appreciated  Pulmonary: Clear to auscultation bilaterally no wheezes rales or rhonchi symmetric chest expansion, unlabored, no conversational dyspnea appreciated  Gastrointestinal: Soft nontender nondistended positive bowel sounds all 4 quadrants no rebound or guarding  Musculoskeletal: No clubbing cyanosis, warm and well-perfused, calves soft symmetric nontender bilaterally  Skin: Clean dry without rashs  Neuro: Symmetric weakness bilateral upper and lower extremities, no facial asymmetry appreciated no sensory deficits appreciated  psych: Patient somnolent does not appear to be responding to internal stimuli  : No Watson catheter no bladder distention no suprapubic tenderness    Result Review    Result Review:  I have personally reviewed the results from the time of this admission to 6/12/2022 19:20 EDT and agree with these findings:  [x]  Laboratory  [x]  Microbiology  [x]  Radiology  [x]  EKG/Telemetry echocardiogram within normal limits  []  Cardiology/Vascular   []  Pathology  []  Old records  []  Other:    Assessment & Plan    Assessment / Plan     Assessment/Plan:  New A. fib with RVR  Right facial numbness with right facial droop resolved  Encephalopathy due to alcohol intoxication versus metabolic encephalopathy due to urinary tract infection  Alcohol intoxication with chronic alcohol use and history of withdrawal present on admission  Delirium tremens  Urinary tract infection  Mild Hypernatremia resolved  Mild anion gap metabolic acidosis resolved  Hypomagnesemia  Hypophosphatemia  Hypertension   History of CVA        Patient admitted for further evaluation and treatment  Cardiology consulted thank you for your assistance  MRI negative for ischemia or hemorrhage  Continue home carvedilol twice daily and titrate per cardiology  Continue Lovenox therapeutic dose transition to oral anticoagulation in the next day or 2  Echocardiogram within normal limits  Continue thiamine and folic acid and multivitamin  Continue CIWA protocol with as needed Ativan  Continue speech therapy physical therapy occupational therapy  Advance diet per speech  Continue atorvastatin due to history of stroke  Continue Bactrim due to multiple drug allergies and tailor based on sensitivities   Continue to replace magnesium phosphorus as needed  Repeat BMP phos in a.m.        Discussed case with patient's nurse at bedside. Discussed case with cardiology attending.  Further inpatient orders recommendations pending clinical course.     Disposition: Home with home health as mental status stabilizes         DVT prophylaxis:  Medical and mechanical DVT prophylaxis orders are present.    CODE STATUS:   Code Status (Patient has no pulse and is not breathing): CPR (Attempt to Resuscitate)  Medical Interventions (Patient has pulse or is breathing): Full Support

## 2022-06-12 NOTE — PLAN OF CARE
Goal Outcome Evaluation:  Plan of Care Reviewed With: patient        Progress: no change  Outcome Evaluation: Pt presents with limitations that impede their ability to safely and independently transfer and ambulate. The skills of a therapist will be required to safely and effectively implement the following treatment plan to restore maximal level of function.

## 2022-06-13 LAB
ANION GAP SERPL CALCULATED.3IONS-SCNC: 10.1 MMOL/L (ref 5–15)
BUN SERPL-MCNC: 5 MG/DL (ref 8–23)
BUN/CREAT SERPL: 7.4 (ref 7–25)
CALCIUM SPEC-SCNC: 9.2 MG/DL (ref 8.6–10.5)
CHLORIDE SERPL-SCNC: 98 MMOL/L (ref 98–107)
CO2 SERPL-SCNC: 24.9 MMOL/L (ref 22–29)
CREAT SERPL-MCNC: 0.68 MG/DL (ref 0.57–1)
EGFRCR SERPLBLD CKD-EPI 2021: 96.2 ML/MIN/1.73
GLUCOSE SERPL-MCNC: 116 MG/DL (ref 65–99)
PHOSPHATE SERPL-MCNC: 2.4 MG/DL (ref 2.5–4.5)
POTASSIUM SERPL-SCNC: 4.3 MMOL/L (ref 3.5–5.2)
SODIUM SERPL-SCNC: 133 MMOL/L (ref 136–145)

## 2022-06-13 PROCEDURE — 92526 ORAL FUNCTION THERAPY: CPT

## 2022-06-13 PROCEDURE — 80048 BASIC METABOLIC PNL TOTAL CA: CPT | Performed by: FAMILY MEDICINE

## 2022-06-13 PROCEDURE — 25010000002 ENOXAPARIN PER 10 MG: Performed by: FAMILY MEDICINE

## 2022-06-13 PROCEDURE — 84100 ASSAY OF PHOSPHORUS: CPT | Performed by: FAMILY MEDICINE

## 2022-06-13 PROCEDURE — 97116 GAIT TRAINING THERAPY: CPT

## 2022-06-13 PROCEDURE — 99233 SBSQ HOSP IP/OBS HIGH 50: CPT | Performed by: FAMILY MEDICINE

## 2022-06-13 PROCEDURE — 99254 IP/OBS CNSLTJ NEW/EST MOD 60: CPT | Performed by: PSYCHIATRY & NEUROLOGY

## 2022-06-13 RX ADMIN — CARVEDILOL 6.25 MG: 6.25 TABLET, FILM COATED ORAL at 08:43

## 2022-06-13 RX ADMIN — ACETAMINOPHEN 650 MG: 325 TABLET ORAL at 05:39

## 2022-06-13 RX ADMIN — POTASSIUM & SODIUM PHOSPHATES POWDER PACK 280-160-250 MG 1 PACKET: 280-160-250 PACK at 08:43

## 2022-06-13 RX ADMIN — ATORVASTATIN CALCIUM 80 MG: 40 TABLET, FILM COATED ORAL at 20:52

## 2022-06-13 RX ADMIN — Medication 10 ML: at 08:43

## 2022-06-13 RX ADMIN — SULFAMETHOXAZOLE AND TRIMETHOPRIM 1 TABLET: 800; 160 TABLET ORAL at 08:43

## 2022-06-13 RX ADMIN — MULTIPLE VITAMINS W/ MINERALS TAB 1 TABLET: TAB at 08:43

## 2022-06-13 RX ADMIN — SULFAMETHOXAZOLE AND TRIMETHOPRIM 1 TABLET: 800; 160 TABLET ORAL at 20:52

## 2022-06-13 RX ADMIN — MAGNESIUM OXIDE TAB 400 MG (241.3 MG ELEMENTAL MG) 400 MG: 400 (241.3 MG) TAB at 08:43

## 2022-06-13 RX ADMIN — POTASSIUM & SODIUM PHOSPHATES POWDER PACK 280-160-250 MG 2 PACKET: 280-160-250 PACK at 11:56

## 2022-06-13 RX ADMIN — Medication 100 MG: at 08:43

## 2022-06-13 RX ADMIN — APIXABAN 5 MG: 5 TABLET, FILM COATED ORAL at 17:11

## 2022-06-13 RX ADMIN — CARVEDILOL 6.25 MG: 6.25 TABLET, FILM COATED ORAL at 17:11

## 2022-06-13 RX ADMIN — ENOXAPARIN SODIUM 60 MG: 100 INJECTION SUBCUTANEOUS at 05:39

## 2022-06-13 RX ADMIN — POTASSIUM & SODIUM PHOSPHATES POWDER PACK 280-160-250 MG 2 PACKET: 280-160-250 PACK at 17:11

## 2022-06-13 RX ADMIN — FOLIC ACID 1 MG: 1 TABLET ORAL at 09:28

## 2022-06-13 RX ADMIN — Medication 10 ML: at 20:52

## 2022-06-13 RX ADMIN — LABETALOL 20 MG/4 ML (5 MG/ML) INTRAVENOUS SYRINGE 20 MG: at 20:22

## 2022-06-13 NOTE — CONSULTS
Consult received per Stroke Protocol. Patient without a noted DM diagnosis with a current HbA1c of 5% and an estimated average glucose of 97 mg/dL.

## 2022-06-13 NOTE — PLAN OF CARE
Goal Outcome Evaluation:  Plan of Care Reviewed With: patient        Progress: improving  Outcome Evaluation: Patient stable this shift, no complaints at this time.

## 2022-06-13 NOTE — THERAPY TREATMENT NOTE
Acute Care - Physical Therapy Treatment Note   [place]     Patient Name: Kiesha Pan  : 1955  MRN: [id number]  Today's Date: 2022      Visit Dx:     ICD-10-CM ICD-9-CM   1. Urinary tract infection in female  N39.0 599.0   2. Cerebrovascular accident (CVA), unspecified mechanism (HCC)  I63.9 434.91   3. Dysphagia, oropharyngeal  R13.12 787.22   4. Decreased activities of daily living (ADL)  Z78.9 V49.89   5. Difficulty walking  R26.2 719.7     Patient Active Problem List   Diagnosis   • Alcohol dependence ([facility])   • Allergic rhinitis due to allergen   • Anxiety   • Asthma   • Cataracts, bilateral   • COVID-19   • Depression   • Essential hypertension   • History of stroke   • Hypothyroid   • Other specified chronic obstructive airways disease   • Elevated troponin level not due to acute coronary syndrome   • Mixed hyperlipidemia   • AMS (altered mental status)   • Paroxysmal atrial fibrillation with RVR ([facility])     Past Medical History:   Diagnosis Date   • Asthma    • Elevated troponin level not due to acute coronary syndrome 12/3/2021   • Hypertension    • Mixed hyperlipidemia 12/3/2021   • Stroke (HCC) 2021     Past Surgical History:   Procedure Laterality Date   • CHOLECYSTECTOMY       PT Assessment (last 12 hours)     PT Evaluation and Treatment     Row Name 22          Physical Therapy Time and Intention    Subjective Information complains of;weakness;fatigue  -RH     Document Type therapy note (daily note)  -     Mode of Treatment physical therapy;individual therapy  -RH     Patient Effort fair  -RH     Row Name 22          Pain Scale: FACES Pre/Post-Treatment    Pain: FACES Scale, Pretreatment 0-->no hurt  -RH     Posttreatment Pain Rating 0-->no hurt  -RH     Row Name 22          Bed Mobility    Bed Mobility supine-sit;sit-supine;scooting/bridging  -RH     All Activities, [place] (Bed Mobility) contact guard  -RH     Scooting/Bridging [place] (Bed  Mobility) contact guard  -RH     Supine-Sit [place] (Bed Mobility) contact guard  -RH     Sit-Supine [place] (Bed Mobility) contact guard  -RH     Assistive Device (Bed Mobility) bed rails  -RH     Comment, (Bed Mobility) Pt sits at side of bed with SBA.  -     Row Name 06/13/22 0918          Transfers    Transfers sit-stand transfer;stand-sit transfer  -RH     Sit-Stand [place] (Transfers) contact guard  -RH     Stand-Sit [place] (Transfers) contact guard  -RH     Row Name 06/13/22 0918          Sit-Stand Transfer    Assistive Device (Sit-Stand Transfers) walker, front-wheeled  -RH     Row Name 06/13/22 0918          Stand-Sit Transfer    Assistive Device (Stand-Sit Transfers) walker, front-wheeled  -RH     Row Name 06/13/22 0918          Gait/Stairs (Locomotion)    Gait/Stairs Locomotion gait/ambulation independence;gait/ambulation assistive device;distance ambulated;gait pattern;gait deviations  -     [place] Level (Gait) contact guard;minimum assist (75% patient effort)  -     Assistive Device (Gait) walker, front-wheeled  -     Distance in Feet (Gait) 85  -RH     Pattern (Gait) 3-point;step-through  -     Deviations/Abnormal Patterns (Gait) base of support, narrow;gait speed decreased;stride length decreased  -     Gait Assessment/Intervention Pt amb with RW with minimal instability.  -     Row Name 06/13/22 0918          Balance    Balance Assessment standing dynamic balance  -     Dynamic Standing Balance contact guard  -     Position/Device Used, Standing Balance walker, front-wheeled  -     Row Name 06/13/22 0918          Vital Signs    O2 Delivery Pre Treatment room air  -     Row Name 06/13/22 0918          Progress Summary (PT)    Progress Toward Functional Goals (PT) progress toward functional goals is fair  -RH           User Key  (r) = Recorded By, (t) = Taken By, (c) = Cosigned By    Initials Name Provider Type    RH David Bee PTA Physical Therapist  Assistant                Physical Therapy Education                 Title: PT OT SLP Therapies (In Progress)     Topic: Physical Therapy (Not Started)     Point: Mobility training (Not Started)     Learner Progress:  Not documented in this visit.          Point: Home exercise program (Not Started)     Learner Progress:  Not documented in this visit.          Point: Body mechanics (Not Started)     Learner Progress:  Not documented in this visit.          Point: Precautions (Not Started)     Learner Progress:  Not documented in this visit.                          PT Recommendation and Plan     Progress Summary (PT)  Progress Toward Functional Goals (PT): progress toward functional goals is fair   Outcome Measures     Row Name 06/13/22 0900 06/12/22 1100          How much help from another person do you currently need...    Turning from your back to your side while in flat bed without using bedrails? 2  -RH 2  -CS     Moving from lying on back to sitting on the side of a flat bed without bedrails? 2  -RH 2  -CS     Moving to and from a bed to a chair (including a wheelchair)? 2  -RH 2  -CS     Standing up from a chair using your arms (e.g., wheelchair, bedside chair)? 2  -RH 2  -CS     Climbing 3-5 steps with a railing? 2  -RH 2  -CS     To walk in hospital room? 3  -RH 2  -CS     AM-PAC 6 Clicks Score (PT) 13  -RH 12  -CS            Functional Assessment    Outcome Measure Options -- AM-PAC 6 Clicks Basic Mobility (PT)  -CS           User Key  (r) = Recorded By, (t) = Taken By, (c) = Cosigned By    Initials Name Provider Type     David Bee, PTA Physical Therapist Assistant    Suly Pedroza, KACEY Physical Therapist                 Time Calculation:    PT Charges     Row Name 06/13/22 0917             Time Calculation    PT Received On 06/13/22  -RH              Timed Charges    33720 - Gait Training Minutes  5  -RH      53731 - PT Therapeutic Activity Minutes 4  -RH              Total Minutes    Timed Charges  Total Minutes 9  -RH       Total Minutes 9  -RH            User Key  (r) = Recorded By, (t) = Taken By, (c) = Cosigned By    Initials Name Provider Type    David White PTA Physical Therapist Assistant              Therapy Charges for Today     Code Description Service Date Service Provider Modifiers Qty    [id number] HC GAIT TRAINING EA 15 MIN 6/13/2022 David Bee PTA GP 1          PT G-Codes  Outcome Measure Options: AM-PAC 6 Clicks Basic Mobility (PT)  AM-PAC 6 Clicks Score (PT): 13  AM-PAC 6 Clicks Score (OT): 12    David Bee PTA  6/13/2022

## 2022-06-13 NOTE — PAYOR COMM NOTE
"Kiesha Mock (66 y.o. Female)             Date of Birth   1955    Social Security Number       Address   [address] [facility]    Home Phone   [phone]    MRN   [id number]       [facility]    Marital Status                               Admission Date   6/10/22    Admission Type   Emergency    Admitting Provider   Severo Riggs MD    Attending Provider   Severo Riggs MD    Department, Room/Bed   [facility] PROGRESSIVE CARE UNIT, 213/       Discharge Date       Discharge Disposition       Discharge Destination                               Attending Provider: Severo Riggs MD    Allergies: Cephalexin, Diphenhydramine, Levofloxacin, Penicillins    Isolation: None   Infection: None   Code Status: CPR   Advance Care Planning Activity    Ht: 160 cm (63\")   Wt: 64.5 kg (142 lb 3.2 oz)    Admission Cmt: None   Principal Problem: None                Active Insurance as of 6/10/2022     Primary Coverage     Payor Plan Insurance Group Employer/Plan Group    HUMANA HUMANA [id number]     Payor Plan Address Payor Plan Phone Number Payor Plan Fax Number Effective Dates    [address] [address] [phone]  2019 - None Entered    [facility] [id number]       Subscriber Name Subscriber Birth Date Member ID       MELONIE MOCK 1955 [id number]                 Emergency Contacts      (Rel.) Home Phone Work Phone Mobile Phone    NISH (KIRAN)SINDY (Son) [phone] -- [phone]    melonie mock (Spouse) [phone] -- [phone]        Ref# [id number].      CONTACT   SEEMA S UTILIZATION REVIEW    [place] [address] [place] [address]  TAX ID [id number]  NPI  [id number]       [phone]   [phone]       Physician Progress Notes (last 48 hours)      Severo Riggs MD at 22 1920           [facility]   Hospitalist Progress Note  Date: 2022  Patient Name: Kiesha Mock  : 1955  MRN: [id number]  Date " of admission: 6/10/2022      Subjective   Subjective     Chief Complaint: Altered mental status    Summary: Kiesha Pan is a 66 y.o. female past medical history significant for asthma, hypertension, hyperlipidemia, stroke about 1 year ago presents after being found confused with difficulty speaking and facial numbness upon waking on the morning of presentation.  Patient was reportedly in her usual state of health the day prior to presentation.  On the morning of presentation patient's son woke her up patient was having slurred speech difficulty word finding and endorsed facial numbness.  Patient also had confusion unable to answer orientation questions which is much below her baseline.  Patient was brought to the emergency department for further evaluation and treatment.  In the emergency department patient is afebrile heart rate 110s in sinus rhythm initially converting to A. fib with RVR 160s on telemetry review.  Respiratory rate within normal limits.  Satting well on 2 L nasal cannula.  Blood pressure elevated 170s over 90s.  Patient is tearful complaining of generalized headache.  Patient's breath smells of alcohol.  Serum glucose within normal limits.  Troponin within normal limits.  Serum sodium slightly elevated.  Serum magnesium slightly low.  Bicarb slightly low.  Anion gap slightly elevated.  Blood cell count within normal limits.  Hemoglobin within normal limits.  Urinalysis positive for nitrates leukocytes 13-20 white blood cells and 4+ bacteria.  Ethanol level 328.  Chest x-ray negative for acute infiltrate or effusion.  CT head negative for any acute intracranial abnormalities.  CT perfusion scan negative for any cerebral ischemia.  CT angiogram of the head and neck negative for any hemodynamically significant stenosis.  Patient admitted for further evaluation and treatment of A. fib with RVR complicated by encephalopathy due to alcohol intoxication versus metabolic encephalopathy due to urinary tract  infection.    MRI negative for any acute hemorrhage or infarction.  Cardiology consulted.  Patient converted back to sinus rhythm and Cardizem weaned off continue home beta-blocker.  Patient very agitated interacting with visual hallucinations overnight hospital day 1 requiring Ativan per CIWA protocol.  Mentation slowly improving.  Patient remains very weak.  Urine culture growing E. coli.    Interval Followup:  Per nursing patient mentation much improved no longer interacting with visual hallucinations not requiring Ativan per CIWA protocol.  No further issues with urinary retention.  On interview this morning patient lying in bed appears to be resting comfortably wakes easily to voice cooperative with exam interactive oriented to person and place not time.  Patient has been able to take oral medications this morning and is eating more.  Patient worked with physical therapy occupational therapy.  Patient more alert this evening when dinner arrived per nursing.  Afebrile overnight.'s sinus rhythm 80s to 110s on telemetry review.  Blood pressure within normal limits.  Satting well on room air.  Blood sugars within normal limits.  Serum sodium trending down serum potassium within normal limits.  Creatinine stable.  Phosphorus low this morning.  Magnesium low. Urine culture growing E. coli.  MRI negative for any acute hemorrhage or infarction.   No other issues per nursing.    Review of Systems   Constitutional: Positive for fatigue. Negative for fever.   HENT: Positive for trouble swallowing. Negative for sore throat.    Eyes: Negative for pain and discharge.   Respiratory: Negative for cough and shortness of breath.    Cardiovascular: Negative for chest pain and palpitations.   Gastrointestinal: Negative for abdominal pain, nausea and vomiting.   Endocrine: Negative for cold intolerance and heat intolerance.   Genitourinary: Negative for difficulty urinating, dysuria and frequency.   Musculoskeletal: Negative for  back pain and neck stiffness.   Skin: Negative for color change and rash.   Neurological: Negative for syncope and headaches.   Hematological: Negative for adenopathy.   Psychiatric/Behavioral: Positive for confusion. Negative for agitation and hallucinations.       Objective   Objective     Vitals:   Temp:  [98.2 °F (36.8 °C)-99.3 °F (37.4 °C)] 99 °F (37.2 °C)  Heart Rate:  [] 91  Resp:  [15-19] 17  BP: (108-136)/(67-87) 108/67  Flow (L/min):  [2] 2  Physical Exam   Gen. well-developed appearing stated age in no acute distress  HEENT: Normocephalic atraumatic moist membranes pupils equal round reactive light, no scleral icterus no conjunctival injection  Cardiovascular: Regular rate and rhythm S1-S2 no murmurs rubs or gallops no lower extremity edema appreciated  Pulmonary: Clear to auscultation bilaterally no wheezes rales or rhonchi symmetric chest expansion, unlabored, no conversational dyspnea appreciated  Gastrointestinal: Soft nontender nondistended positive bowel sounds all 4 quadrants no rebound or guarding  Musculoskeletal: No clubbing cyanosis, warm and well-perfused, calves soft symmetric nontender bilaterally  Skin: Clean dry without rashs  Neuro: Symmetric weakness bilateral upper and lower extremities, no facial asymmetry appreciated no sensory deficits appreciated  psych: Patient somnolent does not appear to be responding to internal stimuli  : No Watson catheter no bladder distention no suprapubic tenderness    Result Review    Result Review:  I have personally reviewed the results from the time of this admission to 6/12/2022 19:20 EDT and agree with these findings:  [x]  Laboratory  [x]  Microbiology  [x]  Radiology  [x]  EKG/Telemetry echocardiogram within normal limits  []  Cardiology/Vascular   []  Pathology  []  Old records  []  Other:    Assessment & Plan   Assessment / Plan     Assessment/Plan:  New A. fib with RVR  Right facial numbness with right facial droop resolved  Encephalopathy  due to alcohol intoxication versus metabolic encephalopathy due to urinary tract infection  Alcohol intoxication with chronic alcohol use and history of withdrawal present on admission  Delirium tremens  Urinary tract infection  Mild Hypernatremia resolved  Mild anion gap metabolic acidosis resolved  Hypomagnesemia  Hypophosphatemia  Hypertension   History of CVA        Patient admitted for further evaluation and treatment  Cardiology consulted thank you for your assistance  MRI negative for ischemia or hemorrhage  Continue home carvedilol twice daily and titrate per cardiology  Continue Lovenox therapeutic dose transition to oral anticoagulation in the next day or 2  Echocardiogram within normal limits  Continue thiamine and folic acid and multivitamin  Continue CIWA protocol with as needed Ativan  Continue speech therapy physical therapy occupational therapy  Advance diet per speech  Continue atorvastatin due to history of stroke  Continue Bactrim due to multiple drug allergies and tailor based on sensitivities   Continue to replace magnesium phosphorus as needed  Repeat BMP phos in a.m.        Discussed case with patient's nurse at bedside. Discussed case with cardiology attending.  Further inpatient orders recommendations pending clinical course.     Disposition: Home with home health as mental status stabilizes         DVT prophylaxis:  Medical and mechanical DVT prophylaxis orders are present.    CODE STATUS:   Code Status (Patient has no pulse and is not breathing): CPR (Attempt to Resuscitate)  Medical Interventions (Patient has pulse or is breathing): Full Support                       Electronically signed by Severo Riggs MD at 22 1930     Severo Riggs MD at 22 1724           [facility]  [facility]  Patient Name: Kiesha Pan  : 1955  MRN: [id number]  Date of admission: 6/10/2022      Subjective   Subjective     Chief Complaint: Altered mental  status    Summary: Kiesha Pan is a 66 y.o. female past medical history significant for asthma, hypertension, hyperlipidemia, stroke about 1 year ago presents after being found confused with difficulty speaking and facial numbness upon waking on the morning of presentation.  Patient was reportedly in her usual state of health the day prior to presentation.  On the morning of presentation patient's son woke her up patient was having slurred speech difficulty word finding and endorsed facial numbness.  Patient also had confusion unable to answer orientation questions which is much below her baseline.  Patient was brought to the emergency department for further evaluation and treatment.  In the emergency department patient is afebrile heart rate 110s in sinus rhythm initially converting to A. fib with RVR 160s on telemetry review.  Respiratory rate within normal limits.  Satting well on 2 L nasal cannula.  Blood pressure elevated 170s over 90s.  Patient is tearful complaining of generalized headache.  Patient's breath smells of alcohol.  Serum glucose within normal limits.  Troponin within normal limits.  Serum sodium slightly elevated.  Serum magnesium slightly low.  Bicarb slightly low.  Anion gap slightly elevated.  Blood cell count within normal limits.  Hemoglobin within normal limits.  Urinalysis positive for nitrates leukocytes 13-20 white blood cells and 4+ bacteria.  Ethanol level 328.  Chest x-ray negative for acute infiltrate or effusion.  CT head negative for any acute intracranial abnormalities.  CT perfusion scan negative for any cerebral ischemia.  CT angiogram of the head and neck negative for any hemodynamically significant stenosis.  Patient admitted for further evaluation and treatment of A. fib with RVR complicated by encephalopathy due to alcohol intoxication versus metabolic encephalopathy due to urinary tract infection.    MRI negative for any acute hemorrhage or infarction.  Cardiology consulted.   Patient converted back to sinus rhythm and Cardizem weaned off continue home beta-blocker.    Interval Followup:  Per nursing patient developed visual hallucinations overnight and was agitated requiring multiple doses of Ativan.  Patient also noted to have urinary retention and clean intermittent catheterization initiated.  On interview this morning patient lying in bed appears to be resting comfortably very sleepy not responding to voice did wake up to touch refused to open eyes continues to have some slurred speech.  Patient has been able to take oral medications this morning but quickly falls back to sleep per nursing.  Patient more alert this evening when dinner arrived per nursing.  Afebrile overnight.'s sinus rhythm 80s to 110s on telemetry review.  Blood pressure within normal limits.  Satting well on 2 L nasal cannula.  Blood sugars improved.  A1c within normal limits.  Serum sodium within normal limits.  Serum potassium within normal limits.  Creatinine stable.  Anion gap closed.  Phosphorus low this morning.  Magnesium low.  Ammonia within normal limits.  Urine culture growing greater than 100,000 colony-forming units gram-negative bacilli.  MRI negative for any acute hemorrhage or infarction.  Did demonstrate chronic small vessel disease.  No other issues per nursing.    Review of Systems   Constitutional: Positive for fatigue. Negative for fever.   HENT: Positive for trouble swallowing. Negative for sore throat.    Eyes: Negative for pain and discharge.   Respiratory: Negative for cough and shortness of breath.    Cardiovascular: Negative for chest pain and palpitations.   Gastrointestinal: Negative for abdominal pain, nausea and vomiting.   Endocrine: Negative for cold intolerance and heat intolerance.   Genitourinary: Positive for difficulty urinating, dysuria and frequency.        Urinary retention   Musculoskeletal: Negative for back pain and neck stiffness.   Skin: Negative for color change and  rash.   Neurological: Positive for headaches. Negative for syncope.   Hematological: Negative for adenopathy.   Psychiatric/Behavioral: Positive for agitation, confusion and hallucinations.       Objective   Objective     Vitals:   Temp:  [97.7 °F (36.5 °C)-99.7 °F (37.6 °C)] 97.7 °F (36.5 °C)  Heart Rate:  [] 83  Resp:  [19-21] 19  BP: (117-154)/(68-89) 149/81  Flow (L/min):  [2] 2  Physical Exam   Gen. well-developed appearing stated age in no acute distress, somnolent  HEENT: Normocephalic atraumatic moist membranes pupils equal round reactive light, no scleral icterus no conjunctival injection  Cardiovascular: Regular rate and rhythm S1-S2 no murmurs rubs or gallops no lower extremity edema appreciated  Pulmonary: Clear to auscultation bilaterally no wheezes rales or rhonchi symmetric chest expansion, unlabored, no conversational dyspnea appreciated  Gastrointestinal: Soft nontender nondistended positive bowel sounds all 4 quadrants no rebound or guarding  Musculoskeletal: No clubbing cyanosis, warm and well-perfused, calves soft symmetric nontender bilaterally  Skin: Clean dry without rashs  Neuro: Slight right facial droop speech is slurred patien paucity of speech patient not able to cooperate with rest of exam  psych: Patient somnolent does not appear to be responding to internal stimuli  : No Watson catheter no bladder distention positive suprapubic tenderness    Result Review    Result Review:  I have personally reviewed the results from the time of this admission to 6/11/2022 17:24 EDT and agree with these findings:  [x]  Laboratory  [x]  Microbiology  [x]  Radiology  [x]  EKG/Telemetry echocardiogram within normal limits  []  Cardiology/Vascular   []  Pathology  []  Old records  []  Other:    Assessment & Plan   Assessment / Plan     Assessment/Plan:  New A. fib with RVR  Right facial numbness with right facial droop  Encephalopathy due to alcohol intoxication versus metabolic encephalopathy due  to urinary tract infection  Alcohol intoxication with chronic alcohol use and history of withdrawal present on admission  Delirium tremens  Urinary tract infection  Mild Hypernatremia resolved  Mild anion gap metabolic acidosis resolved  Hypomagnesemia  Hypophosphatemia  Hypertension   History of CVA        Patient admitted for further evaluation and treatment  Cardiology consulted thank you for your assistance  MRI negative for ischemia or hemorrhage  Continue home carvedilol twice daily and titrate per cardiology  Continue Lovenox therapeutic dose transition to oral anticoagulation when more alert  Echocardiogram within normal limits  Continue thiamine and folic acid and multivitamin  Continue CIWA protocol with as needed Ativan  Consult speech therapy physical therapy occupational therapy  Advance diet per speech  Start atorvastatin due to history of stroke  Continue Bactrim due to multiple drug allergies and tailor based on sensitivities  Repeat BMP mag phos in a.m.        Discussed case with patient's nurse at bedside. Discussed case with cardiology attending.  Further inpatient orders recommendations pending clinical course.          DVT prophylaxis:  Medical and mechanical DVT prophylaxis orders are present.    CODE STATUS:   Code Status (Patient has no pulse and is not breathing): CPR (Attempt to Resuscitate)  Medical Interventions (Patient has pulse or is breathing): Full Support                       Electronically signed by Severo Riggs MD at 06/11/22 2376       Consult Notes (last 48 hours)  Notes from 06/11/22 1236 through 06/13/22 1236   No notes of this type exist for this encounter.

## 2022-06-13 NOTE — PROGRESS NOTES
[facility]   Hospitalist Progress Note  Date: 2022  Patient Name: Kiesha Pan  : 1955  MRN: [id number]  Date of admission: 6/10/2022      Subjective   Subjective     Chief Complaint: Altered mental status    Summary: Kiesha Pan is a 66 y.o. female past medical history significant for asthma, hypertension, hyperlipidemia, stroke about 1 year ago presents after being found confused with difficulty speaking and facial numbness upon waking on the morning of presentation.  Patient was reportedly in her usual state of health the day prior to presentation.  On the morning of presentation patient's son woke her up patient was having slurred speech difficulty word finding and endorsed facial numbness.  Patient also had confusion unable to answer orientation questions which is much below her baseline.  Patient was brought to the emergency department for further evaluation and treatment.  In the emergency department patient is afebrile heart rate 110s in sinus rhythm initially converting to A. fib with RVR 160s on telemetry review.  Respiratory rate within normal limits.  Satting well on 2 L nasal cannula.  Blood pressure elevated 170s over 90s.  Patient is tearful complaining of generalized headache.  Patient's breath smells of alcohol.  Serum glucose within normal limits.  Troponin within normal limits.  Serum sodium slightly elevated.  Serum magnesium slightly low.  Bicarb slightly low.  Anion gap slightly elevated.  Blood cell count within normal limits.  Hemoglobin within normal limits.  Urinalysis positive for nitrates leukocytes 13-20 white blood cells and 4+ bacteria.  Ethanol level 328.  Chest x-ray negative for acute infiltrate or effusion.  CT head negative for any acute intracranial abnormalities.  CT perfusion scan negative for any cerebral ischemia.  CT angiogram of the head and neck negative for any hemodynamically significant stenosis.  Patient admitted for further evaluation and treatment of  A. fib with RVR complicated by encephalopathy due to alcohol intoxication versus metabolic encephalopathy due to urinary tract infection.    MRI negative for any acute hemorrhage or infarction.  Cardiology consulted.  Patient converted back to sinus rhythm and Cardizem weaned off continue home beta-blocker.  Recommended discharging on oral DOAC.  Patient very agitated interacting with visual hallucinations overnight hospital day 1 requiring Ativan per CIWA protocol.  Mentation slowly improving.  Patient remains very weak.  Urine culture growing E. Coli sensitive to Bactrim.  Continues to have right facial numbness.  Neurology consulted.  Plan to return home with home health once stable hopefully in the next 24 to 48 hours    Interval Followup: Patient sitting up in bed appears to be uncomfortable complaining of neck pain and right facial numbness.  Not requiring Ativan per CIWA protocol in the past 24 hours.  Afebrile overnight.'s sinus rhythm 80s to 110s on telemetry review.  Blood pressure trending higher.  Satting well on room air.  Blood sugars within normal limits.  Serum sodium remains slightly low.  Creatinine stable.  Phosphorus low this morning.   No other issues per nursing.    Review of Systems   Constitutional: Positive for fatigue. Negative for fever.   HENT: Positive for trouble swallowing. Negative for sore throat.    Eyes: Negative for pain and discharge.   Respiratory: Negative for cough and shortness of breath.    Cardiovascular: Negative for chest pain and palpitations.   Gastrointestinal: Negative for abdominal pain, nausea and vomiting.   Endocrine: Negative for cold intolerance and heat intolerance.   Genitourinary: Negative for difficulty urinating, dysuria and frequency.   Musculoskeletal: Negative for back pain and neck stiffness.   Skin: Negative for color change and rash.   Neurological: Negative for syncope and headaches.   Hematological: Negative for adenopathy.   Psychiatric/Behavioral:  Positive for confusion. Negative for agitation and hallucinations.       Objective   Objective     Vitals:   Temp:  [97.3 °F (36.3 °C)-99 °F (37.2 °C)] 99 °F (37.2 °C)  Heart Rate:  [] 93  Resp:  [14-17] 17  BP: (137-176)/() 159/99  Physical Exam   Gen. well-developed appearing stated age in no acute distress  HEENT: Normocephalic atraumatic moist membranes pupils equal round reactive light, no scleral icterus no conjunctival injection  Cardiovascular: Regular rate and rhythm S1-S2 no murmurs rubs or gallops no lower extremity edema appreciated  Pulmonary: Clear to auscultation bilaterally no wheezes rales or rhonchi symmetric chest expansion, unlabored, no conversational dyspnea appreciated  Gastrointestinal: Soft nontender nondistended positive bowel sounds all 4 quadrants no rebound or guarding  Musculoskeletal: No clubbing cyanosis, warm and well-perfused, calves soft symmetric nontender bilaterally  Skin: Clean dry without rashs  Neuro: Symmetric weakness bilateral upper and lower extremities, no facial asymmetry appreciated no sensory deficits appreciated on exam  psych: Patient awake alert does not appear to be responding to internal stimuli  : No Watson catheter no bladder distention no suprapubic tenderness    Result Review    Result Review:  I have personally reviewed the results from the time of this admission to 6/13/2022 17:31 EDT and agree with these findings:  [x]  Laboratory  [x]  Microbiology  [x]  Radiology  [x]  EKG/Telemetry   [x]  Cardiology/Vascular echocardiogram within normal limits  []  Pathology  []  Old records  []  Other:    Assessment & Plan   Assessment / Plan     Assessment/Plan:  New A. fib with RVR  Right facial numbness with right facial droop  Encephalopathy due to alcohol intoxication versus metabolic encephalopathy due to urinary tract infection  Alcohol intoxication with chronic alcohol use and history of withdrawal present on admission  Delirium tremens  Urinary  tract infection  Mild Hypernatremia resolved  Mild anion gap metabolic acidosis resolved  Hypomagnesemia  Hypophosphatemia  Hypertension   History of CVA        Patient admitted for further evaluation and treatment  Cardiology and neurology consulted thank you for your assistance  MRI negative for ischemia or hemorrhage  Continue home carvedilol twice daily and titrate as needed per cardiology  Start Eliquis  Echocardiogram within normal limits  Continue thiamine and folic acid and multivitamin  Continue CIWA protocol with as needed Ativan  Continue speech therapy physical therapy occupational therapy  Advance diet per speech  Continue atorvastatin due to history of stroke  Continue Bactrim due to multiple drug allergies and tailor based on sensitivities   Continue to replace electrolytes as needed  Repeat CBC BMP mag phos in a.m.        Discussed case with patient's nurse at bedside. Discussed case with cardiology and neurology attending.  Further inpatient orders recommendations pending clinical course.     Disposition: Home with home health as mental status stabilizes         DVT prophylaxis:  Medical and mechanical DVT prophylaxis orders are present.    CODE STATUS:   Code Status (Patient has no pulse and is not breathing): CPR (Attempt to Resuscitate)  Medical Interventions (Patient has pulse or is breathing): Full Support

## 2022-06-13 NOTE — THERAPY TREATMENT NOTE
Acute Care - Speech Language Pathology   Swallow Treatment Note SARIKA Kelley     Patient Name: Kiesha Pan  : 1955  MRN: [id number]  Today's Date: 2022               Admit Date: 6/10/2022    Visit Dx:     ICD-10-CM ICD-9-CM   1. Urinary tract infection in female  N39.0 599.0   2. Cerebrovascular accident (CVA), unspecified mechanism (HCC)  I63.9 434.91   3. Dysphagia, oropharyngeal  R13.12 787.22   4. Decreased activities of daily living (ADL)  Z78.9 V49.89   5. Difficulty walking  R26.2 719.7     Patient Active Problem List   Diagnosis   • Alcohol dependence ([facility])   • Allergic rhinitis due to allergen   • Anxiety   • Asthma   • Cataracts, bilateral   • COVID-19   • Depression   • Essential hypertension   • History of stroke   • Hypothyroid   • Other specified chronic obstructive airways disease   • Elevated troponin level not due to acute coronary syndrome   • Mixed hyperlipidemia   • AMS (altered mental status)   • Paroxysmal atrial fibrillation with RVR ([facility])     Past Medical History:   Diagnosis Date   • Asthma    • Elevated troponin level not due to acute coronary syndrome 12/3/2021   • Hypertension    • Mixed hyperlipidemia 12/3/2021   • Stroke (HCC) 2021     Past Surgical History:   Procedure Laterality Date   • CHOLECYSTECTOMY     SPEECH PATHOLOGY DYSPHAGIA TREATMENT    Subjective/Behavioral Observations: Patient seen for dysphagia tx.       Current Diet:Mechanical soft and Nectar thick liquids    Current Strategies:Bolus size modification, reduce distractions      Treatment received:Patient seen during morning meal.  Tolerates soft solids well without s/s of aspiration.  Patient still impulsive and speaking with solids still in oral cavity before swallowing.  Education provided regarding safe swallow strategies and risk of aspiration.  Tolerates therapeutic trials of thin liquids without clinical sign or symptom of aspiration.  Swallow completed with soft solids with intermittent report of  globus sensation.  Liquid wash was effective in clearing.  Vocal quality may clear to auscultation.    Results of treatment: As stated    Progress toward goals: Progress noted    Barriers to Achieving goals: Medical status    Plan of care:/changes in plan: Mechanical soft diet-nectar thick liquids, per patient request.  Patient may have single sips of ice water not thickened  90 degrees upright for all intake  Reduced distractions during meals.       EDUCATION  The patient has been educated in the following areas:   Dysphagia (Swallowing Impairment).     Marcie Gutierrez, SLP  6/13/2022

## 2022-06-14 ENCOUNTER — APPOINTMENT (OUTPATIENT)
Dept: MRI IMAGING | Facility: HOSPITAL | Age: 67
End: 2022-06-14

## 2022-06-14 LAB
ANION GAP SERPL CALCULATED.3IONS-SCNC: 14.2 MMOL/L (ref 5–15)
BUN SERPL-MCNC: 4 MG/DL (ref 8–23)
BUN/CREAT SERPL: 5.8 (ref 7–25)
CALCIUM SPEC-SCNC: 10 MG/DL (ref 8.6–10.5)
CHLORIDE SERPL-SCNC: 97 MMOL/L (ref 98–107)
CO2 SERPL-SCNC: 25.8 MMOL/L (ref 22–29)
CREAT SERPL-MCNC: 0.69 MG/DL (ref 0.57–1)
DEPRECATED RDW RBC AUTO: 50.3 FL (ref 37–54)
EGFRCR SERPLBLD CKD-EPI 2021: 95.9 ML/MIN/1.73
ERYTHROCYTE [DISTWIDTH] IN BLOOD BY AUTOMATED COUNT: 13.4 % (ref 12.3–15.4)
GLUCOSE SERPL-MCNC: 108 MG/DL (ref 65–99)
HCT VFR BLD AUTO: 41.7 % (ref 34–46.6)
HGB BLD-MCNC: 14.2 G/DL (ref 12–15.9)
MAGNESIUM SERPL-MCNC: 1.8 MG/DL (ref 1.6–2.4)
MCH RBC QN AUTO: 34.8 PG (ref 26.6–33)
MCHC RBC AUTO-ENTMCNC: 34.1 G/DL (ref 31.5–35.7)
MCV RBC AUTO: 102.2 FL (ref 79–97)
PHOSPHATE SERPL-MCNC: 4.1 MG/DL (ref 2.5–4.5)
PLATELET # BLD AUTO: 351 10*3/MM3 (ref 140–450)
PMV BLD AUTO: 10.5 FL (ref 6–12)
POTASSIUM SERPL-SCNC: 4.1 MMOL/L (ref 3.5–5.2)
RBC # BLD AUTO: 4.08 10*6/MM3 (ref 3.77–5.28)
SODIUM SERPL-SCNC: 137 MMOL/L (ref 136–145)
WBC NRBC COR # BLD: 7.4 10*3/MM3 (ref 3.4–10.8)

## 2022-06-14 PROCEDURE — 84100 ASSAY OF PHOSPHORUS: CPT | Performed by: FAMILY MEDICINE

## 2022-06-14 PROCEDURE — 85027 COMPLETE CBC AUTOMATED: CPT | Performed by: INTERNAL MEDICINE

## 2022-06-14 PROCEDURE — 83735 ASSAY OF MAGNESIUM: CPT | Performed by: FAMILY MEDICINE

## 2022-06-14 PROCEDURE — 99233 SBSQ HOSP IP/OBS HIGH 50: CPT | Performed by: INTERNAL MEDICINE

## 2022-06-14 PROCEDURE — 72141 MRI NECK SPINE W/O DYE: CPT

## 2022-06-14 PROCEDURE — 80048 BASIC METABOLIC PNL TOTAL CA: CPT | Performed by: FAMILY MEDICINE

## 2022-06-14 PROCEDURE — 97116 GAIT TRAINING THERAPY: CPT

## 2022-06-14 PROCEDURE — 92526 ORAL FUNCTION THERAPY: CPT

## 2022-06-14 RX ADMIN — MAGNESIUM OXIDE TAB 400 MG (241.3 MG ELEMENTAL MG) 400 MG: 400 (241.3 MG) TAB at 08:22

## 2022-06-14 RX ADMIN — APIXABAN 5 MG: 5 TABLET, FILM COATED ORAL at 17:16

## 2022-06-14 RX ADMIN — FOLIC ACID 1 MG: 1 TABLET ORAL at 08:22

## 2022-06-14 RX ADMIN — MULTIPLE VITAMINS W/ MINERALS TAB 1 TABLET: TAB at 08:22

## 2022-06-14 RX ADMIN — SULFAMETHOXAZOLE AND TRIMETHOPRIM 1 TABLET: 800; 160 TABLET ORAL at 08:22

## 2022-06-14 RX ADMIN — Medication 10 ML: at 19:59

## 2022-06-14 RX ADMIN — POTASSIUM & SODIUM PHOSPHATES POWDER PACK 280-160-250 MG 2 PACKET: 280-160-250 PACK at 17:16

## 2022-06-14 RX ADMIN — SULFAMETHOXAZOLE AND TRIMETHOPRIM 1 TABLET: 800; 160 TABLET ORAL at 19:59

## 2022-06-14 RX ADMIN — POTASSIUM & SODIUM PHOSPHATES POWDER PACK 280-160-250 MG 2 PACKET: 280-160-250 PACK at 11:29

## 2022-06-14 RX ADMIN — Medication 100 MG: at 08:22

## 2022-06-14 RX ADMIN — Medication 10 ML: at 08:23

## 2022-06-14 RX ADMIN — APIXABAN 5 MG: 5 TABLET, FILM COATED ORAL at 05:45

## 2022-06-14 RX ADMIN — CARVEDILOL 6.25 MG: 6.25 TABLET, FILM COATED ORAL at 17:16

## 2022-06-14 RX ADMIN — POTASSIUM & SODIUM PHOSPHATES POWDER PACK 280-160-250 MG 2 PACKET: 280-160-250 PACK at 08:23

## 2022-06-14 RX ADMIN — CARVEDILOL 6.25 MG: 6.25 TABLET, FILM COATED ORAL at 08:22

## 2022-06-14 RX ADMIN — ATORVASTATIN CALCIUM 80 MG: 40 TABLET, FILM COATED ORAL at 19:59

## 2022-06-14 NOTE — THERAPY TREATMENT NOTE
Acute Care - Physical Therapy Treatment Note   K[facility]     Patient Name: Kiesha Pan  : 1955  MRN: [id number]  Today's Date: 2022      Visit Dx:     ICD-10-CM ICD-9-CM   1. Urinary tract infection in female  N39.0 599.0   2. Cerebrovascular accident (CVA), unspecified mechanism (HCC)  I63.9 434.91   3. Dysphagia, oropharyngeal  R13.12 787.22   4. Decreased activities of daily living (ADL)  Z78.9 V49.89   5. Difficulty walking  R26.2 719.7     Patient Active Problem List   Diagnosis   • Alcohol dependence (HCC)   • Allergic rhinitis due to allergen   • Anxiety   • Asthma   • Cataracts, bilateral   • COVID-19   • Depression   • Essential hypertension   • History of stroke   • Hypothyroid   • Other specified chronic obstructive airways disease   • Elevated troponin level not due to acute coronary syndrome   • Mixed hyperlipidemia   • AMS (altered mental status)   • Paroxysmal atrial fibrillation with RVR ([facility])     Past Medical History:   Diagnosis Date   • Asthma    • Elevated troponin level not due to acute coronary syndrome 12/3/2021   • Hypertension    • Mixed hyperlipidemia 12/3/2021   • Stroke (HCC) 2021     Past Surgical History:   Procedure Laterality Date   • CHOLECYSTECTOMY       PT Assessment (last 12 hours)     PT Evaluation and Treatment     Row Name 22 1534          Physical Therapy Time and Intention    Subjective Information no complaints  -RH     Document Type therapy note (daily note)  -RH     Mode of Treatment physical therapy;individual therapy  -RH     Patient Effort fair  -RH     Row Name 22 1534          Pain Scale: FACES Pre/Post-Treatment    Pain: FACES Scale, Pretreatment 0-->no hurt  -RH     Posttreatment Pain Rating 0-->no hurt  -RH     Row Name 22 1534          Bed Mobility    Scooting/Bridging [place] (Bed Mobility) contact guard  -RH     Supine-Sit [place] (Bed Mobility) contact guard  -RH     Sit-Supine [place] (Bed Mobility) contact guard   -RH     Assistive Device (Bed Mobility) bed rails  -RH     Comment, (Bed Mobility) Pt maintains sitting with SBA.  -RH     Row Name 06/14/22 1534          Transfers    Transfers sit-stand transfer;stand-sit transfer  -RH     Sit-Stand [place] (Transfers) contact guard  -RH     Stand-Sit [place] (Transfers) contact guard  -RH     Row Name 06/14/22 1534          Sit-Stand Transfer    Assistive Device (Sit-Stand Transfers) cane, straight  -RH     Row Name 06/14/22 1534          Stand-Sit Transfer    Assistive Device (Stand-Sit Transfers) cane, straight  -RH     Row Name 06/14/22 1534          Gait/Stairs (Locomotion)    Gait/Stairs Locomotion gait/ambulation independence;gait/ambulation assistive device;distance ambulated;gait pattern;gait deviations  -     [place] Level (Gait) contact guard  -RH     Assistive Device (Gait) cane, straight  -RH     Distance in Feet (Gait) 150  -RH     Pattern (Gait) 3-point;step-to  -RH     Deviations/Abnormal Patterns (Gait) base of support, narrow;gait speed decreased;stride length decreased  -RH     Gait Assessment/Intervention Pt able to amb with STC and CGA but is unable to maintain consistent gait pattern.  -           User Key  (r) = Recorded By, (t) = Taken By, (c) = Cosigned By    Initials Name Provider Type    RH David Bee PTA Physical Therapist Assistant                Physical Therapy Education                 Title: PT OT SLP Therapies (In Progress)     Topic: Physical Therapy (Not Started)     Point: Mobility training (Not Started)     Learner Progress:  Not documented in this visit.          Point: Home exercise program (Not Started)     Learner Progress:  Not documented in this visit.          Point: Body mechanics (Not Started)     Learner Progress:  Not documented in this visit.          Point: Precautions (Not Started)     Learner Progress:  Not documented in this visit.                          PT Recommendation and Plan     Progress Summary  (PT)  Progress Toward Functional Goals (PT): progress toward functional goals is fair   Outcome Measures     Row Name 06/14/22 1500 06/13/22 0900 06/12/22 1100       How much help from another person do you currently need...    Turning from your back to your side while in flat bed without using bedrails? 3  -RH 2  -RH 2  -CS    Moving from lying on back to sitting on the side of a flat bed without bedrails? 3  -RH 2  -RH 2  -CS    Moving to and from a bed to a chair (including a wheelchair)? 2  -RH 2  -RH 2  -CS    Standing up from a chair using your arms (e.g., wheelchair, bedside chair)? 2  -RH 2  -RH 2  -CS    Climbing 3-5 steps with a railing? 2  -RH 2  -RH 2  -CS    To walk in hospital room? 3  -RH 3  -RH 2  -CS    AM-PAC 6 Clicks Score (PT) 15  -RH 13  -RH 12  -CS       Functional Assessment    Outcome Measure Options -- -- AM-PAC 6 Clicks Basic Mobility (PT)  -CS          User Key  (r) = Recorded By, (t) = Taken By, (c) = Cosigned By    Initials Name Provider Type    RH David Bee PTA Physical Therapist Assistant    Suly Pedroza PT Physical Therapist                 Time Calculation:    PT Charges     Row Name 06/14/22 1534             Time Calculation    PT Received On 06/14/22  -RH              Timed Charges    22095 - Gait Training Minutes  7  -RH      15150 - PT Therapeutic Activity Minutes 5  -RH              Total Minutes    Timed Charges Total Minutes 12  -RH       Total Minutes 12  -RH            User Key  (r) = Recorded By, (t) = Taken By, (c) = Cosigned By    Initials Name Provider Type     David Bee PTA Physical Therapist Assistant              Therapy Charges for Today     Code Description Service Date Service Provider Modifiers Qty    92821212092 HC GAIT TRAINING EA 15 MIN 6/13/2022 David Bee PTA GP 1    01051012654 HC GAIT TRAINING EA 15 MIN 6/14/2022 David Bee PTA GP 1          PT G-Codes  Outcome Measure Options: AM-PAC 6 Clicks Basic Mobility (PT)  AM-PAC 6 Clicks  Score (PT): 15  AM-PAC 6 Clicks Score (OT): 12    David Bee, ANA  6/14/2022

## 2022-06-14 NOTE — NURSING NOTE
Neurologist confirmed the patient ahs not had a stroke and that stroke orders can be discontinued.

## 2022-06-14 NOTE — PLAN OF CARE
"Goal Outcome Evaluation:            Pt was comfortable overnight, did not sleep as she is \"night owl\" per patient.  No s/s of distress and no complaints.  Pt BP elevated overnight.  See MAR.  Pt not requiring ativan for any withdrawal symptoms.  Awaiting MD rounding to dictate further course of care.     "

## 2022-06-14 NOTE — CONSULTS
[facility]   Neurology Consult Note    Patient Name: Kiesha Pan  : 1955  MRN: [id number]  Primary Care Physician:  Ave Benson APRN  Referring Physician: No ref. provider found  Date of admission: 6/10/2022    Subjective   Subjective     Reason for Consult/ Chief Complaint: Evaluation for possible stroke.    HPI:  Kiesha Pan is a 66 y.o. female evaluated for possible stroke.  The history is obtained from the patient and the chart.  The patient was admitted 3 days ago for confusion, slurred speech, difficulty speaking with word finding difficulties, facial numbness.  Patient was found to have an alcohol level of 328.  Work-up was negative stroke including negative CT angiogram of the head and neck, CT scan of the brain, CT perfusion and MRI of the brain.  MRI of the brain shows no acute abnormality and may be minimal chronic small vessel ischemic infarction.  I reviewed MRI of the brain from last year in which the report notes that there is a 5 mm area of acute infarction in the right parietal occipital region in the posterior cerebral distribution.  I reviewed the findings that was noted on series 17, image 38 that was mentioned.  The patient tells me that she has had right facial numbness that comes intermittently for the past 6 months and it can happen every 2 weeks lasting for 10 minutes at a time.  She tells me that she has had right arm numbness and weakness since her son was in the seventh grade.  It comes and goes twice a month lasting again for 20 minutes intermittently.    She is found to have atrial fibrillation this admission and was seen by cardiology and started on apixaban.      Personal History     Past Medical History:   Diagnosis Date   • Asthma    • Elevated troponin level not due to acute coronary syndrome 12/3/2021   • Hypertension    • Mixed hyperlipidemia 12/3/2021   • Stroke (HCC) 2021       Past Surgical History:   Procedure Laterality Date   • CHOLECYSTECTOMY          Family History: family history includes Kidney disease in her mother. Otherwise pertinent FHx was reviewed and not pertinent to current issue.    Social History:  reports that she quit smoking about 25 years ago. She has never used smokeless tobacco. She reports current alcohol use of about 8.0 standard drinks of alcohol per week. She reports previous drug use.    Home Medications:  amLODIPine, aspirin, atorvastatin, carvedilol, diclofenac, lisinopril, and ondansetron ODT    Allergies:  Allergies   Allergen Reactions   • Cephalexin Unknown - Low Severity   • Diphenhydramine Unknown - Low Severity   • Levofloxacin Unknown - Low Severity   • Penicillins Unknown - Low Severity       Objective    Objective     Vitals:   Temp:  [97.3 °F (36.3 °C)-99 °F (37.2 °C)] 98.8 °F (37.1 °C)  Heart Rate:  [] 82  Resp:  [14-18] 18  BP: (137-185)/() 146/107    Physical Exam: She is alert, fluent, aphasic, follows commands well.  Visual fields full confrontation, EOMs full directions gaze, facial sensation is decreased to pinprick in the right face and decreased in the left face, facial strength is full, soft palate elevation and tongue are normal.  There is no weakness of the upper or lower extremities.  There is no drift.  Of the upper or lower extremities.  There is no weakness on visual muscle testing.  Fine finger movements are intact.  Reflexes are symmetrically decreased in the biceps, triceps, patellar's and ankles.  Sensation is decreased to pinprick on the left arm and could not feel it in both legs.  Cerebellar is intact finger to nose testing.      Result Review    Result Review:  I have personally reviewed the results from the time of this admission to 6/13/2022 21:34 EDT and agree with these findings:  [x]  Laboratory  []  Microbiology  [x]  Radiology  [x]  EKG/Telemetry   []  Cardiology/Vascular   []  Pathology  [x]  Old records  []  Other:      Assessment & Plan   Assessment / Plan   Active Hospital  Problems:  Active Hospital Problems    Diagnosis    • AMS (altered mental status)    • Paroxysmal atrial fibrillation with RVR (HCC)          Plan: I discussed with her that the treatment for stroke prevention is anticoagulation.  She is presently on Eliquis.  I discussed with her that the MRI was done 3 days ago did not show a stroke.  I also reviewed MRI from last year and I do not think there was a stroke as well in that MRI.  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    I will complete the work-up by doing an MRI of the cervical spine history of a fall in March.     Total time spent with the patient and coordinating patient care was 55 minutes.    electronically signed by Jace Zhang MD, 06/13/22, 9:34 PM EDT.

## 2022-06-14 NOTE — CONSULTS
"Nutrition Services    Patient Name: Kiesha Pan  YOB: 1955  MRN: [id number]  Admission date: 6/10/2022      CLINICAL NUTRITION ASSESSMENT      Reason for Assessment  MST score 2+, Physician consult     H&P:    Past Medical History:   Diagnosis Date   • Asthma    • Elevated troponin level not due to acute coronary syndrome 12/3/2021   • Hypertension    • Mixed hyperlipidemia 12/3/2021   • Stroke (HCC) 03/2021        Current Problems:   Active Hospital Problems    Diagnosis    • AMS (altered mental status)    • Paroxysmal atrial fibrillation with RVR (HCC)         Nutrition/Diet History         Narrative       RD f/u for intake and acceptance of supplements. Pt reports good appetite, not always enjoying meals that are provided. Pt does not remember receiving Magic Cup on previous trays but admits having memory issues as of late. Pt sampled Magic Cup at time of visit and endorsed enjoyment. Offered additional snacks or preferences to meet needs as pt is consuming +/-50% of meals, pt requested HS snack, will endorse to dining services. Labs reviewed - lipid panel grossly WNL, A1c of 5. Pt previously on stroke watch, tests reveal pt did not have stroke, can d/c Cardiac diet to improve meal options and provide preferences. Discussed pt preferences w/ SLP, diet to be upgraded to Mechanical soft, Whole foods to allow for requested items. RD to CTM.       Anthropometrics        Current Height, Weight Height: 160 cm (63\")  Weight: 64.5 kg (142 lb 3.2 oz)   Current BMI Body mass index is 25.19 kg/m².       Weight Hx  Wt Readings from Last 30 Encounters:   06/10/22 0903 64.5 kg (142 lb 3.2 oz)   01/20/22 2211 67.1 kg (147 lb 14.9 oz)   12/03/21 0955 65.8 kg (145 lb)   11/09/21 1033 71.1 kg (156 lb 12 oz)   11/09/21 0500 23.2 kg (51 lb 3.8 oz)   11/06/21 2315 67.4 kg (148 lb 9.4 oz)   11/06/21 1824 66 kg (145 lb 8.1 oz)   11/06/21 1822 66 kg (145 lb 8.1 oz)   10/02/21 0925 67 kg (147 lb 11.3 oz)   04/06/21 0000 " 67.8 kg (149 lb 6 oz)   11/08/19 0000 77.1 kg (170 lb)   10/10/19 0000 74.6 kg (164 lb 9 oz)   09/24/19 0000 74.2 kg (163 lb 9 oz)   08/16/19 0000 74.6 kg (164 lb 6 oz)   08/06/19 0000 75 kg (165 lb 6 oz)            Wt Change Observation 4.8% wt loss/1 year     Estimated/Assessed Needs       Energy Requirements 25-30 kcal/kg   EST Needs (kcal/day) 7354-6931       Protein Requirements 1.0-1.2 g/kg   EST Daily Needs (g/day) 65-78       Fluid Requirements     Estimated Needs (mL/day) 4536-2602 mL/d     Labs/Medications         Pertinent Labs Reviewed.   Results from last 7 days   Lab Units 06/14/22  0418 06/13/22  0438 06/12/22  0434 06/11/22  0425 06/10/22  0910   SODIUM mmol/L 137 133* 134*   < > 147*   POTASSIUM mmol/L 4.1 4.3 4.4   < > 4.0   CHLORIDE mmol/L 97* 98 97*   < > 105   CO2 mmol/L 25.8 24.9 27.2   < > 21.7*   BUN mg/dL 4* 5* 7*   < > 10   CREATININE mg/dL 0.69 0.68 0.59   < > 0.63   CALCIUM mg/dL 10.0 9.2 9.2   < > 9.0   BILIRUBIN mg/dL  --   --   --   --  0.2   ALK PHOS U/L  --   --   --   --  55   ALT (SGPT) U/L  --   --   --   --  24   AST (SGOT) U/L  --   --   --   --  47*   GLUCOSE mg/dL 108* 116* 118*   < > 92    < > = values in this interval not displayed.     Results from last 7 days   Lab Units 06/14/22 0418 06/13/22 0438 06/12/22 0434 06/11/22 0425   MAGNESIUM mg/dL 1.8  --  1.7 1.7   PHOSPHORUS mg/dL 4.1   < > 2.2* 2.2*   HEMOGLOBIN g/dL 14.2  --   --   --    HEMATOCRIT % 41.7  --   --   --    TRIGLYCERIDES mg/dL  --   --   --  104    < > = values in this interval not displayed.     Coronavirus (COVID-19)   Date Value Ref Range Status   04/01/2021 DETECTED (A) NA Final     Comment:     The SARS-CoV-2 assay is a real-time, RT-PCR test intended  for the qualitative detection of nucleic acid from the  SARS-CoV-2 in respiratory specimens from individuals,  testing performed at [facility].       Lab Results   Component Value Date    HGBA1C 5.00 06/11/2022         Pertinent Medications  Reviewed.     Current Nutrition Orders & Evaluation of Intake       Oral Nutrition     Current PO Diet Diet Dysphagia; IV - Mechanical Soft No Mixed Consistencies; Nectar / Syrup Thick; Cardiac   Supplement Orders Placed This Encounter      Dietary Nutrition Supplements Magic Cup       Malnutrition Severity Assessment                Nutrition Diagnosis         Nutrition Dx Problem 1 Inadequate energy Intake related to inadequate oral Intake as evidenced by decreased appetite., patient report., per nursing documentation. and report of minimal PO intake.       Nutrition Intervention         Magic Cup BID (+580 kcal, 18 g protein)  D/c Cardiac diet  HS snack     Medical Nutrition Therapy/Nutrition Education          Learner     Readiness Patient  Education not indicated at this time     Method     Response Other  Other     Monitor/Evaluation        Monitor Per protocol, PO intake, Supplement intake, Pertinent labs, Weight       Nutrition Discharge Plan         To be determined       Electronically signed by:  EDUAR MARIN RD  06/14/22 13:44 EDT

## 2022-06-14 NOTE — PROGRESS NOTES
[facility]   Hospitalist Progress Note  Date: 2022  Patient Name: Kiesha Pan  : 1955  MRN: [id number]  Date of admission: 6/10/2022      Subjective   Subjective     Chief Complaint: Follow up for altered mental status    Interval Followup: No acute events overnight.  Patient reports feeling cold this morning otherwise no complaints.  Denies neck pain.  No focal weakness or numbness in extremities/face.  No slurred speech. Tolerating modified diet.  Working with PT.  No RN issues reported.     Review of Systems  Cardiovascular:  No Chest Pain, No Edema, No Palpitations  Respiratory:  No Cough, No Dyspnea  Gastrointestinal:  No Nausea, No Vomiting  All other systems reviewed and negative unless stated above    Objective   Objective     Vitals:   Temp:  [98.3 °F (36.8 °C)-99 °F (37.2 °C)] 98.3 °F (36.8 °C)  Heart Rate:  [82-94] 90  Resp:  [16-18] 18  BP: (124-185)/() 139/94  Physical Exam    Constitutional:  female, lying in bed, pleasant, conversant, NAD   Eyes: Pupils equal and reactive, no conjunctival injection   HENT: NCAT, nares patent, MMM   Neck: Supple, trachea midline   Respiratory: Clear to auscultation bilaterally, nonlabored respirations    Cardiovascular: RRR, no murmurs, no pedal edema   Gastrointestinal: Positive bowel sounds, soft, nontender, nondistended   Musculoskeletal: No gross deformities, no clubbing or cyanosis to extremities   Neurologic: Alert, following commands appropriately, cranial Nerves grossly intact, speech clear, no focal weakness   Skin: Warm and dry, no rashes     Result Review    Result Review:  I have personally reviewed the results from the time of this admission to 2022 10:23 EDT and agree with these findings:  [x]  Laboratory  CBC    CBC 1/20/22 6/10/22 6/14/22   WBC 7.96 7.27 7.40   RBC 3.50 (A) 4.12 4.08   Hemoglobin 12.5 14.4 14.2   Hematocrit 35.9 42.2 41.7   .6 (A) 102.4 (A) 102.2 (A)   MCH 35.7 (A) 35.0 (A) 34.8 (A)   MCHC  34.8 34.1 34.1   RDW 14.2 14.6 13.4   Platelets 356 355 351   (A) Abnormal value            BMP    BMP 6/12/22 6/13/22 6/14/22   BUN 7 (A) 5 (A) 4 (A)   Creatinine 0.59 0.68 0.69   Sodium 134 (A) 133 (A) 137   Potassium 4.4 4.3 4.1   Chloride 97 (A) 98 97 (A)   CO2 27.2 24.9 25.8   Calcium 9.2 9.2 10.0   (A) Abnormal value            [x]  Microbiology urine culture 6/10 positive for E. coli  [x]  Radiology MRI cervical spine report notable for multilevel degenerative changes with varying degrees of neural foraminal narrowing but no evidence of canal stenosis  [x]  EKG/Telemetry telemetry shows NSR  []  Cardiology/Vascular   []  Pathology  [x]  Old records reviewed previous progress note  []  Other:    Assessment & Plan   Assessment / Plan     Assessment:  New onset atrial fibrillation, paroxysmal   Urinary tract infection secondary to E. coli  Essential hypertension  Hyperlipidemia  Alcohol withdrawal, resolved  Hypomagnesemia, resolved  Hypophosphatemia resolved  Atrial fibrillation with rapid ventricular rate, resolved  Toxic/metabolic encephalopathy, resolved  History of cerebrovascular accident    Plan:    Remains in NSR  Continue Coreg 6.25 mg twice daily  Continue Eliquis 5 mg twice daily  Continue telemetry    Blood pressure controlled.  Continue Coreg as above    Neurology following, appreciate recommendations  Continue high intensity statin    CIWA scores have been 3 or less the past several days and clinically does not appear to be going through withdrawals.  Will discontinue CIWA score assessment and as needed Ativan   ->Continue daily thiamine, folic acid, MVI   ->Continue oral magnesium and sodium phosphate replacement    Continue Bactrim 1 tab twice daily.  Stop date 6/17/22    Continue modified diet per SLP recs  Continue PT/OT  BMP in a.m.  Disposition: Inpatient rehab pending bed availability      Discussed plan with RNTABITHA    DVT prophylaxis:  Medical and mechanical DVT prophylaxis orders are  present.    CODE STATUS:   Code Status (Patient has no pulse and is not breathing): CPR (Attempt to Resuscitate)  Medical Interventions (Patient has pulse or is breathing): Full Support      Electronically signed by Nhan Smith DO, 06/14/22, 10:23 AM EDT.

## 2022-06-14 NOTE — PAYOR COMM NOTE
"Kiesha Mock (66 y.o. Female)             Date of Birth   1955    Social Security Number       Address   [address] [address]    Home Phone   [phone]    MRN   [id number]       Jehovah's witness   [facility]    Marital Status                               Admission Date   6/10/22    Admission Type   Emergency    Admitting Provider   Severo Riggs MD    Attending Provider   Nhan Smith DO    Department, Room/Bed   [facility] PROGRESSIVE CARE UNIT, 201/1       Discharge Date       Discharge Disposition       Discharge Destination                               Attending Provider: Nhan Smith DO    Allergies: Cephalexin, Diphenhydramine, Levofloxacin, Penicillins    Isolation: None   Infection: None   Code Status: CPR   Advance Care Planning Activity    Ht: 160 cm (63\")   Wt: 64.5 kg (142 lb 3.2 oz)    Admission Cmt: None   Principal Problem: None                Active Insurance as of 6/10/2022     Primary Coverage     Payor Plan Insurance Group Employer/Plan Group    HUMANA HUMANA [id number]     Payor Plan Address Payor Plan Phone Number Payor Plan Fax Number Effective Dates    [address] [address] [phone]  2/1/2019 - None Entered    [facility] [id number]       Subscriber Name Subscriber Birth Date Member ID       MELONIE MOCK 1955 [id number]                 Emergency Contacts      (Rel.) Home Phone Work Phone Mobile Phone    NISH (KIRAN)SINDY (Son) [phone] -- [phone]    melonie mock (Spouse) [phone] -- [phone]      Ref# [id number]    CONTACT   SEEMA S UTILIZATION REVIEW    [facility]  [address] [address]  TAX ID [id number]  NPI  [id number]       [phone]   [phone]                  Severo Riggs MD    Physician   Hospitalist   Progress Notes       Signed   Date of Service:  06/13/22 1731   Creation Time:  06/13/22 1731              Signed        Expand All Collapse All        Show:Clear " all  [x]Manual[x]Template[x]Copied    Added by:  [x]Severo Riggs MD      []Bryson for details     [facility]   Hospitalist Progress Note  Date: 2022  Patient Name: Kiesha Pan  : 1955  MRN: [id number]  Date of admission: 6/10/2022           Subjective []Expand by Default     Subjective      Chief Complaint: Altered mental status     Summary: Kiesha Pan is a 66 y.o. female past medical history significant for asthma, hypertension, hyperlipidemia, stroke about 1 year ago presents after being found confused with difficulty speaking and facial numbness upon waking on the morning of presentation.  Patient was reportedly in her usual state of health the day prior to presentation.  On the morning of presentation patient's son woke her up patient was having slurred speech difficulty word finding and endorsed facial numbness.  Patient also had confusion unable to answer orientation questions which is much below her baseline.  Patient was brought to the emergency department for further evaluation and treatment.  In the emergency department patient is afebrile heart rate 110s in sinus rhythm initially converting to A. fib with RVR 160s on telemetry review.  Respiratory rate within normal limits.  Satting well on 2 L nasal cannula.  Blood pressure elevated 170s over 90s.  Patient is tearful complaining of generalized headache.  Patient's breath smells of alcohol.  Serum glucose within normal limits.  Troponin within normal limits.  Serum sodium slightly elevated.  Serum magnesium slightly low.  Bicarb slightly low.  Anion gap slightly elevated.  Blood cell count within normal limits.  Hemoglobin within normal limits.  Urinalysis positive for nitrates leukocytes 13-20 white blood cells and 4+ bacteria.  Ethanol level 328.  Chest x-ray negative for acute infiltrate or effusion.  CT head negative for any acute intracranial abnormalities.  CT perfusion scan negative for any cerebral ischemia.  CT angiogram of the  head and neck negative for any hemodynamically significant stenosis.  Patient admitted for further evaluation and treatment of A. fib with RVR complicated by encephalopathy due to alcohol intoxication versus metabolic encephalopathy due to urinary tract infection.    MRI negative for any acute hemorrhage or infarction.  Cardiology consulted.  Patient converted back to sinus rhythm and Cardizem weaned off continue home beta-blocker.  Recommended discharging on oral DOAC.  Patient very agitated interacting with visual hallucinations overnight hospital day 1 requiring Ativan per CIWA protocol.  Mentation slowly improving.  Patient remains very weak.  Urine culture growing E. Coli sensitive to Bactrim.  Continues to have right facial numbness.  Neurology consulted.  Plan to return home with home health once stable hopefully in the next 24 to 48 hours     Interval Followup: Patient sitting up in bed appears to be uncomfortable complaining of neck pain and right facial numbness.  Not requiring Ativan per CIWA protocol in the past 24 hours.  Afebrile overnight.'s sinus rhythm 80s to 110s on telemetry review.  Blood pressure trending higher.  Satting well on room air.  Blood sugars within normal limits.  Serum sodium remains slightly low.  Creatinine stable.  Phosphorus low this morning.   No other issues per nursing.     Review of Systems   Constitutional: Positive for fatigue. Negative for fever.   HENT: Positive for trouble swallowing. Negative for sore throat.    Eyes: Negative for pain and discharge.   Respiratory: Negative for cough and shortness of breath.    Cardiovascular: Negative for chest pain and palpitations.   Gastrointestinal: Negative for abdominal pain, nausea and vomiting.   Endocrine: Negative for cold intolerance and heat intolerance.   Genitourinary: Negative for difficulty urinating, dysuria and frequency.   Musculoskeletal: Negative for back pain and neck stiffness.   Skin: Negative for color change  and rash.   Neurological: Negative for syncope and headaches.   Hematological: Negative for adenopathy.   Psychiatric/Behavioral: Positive for confusion. Negative for agitation and hallucinations.               Objective      Objective      Vitals:   Temp:  [97.3 °F (36.3 °C)-99 °F (37.2 °C)] 99 °F (37.2 °C)  Heart Rate:  [] 93  Resp:  [14-17] 17  BP: (137-176)/() 159/99  Physical Exam             Gen. well-developed appearing stated age in no acute distress  HEENT: Normocephalic atraumatic moist membranes pupils equal round reactive light, no scleral icterus no conjunctival injection  Cardiovascular: Regular rate and rhythm S1-S2 no murmurs rubs or gallops no lower extremity edema appreciated  Pulmonary: Clear to auscultation bilaterally no wheezes rales or rhonchi symmetric chest expansion, unlabored, no conversational dyspnea appreciated  Gastrointestinal: Soft nontender nondistended positive bowel sounds all 4 quadrants no rebound or guarding  Musculoskeletal: No clubbing cyanosis, warm and well-perfused, calves soft symmetric nontender bilaterally  Skin: Clean dry without rashs  Neuro: Symmetric weakness bilateral upper and lower extremities, no facial asymmetry appreciated no sensory deficits appreciated on exam  psych: Patient awake alert does not appear to be responding to internal stimuli  : No Watson catheter no bladder distention no suprapubic tenderness           Result Review       Result Review:  I have personally reviewed the results from the time of this admission to 6/13/2022 17:31 EDT and agree with these findings:  [x]?  Laboratory  [x]?  Microbiology  [x]?  Radiology  [x]?  EKG/Telemetry   [x]?  Cardiology/Vascular echocardiogram within normal limits  []?  Pathology  []?  Old records  []?  Other:           Assessment & Plan     Assessment / Plan      Assessment/Plan:  New A. fib with RVR  Right facial numbness with right facial droop  Encephalopathy due to alcohol intoxication versus  metabolic encephalopathy due to urinary tract infection  Alcohol intoxication with chronic alcohol use and history of withdrawal present on admission  Delirium tremens  Urinary tract infection  Mild Hypernatremia resolved  Mild anion gap metabolic acidosis resolved  Hypomagnesemia  Hypophosphatemia  Hypertension   History of CVA        Patient admitted for further evaluation and treatment  Cardiology and neurology consulted thank you for your assistance  MRI negative for ischemia or hemorrhage  Continue home carvedilol twice daily and titrate as needed per cardiology  Start Eliquis  Echocardiogram within normal limits  Continue thiamine and folic acid and multivitamin  Continue CIWA protocol with as needed Ativan  Continue speech therapy physical therapy occupational therapy  Advance diet per speech  Continue atorvastatin due to history of stroke  Continue Bactrim due to multiple drug allergies and tailor based on sensitivities   Continue to replace electrolytes as needed  Repeat CBC BMP mag phos in a.m.        Discussed case with patient's nurse at bedside. Discussed case with cardiology and neurology attending.  Further inpatient orders recommendations pending clinical course.      Disposition: Home with home health as mental status stabilizes           DVT prophylaxis:  Medical and mechanical DVT prophylaxis orders are present.     CODE STATUS:   Code Status (Patient has no pulse and is not breathing): CPR (Attempt to Resuscitate)  Medical Interventions (Patient has pulse or is breathing): Full Support                                                        Jace Zhang MD    Physician   Neurology   Consults       Addendum   Date of Service:  06/13/22 2134   Creation Time:  06/13/22 2134           Consult Orders   Inpatient Neurology Consult General [[id number]] ordered by Severo Riggs MD at 06/13/22 1202          Expand All Collapse All        Show:Clear all  [x]Manual[x]Template[]Copied    Added  by:  [x]Jace Zhang MD      []Bryson for details       [facility]   Neurology Consult Note     Patient Name: Kiesha Pan  : 1955  MRN: [id number]  Primary Care Physician:  Ave Benson APRN  Referring Physician: No ref. provider found  Date of admission: 6/10/2022        Subjective []Expand by Default     Subjective      Reason for Consult/ Chief Complaint: Evaluation for possible stroke.     HPI:  Kiesha Pan is a 66 y.o. female evaluated for possible stroke.  The history is obtained from the patient and the chart.  The patient was admitted 3 days ago for confusion, slurred speech, difficulty speaking with word finding difficulties, facial numbness.  Patient was found to have an alcohol level of 328.  Work-up was negative stroke including negative CT angiogram of the head and neck, CT scan of the brain, CT perfusion and MRI of the brain.  MRI of the brain shows no acute abnormality and may be minimal chronic small vessel ischemic infarction.  I reviewed MRI of the brain from last year in which the report notes that there is a 5 mm area of acute infarction in the right parietal occipital region in the posterior cerebral distribution.  I reviewed the findings that was noted on series 17, image 38 that was mentioned.  The patient tells me that she has had right facial numbness that comes intermittently for the past 6 months and it can happen every 2 weeks lasting for 10 minutes at a time.  She tells me that she has had right arm numbness and weakness since her son was in the seventh grade.  It comes and goes twice a month lasting again for 20 minutes intermittently.     She is found to have atrial fibrillation this admission and was seen by cardiology and started on apixaban.        Personal History      Medical History        Past Medical History:   Diagnosis Date   • Asthma     • Elevated troponin level not due to acute coronary syndrome 12/3/2021   • Hypertension     • Mixed  hyperlipidemia 12/3/2021   • Stroke (HCC) 03/2021            Surgical History         Past Surgical History:   Procedure Laterality Date   • CHOLECYSTECTOMY                Family History: family history includes Kidney disease in her mother. Otherwise pertinent FHx was reviewed and not pertinent to current issue.     Social History:  reports that she quit smoking about 25 years ago. She has never used smokeless tobacco. She reports current alcohol use of about 8.0 standard drinks of alcohol per week. She reports previous drug use.     Home Medications:  amLODIPine, aspirin, atorvastatin, carvedilol, diclofenac, lisinopril, and ondansetron ODT     Allergies:       Allergies   Allergen Reactions   • Cephalexin Unknown - Low Severity   • Diphenhydramine Unknown - Low Severity   • Levofloxacin Unknown - Low Severity   • Penicillins Unknown - Low Severity               Objective       Objective      Vitals:   Temp:  [97.3 °F (36.3 °C)-99 °F (37.2 °C)] 98.8 °F (37.1 °C)  Heart Rate:  [] 82  Resp:  [14-18] 18  BP: (137-185)/() 146/107     Physical Exam: She is alert, fluent, aphasic, follows commands well.  Visual fields full confrontation, EOMs full directions gaze, facial sensation is decreased to pinprick in the right face and decreased in the left face, facial strength is full, soft palate elevation and tongue are normal.  There is no weakness of the upper or lower extremities.  There is no drift.  Of the upper or lower extremities.  There is no weakness on visual muscle testing.  Fine finger movements are intact.  Reflexes are symmetrically decreased in the biceps, triceps, patellar's and ankles.  Sensation is decreased to pinprick on the left arm and could not feel it in both legs.  Cerebellar is intact finger to nose testing.              Result Review       Result Review:  I have personally reviewed the results from the time of this admission to 6/13/2022 21:34 EDT and agree with these findings:  [x]?   Laboratory  []?  Microbiology  [x]?  Radiology  [x]?  EKG/Telemetry   []?  Cardiology/Vascular   []?  Pathology  [x]?  Old records  []?  Other:              Assessment & Plan     Assessment / Plan   Active Hospital Problems:       Active Hospital Problems     Diagnosis     • AMS (altered mental status)     • Paroxysmal atrial fibrillation with RVR (HCC)              Plan: I discussed with her that the treatment for stroke prevention is anticoagulation.  She is presently on Eliquis.  I discussed with her that the MRI was done 3 days ago did not show a stroke.  I also reviewed MRI from last year and I do not think there was a stroke as well in that MRI.  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     I will complete the work-up by doing an MRI of the cervical spine history of a fall in March.     Total time spent with the patient and coordinating patient care was 55 minutes.     electronically signed by Jace Zhang MD, 22, 9:34 PM EDT.                  Revision History                                  Nhan Smith DO    Physician   Hospitalist   Progress Notes       Addendum   Date of Service:  22   Creation Time:  22              Expand All Collapse All        Show:Clear all  [x]Manual[x]Template[x]Copied    Added by:  [x]Nhan Smith DO      []Bryson for details     [facility]  [facility]  Patient Name: Kiesha Pan  : 1955  MRN: [id number]  Date of admission: 6/10/2022           Subjective []Expand by Default     Subjective      Chief Complaint: Follow up for altered mental status     Interval Followup: No acute events overnight.  Patient reports feeling cold this morning otherwise no complaints.  Denies neck pain.  No focal weakness or numbness in extremities/face.  No slurred speech. Tolerating modified diet.  Working with PT.  No  RN issues reported.     Review of Systems  Cardiovascular:  No Chest Pain, No Edema, No Palpitations  Respiratory:  No Cough, No Dyspnea  Gastrointestinal:  No Nausea, No Vomiting  All other systems reviewed and negative unless stated above           Objective      Objective      Vitals:   Temp:  [98.3 °F (36.8 °C)-99 °F (37.2 °C)] 98.3 °F (36.8 °C)  Heart Rate:  [82-94] 90  Resp:  [16-18] 18  BP: (124-185)/() 139/94  Physical Exam                         Constitutional:  female, lying in bed, pleasant, conversant, NAD              Eyes: Pupils equal and reactive, no conjunctival injection              HENT: NCAT, nares patent, MMM              Neck: Supple, trachea midline              Respiratory: Clear to auscultation bilaterally, nonlabored respirations               Cardiovascular: RRR, no murmurs, no pedal edema              Gastrointestinal: Positive bowel sounds, soft, nontender, nondistended              Musculoskeletal: No gross deformities, no clubbing or cyanosis to extremities              Neurologic: Alert, following commands appropriately, cranial Nerves grossly intact, speech clear, no focal weakness              Skin: Warm and dry, no rashes            Result Review       Result Review:  I have personally reviewed the results from the time of this admission to 6/14/2022 10:23 EDT and agree with these findings:  [x]?  Laboratory  CBC Results         CBC    CBC 1/20/22 6/10/22 6/14/22   WBC 7.96 7.27 7.40   RBC 3.50 (A) 4.12 4.08   Hemoglobin 12.5 14.4 14.2   Hematocrit 35.9 42.2 41.7   .6 (A) 102.4 (A) 102.2 (A)   MCH 35.7 (A) 35.0 (A) 34.8 (A)   MCHC 34.8 34.1 34.1   RDW 14.2 14.6 13.4   Platelets 356 355 351   (A) Abnormal value                  Basic Metabolic Profile Reults:         BMP    BMP 6/12/22 6/13/22 6/14/22   BUN 7 (A) 5 (A) 4 (A)   Creatinine 0.59 0.68 0.69   Sodium 134 (A) 133 (A) 137   Potassium 4.4 4.3 4.1   Chloride 97 (A) 98 97 (A)   CO2 27.2 24.9 25.8    Calcium 9.2 9.2 10.0   (A) Abnormal value                  [x]?  Microbiology urine culture 6/10 positive for E. coli  [x]?  Radiology MRI cervical spine report notable for multilevel degenerative changes with varying degrees of neural foraminal narrowing but no evidence of canal stenosis  [x]?  EKG/Telemetry telemetry shows NSR  []?  Cardiology/Vascular   []?  Pathology  [x]?  Old records reviewed previous progress note  []?  Other:           Assessment & Plan     Assessment / Plan      Assessment:  New onset atrial fibrillation, paroxysmal   Urinary tract infection secondary to E. coli  Essential hypertension  Hyperlipidemia  Alcohol withdrawal, resolved  Hypomagnesemia, resolved  Hypophosphatemia resolved  Atrial fibrillation with rapid ventricular rate, resolved  Toxic/metabolic encephalopathy, resolved  History of cerebrovascular accident     Plan:     Remains in NSR  Continue Coreg 6.25 mg twice daily  Continue Eliquis 5 mg twice daily  Continue telemetry     Blood pressure controlled.  Continue Coreg as above     Neurology following, appreciate recommendations  Continue high intensity statin     CIWA scores have been 3 or less the past several days and clinically does not appear to be going through withdrawals.  Will discontinue CIWA score assessment and as needed Ativan              ->Continue daily thiamine, folic acid, MVI              ->Continue oral magnesium and sodium phosphate replacement     Continue Bactrim 1 tab twice daily.  Stop date 6/17/22     Continue modified diet per SLP recs  Continue PT/OT  BMP in a.m.  Disposition: Inpatient rehab pending bed availability        Discussed plan with RNTABITHA     DVT prophylaxis:  Medical and mechanical DVT prophylaxis orders are present.     CODE STATUS:   Code Status (Patient has no pulse and is not breathing): CPR (Attempt to Resuscitate)  Medical Interventions (Patient has pulse or is breathing): Full Support        Electronically signed by Nhan Smith  DO, 06/14/22, 10:23 AM EDT.                                 Revision History                                                                  Discharge Summary    No notes of this type exist for this encounter.

## 2022-06-14 NOTE — PLAN OF CARE
Goal Outcome Evaluation:  Plan of Care Reviewed With: patient        Progress: improving  Outcome Evaluation: Patient stable this shift, no complaints at this time, will continue to monitor.

## 2022-06-15 LAB
ANION GAP SERPL CALCULATED.3IONS-SCNC: 14.1 MMOL/L (ref 5–15)
BUN SERPL-MCNC: 8 MG/DL (ref 8–23)
BUN/CREAT SERPL: 10.1 (ref 7–25)
CALCIUM SPEC-SCNC: 9.9 MG/DL (ref 8.6–10.5)
CHLORIDE SERPL-SCNC: 97 MMOL/L (ref 98–107)
CO2 SERPL-SCNC: 24.9 MMOL/L (ref 22–29)
CREAT SERPL-MCNC: 0.79 MG/DL (ref 0.57–1)
EGFRCR SERPLBLD CKD-EPI 2021: 82.6 ML/MIN/1.73
GLUCOSE SERPL-MCNC: 124 MG/DL (ref 65–99)
POTASSIUM SERPL-SCNC: 4.8 MMOL/L (ref 3.5–5.2)
SODIUM SERPL-SCNC: 136 MMOL/L (ref 136–145)

## 2022-06-15 PROCEDURE — 92526 ORAL FUNCTION THERAPY: CPT

## 2022-06-15 PROCEDURE — 97116 GAIT TRAINING THERAPY: CPT

## 2022-06-15 PROCEDURE — 99232 SBSQ HOSP IP/OBS MODERATE 35: CPT | Performed by: INTERNAL MEDICINE

## 2022-06-15 PROCEDURE — 80048 BASIC METABOLIC PNL TOTAL CA: CPT | Performed by: INTERNAL MEDICINE

## 2022-06-15 RX ORDER — FLUTICASONE PROPIONATE 50 MCG
2 SPRAY, SUSPENSION (ML) NASAL DAILY
Status: DISCONTINUED | OUTPATIENT
Start: 2022-06-15 | End: 2022-06-17 | Stop reason: HOSPADM

## 2022-06-15 RX ORDER — ASPIRIN 81 MG/1
81 TABLET ORAL DAILY
Status: DISCONTINUED | OUTPATIENT
Start: 2022-06-16 | End: 2022-06-17 | Stop reason: HOSPADM

## 2022-06-15 RX ORDER — AMLODIPINE BESYLATE 5 MG/1
5 TABLET ORAL DAILY
Status: DISCONTINUED | OUTPATIENT
Start: 2022-06-15 | End: 2022-06-16

## 2022-06-15 RX ADMIN — POTASSIUM & SODIUM PHOSPHATES POWDER PACK 280-160-250 MG 2 PACKET: 280-160-250 PACK at 08:13

## 2022-06-15 RX ADMIN — SULFAMETHOXAZOLE AND TRIMETHOPRIM 1 TABLET: 800; 160 TABLET ORAL at 20:07

## 2022-06-15 RX ADMIN — POTASSIUM & SODIUM PHOSPHATES POWDER PACK 280-160-250 MG 2 PACKET: 280-160-250 PACK at 17:50

## 2022-06-15 RX ADMIN — CARVEDILOL 6.25 MG: 6.25 TABLET, FILM COATED ORAL at 08:13

## 2022-06-15 RX ADMIN — ATORVASTATIN CALCIUM 80 MG: 40 TABLET, FILM COATED ORAL at 20:07

## 2022-06-15 RX ADMIN — MULTIPLE VITAMINS W/ MINERALS TAB 1 TABLET: TAB at 08:13

## 2022-06-15 RX ADMIN — MAGNESIUM OXIDE TAB 400 MG (241.3 MG ELEMENTAL MG) 400 MG: 400 (241.3 MG) TAB at 08:13

## 2022-06-15 RX ADMIN — APIXABAN 5 MG: 5 TABLET, FILM COATED ORAL at 20:07

## 2022-06-15 RX ADMIN — Medication 10 ML: at 20:07

## 2022-06-15 RX ADMIN — FOLIC ACID 1 MG: 1 TABLET ORAL at 08:13

## 2022-06-15 RX ADMIN — CARVEDILOL 6.25 MG: 6.25 TABLET, FILM COATED ORAL at 17:50

## 2022-06-15 RX ADMIN — APIXABAN 5 MG: 5 TABLET, FILM COATED ORAL at 08:13

## 2022-06-15 RX ADMIN — SULFAMETHOXAZOLE AND TRIMETHOPRIM 1 TABLET: 800; 160 TABLET ORAL at 08:13

## 2022-06-15 RX ADMIN — FLUTICASONE PROPIONATE 2 SPRAY: 50 SPRAY, METERED NASAL at 17:51

## 2022-06-15 RX ADMIN — Medication 10 ML: at 08:13

## 2022-06-15 RX ADMIN — POTASSIUM & SODIUM PHOSPHATES POWDER PACK 280-160-250 MG 2 PACKET: 280-160-250 PACK at 12:32

## 2022-06-15 RX ADMIN — Medication 100 MG: at 08:13

## 2022-06-15 RX ADMIN — AMLODIPINE BESYLATE 5 MG: 5 TABLET ORAL at 17:50

## 2022-06-15 NOTE — PROGRESS NOTES
[facility]   Hospitalist Progress Note  Date: 6/15/2022  Patient Name: Kiesha Pan  : 1955  MRN: [id number]  Date of admission: 6/10/2022      Subjective   Subjective     Chief Complaint: Follow up for altered mental status    Interval Followup: No events overnight.  No fevers.  Blood pressure trend improved  Remains on room air.  Doing well this morning.  Remains alert and conversant.  Answering questions and following commands appropriately.  Complaining of nasal congestion postnasal drip which she says is a chronic issue.  Has history of allergic rhinitis.  No cough or trouble breathing.  Tolerating modified diet.  Working with PT.    Review of Systems  HEENT: + Nasal congestion, no sinus pain, no sore throat, no eye pain or blurriness  Cardiovascular:  No Chest Pain, No Edema, No Palpitations  Respiratory:  No Cough, No Dyspnea  Gastrointestinal:  No Nausea, No Vomiting  Neurological:  No focal weakness, no paresthesia    Objective   Objective     Vitals:   Temp:  [98.1 °F (36.7 °C)-98.4 °F (36.9 °C)] 98.2 °F (36.8 °C)  Heart Rate:  [85-95] 95  Resp:  [16-20] 18  BP: (132-172)/(79-91) 154/79  Physical Exam    Constitutional:  female, lying in bed, pleasant, conversant, NAD   Eyes: Pupils equal and reactive, no conjunctival injection   HENT: NCAT, nares patent, MMM   Neck: Supple, trachea midline   Respiratory: Clear to auscultation bilaterally, nonlabored respirations    Cardiovascular: RRR, no murmurs, no pedal edema   Gastrointestinal: Positive bowel sounds, soft, nontender, nondistended   Neurologic: Alert, following commands appropriately, cranial Nerves grossly intact, speech clear, no focal weakness   Skin: Warm and dry, no rashes     Result Review    Result Review:  I have personally reviewed the results from the time of this admission to 6/15/2022 15:18 EDT and agree with these findings:  [x]  Laboratory  CBC    CBC 1/20/22 6/10/22 6/14/22   WBC 7.96 7.27 7.40   RBC 3.50 (A) 4.12  4.08   Hemoglobin 12.5 14.4 14.2   Hematocrit 35.9 42.2 41.7   .6 (A) 102.4 (A) 102.2 (A)   MCH 35.7 (A) 35.0 (A) 34.8 (A)   MCHC 34.8 34.1 34.1   RDW 14.2 14.6 13.4   Platelets 356 355 351   (A) Abnormal value            BMP    BMP 6/13/22 6/14/22 6/15/22   BUN 5 (A) 4 (A) 8   Creatinine 0.68 0.69 0.79   Sodium 133 (A) 137 136   Potassium 4.3 4.1 4.8   Chloride 98 97 (A) 97 (A)   CO2 24.9 25.8 24.9   Calcium 9.2 10.0 9.9   (A) Abnormal value       Comments are available for some flowsheets but are not being displayed.           [x]  Microbiology urine culture 6/10 positive for E. coli  [x]  Radiology   [x]  EKG/Telemetry telemetry shows NSR  []  Cardiology/Vascular   []  Pathology  [x]  Old records   []  Other:    Assessment & Plan   Assessment / Plan     Assessment:  Nasal congestion  Allergic rhinitis  New onset atrial fibrillation, paroxysmal   Urinary tract infection secondary to E. coli  Essential hypertension  Hyperlipidemia  Alcohol withdrawal, resolved  Hypomagnesemia, resolved  Hypophosphatemia resolved  Atrial fibrillation with rapid ventricular rate, resolved  Toxic/metabolic encephalopathy, resolved  History of cerebrovascular accident    Plan:    Start Flonase given nasal complaints  Remains in NSR. Continue Coreg 6.25 mg twice daily. Continue Eliquis 5 mg twice daily. Continue telemetry  Occasional SBP values in 170s.  Will restart home amlodipine 5 mg daily  Continue aspirin 81 mg daily  Continue high intensity statin  Continue daily thiamine, folic acid, MVI  Continue oral magnesium and sodium phosphate replacement; recheck electrolyte levels  Continue Bactrim 1 tab twice daily.  Stop date 6/17/22  Continue modified diet per SLP recs  Continue PT/OT  BMP in a.m.  Disposition: Inpatient rehab pending bed availability    Discussed plan with RN, SW    DVT prophylaxis:  Medical and mechanical DVT prophylaxis orders are present.    CODE STATUS:   Code Status (Patient has no pulse and is not  breathing): CPR (Attempt to Resuscitate)  Medical Interventions (Patient has pulse or is breathing): Full Support    Electronically signed by Nhan Smith DO, 06/15/22, 3:18 PM EDT.

## 2022-06-15 NOTE — THERAPY TREATMENT NOTE
Acute Care - Speech Language Pathology   Swallow Treatment Note SARIKA Kelley     Patient Name: Kiesha Pan  : 1955  MRN: [id number]  Today's Date: 6/15/2022               Admit Date: 6/10/2022    Visit Dx:     ICD-10-CM ICD-9-CM   1. Urinary tract infection in female  N39.0 599.0   2. Cerebrovascular accident (CVA), unspecified mechanism (HCC)  I63.9 434.91   3. Dysphagia, oropharyngeal  R13.12 787.22   4. Decreased activities of daily living (ADL)  Z78.9 V49.89   5. Difficulty walking  R26.2 719.7     Patient Active Problem List   Diagnosis   • Alcohol dependence ([facility])   • Allergic rhinitis due to allergen   • Anxiety   • Asthma   • Cataracts, bilateral   • COVID-19   • Depression   • Essential hypertension   • History of stroke   • Hypothyroid   • Other specified chronic obstructive airways disease   • Elevated troponin level not due to acute coronary syndrome   • Mixed hyperlipidemia   • AMS (altered mental status)   • Paroxysmal atrial fibrillation with RVR ([facility])     Past Medical History:   Diagnosis Date   • Asthma    • Elevated troponin level not due to acute coronary syndrome 12/3/2021   • Hypertension    • Mixed hyperlipidemia 12/3/2021   • Stroke (HCC) 2021     Past Surgical History:   Procedure Laterality Date   • CHOLECYSTECTOMY     SPEECH PATHOLOGY DYSPHAGIA TREATMENT    Subjective/Behavioral Observations: Patient seen for dysphagia tx.       Current Diet:Mechanical soft and Thin liquids    Current Strategies:Bolus size modification, reduce distractions      Treatment received:Patient seen bedside. Diet upgraded yesterday to mechanical soft and thin liquids.  Patient tolerating soft solids well without clinical sign or symptom of aspiration.  Improved bolus preparation and control.  Vocal quality remained clear to auscultation.  Improved overall intake.     Results of treatment: As stated    Progress toward goals: Progress noted    Barriers to Achieving goals: Medical status    Plan of  care:/changes in plan: Mechanical soft/whole foods, thin liquids     90 degrees upright for all intake  Reduced distractions during meals.     No further dysphagia tx indicated at this time.  Available for reconsult should patient medical status warrant.       EDUCATION  The patient has been educated in the following areas:   Dysphagia (Swallowing Impairment).     Marcie Gutierrez, SLP  6/15/2022

## 2022-06-15 NOTE — THERAPY TREATMENT NOTE
Acute Care - Physical Therapy Treatment Note  [facility]     Patient Name: Kiesha Pan  : 1955  MRN: [id number]  Today's Date: 6/15/2022      Visit Dx:     ICD-10-CM ICD-9-CM   1. Urinary tract infection in female  N39.0 599.0   2. Cerebrovascular accident (CVA), unspecified mechanism (HCC)  I63.9 434.91   3. Dysphagia, oropharyngeal  R13.12 787.22   4. Decreased activities of daily living (ADL)  Z78.9 V49.89   5. Difficulty walking  R26.2 719.7     Patient Active Problem List   Diagnosis   • Alcohol dependence (HCC)   • Allergic rhinitis due to allergen   • Anxiety   • Asthma   • Cataracts, bilateral   • COVID-19   • Depression   • Essential hypertension   • History of stroke   • Hypothyroid   • Other specified chronic obstructive airways disease   • Elevated troponin level not due to acute coronary syndrome   • Mixed hyperlipidemia   • AMS (altered mental status)   • Paroxysmal atrial fibrillation with RVR ([facility])     Past Medical History:   Diagnosis Date   • Asthma    • Elevated troponin level not due to acute coronary syndrome 12/3/2021   • Hypertension    • Mixed hyperlipidemia 12/3/2021   • Stroke (HCC) 2021     Past Surgical History:   Procedure Laterality Date   • CHOLECYSTECTOMY       PT Assessment (last 12 hours)     PT Evaluation and Treatment     Row Name 06/15/22 0840          Physical Therapy Time and Intention    Subjective Information complains of;dyspnea  -RH     Document Type therapy note (daily note)  -     Mode of Treatment physical therapy;individual therapy  -RH     Patient Effort fair  -RH     Comment Pt with no SOA at end of tx.  -     Row Name 06/15/22 0840          Pain Scale: FACES Pre/Post-Treatment    Pain: FACES Scale, Pretreatment 0-->no hurt  -RH     Row Name 06/15/22 0840          Bed Mobility    Bed Mobility supine-sit;sit-supine  -RH     All Activities, [place] (Bed Mobility) contact guard  -RH     Supine-Sit [place] (Bed Mobility) contact guard  -RH      Sit-Supine [place] (Bed Mobility) contact guard  -RH     Assistive Device (Bed Mobility) bed rails  -RH     Row Name 06/15/22 0840          Transfers    Transfers sit-stand transfer;stand-sit transfer  -RH     Sit-Stand [place] (Transfers) contact guard  -RH     Stand-Sit [place] (Transfers) contact guard  -RH     Row Name 06/15/22 0840          Sit-Stand Transfer    Assistive Device (Sit-Stand Transfers) cane, straight  -RH     Row Name 06/15/22 0840          Stand-Sit Transfer    Assistive Device (Stand-Sit Transfers) cane, straight  -RH     Row Name 06/15/22 0840          Gait/Stairs (Locomotion)    Gait/Stairs Locomotion gait/ambulation independence;gait/ambulation assistive device;distance ambulated;gait pattern;gait deviations  -RH     [place] Level (Gait) contact guard  -RH     Assistive Device (Gait) cane, straight  -RH     Distance in Feet (Gait) 175  -RH     Pattern (Gait) 3-point;step-through  -RH     Deviations/Abnormal Patterns (Gait) base of support, narrow  -RH     Gait Assessment/Intervention Pt with functional amb with STC but with varying gait pattern.  -RH     Row Name 06/15/22 0840          Vital Signs    O2 Delivery Intra Treatment room air  -RH     Post SpO2 (%) 92  -RH     Row Name 06/15/22 0840          Progress Summary (PT)    Progress Toward Functional Goals (PT) progress toward functional goals is fair  -RH           User Key  (r) = Recorded By, (t) = Taken By, (c) = Cosigned By    Initials Name Provider Type     David Bee PTA Physical Therapist Assistant                Physical Therapy Education                 Title: PT OT SLP Therapies (In Progress)     Topic: Physical Therapy (Not Started)     Point: Mobility training (Not Started)     Learner Progress:  Not documented in this visit.          Point: Home exercise program (Not Started)     Learner Progress:  Not documented in this visit.          Point: Body mechanics (Not Started)     Learner Progress:  Not  documented in this visit.          Point: Precautions (Not Started)     Learner Progress:  Not documented in this visit.                          PT Recommendation and Plan     Progress Summary (PT)  Progress Toward Functional Goals (PT): progress toward functional goals is fair   Outcome Measures     Row Name 06/15/22 0800 06/14/22 1500 06/13/22 0900       How much help from another person do you currently need...    Turning from your back to your side while in flat bed without using bedrails? 3  -RH 3  -RH 2  -RH    Moving from lying on back to sitting on the side of a flat bed without bedrails? 3  -RH 3  -RH 2  -RH    Moving to and from a bed to a chair (including a wheelchair)? 3  -RH 2  -RH 2  -RH    Standing up from a chair using your arms (e.g., wheelchair, bedside chair)? 3  -RH 2  -RH 2  -RH    Climbing 3-5 steps with a railing? 2  -RH 2  -RH 2  -RH    To walk in hospital room? 3  -RH 3  -RH 3  -RH    AM-PAC 6 Clicks Score (PT) 17  -RH 15  -RH 13  -RH    Row Name 06/12/22 1100             How much help from another person do you currently need...    Turning from your back to your side while in flat bed without using bedrails? 2  -CS      Moving from lying on back to sitting on the side of a flat bed without bedrails? 2  -CS      Moving to and from a bed to a chair (including a wheelchair)? 2  -CS      Standing up from a chair using your arms (e.g., wheelchair, bedside chair)? 2  -CS      Climbing 3-5 steps with a railing? 2  -CS      To walk in hospital room? 2  -CS      AM-PAC 6 Clicks Score (PT) 12  -CS              Functional Assessment    Outcome Measure Options AM-PAC 6 Clicks Basic Mobility (PT)  -CS            User Key  (r) = Recorded By, (t) = Taken By, (c) = Cosigned By    Initials Name Provider Type    RH David Bee, PTA Physical Therapist Assistant    Suly Pedroza, PT Physical Therapist                 Time Calculation:    PT Charges     Row Name 06/15/22 0840             Time Calculation     PT Received On 06/15/22  -RH              Timed Charges    68760 - Gait Training Minutes  7  -RH      42444 - PT Therapeutic Activity Minutes 4  -RH              Total Minutes    Timed Charges Total Minutes 11  -RH       Total Minutes 11  -RH            User Key  (r) = Recorded By, (t) = Taken By, (c) = Cosigned By    Initials Name Provider Type     David Bee PTA Physical Therapist Assistant              Therapy Charges for Today     Code Description Service Date Service Provider Modifiers Qty    87108237587 HC GAIT TRAINING EA 15 MIN 6/14/2022 David Bee PTA GP 1    46017542659 HC GAIT TRAINING EA 15 MIN 6/15/2022 David Bee PTA GP 1          PT G-Codes  Outcome Measure Options: AM-PAC 6 Clicks Basic Mobility (PT)  AM-PAC 6 Clicks Score (PT): 17  AM-PAC 6 Clicks Score (OT): 12    David Bee PTA  6/15/2022

## 2022-06-15 NOTE — PLAN OF CARE
Goal Outcome Evaluation:   New medications added, see MAR.  No new functional changes noted this shift.

## 2022-06-15 NOTE — PLAN OF CARE
Goal Outcome Evaluation:         Pt rested comfortably overnight.  No s/s of distress and no complaints.  No new neuro deficits.  Pt appears disgruntled with son regarding rehab after discharge during general conversation.  Remains mildly confused at times.  BP not requiring treatment overnight.  Ambulates well with walker and stand by assistance as she is very unsteady.  Cane for optimal stabilization.  No acute changes overnight.  Awaiting further rounding by  MD to dictate further course of care.  CTM

## 2022-06-16 LAB
ANION GAP SERPL CALCULATED.3IONS-SCNC: 13.1 MMOL/L (ref 5–15)
BUN SERPL-MCNC: 8 MG/DL (ref 8–23)
BUN/CREAT SERPL: 8.5 (ref 7–25)
CALCIUM SPEC-SCNC: 9.4 MG/DL (ref 8.6–10.5)
CHLORIDE SERPL-SCNC: 96 MMOL/L (ref 98–107)
CO2 SERPL-SCNC: 23.9 MMOL/L (ref 22–29)
CREAT SERPL-MCNC: 0.94 MG/DL (ref 0.57–1)
EGFRCR SERPLBLD CKD-EPI 2021: 67.1 ML/MIN/1.73
GLUCOSE SERPL-MCNC: 118 MG/DL (ref 65–99)
MAGNESIUM SERPL-MCNC: 1.9 MG/DL (ref 1.6–2.4)
PHOSPHATE SERPL-MCNC: 5.3 MG/DL (ref 2.5–4.5)
POTASSIUM SERPL-SCNC: 4.1 MMOL/L (ref 3.5–5.2)
SODIUM SERPL-SCNC: 133 MMOL/L (ref 136–145)

## 2022-06-16 PROCEDURE — 99232 SBSQ HOSP IP/OBS MODERATE 35: CPT | Performed by: INTERNAL MEDICINE

## 2022-06-16 PROCEDURE — 84100 ASSAY OF PHOSPHORUS: CPT | Performed by: INTERNAL MEDICINE

## 2022-06-16 PROCEDURE — 97116 GAIT TRAINING THERAPY: CPT

## 2022-06-16 PROCEDURE — 83735 ASSAY OF MAGNESIUM: CPT | Performed by: INTERNAL MEDICINE

## 2022-06-16 PROCEDURE — 80048 BASIC METABOLIC PNL TOTAL CA: CPT | Performed by: INTERNAL MEDICINE

## 2022-06-16 RX ORDER — CARVEDILOL 12.5 MG/1
12.5 TABLET ORAL 2 TIMES DAILY WITH MEALS
Status: DISCONTINUED | OUTPATIENT
Start: 2022-06-16 | End: 2022-06-17 | Stop reason: HOSPADM

## 2022-06-16 RX ORDER — CARVEDILOL 12.5 MG/1
12.5 TABLET ORAL 2 TIMES DAILY WITH MEALS
Status: DISCONTINUED | OUTPATIENT
Start: 2022-06-16 | End: 2022-06-16

## 2022-06-16 RX ADMIN — SULFAMETHOXAZOLE AND TRIMETHOPRIM 1 TABLET: 800; 160 TABLET ORAL at 08:41

## 2022-06-16 RX ADMIN — MAGNESIUM OXIDE TAB 400 MG (241.3 MG ELEMENTAL MG) 400 MG: 400 (241.3 MG) TAB at 08:41

## 2022-06-16 RX ADMIN — CARVEDILOL 12.5 MG: 12.5 TABLET, FILM COATED ORAL at 17:12

## 2022-06-16 RX ADMIN — APIXABAN 5 MG: 5 TABLET, FILM COATED ORAL at 20:47

## 2022-06-16 RX ADMIN — FOLIC ACID 1 MG: 1 TABLET ORAL at 08:41

## 2022-06-16 RX ADMIN — Medication 10 ML: at 20:47

## 2022-06-16 RX ADMIN — ASPIRIN 81 MG: 81 TABLET, COATED ORAL at 08:41

## 2022-06-16 RX ADMIN — APIXABAN 5 MG: 5 TABLET, FILM COATED ORAL at 08:41

## 2022-06-16 RX ADMIN — FLUTICASONE PROPIONATE 2 SPRAY: 50 SPRAY, METERED NASAL at 08:42

## 2022-06-16 RX ADMIN — Medication 100 MG: at 08:41

## 2022-06-16 RX ADMIN — CARVEDILOL 12.5 MG: 12.5 TABLET, FILM COATED ORAL at 09:03

## 2022-06-16 RX ADMIN — Medication 10 ML: at 08:42

## 2022-06-16 RX ADMIN — SULFAMETHOXAZOLE AND TRIMETHOPRIM 1 TABLET: 800; 160 TABLET ORAL at 20:47

## 2022-06-16 RX ADMIN — ATORVASTATIN CALCIUM 80 MG: 40 TABLET, FILM COATED ORAL at 20:47

## 2022-06-16 RX ADMIN — MULTIPLE VITAMINS W/ MINERALS TAB 1 TABLET: TAB at 08:41

## 2022-06-16 NOTE — PLAN OF CARE
Goal Outcome Evaluation:           Progress: no change  Outcome Evaluation: Patient stable during the shift. No complaints, she has rested throughout the night. VSS. Call light within reach

## 2022-06-16 NOTE — THERAPY TREATMENT NOTE
Acute Care - Physical Therapy Treatment Note   [facility]     Patient Name: Kiesha Pan  : 1955  MRN: [id number]  Today's Date: 2022      Visit Dx:     ICD-10-CM ICD-9-CM   1. Urinary tract infection in female  N39.0 599.0   2. Cerebrovascular accident (CVA), unspecified mechanism (HCC)  I63.9 434.91   3. Dysphagia, oropharyngeal  R13.12 787.22   4. Decreased activities of daily living (ADL)  Z78.9 V49.89   5. Difficulty walking  R26.2 719.7     Patient Active Problem List   Diagnosis   • Alcohol dependence (HCC)   • Allergic rhinitis due to allergen   • Anxiety   • Asthma   • Cataracts, bilateral   • COVID-19   • Depression   • Essential hypertension   • History of stroke   • Hypothyroid   • Other specified chronic obstructive airways disease   • Elevated troponin level not due to acute coronary syndrome   • Mixed hyperlipidemia   • AMS (altered mental status)   • Paroxysmal atrial fibrillation with RVR ([facility])     Past Medical History:   Diagnosis Date   • Asthma    • Elevated troponin level not due to acute coronary syndrome 12/3/2021   • Hypertension    • Mixed hyperlipidemia 12/3/2021   • Stroke (HCC) 2021     Past Surgical History:   Procedure Laterality Date   • CHOLECYSTECTOMY       PT Assessment (last 12 hours)     PT Evaluation and Treatment     Row Name 22 0838          Physical Therapy Time and Intention    Subjective Information no complaints  -RH     Document Type therapy note (daily note)  -RH     Mode of Treatment physical therapy;individual therapy  -RH     Patient Effort adequate  -RH     Row Name 2238          Pain Scale: FACES Pre/Post-Treatment    Pain: FACES Scale, Pretreatment 0-->no hurt  -RH     Posttreatment Pain Rating 0-->no hurt  -RH     Row Name 22 0838          Transfers    Sit-Stand [place] (Transfers) contact guard  -RH     Stand-Sit [place] (Transfers) contact guard  -RH     Row Name 2238          Sit-Stand Transfer    Assistive  Device (Sit-Stand Transfers) cane, straight  -RH     Row Name 06/16/22 0838          Stand-Sit Transfer    Assistive Device (Stand-Sit Transfers) cane, straight  -RH     Row Name 06/16/22 0838          Gait/Stairs (Locomotion)    Gait/Stairs Locomotion gait/ambulation independence;gait/ambulation assistive device;distance ambulated;gait pattern;gait deviations  -RH     [place] Level (Gait) contact guard;standby assist  -RH     Assistive Device (Gait) cane, straight  -RH     Distance in Feet (Gait) 200  -RH     Pattern (Gait) 3-point;step-through  -RH     Deviations/Abnormal Patterns (Gait) base of support, narrow  -     Row Name 06/16/22 0838          Vital Signs    O2 Delivery Intra Treatment room air  -     Row Name 06/16/22 0838          Progress Summary (PT)    Progress Toward Functional Goals (PT) progress toward functional goals is good  -RH           User Key  (r) = Recorded By, (t) = Taken By, (c) = Cosigned By    Initials Name Provider Type     David Bee PTA Physical Therapist Assistant                Physical Therapy Education                 Title: PT OT SLP Therapies (In Progress)     Topic: Physical Therapy (Not Started)     Point: Mobility training (Not Started)     Learner Progress:  Not documented in this visit.          Point: Home exercise program (Not Started)     Learner Progress:  Not documented in this visit.          Point: Body mechanics (Not Started)     Learner Progress:  Not documented in this visit.          Point: Precautions (Not Started)     Learner Progress:  Not documented in this visit.                          PT Recommendation and Plan     Progress Summary (PT)  Progress Toward Functional Goals (PT): progress toward functional goals is good   Outcome Measures     Row Name 06/16/22 0800 06/15/22 0800 06/14/22 1500       How much help from another person do you currently need...    Turning from your back to your side while in flat bed without using bedrails? 4  -RH 3   -RH 3  -RH    Moving from lying on back to sitting on the side of a flat bed without bedrails? 4  -RH 3  -RH 3  -RH    Moving to and from a bed to a chair (including a wheelchair)? 3  -RH 3  -RH 2  -RH    Standing up from a chair using your arms (e.g., wheelchair, bedside chair)? 3  -RH 3  -RH 2  -RH    Climbing 3-5 steps with a railing? 3  -RH 2  -RH 2  -RH    To walk in hospital room? 4  -RH 3  -RH 3  -RH    AM-PAC 6 Clicks Score (PT) 21  -RH 17  -RH 15  -RH    Row Name 06/13/22 0900             How much help from another person do you currently need...    Turning from your back to your side while in flat bed without using bedrails? 2  -RH      Moving from lying on back to sitting on the side of a flat bed without bedrails? 2  -RH      Moving to and from a bed to a chair (including a wheelchair)? 2  -RH      Standing up from a chair using your arms (e.g., wheelchair, bedside chair)? 2  -RH      Climbing 3-5 steps with a railing? 2  -RH      To walk in hospital room? 3  -RH      AM-PAC 6 Clicks Score (PT) 13  -RH            User Key  (r) = Recorded By, (t) = Taken By, (c) = Cosigned By    Initials Name Provider Type    RH David Bee PTA Physical Therapist Assistant                 Time Calculation:    PT Charges     Row Name 06/16/22 0817             Time Calculation    PT Received On 06/16/22  -RH              Timed Charges    68111 - Gait Training Minutes  7  -RH      89543 - PT Therapeutic Activity Minutes 5  -RH              Total Minutes    Timed Charges Total Minutes 12  -RH       Total Minutes 12  -RH            User Key  (r) = Recorded By, (t) = Taken By, (c) = Cosigned By    Initials Name Provider Type    RH David Bee PTA Physical Therapist Assistant              Therapy Charges for Today     Code Description Service Date Service Provider Modifiers Qty    99992516191 HC GAIT TRAINING EA 15 MIN 6/15/2022 David Bee PTA GP 1    79759682294 HC GAIT TRAINING EA 15 MIN 6/16/2022 David Bee PTA GP 1           PT G-Codes  Outcome Measure Options: AM-PAC 6 Clicks Basic Mobility (PT)  AM-PAC 6 Clicks Score (PT): 21  AM-PAC 6 Clicks Score (OT): 12    David Bee, ANA  6/16/2022

## 2022-06-16 NOTE — PROGRESS NOTES
[facility]   Hospitalist Progress Note  Date: 2022  Patient Name: Kiesha Pan  : 1955  MRN: [id number]  Date of admission: 6/10/2022      Subjective   Subjective     Chief Complaint: Follow up for altered mental status    Interval Followup: No events overnight.  Vital stable.  Patient up in chair already.  Feeling well.  No acute complaints.  Denies chest pain, trouble breathing, palpitations.  No confusion. No weakness in the extremities.  Tolerating oral intake.  No issues with urination or constipation.  No RN issues reported.  Patient feels ready to get out of the hospital, discussed she has a rehab bed tomorrow.    Review of Systems  HEENT: Nasal congestion, improved  Respiratory:  No Cough, No Dyspnea  Gastrointestinal:  No Nausea, No Vomiting  Neurological:  No focal weakness, no paresthesia    Objective   Objective     Vitals:   Temp:  [97.9 °F (36.6 °C)-98.3 °F (36.8 °C)] 97.9 °F (36.6 °C)  Heart Rate:  [] 101  Resp:  [18-20] 18  BP: (121-154)/(67-93) 145/93  Physical Exam    Constitutional:  female, lying in bed, pleasant, conversant, NAD   Eyes: Pupils equal and reactive, no conjunctival injection   HENT: NCAT, nares patent, MMM   Neck: Supple, trachea midline   Respiratory: Clear to auscultation bilaterally, nonlabored respirations    Cardiovascular: IR/IR, no murmurs, no pedal edema   Gastrointestinal: Positive bowel sounds, soft, nontender, nondistended   Neurologic: Alert, CN grossly intact, speech clear, no focal weakness   Skin: Warm and dry, no rashes     Result Review    Result Review:  I have personally reviewed the results from the time of this admission to 2022 08:39 EDT and agree with these findings:  [x]  Laboratory  CBC    CBC 1/20/22 6/10/22 6/14/22   WBC 7.96 7.27 7.40   RBC 3.50 (A) 4.12 4.08   Hemoglobin 12.5 14.4 14.2   Hematocrit 35.9 42.2 41.7   .6 (A) 102.4 (A) 102.2 (A)   MCH 35.7 (A) 35.0 (A) 34.8 (A)   MCHC 34.8 34.1 34.1   RDW 14.2 14.6  13.4   Platelets 356 355 351   (A) Abnormal value            BMP    BMP 6/14/22 6/15/22 6/16/22   BUN 4 (A) 8 8   Creatinine 0.69 0.79 0.94   Sodium 137 136 133 (A)   Potassium 4.1 4.8 4.1   Chloride 97 (A) 97 (A) 96 (A)   CO2 25.8 24.9 23.9   Calcium 10.0 9.9 9.4   (A) Abnormal value       Comments are available for some flowsheets but are not being displayed.           [x]  Microbiology urine culture 6/10 positive for E. coli  []  Radiology   [x]  EKG/Telemetry pA. fib, 2 episodes of increased ventricular rate 120s  []  Cardiology/Vascular   []  Pathology  [x]  Old records   []  Other:    Assessment & Plan   Assessment / Plan     Assessment:  New onset atrial fibrillation, paroxysmal   Essential hypertension  Nasal congestion  Allergic rhinitis  Urinary tract infection secondary to E. coli  Hyperlipidemia  Alcohol withdrawal, resolved  Hypomagnesemia, resolved  Hypophosphatemia resolved  Atrial fibrillation with rapid ventricular rate, resolved  Toxic/metabolic encephalopathy, resolved  History of cerebrovascular accident    Plan:    Increase Coreg to 12.5 mg twice daily  Will stop amlodipine given increased Coreg dose  Continue Eliquis 5 mg twice daily  Continue telemetry  Continue aspirin 81 mg daily  Continue high intensity statin  Continue daily thiamine, folic acid, MVI  Phosphorus level elevated.  Stop scheduled sodium phosphate replacement  Continue Bactrim 1 tab twice daily.  Stop date 6/17/22  Continue modified diet per SLP recs  Continue PT/OT  Labs have been stable, lab holiday  Disposition: Bed available at [facility] rehab 6/17/2022    Discussed plan with RN, SW    DVT prophylaxis:  Medical and mechanical DVT prophylaxis orders are present.    CODE STATUS:   Code Status (Patient has no pulse and is not breathing): CPR (Attempt to Resuscitate)  Medical Interventions (Patient has pulse or is breathing): Full Support    Electronically signed by Nhan Smith DO, 06/16/22, 8:46 AM EDT.

## 2022-06-17 VITALS
HEART RATE: 88 BPM | RESPIRATION RATE: 18 BRPM | OXYGEN SATURATION: 95 % | HEIGHT: 63 IN | BODY MASS INDEX: 25.2 KG/M2 | SYSTOLIC BLOOD PRESSURE: 169 MMHG | TEMPERATURE: 97.3 F | DIASTOLIC BLOOD PRESSURE: 96 MMHG | WEIGHT: 142.2 LBS

## 2022-06-17 PROBLEM — R41.82 AMS (ALTERED MENTAL STATUS): Status: RESOLVED | Noted: 2022-06-10 | Resolved: 2022-06-17

## 2022-06-17 PROBLEM — I48.0 PAROXYSMAL ATRIAL FIBRILLATION WITH RVR (HCC): Status: RESOLVED | Noted: 2022-06-10 | Resolved: 2022-06-17

## 2022-06-17 PROCEDURE — 99239 HOSP IP/OBS DSCHRG MGMT >30: CPT | Performed by: INTERNAL MEDICINE

## 2022-06-17 PROCEDURE — 97116 GAIT TRAINING THERAPY: CPT

## 2022-06-17 RX ORDER — CARVEDILOL 12.5 MG/1
12.5 TABLET ORAL 2 TIMES DAILY WITH MEALS
Start: 2022-06-17

## 2022-06-17 RX ORDER — FLUTICASONE PROPIONATE 50 MCG
2 SPRAY, SUSPENSION (ML) NASAL DAILY
Start: 2022-06-18

## 2022-06-17 RX ADMIN — MAGNESIUM OXIDE TAB 400 MG (241.3 MG ELEMENTAL MG) 400 MG: 400 (241.3 MG) TAB at 08:14

## 2022-06-17 RX ADMIN — SULFAMETHOXAZOLE AND TRIMETHOPRIM 1 TABLET: 800; 160 TABLET ORAL at 08:13

## 2022-06-17 RX ADMIN — Medication 10 ML: at 08:13

## 2022-06-17 RX ADMIN — CARVEDILOL 12.5 MG: 12.5 TABLET, FILM COATED ORAL at 08:13

## 2022-06-17 RX ADMIN — FLUTICASONE PROPIONATE 2 SPRAY: 50 SPRAY, METERED NASAL at 08:14

## 2022-06-17 RX ADMIN — FOLIC ACID 1 MG: 1 TABLET ORAL at 08:13

## 2022-06-17 RX ADMIN — Medication 100 MG: at 08:14

## 2022-06-17 RX ADMIN — APIXABAN 5 MG: 5 TABLET, FILM COATED ORAL at 08:13

## 2022-06-17 RX ADMIN — ASPIRIN 81 MG: 81 TABLET, COATED ORAL at 08:13

## 2022-06-17 RX ADMIN — MULTIPLE VITAMINS W/ MINERALS TAB 1 TABLET: TAB at 08:13

## 2022-06-17 NOTE — PLAN OF CARE
Goal Outcome Evaluation:   Patient being discharged to [facility], transported by spouse

## 2022-06-17 NOTE — PAYOR COMM NOTE
"Cliff Mock (66 y.o. Female)             Date of Birth   1955    Social Security Number       Address   [place] [address]    Home Phone   [phone]    MRN   [id number]       Faith   Faith    Marital Status                               Admission Date   6/10/22    Admission Type   Emergency    Admitting Provider   Severo Riggs MD    Attending Provider   Nhan Smith DO    Department, Room/Bed   [facility] UNIT, 201/1       Discharge Date       Discharge Disposition   Rehab Facility or Unit (DC - External)    Discharge Destination                               Attending Provider: Nhan Smith DO    Allergies: Cephalexin, Diphenhydramine, Levofloxacin, Penicillins    Isolation: None   Infection: None   Code Status: CPR   Advance Care Planning Activity    Ht: 160 cm (63\")   Wt: 64.5 kg (142 lb 3.2 oz)    Admission Cmt: None   Principal Problem: None                Active Insurance as of 6/10/2022     Primary Coverage     Payor Plan Insurance Group Employer/Plan Group    HUMANA HUMANA [id number]     Payor Plan Address Payor Plan Phone Number Payor Plan Fax Number Effective Dates    [address] [address] [phone]  2/1/2019 - None Entered    [facility] [address]       Subscriber Name Subscriber Birth Date Member ID       MELONIE MOCK S 1955 [id number]           Secondary Coverage     Payor Plan Insurance Group Employer/Plan Group    MEDICARE MEDICARE A ONLY      Payor Plan Address Payor Plan Phone Number Payor Plan Fax Number Effective Dates    [address] [phone]  9/1/2020 - None Entered    [place] [address]       Subscriber Name Subscriber Birth Date Member ID       CLIFF MOCK 1955 [id number]                 Emergency Contacts      (Rel.) Home Phone Work Phone Mobile Phone    NISH (POA)SINDY (Son) [phone] -- [phone]    melonie mock (Spouse) [phone] -- [phone]      Ref# [id number].  " Update/dc    CONTACT   SEEMA TREJO UTILIZATION REVIEW    [facility]  [address] [address]  TAX ID [id number]  [facility]  [phone]       [phone]   [phone]    Nhan Smith DO    Physician   Hospitalist   Progress Notes       Signed   Date of Service:  22   Creation Time:  22              Signed        Expand All Collapse All        Show:Clear all  [x]Manual[x]Template[x]Copied    Added by:  [x]Nhan Smith DO      []Bryson for details     [facility]   Hospitalist Progress Note  Date: 2022  Patient Name: Kiesha Pan  : 1955  MRN: [id number]  Date of admission: 6/10/2022           Subjective []Expand by Default     Subjective      Chief Complaint: Follow up for altered mental status     Interval Followup: No events overnight.  Vital stable.  Patient up in chair already.  Feeling well.  No acute complaints.  Denies chest pain, trouble breathing, palpitations.  No confusion. No weakness in the extremities.  Tolerating oral intake.  No issues with urination or constipation.  No RN issues reported.  Patient feels ready to get out of the hospital, discussed she has a rehab bed tomorrow.     Review of Systems  HEENT: Nasal congestion, improved  Respiratory:  No Cough, No Dyspnea  Gastrointestinal:  No Nausea, No Vomiting  Neurological:  No focal weakness, no paresthesia           Objective      Objective      Vitals:   Temp:  [97.9 °F (36.6 °C)-98.3 °F (36.8 °C)] 97.9 °F (36.6 °C)  Heart Rate:  [] 101  Resp:  [18-20] 18  BP: (121-154)/(67-93) 145/93  Physical Exam                         Constitutional:  female, lying in bed, pleasant, conversant, NAD              Eyes: Pupils equal and reactive, no conjunctival injection              HENT: NCAT, nares patent, MMM              Neck: Supple, trachea midline              Respiratory: Clear to auscultation bilaterally, nonlabored respirations               Cardiovascular: IR/IR, no  murmurs, no pedal edema              Gastrointestinal: Positive bowel sounds, soft, nontender, nondistended              Neurologic: Alert, CN grossly intact, speech clear, no focal weakness              Skin: Warm and dry, no rashes            Result Review       Result Review:  I have personally reviewed the results from the time of this admission to 6/16/2022 08:39 EDT and agree with these findings:  [x]?  Laboratory  CBC Results         CBC    CBC 1/20/22 6/10/22 6/14/22   WBC 7.96 7.27 7.40   RBC 3.50 (A) 4.12 4.08   Hemoglobin 12.5 14.4 14.2   Hematocrit 35.9 42.2 41.7   .6 (A) 102.4 (A) 102.2 (A)   MCH 35.7 (A) 35.0 (A) 34.8 (A)   MCHC 34.8 34.1 34.1   RDW 14.2 14.6 13.4   Platelets 356 355 351   (A) Abnormal value                  Basic Metabolic Profile Reults:         BMP    BMP 6/14/22 6/15/22 6/16/22   BUN 4 (A) 8 8   Creatinine 0.69 0.79 0.94   Sodium 137 136 133 (A)   Potassium 4.1 4.8 4.1   Chloride 97 (A) 97 (A) 96 (A)   CO2 25.8 24.9 23.9   Calcium 10.0 9.9 9.4   (A) Abnormal value        Comments are available for some flowsheets but are not being displayed.                 [x]?  Microbiology urine culture 6/10 positive for E. coli  []?  Radiology   [x]?  EKG/Telemetry pA. fib, 2 episodes of increased ventricular rate 120s  []?  Cardiology/Vascular   []?  Pathology  [x]?  Old records   []?  Other:           Assessment & Plan     Assessment / Plan      Assessment:  New onset atrial fibrillation, paroxysmal   Essential hypertension  Nasal congestion  Allergic rhinitis  Urinary tract infection secondary to E. coli  Hyperlipidemia  Alcohol withdrawal, resolved  Hypomagnesemia, resolved  Hypophosphatemia resolved  Atrial fibrillation with rapid ventricular rate, resolved  Toxic/metabolic encephalopathy, resolved  History of cerebrovascular accident     Plan:     Increase Coreg to 12.5 mg twice daily  Will stop amlodipine given increased Coreg dose  Continue Eliquis 5 mg twice daily  Continue  telemetry  Continue aspirin 81 mg daily  Continue high intensity statin  Continue daily thiamine, folic acid, MVI  Phosphorus level elevated.  Stop scheduled sodium phosphate replacement  Continue Bactrim 1 tab twice daily.  Stop date 22  Continue modified diet per SLP recs  Continue PT/OT  Labs have been stable, lab holiday  Disposition: Bed available at [facility] rehab 2022     Discussed plan with RNTABITHA     DVT prophylaxis:  Medical and mechanical DVT prophylaxis orders are present.     CODE STATUS:   Code Status (Patient has no pulse and is not breathing): CPR (Attempt to Resuscitate)  Medical Interventions (Patient has pulse or is breathing): Full Support     Electronically signed by Nhan Smith DO, 22, 8:46 AM EDT.                                                   Discharge Summary      Nhan Smith DO at 22 1003                         [facility]  [facility]    Patient Name: Kiesha Pan  : 1955  MRN: [id number]    Date of Admission: 6/10/2022  Date of Discharge:  22  Primary Care Physician: Ave Benson APRN    Consults     Date and Time Order Name Status Description    2022 12:02 PM Inpatient Neurology Consult General Completed     6/10/2022  4:36 PM Inpatient Cardiology Consult Completed     6/10/2022 11:37 AM Inpatient Hospitalist Consult            Active and Resolved Hospital Problems:  Active Hospital Problems   Essential hypertension  Hyperlipidemia  Paroxysmal atrial fibrillation   Nasal congestion  Allergic rhinitis  History of cerebrovascular accident   Resolved Hospital Problems    Diagnosis POA   • AMS (altered mental status) [R41.82] Yes   • Paroxysmal atrial fibrillation with RVR (HCC) [I48.0] Yes   · Urinary tract infection secondary to E. coli  · Alcohol withdrawal  · Altered mental status likely secondary to alcohol withdrawal and urinary tract infection  · Facial  paresthesias  · Hypomagnesemia  · Hypophosphatemia  · Metabolic acidosis    Hospital Course     Hospital Course:  Kiesha Pan is a 66 y.o. female with history of hypertension, hyperlipidemia, alcohol abuse, stroke who presented to the hospital with difficulty speaking and facial numbness.  Admitted for stroke work-up.  Imaging including CT and MRI of the head reported no evidence of acute ischemia or infarction.  CT of the head and neck reported no hemodynamically significant stenoses.  She went into atrial fibrillation with rapid ventricular rate which was a new diagnosis.  Cardiology was consulted.    Rate controlled with Coreg.  Converted back to normal sinus rhythm.Started on therapeutic Lovenox and switch to Eliquis for stroke prevention.  Echo showed preserved ejection fraction and no significant valvular abnormalities.  She had a recent outpatient stress test that was negative for ischemia.  No further work-up was pursued from a cardiology standpoint. Seen by neurology and continued on daily baby aspirin along with high intensity statin. Neurological deficits resolved.  Tolerated oral intake. Was incidentally found to have urinary tract infection and completed a course of Bactrim. Deemed a good candidate for rehabilitation and was subsequently discharged to [facility] for ongoing physical therapy.  Discharge medications, follow-up instructions provided.  Discharged in stable condition.    DISCHARGE Follow Up Recommendations for labs and diagnostics: NONE      Day of Discharge     Vital Signs:  Temp:  [97.3 °F (36.3 °C)-98.8 °F (37.1 °C)] 97.3 °F (36.3 °C)  Heart Rate:  [73-88] 88  Resp:  [16-18] 18  BP: (128-169)/(73-96) 169/96  Physical Exam:   GENERAL: The patient is conversant and nontoxic.  HEENT: PERRLA, EOMI. Oropharynx clear. MMM  NECK: Supple, trachea midline  HEART: Regular rate and rhythm without murmurs, no edema  LUNGS: Equal air entry, clear to auscultation bilaterally.  ABDOMEN: Soft, positive  bowel sounds, nontender, nondistended  SKIN: No rash or open wounds   NEUROLOGIC: Alert, cranial nerves grossly intact      Discharge Details        Discharge Medications      New Medications      Instructions Start Date   apixaban 5 MG tablet tablet  Commonly known as: ELIQUIS   5 mg, Oral, Every 12 Hours Scheduled      fluticasone 50 MCG/ACT nasal spray  Commonly known as: FLONASE   2 sprays, Each Nare, Daily   Start Date: June 18, 2022        Changes to Medications      Instructions Start Date   carvedilol 12.5 MG tablet  Commonly known as: COREG  What changed:   · medication strength  · how much to take  · how to take this  · when to take this  · additional instructions   12.5 mg, Oral, 2 Times Daily With Meals         Continue These Medications      Instructions Start Date   aspirin 81 MG EC tablet   81 mg, Oral, Daily      atorvastatin 80 MG tablet  Commonly known as: LIPITOR   Take 1 tablet nightly at bedtime      ondansetron ODT 4 MG disintegrating tablet  Commonly known as: ZOFRAN-ODT   4 mg, Translingual, 4 Times Daily PRN         Stop These Medications    amLODIPine 5 MG tablet  Commonly known as: NORVASC     diclofenac 50 MG EC tablet  Commonly known as: VOLTAREN     lisinopril 40 MG tablet  Commonly known as: PRINIVIL,ZESTRIL            Allergies   Allergen Reactions   • Cephalexin Unknown - Low Severity   • Diphenhydramine Unknown - Low Severity   • Levofloxacin Unknown - Low Severity   • Penicillins Unknown - Low Severity       Discharge Disposition:  Rehab Facility or Unit (DC - External)    Diet:  Hospital:  Diet Order   Procedures   • Diet Soft Texture; Whole Foods; Thin       Discharge Activity:   Activity Instructions     Activity as Tolerated            CODE STATUS:  Code Status and Medical Interventions:   Ordered at: 06/10/22 9987     Code Status (Patient has no pulse and is not breathing):    CPR (Attempt to Resuscitate)     Medical Interventions (Patient has pulse or is breathing):    Full  Support         Future Appointments   Date Time Provider Department Center   7/12/2022 11:45 AM Josué Purdy MD [facility] [place] [facility]       Additional Instructions for the Follow-ups that You Need to Schedule     Discharge Follow-up with PCP   As directed       Currently Documented PCP:    Ave Benson APRN    PCP Phone Number:    [phone]     Follow Up Details: as directed         Discharge Follow-up with Specified Provider: Dr Navarro; 1 Month   As directed      To: Dr Navarro    Follow Up: 1 Month               Pertinent  and/or Most Recent Results     PROCEDURES: NONE    LAB RESULTS:      Lab 06/14/22 0418   WBC 7.40   HEMOGLOBIN 14.2   HEMATOCRIT 41.7   PLATELETS 351   .2*         Lab 06/16/22  0545 06/15/22  0409 06/14/22  0418 06/13/22  0438 06/12/22  0434 06/11/22  0425 06/11/22  0425 06/10/22  2001   SODIUM 133* 136 137 133* 134*   < > 138  --    POTASSIUM 4.1 4.8 4.1 4.3 4.4   < > 4.3  --    CHLORIDE 96* 97* 97* 98 97*   < > 100  --    CO2 23.9 24.9 25.8 24.9 27.2   < > 27.0  --    ANION GAP 13.1 14.1 14.2 10.1 9.8   < > 11.0  --    BUN 8 8 4* 5* 7*   < > 6*  --    CREATININE 0.94 0.79 0.69 0.68 0.59   < > 0.54*  --    EGFR 67.1 82.6 95.9 96.2 99.5   < > 101.7  --    GLUCOSE 118* 124* 108* 116* 118*   < > 128*  --    CALCIUM 9.4 9.9 10.0 9.2 9.2   < > 8.9  --    MAGNESIUM 1.9  --  1.8  --  1.7  --  1.7  --    PHOSPHORUS 5.3*  --  4.1 2.4* 2.2*  --  2.2*  --    HEMOGLOBIN A1C  --   --   --   --   --   --  5.00  --    TSH  --   --   --   --   --   --   --  3.520    < > = values in this interval not displayed.             Lab 06/10/22  2001   TROPONIN T <0.010         Lab 06/11/22  0425   CHOLESTEROL 181   LDL CHOL 95   HDL CHOL 67*   TRIGLYCERIDES 104         Lab 06/10/22  2001   ABO TYPING O   RH TYPING Positive         Brief Urine Lab Results  (Last result in the past 365 days)      Color   Clarity   Blood   Leuk Est   Nitrite   Protein   CREAT   Urine HCG        06/10/22 1033  Yellow   Clear   Trace   Small (1+)   Positive   Negative               Microbiology Results (last 10 days)     Procedure Component Value - Date/Time    Urine Culture - Urine, Urine, Clean Catch [[id number]]  (Abnormal)  (Susceptibility) Collected: 06/10/22 1033    Lab Status: Final result Specimen: Urine, Clean Catch Updated: 06/12/22 1053     Urine Culture >100,000 CFU/mL Escherichia coli    Narrative:      Colonization of the urinary tract without infection is common. Treatment is discouraged unless the patient is symptomatic, pregnant, or undergoing an invasive urologic procedure.    Susceptibility      Escherichia coli      VANCE      Ampicillin Susceptible      Ampicillin + Sulbactam Susceptible      Cefazolin Susceptible      Cefepime Susceptible      Ceftazidime Susceptible      Ceftriaxone Susceptible      Gentamicin Susceptible      Levofloxacin Susceptible      Nitrofurantoin Susceptible      Piperacillin + Tazobactam Susceptible      Trimethoprim + Sulfamethoxazole Susceptible                                 CT Angiogram Neck    Result Date: 6/10/2022  Impression:   1. No hemodynamically significant stenosis of the carotid or vertebral arteries.     REY LEE MD       Electronically Signed and Approved By: REY LEE MD on 6/10/2022 at 10:41             MRI Brain Without Contrast    Result Date: 6/10/2022  Impression:   1. No acute brain abnormality is seen. No acute infarct. No acute intracranial hemorrhage. 2. There may be minimal chronic small vessel ischemia/infarction. 3. Slight motion artifact obscures detail on some of the sequences.    4. The other findings are as detailed above.    COMMENT:  Part of this note is an electronic transcription of spoken language to printed text. The electronic translation/transcription may permit erroneous, or at times, nonsensical (or even sensical) words or phrases to be inadvertently transcribed or omitted; this  has reviewed the note for such  errors (as well as additional errors); however, some may still exist.  MIIRAM CAMARA JR, MD       Electronically Signed and Approved By: MIRIAM CAMARA JR, MD on 6/10/2022 at 23:31             MRI Cervical Spine Without Contrast    Result Date: 6/14/2022  Impression:   No acute osseous abnormality.  Multilevel degenerative changes are present throughout the spine with no evidence of canal stenosis.  Varying degrees of neural foraminal narrowing as described above..   JESSEE SOLIS MD       Electronically Signed and Approved By: JESSEE SOLIS MD on 6/14/2022 at 7:35             XR Chest 1 View    Result Date: 6/10/2022  Impression:   1. No acute cardiopulmonary disease.       REY LEE MD       Electronically Signed and Approved By: REY LEE MD on 6/10/2022 at 11:05             CT Head Without Contrast Stroke Protocol    Result Date: 6/10/2022  Impression:  Negative head CT.  No evidence of acute intracranial abnormality.  Stroke alert results called to Dr. Urban at 9:32 a.m..     HAYDEE SOL MD       Electronically Signed and Approved By: HAYDEE SOL MD on 6/10/2022 at 9:33             CT Angiogram Head w AI Analysis of LVO    Result Date: 6/10/2022  Impression:   1. No intracranial vascular stenosis or occlusion.     REY LEE MD       Electronically Signed and Approved By: REY LEE MD on 6/10/2022 at 10:32             CT CEREBRAL PERFUSION WITH & WITHOUT CONTRAST    Result Date: 6/10/2022  Impression:   1. No evidence of cerebral ischemia or core infarct.   Findings personally called to Dr. Urban of the emergency department at 1010 hours on 6/10/2022      REY LEE MD       Electronically Signed and Approved By: REY LEE MD on 6/10/2022 at 10:25                       Results for orders placed during the hospital encounter of 06/10/22    Adult Transthoracic Echo Complete With Contrast if Necessary Per Protocol    Interpretation Summary  · Left ventricular ejection fraction appears  to be 61 - 65%. Left ventricular systolic function is normal.  · There are no significant valvular abnormalities by Doppler study.  · This is a technically difficult and limited study.      Labs Pending at Discharge:  Pending Labs     Order Current Status    POC Creatinine with Hold Tube In process            Time spent on Discharge including face to face service: >30 minutes    Electronically signed by Nhan Smith DO, 06/17/22, 10:03 AM EDT.      Electronically signed by Nhan Smith DO at 06/17/22 1011

## 2022-06-17 NOTE — THERAPY TREATMENT NOTE
Acute Care - Physical Therapy Treatment Note   [facility]     Patient Name: Kiesha Pan  : 1955  MRN: [id number]  Today's Date: 2022      Visit Dx:     ICD-10-CM ICD-9-CM   1. Urinary tract infection in female  N39.0 599.0   2. Cerebrovascular accident (CVA), unspecified mechanism (HCC)  I63.9 434.91   3. Dysphagia, oropharyngeal  R13.12 787.22   4. Decreased activities of daily living (ADL)  Z78.9 V49.89   5. Difficulty walking  R26.2 719.7     Patient Active Problem List   Diagnosis   • Alcohol dependence (HCC)   • Allergic rhinitis due to allergen   • Anxiety   • Asthma   • Cataracts, bilateral   • COVID-19   • Depression   • Essential hypertension   • History of stroke   • Hypothyroid   • Other specified chronic obstructive airways disease   • Elevated troponin level not due to acute coronary syndrome   • Mixed hyperlipidemia     Past Medical History:   Diagnosis Date   • Asthma    • Elevated troponin level not due to acute coronary syndrome 12/3/2021   • Hypertension    • Mixed hyperlipidemia 12/3/2021   • Stroke (HCC) 2021     Past Surgical History:   Procedure Laterality Date   • CHOLECYSTECTOMY       PT Assessment (last 12 hours)     PT Evaluation and Treatment     Row Name 22 1250          Physical Therapy Time and Intention    Subjective Information no complaints  -RH     Document Type therapy note (daily note)  -RH     Mode of Treatment physical therapy;individual therapy  -RH     Patient Effort fair  -RH     Row Name 22 1250          Pain Scale: FACES Pre/Post-Treatment    Pain: FACES Scale, Pretreatment 0-->no hurt  -RH     Posttreatment Pain Rating 0-->no hurt  -RH     Row Name 22 1250          Bed Mobility    Bed Mobility supine-sit;scooting/bridging  -RH     All Activities, Emanuel (Bed Mobility) contact guard  -RH     Scooting/Bridging Emanuel (Bed Mobility) contact guard  -RH     Supine-Sit Emanuel (Bed Mobility) contact guard  -     Row Name 22  1250          Transfers    Transfers sit-stand transfer;stand-sit transfer  -RH     Sit-Stand [place] (Transfers) contact guard  -RH     Stand-Sit [place] (Transfers) contact guard  -RH     Row Name 06/17/22 1250          Sit-Stand Transfer    Assistive Device (Sit-Stand Transfers) cane, straight  -RH     Row Name 06/17/22 1250          Stand-Sit Transfer    Assistive Device (Stand-Sit Transfers) cane, straight  -RH     Row Name 06/17/22 1250          Gait/Stairs (Locomotion)    Gait/Stairs Locomotion gait/ambulation independence;gait/ambulation assistive device;distance ambulated;gait pattern;gait deviations  -RH     [place] Level (Gait) contact guard  -RH     Assistive Device (Gait) cane, straight  -RH     Distance in Feet (Gait) 225  -RH     Pattern (Gait) 3-point;step-to  -RH     Deviations/Abnormal Patterns (Gait) --  Narrow SARMAD with varying gait pattern with STC.  -     Row Name 06/17/22 1250          Vital Signs    O2 Delivery Intra Treatment room air  -     Row Name 06/17/22 1250          Progress Summary (PT)    Progress Toward Functional Goals (PT) progress toward functional goals is fair  -RH           User Key  (r) = Recorded By, (t) = Taken By, (c) = Cosigned By    Initials Name Provider Type     David Bee PTA Physical Therapist Assistant                Physical Therapy Education                 Title: PT OT SLP Therapies (Resolved)     Topic: Physical Therapy (Resolved)     Point: Mobility training (Resolved)     Learning Progress Summary           Patient Acceptance, E,TB, VU by RG at 6/17/2022 0818                   Point: Home exercise program (Resolved)     Learning Progress Summary           Patient Acceptance, E,TB, VU by RG at 6/17/2022 0818                   Point: Body mechanics (Resolved)     Learning Progress Summary           Patient Acceptance, E,TB, VU by RG at 6/17/2022 0818                   Point: Precautions (Resolved)     Learning Progress Summary            Patient Acceptance, E,TB, VU by JACINTO at 6/17/2022 0818                               User Key     Initials Effective Dates Name Provider Type Discipline     11/02/21 -  India Bailey, RN Registered Nurse Nurse              PT Recommendation and Plan     Progress Summary (PT)  Progress Toward Functional Goals (PT): progress toward functional goals is fair   Outcome Measures     Row Name 06/17/22 1200 06/16/22 0800 06/15/22 0800       How much help from another person do you currently need...    Turning from your back to your side while in flat bed without using bedrails? 4  -RH 4  -RH 3  -RH    Moving from lying on back to sitting on the side of a flat bed without bedrails? 4  -RH 4  -RH 3  -RH    Moving to and from a bed to a chair (including a wheelchair)? 3  -RH 3  -RH 3  -RH    Standing up from a chair using your arms (e.g., wheelchair, bedside chair)? 3  -RH 3  -RH 3  -RH    Climbing 3-5 steps with a railing? 3  -RH 3  -RH 2  -RH    To walk in hospital room? 4  -RH 4  -RH 3  -RH    AM-PAC 6 Clicks Score (PT) 21  -RH 21  -RH 17  -RH    Row Name 06/14/22 1500             How much help from another person do you currently need...    Turning from your back to your side while in flat bed without using bedrails? 3  -RH      Moving from lying on back to sitting on the side of a flat bed without bedrails? 3  -RH      Moving to and from a bed to a chair (including a wheelchair)? 2  -RH      Standing up from a chair using your arms (e.g., wheelchair, bedside chair)? 2  -RH      Climbing 3-5 steps with a railing? 2  -RH      To walk in hospital room? 3  -RH      AM-PAC 6 Clicks Score (PT) 15  -RH            User Key  (r) = Recorded By, (t) = Taken By, (c) = Cosigned By    Initials Name Provider Type    RH David Bee PTA Physical Therapist Assistant                 Time Calculation:    PT Charges     Row Name 06/17/22 1249             Time Calculation    Start Time 1249  -RH      PT Received On 06/17/22  -               Timed Charges    15538 - Gait Training Minutes  7  -RH      10568 - PT Therapeutic Activity Minutes 4  -RH              Total Minutes    Timed Charges Total Minutes 11  -RH       Total Minutes 11  -RH            User Key  (r) = Recorded By, (t) = Taken By, (c) = Cosigned By    Initials Name Provider Type    David White PTA Physical Therapist Assistant              Therapy Charges for Today     Code Description Service Date Service Provider Modifiers Qty    44090109498 HC GAIT TRAINING EA 15 MIN 6/16/2022 David Bee PTA GP 1    57094234218 HC GAIT TRAINING EA 15 MIN 6/17/2022 David Bee PTA GP 1          PT G-Codes  Outcome Measure Options: AM-PAC 6 Clicks Basic Mobility (PT)  AM-PAC 6 Clicks Score (PT): 21  AM-PAC 6 Clicks Score (OT): 12    David Bee PTA  6/17/2022

## 2022-06-17 NOTE — DISCHARGE SUMMARY
[facility]        HOSPITALIST  DISCHARGE SUMMARY    Patient Name: Kiesha Pan  : 1955  MRN: [id number]    Date of Admission: 6/10/2022  Date of Discharge:  22  Primary Care Physician: Ave Benson APRN    Consults     Date and Time Order Name Status Description    2022 12:02 PM Inpatient Neurology Consult General Completed     6/10/2022  4:36 PM Inpatient Cardiology Consult Completed     6/10/2022 11:37 AM Inpatient Hospitalist Consult            Active and Resolved Hospital Problems:  Active Hospital Problems   Essential hypertension  Hyperlipidemia  Paroxysmal atrial fibrillation   Nasal congestion  Allergic rhinitis  History of cerebrovascular accident   Resolved Hospital Problems    Diagnosis POA   • AMS (altered mental status) [R41.82] Yes   • Paroxysmal atrial fibrillation with RVR (HCC) [I48.0] Yes   Urinary tract infection secondary to E. coli  Alcohol withdrawal  Altered mental status likely secondary to alcohol withdrawal and urinary tract infection  Facial paresthesias  Hypomagnesemia  Hypophosphatemia  Metabolic acidosis    Hospital Course     Hospital Course:  Kiesha Pan is a 66 y.o. female with history of hypertension, hyperlipidemia, alcohol abuse, stroke who presented to the hospital with difficulty speaking and facial numbness.  Admitted for stroke work-up.  Imaging including CT and MRI of the head reported no evidence of acute ischemia or infarction.  CT of the head and neck reported no hemodynamically significant stenoses.  She went into atrial fibrillation with rapid ventricular rate which was a new diagnosis.  Cardiology was consulted.    Rate controlled with Coreg.  Converted back to normal sinus rhythm.Started on therapeutic Lovenox and switch to Eliquis for stroke prevention.  Echo showed preserved ejection fraction and no significant valvular abnormalities.  She had a recent outpatient stress test that was negative for ischemia.  No further  work-up was pursued from a cardiology standpoint. Seen by neurology and continued on daily baby aspirin along with high intensity statin. Neurological deficits resolved.  Tolerated oral intake. Was incidentally found to have urinary tract infection and completed a course of Bactrim. Deemed a good candidate for rehabilitation and was subsequently discharged to [facility] for ongoing physical therapy.  Discharge medications, follow-up instructions provided.  Discharged in stable condition.    DISCHARGE Follow Up Recommendations for labs and diagnostics: NONE      Day of Discharge     Vital Signs:  Temp:  [97.3 °F (36.3 °C)-98.8 °F (37.1 °C)] 97.3 °F (36.3 °C)  Heart Rate:  [73-88] 88  Resp:  [16-18] 18  BP: (128-169)/(73-96) 169/96  Physical Exam:   GENERAL: The patient is conversant and nontoxic.  HEENT: PERRLA, EOMI. Oropharynx clear. MMM  NECK: Supple, trachea midline  HEART: Regular rate and rhythm without murmurs, no edema  LUNGS: Equal air entry, clear to auscultation bilaterally.  ABDOMEN: Soft, positive bowel sounds, nontender, nondistended  SKIN: No rash or open wounds   NEUROLOGIC: Alert, cranial nerves grossly intact      Discharge Details        Discharge Medications      New Medications      Instructions Start Date   apixaban 5 MG tablet tablet  Commonly known as: ELIQUIS   5 mg, Oral, Every 12 Hours Scheduled      fluticasone 50 MCG/ACT nasal spray  Commonly known as: FLONASE   2 sprays, Each Nare, Daily   Start Date: June 18, 2022        Changes to Medications      Instructions Start Date   carvedilol 12.5 MG tablet  Commonly known as: COREG  What changed:   medication strength  how much to take  how to take this  when to take this  additional instructions   12.5 mg, Oral, 2 Times Daily With Meals         Continue These Medications      Instructions Start Date   aspirin 81 MG EC tablet   81 mg, Oral, Daily      atorvastatin 80 MG tablet  Commonly known as: LIPITOR   Take 1 tablet nightly at bedtime       ondansetron ODT 4 MG disintegrating tablet  Commonly known as: ZOFRAN-ODT   4 mg, Translingual, 4 Times Daily PRN         Stop These Medications    amLODIPine 5 MG tablet  Commonly known as: NORVASC     diclofenac 50 MG EC tablet  Commonly known as: VOLTAREN     lisinopril 40 MG tablet  Commonly known as: PRINIVIL,ZESTRIL            Allergies   Allergen Reactions   • Cephalexin Unknown - Low Severity   • Diphenhydramine Unknown - Low Severity   • Levofloxacin Unknown - Low Severity   • Penicillins Unknown - Low Severity       Discharge Disposition:  Rehab Facility or Unit (DC - External)    Diet:  Hospital:  Diet Order   Procedures   • Diet Soft Texture; Whole Foods; Thin       Discharge Activity:   Activity Instructions     Activity as Tolerated            CODE STATUS:  Code Status and Medical Interventions:   Ordered at: 06/10/22 2229     Code Status (Patient has no pulse and is not breathing):    CPR (Attempt to Resuscitate)     Medical Interventions (Patient has pulse or is breathing):    Full Support         Future Appointments   Date Time Provider Department Center   7/12/2022 11:45 AM Josué Purdy MD [facility]       Additional Instructions for the Follow-ups that You Need to Schedule     Discharge Follow-up with PCP   As directed       Currently Documented PCP:    Ave Benson APRN    PCP Phone Number:    [phone]     Follow Up Details: as directed         Discharge Follow-up with Specified Provider: Dr Navarro; 1 Month   As directed      To: Dr Navarro    Follow Up: 1 Month               Pertinent  and/or Most Recent Results     PROCEDURES: NONE    LAB RESULTS:      Lab 06/14/22 0418   WBC 7.40   HEMOGLOBIN 14.2   HEMATOCRIT 41.7   PLATELETS 351   .2*         Lab 06/16/22  0545 06/15/22  0409 06/14/22  0418 06/13/22  0438 06/12/22  0434 06/11/22  0425 06/11/22  0425 06/10/22  2001   SODIUM 133* 136 137 133* 134*   < > 138  --    POTASSIUM 4.1 4.8 4.1 4.3 4.4   < > 4.3   --    CHLORIDE 96* 97* 97* 98 97*   < > 100  --    CO2 23.9 24.9 25.8 24.9 27.2   < > 27.0  --    ANION GAP 13.1 14.1 14.2 10.1 9.8   < > 11.0  --    BUN 8 8 4* 5* 7*   < > 6*  --    CREATININE 0.94 0.79 0.69 0.68 0.59   < > 0.54*  --    EGFR 67.1 82.6 95.9 96.2 99.5   < > 101.7  --    GLUCOSE 118* 124* 108* 116* 118*   < > 128*  --    CALCIUM 9.4 9.9 10.0 9.2 9.2   < > 8.9  --    MAGNESIUM 1.9  --  1.8  --  1.7  --  1.7  --    PHOSPHORUS 5.3*  --  4.1 2.4* 2.2*  --  2.2*  --    HEMOGLOBIN A1C  --   --   --   --   --   --  5.00  --    TSH  --   --   --   --   --   --   --  3.520    < > = values in this interval not displayed.             Lab 06/10/22  2001   TROPONIN T <0.010         Lab 06/11/22  0425   CHOLESTEROL 181   LDL CHOL 95   HDL CHOL 67*   TRIGLYCERIDES 104         Lab 06/10/22  2001   ABO TYPING O   RH TYPING Positive         Brief Urine Lab Results  (Last result in the past 365 days)      Color   Clarity   Blood   Leuk Est   Nitrite   Protein   CREAT   Urine HCG        06/10/22 1033 Yellow   Clear   Trace   Small (1+)   Positive   Negative               Microbiology Results (last 10 days)     Procedure Component Value - Date/Time    Urine Culture - Urine, Urine, Clean Catch [[id number]]  (Abnormal)  (Susceptibility) Collected: 06/10/22 1033    Lab Status: Final result Specimen: Urine, Clean Catch Updated: 06/12/22 1053     Urine Culture >100,000 CFU/mL Escherichia coli    Narrative:      Colonization of the urinary tract without infection is common. Treatment is discouraged unless the patient is symptomatic, pregnant, or undergoing an invasive urologic procedure.    Susceptibility      Escherichia coli      VANCE      Ampicillin Susceptible      Ampicillin + Sulbactam Susceptible      Cefazolin Susceptible      Cefepime Susceptible      Ceftazidime Susceptible      Ceftriaxone Susceptible      Gentamicin Susceptible      Levofloxacin Susceptible      Nitrofurantoin Susceptible      Piperacillin +  Tazobactam Susceptible      Trimethoprim + Sulfamethoxazole Susceptible                                 CT Angiogram Neck    Result Date: 6/10/2022  Impression:   1. No hemodynamically significant stenosis of the carotid or vertebral arteries.     REY LEE MD       Electronically Signed and Approved By: REY LEE MD on 6/10/2022 at 10:41             MRI Brain Without Contrast    Result Date: 6/10/2022  Impression:   1. No acute brain abnormality is seen. No acute infarct. No acute intracranial hemorrhage. 2. There may be minimal chronic small vessel ischemia/infarction. 3. Slight motion artifact obscures detail on some of the sequences.    4. The other findings are as detailed above.    COMMENT:  Part of this note is an electronic transcription of spoken language to printed text. The electronic translation/transcription may permit erroneous, or at times, nonsensical (or even sensical) words or phrases to be inadvertently transcribed or omitted; this  has reviewed the note for such errors (as well as additional errors); however, some may still exist.  MIRIAM CAMARA JR, MD       Electronically Signed and Approved By: MIRIAM CAMARA JR, MD on 6/10/2022 at 23:31             MRI Cervical Spine Without Contrast    Result Date: 6/14/2022  Impression:   No acute osseous abnormality.  Multilevel degenerative changes are present throughout the spine with no evidence of canal stenosis.  Varying degrees of neural foraminal narrowing as described above..   JESSEE SOLIS MD       Electronically Signed and Approved By: JESSEE SOLIS MD on 6/14/2022 at 7:35             XR Chest 1 View    Result Date: 6/10/2022  Impression:   1. No acute cardiopulmonary disease.       REY LEE MD       Electronically Signed and Approved By: REY LEE MD on 6/10/2022 at 11:05             CT Head Without Contrast Stroke Protocol    Result Date: 6/10/2022  Impression:  Negative head CT.  No evidence of acute intracranial  abnormality.  Stroke alert results called to Dr. Urban at 9:32 a.m..     HAYDEE SOL MD       Electronically Signed and Approved By: HAYDEE SOL MD on 6/10/2022 at 9:33             CT Angiogram Head w AI Analysis of LVO    Result Date: 6/10/2022  Impression:   1. No intracranial vascular stenosis or occlusion.     REY LEE MD       Electronically Signed and Approved By: REY LEE MD on 6/10/2022 at 10:32             CT CEREBRAL PERFUSION WITH & WITHOUT CONTRAST    Result Date: 6/10/2022  Impression:   1. No evidence of cerebral ischemia or core infarct.   Findings personally called to Dr. Urban of the emergency department at 1010 hours on 6/10/2022      REY LEE MD       Electronically Signed and Approved By: REY LEE MD on 6/10/2022 at 10:25                       Results for orders placed during the hospital encounter of 06/10/22    Adult Transthoracic Echo Complete With Contrast if Necessary Per Protocol    Interpretation Summary  · Left ventricular ejection fraction appears to be 61 - 65%. Left ventricular systolic function is normal.  · There are no significant valvular abnormalities by Doppler study.  · This is a technically difficult and limited study.      Labs Pending at Discharge:  Pending Labs     Order Current Status    POC Creatinine with Hold Tube In process            Time spent on Discharge including face to face service: >30 minutes    Electronically signed by Nhan Smith DO, 06/17/22, 10:03 AM EDT.

## 2022-06-17 NOTE — PLAN OF CARE
Goal Outcome Evaluation:  Plan of Care Reviewed With: patient        Progress: improving. Continues to increase strength. Ambulates to bathroom, using a cane,  with assistance. Call light at hand.

## 2022-09-10 ENCOUNTER — APPOINTMENT (OUTPATIENT)
Dept: CT IMAGING | Facility: HOSPITAL | Age: 67
End: 2022-09-10

## 2022-09-10 ENCOUNTER — APPOINTMENT (OUTPATIENT)
Dept: GENERAL RADIOLOGY | Facility: HOSPITAL | Age: 67
End: 2022-09-10

## 2022-09-10 ENCOUNTER — HOSPITAL ENCOUNTER (EMERGENCY)
Facility: HOSPITAL | Age: 67
Discharge: HOME | End: 2022-09-10
Attending: EMERGENCY MEDICINE | Admitting: EMERGENCY MEDICINE

## 2022-09-10 VITALS
HEIGHT: 62 IN | BODY MASS INDEX: 27.47 KG/M2 | HEART RATE: 87 BPM | TEMPERATURE: 97.2 F | DIASTOLIC BLOOD PRESSURE: 65 MMHG | SYSTOLIC BLOOD PRESSURE: 114 MMHG | OXYGEN SATURATION: 95 % | WEIGHT: 149.25 LBS | RESPIRATION RATE: 16 BRPM

## 2022-09-10 DIAGNOSIS — F10.920 ALCOHOLIC INTOXICATION WITHOUT COMPLICATION: Primary | ICD-10-CM

## 2022-09-10 LAB
ALBUMIN SERPL-MCNC: 4.3 G/DL (ref 3.5–5.2)
ALBUMIN/GLOB SERPL: 1.9 G/DL
ALP SERPL-CCNC: 44 U/L (ref 39–117)
ALT SERPL W P-5'-P-CCNC: 18 U/L (ref 1–33)
AMMONIA BLD-SCNC: 20 UMOL/L (ref 11–51)
AMPHET+METHAMPHET UR QL: NEGATIVE
ANION GAP SERPL CALCULATED.3IONS-SCNC: 9.6 MMOL/L (ref 5–15)
APAP SERPL-MCNC: <5 MCG/ML (ref 0–30)
ARTERIAL PATENCY WRIST A: POSITIVE
AST SERPL-CCNC: 24 U/L (ref 1–32)
BACTERIA UR QL AUTO: ABNORMAL /HPF
BARBITURATES UR QL SCN: NEGATIVE
BASE EXCESS BLDA CALC-SCNC: 1.2 MMOL/L (ref -2–2)
BASOPHILS # BLD AUTO: 0.16 10*3/MM3 (ref 0–0.2)
BASOPHILS NFR BLD AUTO: 1.6 % (ref 0–1.5)
BDY SITE: ABNORMAL
BENZODIAZ UR QL SCN: NEGATIVE
BILIRUB SERPL-MCNC: 0.2 MG/DL (ref 0–1.2)
BILIRUB UR QL STRIP: NEGATIVE
BUN SERPL-MCNC: 11 MG/DL (ref 8–23)
BUN/CREAT SERPL: 19.6 (ref 7–25)
CA-I BLDA-SCNC: 1.1 MMOL/L (ref 1.13–1.32)
CALCIUM SPEC-SCNC: 8.7 MG/DL (ref 8.6–10.5)
CANNABINOIDS SERPL QL: NEGATIVE
CHLORIDE BLDA-SCNC: 111 MMOL/L (ref 98–106)
CHLORIDE SERPL-SCNC: 110 MMOL/L (ref 98–107)
CK SERPL-CCNC: 99 U/L (ref 20–180)
CLARITY UR: CLEAR
CO2 SERPL-SCNC: 30.4 MMOL/L (ref 22–29)
COCAINE UR QL: NEGATIVE
COHGB MFR BLD: 0.1 % (ref 0–1.5)
COLOR UR: YELLOW
CREAT SERPL-MCNC: 0.56 MG/DL (ref 0.57–1)
DEPRECATED RDW RBC AUTO: 45.9 FL (ref 37–54)
EGFRCR SERPLBLD CKD-EPI 2021: 100.8 ML/MIN/1.73
EOSINOPHIL # BLD AUTO: 0.01 10*3/MM3 (ref 0–0.4)
EOSINOPHIL NFR BLD AUTO: 0.1 % (ref 0.3–6.2)
ERYTHROCYTE [DISTWIDTH] IN BLOOD BY AUTOMATED COUNT: 12.4 % (ref 12.3–15.4)
ETHANOL BLD-MCNC: 428 MG/DL (ref 0–10)
ETHANOL UR QL: 0.43 %
FHHB: 4 % (ref 0–5)
GAS FLOW AIRWAY: 2 LPM
GLOBULIN UR ELPH-MCNC: 2.3 GM/DL
GLUCOSE BLDA-MCNC: 94 MMOL/L (ref 65–99)
GLUCOSE BLDC GLUCOMTR-MCNC: 81 MG/DL (ref 70–99)
GLUCOSE SERPL-MCNC: 96 MG/DL (ref 65–99)
GLUCOSE UR STRIP-MCNC: NEGATIVE MG/DL
HCO3 BLDA-SCNC: 26.6 MMOL/L (ref 22–26)
HCT VFR BLD AUTO: 38.8 % (ref 34–46.6)
HGB BLD-MCNC: 13.3 G/DL (ref 12–15.9)
HGB BLDA-MCNC: 13.5 G/DL (ref 11.7–14.6)
HGB UR QL STRIP.AUTO: NEGATIVE
HOLD SPECIMEN: NORMAL
HOLD SPECIMEN: NORMAL
HYALINE CASTS UR QL AUTO: ABNORMAL /LPF
IMM GRANULOCYTES # BLD AUTO: 0.02 10*3/MM3 (ref 0–0.05)
IMM GRANULOCYTES NFR BLD AUTO: 0.2 % (ref 0–0.5)
KETONES UR QL STRIP: NEGATIVE
LACTATE BLDA-SCNC: 2.94 MMOL/L (ref 0.5–2)
LEUKOCYTE ESTERASE UR QL STRIP.AUTO: ABNORMAL
LYMPHOCYTES # BLD AUTO: 3.27 10*3/MM3 (ref 0.7–3.1)
LYMPHOCYTES NFR BLD AUTO: 32.2 % (ref 19.6–45.3)
MAGNESIUM SERPL-MCNC: 2.3 MG/DL (ref 1.6–2.4)
MCH RBC QN AUTO: 34.4 PG (ref 26.6–33)
MCHC RBC AUTO-ENTMCNC: 34.3 G/DL (ref 31.5–35.7)
MCV RBC AUTO: 100.3 FL (ref 79–97)
METHADONE UR QL SCN: NEGATIVE
METHGB BLD QL: 0.2 % (ref 0–1.5)
MODALITY: ABNORMAL
MONOCYTES # BLD AUTO: 0.78 10*3/MM3 (ref 0.1–0.9)
MONOCYTES NFR BLD AUTO: 7.7 % (ref 5–12)
NEUTROPHILS NFR BLD AUTO: 5.91 10*3/MM3 (ref 1.7–7)
NEUTROPHILS NFR BLD AUTO: 58.2 % (ref 42.7–76)
NITRITE UR QL STRIP: NEGATIVE
NOTE: ABNORMAL
NRBC BLD AUTO-RTO: 0 /100 WBC (ref 0–0.2)
OPIATES UR QL: NEGATIVE
OXYCODONE UR QL SCN: NEGATIVE
OXYHGB MFR BLDV: 95.7 % (ref 94–99)
PCO2 BLDA: 44.8 MM HG (ref 35–45)
PH BLDA: 7.39 PH UNITS (ref 7.35–7.45)
PH UR STRIP.AUTO: 7 [PH] (ref 5–8)
PLATELET # BLD AUTO: 389 10*3/MM3 (ref 140–450)
PMV BLD AUTO: 9.2 FL (ref 6–12)
PO2 BLDA: 99 MM HG (ref 80–100)
POTASSIUM BLDA-SCNC: 3.46 MMOL/L (ref 3.5–5)
POTASSIUM SERPL-SCNC: 3.6 MMOL/L (ref 3.5–5.2)
PROT SERPL-MCNC: 6.6 G/DL (ref 6–8.5)
PROT UR QL STRIP: NEGATIVE
RBC # BLD AUTO: 3.87 10*6/MM3 (ref 3.77–5.28)
RBC # UR STRIP: ABNORMAL /HPF
REF LAB TEST METHOD: ABNORMAL
SALICYLATES SERPL-MCNC: <0.3 MG/DL
SAO2 % BLDCOA: 96 % (ref 95–99)
SODIUM BLDA-SCNC: 147.5 MMOL/L (ref 136–146)
SODIUM SERPL-SCNC: 150 MMOL/L (ref 136–145)
SP GR UR STRIP: <=1.005 (ref 1–1.03)
SQUAMOUS #/AREA URNS HPF: ABNORMAL /HPF
TROPONIN T SERPL-MCNC: <0.01 NG/ML (ref 0–0.03)
UROBILINOGEN UR QL STRIP: ABNORMAL
WBC # UR STRIP: ABNORMAL /HPF
WBC NRBC COR # BLD: 10.15 10*3/MM3 (ref 3.4–10.8)
WHOLE BLOOD HOLD COAG: NORMAL
WHOLE BLOOD HOLD SPECIMEN: NORMAL

## 2022-09-10 PROCEDURE — 96375 TX/PRO/DX INJ NEW DRUG ADDON: CPT

## 2022-09-10 PROCEDURE — 87086 URINE CULTURE/COLONY COUNT: CPT | Performed by: EMERGENCY MEDICINE

## 2022-09-10 PROCEDURE — 80307 DRUG TEST PRSMV CHEM ANLYZR: CPT | Performed by: EMERGENCY MEDICINE

## 2022-09-10 PROCEDURE — 80179 DRUG ASSAY SALICYLATE: CPT | Performed by: EMERGENCY MEDICINE

## 2022-09-10 PROCEDURE — 87186 SC STD MICRODIL/AGAR DIL: CPT | Performed by: EMERGENCY MEDICINE

## 2022-09-10 PROCEDURE — 25010000002 NALOXONE HCL 2 MG/2ML SOLUTION PREFILLED SYRINGE: Performed by: EMERGENCY MEDICINE

## 2022-09-10 PROCEDURE — 71045 X-RAY EXAM CHEST 1 VIEW: CPT

## 2022-09-10 PROCEDURE — 83735 ASSAY OF MAGNESIUM: CPT | Performed by: EMERGENCY MEDICINE

## 2022-09-10 PROCEDURE — 93005 ELECTROCARDIOGRAM TRACING: CPT | Performed by: EMERGENCY MEDICINE

## 2022-09-10 PROCEDURE — 36600 WITHDRAWAL OF ARTERIAL BLOOD: CPT | Performed by: EMERGENCY MEDICINE

## 2022-09-10 PROCEDURE — 96374 THER/PROPH/DIAG INJ IV PUSH: CPT

## 2022-09-10 PROCEDURE — 25010000002 THIAMINE PER 100 MG: Performed by: EMERGENCY MEDICINE

## 2022-09-10 PROCEDURE — 84484 ASSAY OF TROPONIN QUANT: CPT | Performed by: EMERGENCY MEDICINE

## 2022-09-10 PROCEDURE — 36415 COLL VENOUS BLD VENIPUNCTURE: CPT

## 2022-09-10 PROCEDURE — 80053 COMPREHEN METABOLIC PANEL: CPT | Performed by: EMERGENCY MEDICINE

## 2022-09-10 PROCEDURE — 70450 CT HEAD/BRAIN W/O DYE: CPT

## 2022-09-10 PROCEDURE — 82375 ASSAY CARBOXYHB QUANT: CPT | Performed by: EMERGENCY MEDICINE

## 2022-09-10 PROCEDURE — 82077 ASSAY SPEC XCP UR&BREATH IA: CPT | Performed by: EMERGENCY MEDICINE

## 2022-09-10 PROCEDURE — 99283 EMERGENCY DEPT VISIT LOW MDM: CPT

## 2022-09-10 PROCEDURE — 87077 CULTURE AEROBIC IDENTIFY: CPT | Performed by: EMERGENCY MEDICINE

## 2022-09-10 PROCEDURE — 80143 DRUG ASSAY ACETAMINOPHEN: CPT | Performed by: EMERGENCY MEDICINE

## 2022-09-10 PROCEDURE — 82805 BLOOD GASES W/O2 SATURATION: CPT | Performed by: EMERGENCY MEDICINE

## 2022-09-10 PROCEDURE — 81001 URINALYSIS AUTO W/SCOPE: CPT | Performed by: EMERGENCY MEDICINE

## 2022-09-10 PROCEDURE — 82962 GLUCOSE BLOOD TEST: CPT

## 2022-09-10 PROCEDURE — 85025 COMPLETE CBC W/AUTO DIFF WBC: CPT | Performed by: EMERGENCY MEDICINE

## 2022-09-10 PROCEDURE — 82550 ASSAY OF CK (CPK): CPT | Performed by: EMERGENCY MEDICINE

## 2022-09-10 PROCEDURE — 83050 HGB METHEMOGLOBIN QUAN: CPT | Performed by: EMERGENCY MEDICINE

## 2022-09-10 PROCEDURE — 82140 ASSAY OF AMMONIA: CPT | Performed by: EMERGENCY MEDICINE

## 2022-09-10 RX ORDER — THIAMINE HYDROCHLORIDE 100 MG/ML
100 INJECTION, SOLUTION INTRAMUSCULAR; INTRAVENOUS ONCE
Status: COMPLETED | OUTPATIENT
Start: 2022-09-10 | End: 2022-09-10

## 2022-09-10 RX ORDER — MELATONIN
1000 DAILY
COMMUNITY

## 2022-09-10 RX ORDER — NALOXONE HYDROCHLORIDE 1 MG/ML
2 INJECTION INTRAMUSCULAR; INTRAVENOUS; SUBCUTANEOUS ONCE
Status: COMPLETED | OUTPATIENT
Start: 2022-09-10 | End: 2022-09-10

## 2022-09-10 RX ORDER — LORATADINE 10 MG/1
10 TABLET ORAL DAILY
COMMUNITY

## 2022-09-10 RX ORDER — SODIUM CHLORIDE 0.9 % (FLUSH) 0.9 %
10 SYRINGE (ML) INJECTION AS NEEDED
Status: DISCONTINUED | OUTPATIENT
Start: 2022-09-10 | End: 2022-09-10 | Stop reason: HOSPADM

## 2022-09-10 RX ORDER — AMLODIPINE BESYLATE 2.5 MG/1
2.5 TABLET ORAL DAILY
COMMUNITY

## 2022-09-10 RX ADMIN — THIAMINE HYDROCHLORIDE 100 MG: 100 INJECTION, SOLUTION INTRAMUSCULAR; INTRAVENOUS at 14:18

## 2022-09-10 RX ADMIN — NALOXONE HYDROCHLORIDE 2 MG: 1 INJECTION PARENTERAL at 10:37

## 2022-09-10 RX ADMIN — SODIUM CHLORIDE 500 ML: 9 INJECTION, SOLUTION INTRAVENOUS at 12:38

## 2022-09-10 NOTE — ED TRIAGE NOTES
Son arrived to ER and he advised that it had been 12+ hours since he had saw his mom.  She was found down in their home.  The last communication that he had with her was via text at 0445 this AM.  At that time, she had asked him an appropriate question that made sense to him.

## 2022-09-10 NOTE — DISCHARGE INSTRUCTIONS
Take your medications as directed.  Do not drink alcohol.  Return for worsening symptoms.  Follow-up with your doctor on Monday.

## 2022-09-10 NOTE — ED NOTES
CT had been attempted, but it was unable to be done due to patient not cooperating.  Patient was screaming and being belligerent.

## 2022-09-10 NOTE — ED PROVIDER NOTES
"Time: 10:31 AM EDT  Arrived by: ambulance   Chief Complaint: Fall  History provided by: Patient's son  History is limited by: Altered mental status     History of Present Illness:  Patient is a 66 y.o. year old female who presents to the emergency department with a fall. Patient's son states that he walked in to see the patient on the floor, unresponsive, after being unable to reach her. She was last seen by her  before he left for work. Patient has a history of alcohol abuse.  Patient had the stomach \"bug\" a couple of days ago with vomiting. Patient has had a stroke in the past.     HPI    Similar Symptoms Previously: No  Recently seen: No      Patient Care Team  Primary Care Provider: Shahida Martinez MD    Past Medical History:     Allergies   Allergen Reactions   • Cephalexin Unknown - Low Severity   • Diphenhydramine Unknown - Low Severity   • Levofloxacin Unknown - Low Severity   • Penicillins Unknown - Low Severity     Past Medical History:   Diagnosis Date   • Asthma    • Elevated troponin level not due to acute coronary syndrome 12/3/2021   • Hypertension    • Mixed hyperlipidemia 12/3/2021   • Stroke (HCC) 03/2021     Past Surgical History:   Procedure Laterality Date   • CHOLECYSTECTOMY       Family History   Problem Relation Age of Onset   • Kidney disease Mother        Home Medications:  Prior to Admission medications    Medication Sig Start Date End Date Taking? Authorizing Provider   apixaban (ELIQUIS) 5 MG tablet tablet Take 1 tablet by mouth Every 12 (Twelve) Hours. Indications: Atrial Fibrillation 6/17/22   Nhan Smith, DO   aspirin 81 MG EC tablet Take 81 mg by mouth Daily.    Provider, MD Jesi   atorvastatin (LIPITOR) 80 MG tablet Take 1 tablet nightly at bedtime 12/3/21   Ave Benson APRN   carvedilol (COREG) 12.5 MG tablet Take 1 tablet by mouth 2 (Two) Times a Day With Meals. 6/17/22   Nhan Smith DO   fluticasone (FLONASE) 50 MCG/ACT nasal spray 2 " "sprays by Each Nare route Daily. 22   Nhan Smith,    ondansetron ODT (ZOFRAN-ODT) 4 MG disintegrating tablet Place 1 tablet on the tongue 4 (Four) Times a Day As Needed for Nausea or Vomiting. 22   Shelli Garcia APRN        Social History:   Social History     Tobacco Use   • Smoking status: Former Smoker     Quit date:      Years since quittin.7   • Smokeless tobacco: Never Used   Vaping Use   • Vaping Use: Never used   Substance Use Topics   • Alcohol use: Yes     Alcohol/week: 8.0 standard drinks     Types: 4 Shots of liquor, 4 Drinks containing 0.5 oz of alcohol per week     Comment: [place]   • Drug use: Not Currently         Review of Systems:  Review of Systems   Unable to perform ROS: Mental status change        Physical Exam:  /65 (BP Location: Right arm, Patient Position: Lying)   Pulse 87   Temp 97.2 °F (36.2 °C) (Rectal)   Resp 16   Ht 157.5 cm (62\")   Wt 67.7 kg (149 lb 4 oz)   LMP  (LMP Unknown)   SpO2 95%   BMI 27.30 kg/m²     Physical Exam  Vitals and nursing note reviewed.   Constitutional:       General: She is not in acute distress.     Appearance: Normal appearance. She is not toxic-appearing.      Comments: Only grunting in response to stimulus    HENT:      Head: Normocephalic and atraumatic.      Jaw: There is normal jaw occlusion.      Mouth/Throat:      Lips: Pink.      Mouth: Mucous membranes are moist.   Eyes:      General: Lids are normal.      Extraocular Movements: Extraocular movements intact.      Conjunctiva/sclera: Conjunctivae normal.      Pupils: Pupils are equal, round, and reactive to light.      Comments: Pupils are 3mm, they do react.    Neck:      Comments: Non-tender   Cardiovascular:      Rate and Rhythm: Normal rate and regular rhythm.      Pulses: Normal pulses.      Heart sounds: Normal heart sounds.   Pulmonary:      Effort: Pulmonary effort is normal. No respiratory distress.      Breath sounds: Examination of the right-upper field " reveals decreased breath sounds. Examination of the left-upper field reveals decreased breath sounds. Examination of the right-middle field reveals decreased breath sounds. Examination of the left-middle field reveals decreased breath sounds. Examination of the right-lower field reveals decreased breath sounds. Examination of the left-lower field reveals decreased breath sounds. Decreased breath sounds present. No wheezing or rhonchi.   Abdominal:      General: Abdomen is flat. Bowel sounds are normal.      Palpations: Abdomen is soft.      Tenderness: There is no abdominal tenderness. There is no guarding or rebound.   Musculoskeletal:         General: Normal range of motion.      Cervical back: Normal range of motion and neck supple.      Right lower leg: No edema.      Left lower leg: No edema.   Skin:     General: Skin is warm and dry.   Neurological:      Mental Status: Mental status is at baseline. She is lethargic and disoriented.      Comments: Generalized weakness, lethargic   Psychiatric:         Mood and Affect: Mood normal.                Medications in the Emergency Department:  Medications   Naloxone HCl (NARCAN) injection 2 mg (2 mg Intravenous Given 9/10/22 1037)   sodium chloride 0.9 % bolus 500 mL (0 mL Intravenous Stopped 9/10/22 1411)   thiamine (B-1) injection 100 mg (100 mg Intravenous Given 9/10/22 1418)        Labs    Labs Reviewed   URINE CULTURE - Abnormal; Notable for the following components:       Result Value    Urine Culture >100,000 CFU/mL Gram Negative Bacilli (*)     All other components within normal limits    Narrative:     Colonization of the urinary tract without infection is common. Treatment is discouraged unless the patient is symptomatic, pregnant, or undergoing an invasive urologic procedure.   COMPREHENSIVE METABOLIC PANEL - Abnormal; Notable for the following components:    Creatinine 0.56 (*)     Sodium 150 (*)     Chloride 110 (*)     CO2 30.4 (*)     All other  components within normal limits    Narrative:     GFR Normal >60  Chronic Kidney Disease <60  Kidney Failure <15     URINALYSIS W/ CULTURE IF INDICATED - Abnormal; Notable for the following components:    Leuk Esterase, UA Trace (*)     All other components within normal limits    Narrative:     In absence of clinical symptoms, the presence of pyuria, bacteria, and/or nitrites on the urinalysis result does not correlate with infection.   CBC WITH AUTO DIFFERENTIAL - Abnormal; Notable for the following components:    .3 (*)     MCH 34.4 (*)     Eosinophil % 0.1 (*)     Basophil % 1.6 (*)     Lymphocytes, Absolute 3.27 (*)     All other components within normal limits   URINALYSIS, MICROSCOPIC ONLY - Abnormal; Notable for the following components:    RBC, UA 0-2 (*)     WBC, UA 6-12 (*)     Bacteria, UA 2+ (*)     All other components within normal limits   ETHANOL - Abnormal; Notable for the following components:    Ethanol 428 (*)     All other components within normal limits    Narrative:     Ethanol (Plasma)  <10 Essentially Negative    Toxic Concentrations           mg/dL    Flushing, slowing of reflexes    Impaired visual activity         Depression of CNS              >100  Possible Coma                  >300      ABG WITH COOX AND ELECTROLYTES - Abnormal; Notable for the following components:    HCO3, Arterial 26.6 (*)     Sodium, Arterial 147.5 (*)     Potassium, Arterial 3.46 (*)     Ionized Calcium, Arterial 1.10 (*)     Chloride, Arterial 111 (*)     Lactate, Arterial 2.94 (*)     All other components within normal limits   TROPONIN (IN-HOUSE) - Normal    Narrative:     Troponin T Reference Range:  <= 0.03 ng/mL-   Negative for AMI  >0.03 ng/mL-     Abnormal for myocardial necrosis.  Clinicians would have to utilize clinical acumen, EKG, Troponin and serial changes to determine if it is an Acute Myocardial Infarction or myocardial injury due to an underlying chronic condition.        Results may be falsely decreased if patient taking Biotin.     MAGNESIUM - Normal   URINE DRUG SCREEN - Normal    Narrative:     Negative Thresholds Per Drugs Screened:    Amphetamines                 500 ng/ml  Barbiturates                 200 ng/ml  Benzodiazepines              100 ng/ml  Cocaine                      300 ng/ml  Methadone                    300 ng/ml  Opiates                      300 ng/ml  Oxycodone                    100 ng/ml  THC                           50 ng/ml    The Normal Value for all drugs tested is negative. This report includes final unconfirmed screening results to be used for medical treatment purposes only. Unconfirmed results must not be used for non-medical purposes such as employment or legal testing. Clinical consideration should be applied to any drug of abuse test, particularly when unconfirmed results are used.           SALICYLATE LEVEL - Normal   ACETAMINOPHEN LEVEL - Normal   CK - Normal   AMMONIA - Normal   POCT GLUCOSE FINGERSTICK - Normal   RAINBOW DRAW    Narrative:     The following orders were created for panel order [place] Draw.  Procedure                               Abnormality         Status                     ---------                               -----------         ------                     Green Top (Gel)[[id number]]                                  Final result               Lavender Top[[id number]]                                     Final result               Gold Top - SST[[id number]]                                   Final result               Light Blue Top[[id number]]                                   Final result                 Please view results for these tests on the individual orders.   POCT GLUCOSE FINGERSTICK   CBC AND DIFFERENTIAL    Narrative:     The following orders were created for panel order CBC & Differential.  Procedure                               Abnormality         Status                     ---------                                -----------         ------                     CBC Auto Differential[[id number]]        Abnormal            Final result                 Please view results for these tests on the individual orders.   GREEN TOP   LAVENDER TOP   GOLD TOP - SST   LIGHT BLUE TOP       Lab Results (last 24 hours)     ** No results found for the last 24 hours. **           Imaging:    CT Head Without Contrast    Result Date: 9/10/2022  Narrative: PROCEDURE: CT HEAD WO CONTRAST  COMPARISON:  [facility], MR, MRI BRAIN WO CONTRAST, 6/10/2022, 22:08.  [facility], CT, CT HEAD WO CONTRAST, 11/06/2021, 19:10. INDICATIONS: ALTERED MENTAL STATUS. POSSIBLE FALL. ETOH.  PROTOCOL:   Standard imaging protocol performed    RADIATION:   DLP: 1018.2mGy*cm   MA and/or KV was adjusted to minimize radiation dose.     TECHNIQUE: After obtaining the patient's consent, CT images were obtained without non-ionic intravenous contrast material.  FINDINGS:  There is no acute intracranial hemorrhage or extra-axial collection. The ventricles appear normal in caliber, with no evidence of mass effect or midline shift. The basal cisterns are patent. The gray-white differentiation is preserved.  The calvarium is intact. The paranasal sinuses and mastoid air cells are well-aerated.      Impression:  No acute intracranial process or calvarial fracture identified.     PASCUAL SAMAYOA MD       Electronically Signed and Approved By: PASCUAL SAMAYOA MD on 9/10/2022 at 13:28             XR Chest 1 View    Result Date: 9/10/2022  Narrative: PROCEDURE: XR CHEST 1 VW  COMPARISON: [facility], CR, XR CHEST 1 VW, 6/10/2022, 10:39.  INDICATIONS: Weak/Dizzy/AMS triage protocol  FINDINGS:  LUNGS: Normal.  No significant pulmonary parenchymal abnormalities.  VASCULATURE: Normal.  Unremarkable pulmonary vasculature.  CARDIAC: Normal.  No cardiac silhouette abnormality or cardiomegaly.  MEDIASTINUM: Normal.  No visible mass or adenopathy.   PLEURA: Normal.  No effusion or pleural thickening.  BONES: Normal.  No fracture or visible bony lesion.  OTHER: Negative.       Impression:  No acute cardiopulmonary process identified.       PASCUAL SAMAYOA MD       Electronically Signed and Approved By: PASCUAL SAMAYOA MD on 9/10/2022 at 10:34               No Radiology Exams Resulted Within Past 24 Hours    Procedures:  Procedures    Progress                            Medical Decision Making:  MDM  Number of Diagnoses or Management Options  Alcoholic intoxication without complication (HCC)  Diagnosis management comments: Patient given IV fluids and thiamine in the emergency department.  The patient is now alert and oriented and ambulatory without ataxia after period of observation in the emergency room.  The patient's ancillary studies are unremarkable.       Amount and/or Complexity of Data Reviewed  Clinical lab tests: reviewed  Tests in the radiology section of CPT®: reviewed  Tests in the medicine section of CPT®: reviewed  Decide to obtain previous medical records or to obtain history from someone other than the patient: yes  Obtain history from someone other than the patient: yes  Review and summarize past medical records: yes  Discuss the patient with other providers: yes  Independent visualization of images, tracings, or specimens: yes         Final diagnoses:   Alcoholic intoxication without complication (HCC)        Disposition:  ED Disposition     ED Disposition   Discharge    Condition   Stable    Comment   --             This medical record created using voice recognition software.      Documentation assistance provided by Jahaira Ramirez acting as scribe for Dr. Trevin Hernandez DO. Information recorded by the scribe was done at my direction and has been verified and validated by me.        Jahaira Ramirez  09/10/22 1040       Trevin Hernandez DO  09/11/22 9732

## 2022-09-11 LAB — QT INTERVAL: 457 MS

## 2022-09-12 ENCOUNTER — TELEPHONE (OUTPATIENT)
Dept: EMERGENCY DEPT | Facility: HOSPITAL | Age: 67
End: 2022-09-12

## 2022-09-12 LAB — BACTERIA SPEC AEROBE CULT: ABNORMAL

## 2022-09-13 ENCOUNTER — TELEPHONE (OUTPATIENT)
Dept: EMERGENCY DEPT | Facility: HOSPITAL | Age: 67
End: 2022-09-13

## 2022-12-02 ENCOUNTER — APPOINTMENT (OUTPATIENT)
Dept: CT IMAGING | Facility: HOSPITAL | Age: 67
End: 2022-12-02

## 2022-12-02 ENCOUNTER — HOSPITAL ENCOUNTER (EMERGENCY)
Facility: HOSPITAL | Age: 67
Discharge: HOME | End: 2022-12-02
Attending: EMERGENCY MEDICINE | Admitting: EMERGENCY MEDICINE

## 2022-12-02 ENCOUNTER — APPOINTMENT (OUTPATIENT)
Dept: GENERAL RADIOLOGY | Facility: HOSPITAL | Age: 67
End: 2022-12-02

## 2022-12-02 VITALS
TEMPERATURE: 100 F | HEART RATE: 119 BPM | OXYGEN SATURATION: 95 % | DIASTOLIC BLOOD PRESSURE: 97 MMHG | WEIGHT: 152.12 LBS | HEIGHT: 67 IN | SYSTOLIC BLOOD PRESSURE: 158 MMHG | RESPIRATION RATE: 20 BRPM | BODY MASS INDEX: 23.88 KG/M2

## 2022-12-02 DIAGNOSIS — N30.00 ACUTE CYSTITIS WITHOUT HEMATURIA: ICD-10-CM

## 2022-12-02 DIAGNOSIS — R25.1 TREMOR: Primary | ICD-10-CM

## 2022-12-02 LAB
ABO GROUP BLD: NORMAL
ALBUMIN SERPL-MCNC: 4.8 G/DL (ref 3.5–5.2)
ALBUMIN/GLOB SERPL: 1.8 G/DL
ALP SERPL-CCNC: 59 U/L (ref 39–117)
ALT SERPL W P-5'-P-CCNC: 47 U/L (ref 1–33)
ANION GAP SERPL CALCULATED.3IONS-SCNC: 19.2 MMOL/L (ref 5–15)
ANISOCYTOSIS BLD QL: NORMAL
APTT PPP: 25.1 SECONDS (ref 24.2–34.2)
AST SERPL-CCNC: 56 U/L (ref 1–32)
BACTERIA UR QL AUTO: ABNORMAL /HPF
BASOPHILS # BLD AUTO: 0.08 10*3/MM3 (ref 0–0.2)
BASOPHILS NFR BLD AUTO: 1.1 % (ref 0–1.5)
BILIRUB SERPL-MCNC: 0.9 MG/DL (ref 0–1.2)
BILIRUB UR QL STRIP: NEGATIVE
BLD GP AB SCN SERPL QL: NEGATIVE
BUN SERPL-MCNC: 7 MG/DL (ref 8–23)
BUN/CREAT SERPL: 8.9 (ref 7–25)
CALCIUM SPEC-SCNC: 9.3 MG/DL (ref 8.6–10.5)
CHLORIDE SERPL-SCNC: 93 MMOL/L (ref 98–107)
CLARITY UR: CLEAR
CO2 SERPL-SCNC: 25.8 MMOL/L (ref 22–29)
COLOR UR: YELLOW
CREAT SERPL-MCNC: 0.79 MG/DL (ref 0.57–1)
DEPRECATED RDW RBC AUTO: 53.7 FL (ref 37–54)
EGFRCR SERPLBLD CKD-EPI 2021: 82.1 ML/MIN/1.73
EOSINOPHIL # BLD AUTO: 0 10*3/MM3 (ref 0–0.4)
EOSINOPHIL NFR BLD AUTO: 0 % (ref 0.3–6.2)
ERYTHROCYTE [DISTWIDTH] IN BLOOD BY AUTOMATED COUNT: 14.4 % (ref 12.3–15.4)
GLOBULIN UR ELPH-MCNC: 2.6 GM/DL
GLUCOSE SERPL-MCNC: 156 MG/DL (ref 65–99)
GLUCOSE UR STRIP-MCNC: NEGATIVE MG/DL
HCT VFR BLD AUTO: 40.4 % (ref 34–46.6)
HGB BLD-MCNC: 13.9 G/DL (ref 12–15.9)
HGB UR QL STRIP.AUTO: ABNORMAL
HOLD SPECIMEN: NORMAL
HYALINE CASTS UR QL AUTO: ABNORMAL /LPF
IMM GRANULOCYTES # BLD AUTO: 0.03 10*3/MM3 (ref 0–0.05)
IMM GRANULOCYTES NFR BLD AUTO: 0.4 % (ref 0–0.5)
INR PPP: 1 (ref 0.86–1.15)
KETONES UR QL STRIP: ABNORMAL
LEUKOCYTE ESTERASE UR QL STRIP.AUTO: ABNORMAL
LYMPHOCYTES # BLD AUTO: 1.23 10*3/MM3 (ref 0.7–3.1)
LYMPHOCYTES NFR BLD AUTO: 16.3 % (ref 19.6–45.3)
MCH RBC QN AUTO: 34.8 PG (ref 26.6–33)
MCHC RBC AUTO-ENTMCNC: 34.4 G/DL (ref 31.5–35.7)
MCV RBC AUTO: 101 FL (ref 79–97)
MONOCYTES # BLD AUTO: 0.71 10*3/MM3 (ref 0.1–0.9)
MONOCYTES NFR BLD AUTO: 9.4 % (ref 5–12)
NEUTROPHILS NFR BLD AUTO: 5.5 10*3/MM3 (ref 1.7–7)
NEUTROPHILS NFR BLD AUTO: 72.8 % (ref 42.7–76)
NITRITE UR QL STRIP: NEGATIVE
NRBC BLD AUTO-RTO: 0.5 /100 WBC (ref 0–0.2)
PH UR STRIP.AUTO: 7 [PH] (ref 5–8)
PLATELET # BLD AUTO: 342 10*3/MM3 (ref 140–450)
PMV BLD AUTO: 10.1 FL (ref 6–12)
POTASSIUM SERPL-SCNC: 3.6 MMOL/L (ref 3.5–5.2)
PROT SERPL-MCNC: 7.4 G/DL (ref 6–8.5)
PROT UR QL STRIP: ABNORMAL
PROTHROMBIN TIME: 13.3 SECONDS (ref 11.8–14.9)
RBC # BLD AUTO: 4 10*6/MM3 (ref 3.77–5.28)
RBC # UR STRIP: ABNORMAL /HPF
REF LAB TEST METHOD: ABNORMAL
RH BLD: POSITIVE
SMALL PLATELETS BLD QL SMEAR: ADEQUATE
SODIUM SERPL-SCNC: 138 MMOL/L (ref 136–145)
SP GR UR STRIP: 1.01 (ref 1–1.03)
SQUAMOUS #/AREA URNS HPF: ABNORMAL /HPF
T&S EXPIRATION DATE: NORMAL
T4 FREE SERPL-MCNC: 1.36 NG/DL (ref 0.93–1.7)
TARGETS BLD QL SMEAR: NORMAL
TROPONIN T SERPL-MCNC: <0.01 NG/ML (ref 0–0.03)
TSH SERPL DL<=0.05 MIU/L-ACNC: 6.59 UIU/ML (ref 0.27–4.2)
UROBILINOGEN UR QL STRIP: ABNORMAL
WBC # UR STRIP: ABNORMAL /HPF
WBC MORPH BLD: NORMAL
WBC NRBC COR # BLD: 7.55 10*3/MM3 (ref 3.4–10.8)
WHOLE BLOOD HOLD COAG: NORMAL
WHOLE BLOOD HOLD SPECIMEN: NORMAL

## 2022-12-02 PROCEDURE — 96375 TX/PRO/DX INJ NEW DRUG ADDON: CPT

## 2022-12-02 PROCEDURE — 85610 PROTHROMBIN TIME: CPT

## 2022-12-02 PROCEDURE — 84439 ASSAY OF FREE THYROXINE: CPT | Performed by: EMERGENCY MEDICINE

## 2022-12-02 PROCEDURE — 36415 COLL VENOUS BLD VENIPUNCTURE: CPT

## 2022-12-02 PROCEDURE — 85730 THROMBOPLASTIN TIME PARTIAL: CPT

## 2022-12-02 PROCEDURE — 93005 ELECTROCARDIOGRAM TRACING: CPT

## 2022-12-02 PROCEDURE — 25010000002 CEFTRIAXONE PER 250 MG: Performed by: EMERGENCY MEDICINE

## 2022-12-02 PROCEDURE — 85025 COMPLETE CBC W/AUTO DIFF WBC: CPT

## 2022-12-02 PROCEDURE — 71045 X-RAY EXAM CHEST 1 VIEW: CPT

## 2022-12-02 PROCEDURE — 84443 ASSAY THYROID STIM HORMONE: CPT | Performed by: EMERGENCY MEDICINE

## 2022-12-02 PROCEDURE — 99284 EMERGENCY DEPT VISIT MOD MDM: CPT

## 2022-12-02 PROCEDURE — 25010000002 LORAZEPAM PER 2 MG: Performed by: EMERGENCY MEDICINE

## 2022-12-02 PROCEDURE — 80053 COMPREHEN METABOLIC PANEL: CPT

## 2022-12-02 PROCEDURE — 86900 BLOOD TYPING SEROLOGIC ABO: CPT

## 2022-12-02 PROCEDURE — 86901 BLOOD TYPING SEROLOGIC RH(D): CPT

## 2022-12-02 PROCEDURE — 84484 ASSAY OF TROPONIN QUANT: CPT

## 2022-12-02 PROCEDURE — 86850 RBC ANTIBODY SCREEN: CPT

## 2022-12-02 PROCEDURE — 93005 ELECTROCARDIOGRAM TRACING: CPT | Performed by: EMERGENCY MEDICINE

## 2022-12-02 PROCEDURE — 81001 URINALYSIS AUTO W/SCOPE: CPT | Performed by: EMERGENCY MEDICINE

## 2022-12-02 PROCEDURE — 85007 BL SMEAR W/DIFF WBC COUNT: CPT

## 2022-12-02 PROCEDURE — 70450 CT HEAD/BRAIN W/O DYE: CPT

## 2022-12-02 PROCEDURE — 96365 THER/PROPH/DIAG IV INF INIT: CPT

## 2022-12-02 PROCEDURE — 93010 ELECTROCARDIOGRAM REPORT: CPT | Performed by: INTERNAL MEDICINE

## 2022-12-02 RX ORDER — SODIUM CHLORIDE 0.9 % (FLUSH) 0.9 %
10 SYRINGE (ML) INJECTION AS NEEDED
Status: DISCONTINUED | OUTPATIENT
Start: 2022-12-02 | End: 2022-12-03 | Stop reason: HOSPADM

## 2022-12-02 RX ORDER — LORAZEPAM 2 MG/ML
1 INJECTION INTRAMUSCULAR ONCE
Status: COMPLETED | OUTPATIENT
Start: 2022-12-02 | End: 2022-12-02

## 2022-12-02 RX ORDER — CEFTRIAXONE SODIUM 1 G/50ML
1 INJECTION, SOLUTION INTRAVENOUS ONCE
Status: COMPLETED | OUTPATIENT
Start: 2022-12-02 | End: 2022-12-02

## 2022-12-02 RX ORDER — CEPHALEXIN 500 MG/1
500 CAPSULE ORAL 2 TIMES DAILY
Qty: 14 CAPSULE | Refills: 0 | Status: SHIPPED | OUTPATIENT
Start: 2022-12-02 | End: 2022-12-09

## 2022-12-02 RX ADMIN — LORAZEPAM 1 MG: 2 INJECTION INTRAMUSCULAR; INTRAVENOUS at 20:24

## 2022-12-02 RX ADMIN — CEFTRIAXONE SODIUM 1 G: 1 INJECTION, SOLUTION INTRAVENOUS at 23:15

## 2022-12-02 NOTE — ED PROVIDER NOTES
Time: 5:26 PM EST  Chief Complaint:   Chief Complaint   Patient presents with   • Headache   • Tremors           History of Present Illness (obtained in triage):  Patient is a 67 y.o. year old female who presents to the emergency department with bilateral upper extremity and lower extremity tremors and headache that started last night around 8 PM.  Family history of TIA.       History of Present Illness (obtained by ED Provider, Dr. Becerra)   At 8 PM yesterday, the patient started having difficulty walking and shaking all over. Family member states this has been intermittent since last night going into today. She has had episodes like this in the past, but refuses to be evaluated for this. She has a history of COPD, and does feel short of breath now. She also endorses tingling throughout her body, especially in the fingertips of her right hand, She states she feels scared right now, worried about her family history of stroke. She denies dizziness or lightheadedness. Since last night, she has also had epigastric pain and vomiting. She has a past surgical history of cholecystectomy. She denies ETOH use.              Patient Care Team  Primary Care Provider: Shahida Martinez MD    Past Medical History:     Allergies   Allergen Reactions   • Cephalexin Unknown - Low Severity   • Diphenhydramine Unknown - Low Severity   • Levofloxacin Unknown - Low Severity   • Penicillins Unknown - Low Severity     Past Medical History:   Diagnosis Date   • Asthma    • Elevated troponin level not due to acute coronary syndrome 12/3/2021   • Hypertension    • Mixed hyperlipidemia 12/3/2021   • Stroke (HCC) 03/2021     Past Surgical History:   Procedure Laterality Date   • CHOLECYSTECTOMY       Family History   Problem Relation Age of Onset   • Kidney disease Mother        Home Medications:  Prior to Admission medications    Medication Sig Start Date End Date Taking? Authorizing Provider   amLODIPine (NORVASC) 2.5 MG  tablet Take 2.5 mg by mouth Daily.    Jesi Richardson MD   apixaban (ELIQUIS) 5 MG tablet tablet Take 1 tablet by mouth Every 12 (Twelve) Hours. Indications: Atrial Fibrillation 22   Nhan Smith DO   aspirin 81 MG EC tablet Take 81 mg by mouth Daily.    Jesi Richardson MD   atorvastatin (LIPITOR) 80 MG tablet Take 1 tablet nightly at bedtime 12/3/21   Ave Benson APRN   carvedilol (COREG) 12.5 MG tablet Take 1 tablet by mouth 2 (Two) Times a Day With Meals. 22   Nhan Smith DO   cholecalciferol (VITAMIN D3) 25 MCG (1000 UT) tablet Take 1,000 Units by mouth Daily.    Jesi Richardson MD   fluticasone (FLONASE) 50 MCG/ACT nasal spray 2 sprays by Each Nare route Daily. 22   Nhan Smith DO   loratadine (Claritin) 10 MG tablet Take 10 mg by mouth Daily.    Jesi Richardson MD   ondansetron ODT (ZOFRAN-ODT) 4 MG disintegrating tablet Place 1 tablet on the tongue 4 (Four) Times a Day As Needed for Nausea or Vomiting. 22   Shelli Garcia APRN   rivaroxaban (XARELTO) 15 MG tablet Take 15 mg by mouth Daily.    Jesi Richardson MD   thiamine (VITAMIN B-1) 100 MG tablet  tablet Take 100 mg by mouth Daily.    Jesi Richardson MD        Social History:   Social History     Tobacco Use   • Smoking status: Former     Types: Cigarettes     Quit date:      Years since quittin.9   • Smokeless tobacco: Never   Vaping Use   • Vaping Use: Never used   Substance Use Topics   • Alcohol use: Yes     Alcohol/week: 8.0 standard drinks     Types: 4 Shots of liquor, 4 Drinks containing 0.5 oz of alcohol per week     Comment: [place]   • Drug use: Not Currently         Review of Systems:  Review of Systems   Constitutional: Negative for chills and fever.   HENT: Negative for congestion, rhinorrhea and sore throat.    Eyes: Negative for photophobia.   Respiratory: Negative for apnea, cough, chest tightness and shortness of breath.    Cardiovascular: Negative for chest  "pain and palpitations.   Gastrointestinal: Negative for abdominal pain, diarrhea, nausea and vomiting.   Endocrine: Negative.    Genitourinary: Negative for difficulty urinating and dysuria.   Musculoskeletal: Negative for back pain, joint swelling and myalgias.   Skin: Negative for color change and wound.   Allergic/Immunologic: Negative.    Neurological: Positive for tremors and headaches. Negative for dizziness, seizures, syncope, facial asymmetry, speech difficulty, weakness, light-headedness and numbness.   Psychiatric/Behavioral: Negative.    All other systems reviewed and are negative.       Physical Exam:  /97   Pulse 119   Temp 100 °F (37.8 °C)   Resp 20   Ht 170.2 cm (67\")   Wt 69 kg (152 lb 1.9 oz)   LMP  (LMP Unknown)   SpO2 95%   BMI 23.82 kg/m²     Physical Exam  Vitals and nursing note reviewed.   Constitutional:       General: She is awake.      Appearance: Normal appearance.   HENT:      Head: Normocephalic and atraumatic.      Nose: Nose normal.      Mouth/Throat:      Mouth: Mucous membranes are moist.   Eyes:      Extraocular Movements: Extraocular movements intact.      Pupils: Pupils are equal, round, and reactive to light.   Cardiovascular:      Rate and Rhythm: Regular rhythm. Tachycardia present.      Heart sounds: Normal heart sounds.   Pulmonary:      Effort: Pulmonary effort is normal. No respiratory distress.      Breath sounds: Normal breath sounds. No wheezing, rhonchi or rales.   Abdominal:      General: Bowel sounds are normal. There is no distension.      Palpations: Abdomen is soft.      Tenderness: There is no abdominal tenderness. There is no guarding or rebound.      Comments: No rigidity   Musculoskeletal:         General: No tenderness. Normal range of motion.      Cervical back: Normal range of motion and neck supple.   Skin:     General: Skin is warm and dry.      Coloration: Skin is not jaundiced.   Neurological:      General: No focal deficit present.      " Mental Status: She is alert and oriented to person, place, and time. Mental status is at baseline.      Sensory: Sensation is intact.      Motor: Motor function is intact. No weakness.      Coordination: Coordination is intact.      Comments: Generalized heavy tremors to diffuse extremities     Psychiatric:         Attention and Perception: Attention and perception normal.         Mood and Affect: Mood and affect normal.         Speech: Speech normal.         Behavior: Behavior normal.         Judgment: Judgment normal.                Medications in the Emergency Department:  Medications   LORazepam (ATIVAN) injection 1 mg (1 mg Intravenous Given 12/2/22 2024)   cefTRIAXone (ROCEPHIN) IVPB 1 g (0 g Intravenous Stopped 12/2/22 2354)        Labs  Lab Results (last 24 hours)     Procedure Component Value Units Date/Time    CBC & Differential [[id number]]  (Abnormal) Collected: 12/02/22 1730    Specimen: Blood from Arm, Right Updated: 12/02/22 1818    Narrative:      The following orders were created for panel order CBC & Differential.  Procedure                               Abnormality         Status                     ---------                               -----------         ------                     CBC Auto Differential[[id number]]        Abnormal            Final result               Scan Slide[[id number]]                                       Final result                 Please view results for these tests on the individual orders.    Comprehensive Metabolic Panel [[id number]]  (Abnormal) Collected: 12/02/22 1730    Specimen: Blood from Arm, Right Updated: 12/02/22 1803     Glucose 156 mg/dL      BUN 7 mg/dL      Creatinine 0.79 mg/dL      Sodium 138 mmol/L      Potassium 3.6 mmol/L      Chloride 93 mmol/L      CO2 25.8 mmol/L      Calcium 9.3 mg/dL      Total Protein 7.4 g/dL      Albumin 4.80 g/dL      ALT (SGPT) 47 U/L      AST (SGOT) 56 U/L      Alkaline Phosphatase 59 U/L      Total Bilirubin 0.9 mg/dL       Globulin 2.6 gm/dL      A/G Ratio 1.8 g/dL      BUN/Creatinine Ratio 8.9     Anion Gap 19.2 mmol/L      eGFR 82.1 mL/min/1.73      Comment: National Kidney Foundation and American Society of Nephrology (ASN) Task Force recommended calculation based on the Chronic Kidney Disease Epidemiology Collaboration (CKD-EPI) equation refit without adjustment for race.       Narrative:      GFR Normal >60  Chronic Kidney Disease <60  Kidney Failure <15      Protime-INR [[id number]]  (Normal) Collected: 12/02/22 1730    Specimen: Blood from Arm, Right Updated: 12/02/22 1821     Protime 13.3 Seconds      INR 1.00    Narrative:      Suggested Therapeutic Ranges For Oral Anticoagulant Therapy:  Level of Therapy                      INR Target Range  Standard Dose                            2.0-3.0  High Dose                                2.5-3.5  Patients not receiving anticoagulant  Therapy Normal Range                     0.86-1.15    aPTT [[id number]]  (Normal) Collected: 12/02/22 1730    Specimen: Blood from Arm, Right Updated: 12/02/22 1821     PTT 25.1 seconds     Troponin [[id number]]  (Normal) Collected: 12/02/22 1730    Specimen: Blood from Arm, Right Updated: 12/02/22 1803     Troponin T <0.010 ng/mL     Narrative:      Troponin T Reference Range:  <= 0.03 ng/mL-   Negative for AMI  >0.03 ng/mL-     Abnormal for myocardial necrosis.  Clinicians would have to utilize clinical acumen, EKG, Troponin and serial changes to determine if it is an Acute Myocardial Infarction or myocardial injury due to an underlying chronic condition.       Results may be falsely decreased if patient taking Biotin.      POC Creatinine with Hold Tube [[id number]] Collected: 12/02/22 1730    Specimen: Blood Updated: 12/02/22 1757    Narrative:      The following orders were created for panel order POC Creatinine with Hold Tube.  Procedure                               Abnormality         Status                     ---------                                -----------         ------                     POC Creatinine[[id number]]                                                              Hold Creatinine Tube[[id number]]                             Final result                 Please view results for these tests on the individual orders.    CBC Auto Differential [[id number]]  (Abnormal) Collected: 12/02/22 1730    Specimen: Blood from Arm, Right Updated: 12/02/22 1818     WBC 7.55 10*3/mm3      RBC 4.00 10*6/mm3      Hemoglobin 13.9 g/dL      Hematocrit 40.4 %      .0 fL      MCH 34.8 pg      MCHC 34.4 g/dL      RDW 14.4 %      RDW-SD 53.7 fl      MPV 10.1 fL      Platelets 342 10*3/mm3      Neutrophil % 72.8 %      Lymphocyte % 16.3 %      Monocyte % 9.4 %      Eosinophil % 0.0 %      Basophil % 1.1 %      Immature Grans % 0.4 %      Neutrophils, Absolute 5.50 10*3/mm3      Lymphocytes, Absolute 1.23 10*3/mm3      Monocytes, Absolute 0.71 10*3/mm3      Eosinophils, Absolute 0.00 10*3/mm3      Basophils, Absolute 0.08 10*3/mm3      Immature Grans, Absolute 0.03 10*3/mm3      nRBC 0.5 /100 WBC     Scan Slide [[id number]] Collected: 12/02/22 1730    Specimen: Blood from Arm, Right Updated: 12/02/22 1818     Anisocytosis Slight/1+     Target Cells Slight/1+     WBC Morphology Normal     Platelet Estimate Adequate    TSH Rfx On Abnormal To Free T4 [[id number]]  (Abnormal) Collected: 12/02/22 1730    Specimen: Blood from Arm, Right Updated: 12/02/22 2024     TSH 6.590 uIU/mL     T4, Free [[id number]]  (Normal) Collected: 12/02/22 1730    Specimen: Blood from Arm, Right Updated: 12/02/22 2147     Free T4 1.36 ng/dL     Narrative:      Results may be falsely increased if patient taking Biotin.      Urinalysis With Microscopic If Indicated (No Culture) - Urine, Clean Catch [[id number]]  (Abnormal) Collected: 12/02/22 2100    Specimen: Urine, Clean Catch Updated: 12/02/22 2110     Color, UA Yellow     Appearance, UA Clear     pH, UA 7.0     Specific Gunpowder, UA 1.008      "Glucose, UA Negative     Ketones, UA Trace     Bilirubin, UA Negative     Blood, UA Small (1+)     Protein, UA >=300 mg/dL (3+)     Leuk Esterase, UA Trace     Nitrite, UA Negative     Urobilinogen, UA 1.0 E.U./dL    Urinalysis, Microscopic Only - Urine, Clean Catch [[id number]]  (Abnormal) Collected: 12/02/22 2100    Specimen: Urine, Clean Catch Updated: 12/02/22 2110     RBC, UA 3-5 /HPF      WBC, UA 3-5 /HPF      Bacteria, UA 4+ /HPF      Squamous Epithelial Cells, UA 0-2 /HPF      Hyaline Casts, UA None Seen /LPF      Methodology Automated Microscopy           Imaging:  XR Chest 1 View    Result Date: 12/2/2022  PROCEDURE: XR CHEST 1 VW  COMPARISON: [facility], CR, XR CHEST 1 VW, 9/10/2022, 10:19.  INDICATIONS: Acute Stroke Protocol (Onset < 12 hrs)  FINDINGS:  Lungs are hyperexpanded.  Decreased peripheral vascular markings consistent with emphysema.  Prominent interstitial markings lung bases.  No focal parenchymal opacity.  No pneumothorax or pleural effusion.  Heart size is normal.       No acute cardiopulmonary abnormality.  Findings of emphysema.       HERNAN NAJERA MD       Electronically Signed and Approved By: HERNAN NAJERA MD on 12/02/2022 at 18:08             CT Head Without Contrast Stroke Protocol    Result Date: 12/2/2022  PROCEDURE: CT HEAD WO CONTRAST STROKE PROTOCOL  COMPARISON:  [facility], CT, CT HEAD WO CONTRAST, 9/10/2022, 13:06. INDICATIONS: SHAKING, \"JITTERY\" TODAY  PROTOCOL:   Standard imaging protocol performed    RADIATION:   DLP: 1056.2 mGy*cm   MA and/or KV was adjusted to minimize radiation dose.     TECHNIQUE: After obtaining the patient's consent, CT images were obtained without non-ionic intravenous contrast material.  FINDINGS:  There is no acute intracranial hemorrhage or extra-axial collection. The ventricles appear normal in caliber, with no evidence of mass effect or midline shift. The basal cisterns are patent. The gray-white differentiation is " preserved.  The calvarium is intact. The paranasal sinuses and mastoid air cells are well-aerated.       No acute intracranial process identified.     PASCUAL SAMAYOA MD       Electronically Signed and Approved By: PASCUAL SAMAYOA MD on 12/02/2022 at 21:10               Procedures:  Procedures    Progress  ED Course as of 12/03/22 0213   Fri Dec 02, 2022   1727 --- PROVIDER IN TRIAGE NOTE ---    The patient was evaluated my Oscar saldana in triage. Orders were placed and the patient is currently awaiting disposition.    [AJ]   Sat Dec 03, 2022   0213 EKG interpretation: Normal sinus rhythm, heart rate 105, normal ID interval, borderline prolonged QT interval, normal QRS duration and normal axis, nonspecific ST segment changes with no acute ischemia. [RP]      ED Course User Index  [AJ] Oscar Carbajal PA-C  [RP] Scott Becerra MD                            The patient was initially evaluated in the triage area where orders were placed. The patient was later dispositioned by Scott Becerra MD.      The patient was advised to stay for completion of workup which includes but is not limited to communication of labs and radiological results, reassessment and plan. The patient was advised that leaving prior to disposition by a provider could result in critical findings that are not communicated to the patient.     Medical Decision Making:  MDM  Number of Diagnoses or Management Options  Acute cystitis without hematuria: new and requires workup  Tremor: established and worsening     Amount and/or Complexity of Data Reviewed  Independent visualization of images, tracings, or specimens: yes    Risk of Complications, Morbidity, and/or Mortality  Presenting problems: moderate  Management options: low    Patient Progress  Patient progress: improved           The following orders were placed after triage and evaluation:  Orders Placed This Encounter   Procedures   • CT Head Without Contrast Stroke Protocol    • XR Chest 1 View   • [place] Draw   • Comprehensive Metabolic Panel   • Protime-INR   • aPTT   • Troponin   • Urinalysis With Microscopic If Indicated (No Culture) - Urine, Clean Catch   • CBC Auto Differential   • Scan Slide   • TSH Rfx On Abnormal To Free T4   • T4, Free   • Urinalysis, Microscopic Only - Urine, Clean Catch   • Perform NIH Stroke Scale   • Measure Actual Weight   • Head of Bed 30 Degrees or Less   • Undress and Gown   • Continuous Pulse Oximetry   • Vital Signs   • Nursing Swallow Assessment   • ECG 12 Lead ED Triage Standing Order; Acute Stroke (Onset <12 hrs)   • Type & Screen   • CBC & Differential   • Green Top (Gel)   • Lavender Top   • Gold Top - SST   • Light Blue Top   • Hold Creatinine Tube       Final diagnoses:   Tremor   Acute cystitis without hematuria          Disposition:  ED Disposition     ED Disposition   Discharge    Condition   Stable    Comment   --             This medical record created using voice recognition software.           Gisele Abdullahi  12/02/22 1927       Scott Becerra MD  12/03/22 [id number]

## 2022-12-03 NOTE — DISCHARGE INSTRUCTIONS
Return to the emergency department for worsening of symptoms.  Follow-up your family doctor Monday morning if symptoms have not resolved.

## 2022-12-04 LAB — QT INTERVAL: 385 MS

## 2023-02-06 ENCOUNTER — APPOINTMENT (OUTPATIENT)
Dept: CT IMAGING | Facility: HOSPITAL | Age: 68
End: 2023-02-06
Payer: COMMERCIAL

## 2023-02-06 ENCOUNTER — HOSPITAL ENCOUNTER (EMERGENCY)
Facility: HOSPITAL | Age: 68
Discharge: HOME | End: 2023-02-06
Attending: EMERGENCY MEDICINE | Admitting: EMERGENCY MEDICINE
Payer: COMMERCIAL

## 2023-02-06 VITALS
OXYGEN SATURATION: 94 % | HEIGHT: 67 IN | SYSTOLIC BLOOD PRESSURE: 174 MMHG | WEIGHT: 157.41 LBS | HEART RATE: 71 BPM | RESPIRATION RATE: 20 BRPM | BODY MASS INDEX: 24.71 KG/M2 | DIASTOLIC BLOOD PRESSURE: 79 MMHG | TEMPERATURE: 98.1 F

## 2023-02-06 DIAGNOSIS — N39.0 URINARY TRACT INFECTION WITHOUT HEMATURIA, SITE UNSPECIFIED: Primary | ICD-10-CM

## 2023-02-06 LAB
ALBUMIN SERPL-MCNC: 4.2 G/DL (ref 3.5–5.2)
ALBUMIN/GLOB SERPL: 1.7 G/DL
ALP SERPL-CCNC: 80 U/L (ref 39–117)
ALT SERPL W P-5'-P-CCNC: 12 U/L (ref 1–33)
ANION GAP SERPL CALCULATED.3IONS-SCNC: 8.7 MMOL/L (ref 5–15)
AST SERPL-CCNC: 18 U/L (ref 1–32)
BACTERIA UR QL AUTO: ABNORMAL /HPF
BASOPHILS # BLD AUTO: 0.09 10*3/MM3 (ref 0–0.2)
BASOPHILS NFR BLD AUTO: 1.2 % (ref 0–1.5)
BILIRUB SERPL-MCNC: 0.2 MG/DL (ref 0–1.2)
BILIRUB UR QL STRIP: NEGATIVE
BUN SERPL-MCNC: 15 MG/DL (ref 8–23)
BUN/CREAT SERPL: 20 (ref 7–25)
CALCIUM SPEC-SCNC: 8.7 MG/DL (ref 8.6–10.5)
CHLORIDE SERPL-SCNC: 103 MMOL/L (ref 98–107)
CLARITY UR: CLEAR
CO2 SERPL-SCNC: 25.3 MMOL/L (ref 22–29)
COLOR UR: YELLOW
CREAT SERPL-MCNC: 0.75 MG/DL (ref 0.57–1)
D-LACTATE SERPL-SCNC: 1.6 MMOL/L (ref 0.5–2)
DEPRECATED RDW RBC AUTO: 44.9 FL (ref 37–54)
EGFRCR SERPLBLD CKD-EPI 2021: 87.4 ML/MIN/1.73
EOSINOPHIL # BLD AUTO: 0.14 10*3/MM3 (ref 0–0.4)
EOSINOPHIL NFR BLD AUTO: 1.8 % (ref 0.3–6.2)
ERYTHROCYTE [DISTWIDTH] IN BLOOD BY AUTOMATED COUNT: 12.2 % (ref 12.3–15.4)
GLOBULIN UR ELPH-MCNC: 2.5 GM/DL
GLUCOSE SERPL-MCNC: 110 MG/DL (ref 65–99)
GLUCOSE UR STRIP-MCNC: NEGATIVE MG/DL
HCT VFR BLD AUTO: 36.3 % (ref 34–46.6)
HGB BLD-MCNC: 12.3 G/DL (ref 12–15.9)
HGB UR QL STRIP.AUTO: NEGATIVE
HOLD SPECIMEN: NORMAL
HOLD SPECIMEN: NORMAL
HYALINE CASTS UR QL AUTO: ABNORMAL /LPF
IMM GRANULOCYTES # BLD AUTO: 0.02 10*3/MM3 (ref 0–0.05)
IMM GRANULOCYTES NFR BLD AUTO: 0.3 % (ref 0–0.5)
KETONES UR QL STRIP: NEGATIVE
LEUKOCYTE ESTERASE UR QL STRIP.AUTO: ABNORMAL
LIPASE SERPL-CCNC: 61 U/L (ref 13–60)
LYMPHOCYTES # BLD AUTO: 2.58 10*3/MM3 (ref 0.7–3.1)
LYMPHOCYTES NFR BLD AUTO: 33.8 % (ref 19.6–45.3)
MCH RBC QN AUTO: 34 PG (ref 26.6–33)
MCHC RBC AUTO-ENTMCNC: 33.9 G/DL (ref 31.5–35.7)
MCV RBC AUTO: 100.3 FL (ref 79–97)
MONOCYTES # BLD AUTO: 0.64 10*3/MM3 (ref 0.1–0.9)
MONOCYTES NFR BLD AUTO: 8.4 % (ref 5–12)
NEUTROPHILS NFR BLD AUTO: 4.16 10*3/MM3 (ref 1.7–7)
NEUTROPHILS NFR BLD AUTO: 54.5 % (ref 42.7–76)
NITRITE UR QL STRIP: NEGATIVE
NRBC BLD AUTO-RTO: 0 /100 WBC (ref 0–0.2)
PH UR STRIP.AUTO: 6.5 [PH] (ref 5–8)
PLATELET # BLD AUTO: 334 10*3/MM3 (ref 140–450)
PMV BLD AUTO: 9.1 FL (ref 6–12)
POTASSIUM SERPL-SCNC: 3.5 MMOL/L (ref 3.5–5.2)
PROT SERPL-MCNC: 6.7 G/DL (ref 6–8.5)
PROT UR QL STRIP: NEGATIVE
RBC # BLD AUTO: 3.62 10*6/MM3 (ref 3.77–5.28)
RBC # UR STRIP: ABNORMAL /HPF
REF LAB TEST METHOD: ABNORMAL
SODIUM SERPL-SCNC: 137 MMOL/L (ref 136–145)
SP GR UR STRIP: 1.01 (ref 1–1.03)
SQUAMOUS #/AREA URNS HPF: ABNORMAL /HPF
UROBILINOGEN UR QL STRIP: ABNORMAL
WBC # UR STRIP: ABNORMAL /HPF
WBC NRBC COR # BLD: 7.63 10*3/MM3 (ref 3.4–10.8)
WHOLE BLOOD HOLD COAG: NORMAL
WHOLE BLOOD HOLD SPECIMEN: NORMAL

## 2023-02-06 PROCEDURE — 80053 COMPREHEN METABOLIC PANEL: CPT

## 2023-02-06 PROCEDURE — 96375 TX/PRO/DX INJ NEW DRUG ADDON: CPT

## 2023-02-06 PROCEDURE — 36415 COLL VENOUS BLD VENIPUNCTURE: CPT

## 2023-02-06 PROCEDURE — 99283 EMERGENCY DEPT VISIT LOW MDM: CPT

## 2023-02-06 PROCEDURE — 81001 URINALYSIS AUTO W/SCOPE: CPT | Performed by: EMERGENCY MEDICINE

## 2023-02-06 PROCEDURE — 96374 THER/PROPH/DIAG INJ IV PUSH: CPT

## 2023-02-06 PROCEDURE — 25010000002 ONDANSETRON PER 1 MG: Performed by: EMERGENCY MEDICINE

## 2023-02-06 PROCEDURE — 83690 ASSAY OF LIPASE: CPT

## 2023-02-06 PROCEDURE — 83605 ASSAY OF LACTIC ACID: CPT

## 2023-02-06 PROCEDURE — 25010000002 MORPHINE PER 10 MG: Performed by: EMERGENCY MEDICINE

## 2023-02-06 PROCEDURE — 74176 CT ABD & PELVIS W/O CONTRAST: CPT

## 2023-02-06 PROCEDURE — 85025 COMPLETE CBC W/AUTO DIFF WBC: CPT

## 2023-02-06 RX ORDER — CEFDINIR 300 MG/1
300 CAPSULE ORAL 2 TIMES DAILY
Qty: 14 CAPSULE | Refills: 0 | Status: SHIPPED | OUTPATIENT
Start: 2023-02-06 | End: 2023-02-13

## 2023-02-06 RX ORDER — SODIUM CHLORIDE 0.9 % (FLUSH) 0.9 %
10 SYRINGE (ML) INJECTION AS NEEDED
Status: DISCONTINUED | OUTPATIENT
Start: 2023-02-06 | End: 2023-02-06 | Stop reason: HOSPADM

## 2023-02-06 RX ORDER — PHENAZOPYRIDINE HYDROCHLORIDE 200 MG/1
200 TABLET, FILM COATED ORAL 3 TIMES DAILY PRN
Qty: 14 TABLET | Refills: 0 | Status: SHIPPED | OUTPATIENT
Start: 2023-02-06

## 2023-02-06 RX ORDER — ONDANSETRON 2 MG/ML
4 INJECTION INTRAMUSCULAR; INTRAVENOUS ONCE
Status: COMPLETED | OUTPATIENT
Start: 2023-02-06 | End: 2023-02-06

## 2023-02-06 RX ADMIN — SODIUM CHLORIDE 500 ML: 9 INJECTION, SOLUTION INTRAVENOUS at 06:21

## 2023-02-06 RX ADMIN — ONDANSETRON 4 MG: 2 INJECTION INTRAMUSCULAR; INTRAVENOUS at 06:01

## 2023-02-06 RX ADMIN — MORPHINE SULFATE 4 MG: 4 INJECTION, SOLUTION INTRAMUSCULAR; INTRAVENOUS at 06:02

## 2023-02-06 NOTE — ED NOTES
"Pt is suppose to be taking bp meds but states she has not for a while she stated she \"doesn't know who her MD is\", she states her  took her papers. Pt with known history of hypertension and stroke and has not taken any medications for a long time. Pt given papers with a follow up name and advised to call make appt asap.   "

## 2023-02-06 NOTE — ED PROVIDER NOTES
Time: 5:27 AM EST  Date of encounter:  2/6/2023  Independent Historian/Clinical History and Information was obtained by:   Patient  Chief Complaint   Patient presents with   • Flank Pain       History is limited by: N/A    History of Present Illness:  Patient is a 67 y.o. year old female who presents to the emergency department for evaluation of  Right Flank pain onset 10 hours. Patient states has had similar symptom in the past and endorses hx of kidney stones, bladder and kidney infection and states she has not been able to urinate since she has been in ED.Patient has taken extra strength Tylenol 4 hours PTA. Patient denies urinary symptoms. Patient denies fever, vomiting.    History provided by:  Patient   used: No        Patient Care Team  Primary Care Provider: Shahida Martinez MD    Past Medical History:     Allergies   Allergen Reactions   • Cephalexin Unknown - Low Severity   • Diphenhydramine Unknown - Low Severity   • Levofloxacin Unknown - Low Severity   • Penicillins Unknown - Low Severity     Past Medical History:   Diagnosis Date   • Asthma    • Elevated troponin level not due to acute coronary syndrome 12/3/2021   • Hypertension    • Mixed hyperlipidemia 12/3/2021   • Stroke (HCC) 03/2021     Past Surgical History:   Procedure Laterality Date   • CHOLECYSTECTOMY       Family History   Problem Relation Age of Onset   • Kidney disease Mother        Home Medications:  Prior to Admission medications    Medication Sig Start Date End Date Taking? Authorizing Provider   amLODIPine (NORVASC) 2.5 MG tablet Take 2.5 mg by mouth Daily.    ProviderJesi MD   apixaban (ELIQUIS) 5 MG tablet tablet Take 1 tablet by mouth Every 12 (Twelve) Hours. Indications: Atrial Fibrillation 6/17/22   Nhan Smith,    aspirin 81 MG EC tablet Take 81 mg by mouth Daily.    ProviderJesi MD   atorvastatin (LIPITOR) 80 MG tablet Take 1 tablet nightly at bedtime 12/3/21   King  MIHAI Rivas   carvedilol (COREG) 12.5 MG tablet Take 1 tablet by mouth 2 (Two) Times a Day With Meals. 22   Nhan Smith DO   cholecalciferol (VITAMIN D3) 25 MCG (1000 UT) tablet Take 1,000 Units by mouth Daily.    ProviderJesi MD   fluticasone (FLONASE) 50 MCG/ACT nasal spray 2 sprays by Each Nare route Daily. 22   Nhan Smith DO   loratadine (Claritin) 10 MG tablet Take 10 mg by mouth Daily.    Jesi Richardson MD   ondansetron ODT (ZOFRAN-ODT) 4 MG disintegrating tablet Place 1 tablet on the tongue 4 (Four) Times a Day As Needed for Nausea or Vomiting. 22   Shelli Garcia APRN   rivaroxaban (XARELTO) 15 MG tablet Take 15 mg by mouth Daily.    Jesi Richardson MD   thiamine (VITAMIN B-1) 100 MG tablet  tablet Take 100 mg by mouth Daily.    ProviderJesi MD        Social History:   Social History     Tobacco Use   • Smoking status: Former     Types: Cigarettes     Quit date:      Years since quittin.   • Smokeless tobacco: Never   Vaping Use   • Vaping Use: Never used   Substance Use Topics   • Alcohol use: Yes     Alcohol/week: 8.0 standard drinks     Types: 4 Shots of liquor, 4 Drinks containing 0.5 oz of alcohol per week     Comment: [place]   • Drug use: Not Currently         Review of Systems:  Review of Systems   Constitutional: Negative for chills and fever.   HENT: Negative for congestion, rhinorrhea and sore throat.    Eyes: Negative for photophobia.   Respiratory: Negative for apnea, cough, chest tightness and shortness of breath.    Cardiovascular: Negative for chest pain and palpitations.   Gastrointestinal: Negative for abdominal pain, diarrhea, nausea and vomiting.   Endocrine: Negative.    Genitourinary: Positive for flank pain. Negative for difficulty urinating and dysuria.   Musculoskeletal: Negative for back pain, joint swelling and myalgias.   Skin: Negative for color change and wound.   Allergic/Immunologic: Negative.   "  Neurological: Negative for seizures and headaches.   Psychiatric/Behavioral: Negative.    All other systems reviewed and are negative.       Physical Exam:  /94   Pulse 81   Temp 98.1 °F (36.7 °C) (Oral)   Resp 20   Ht 170.2 cm (67\")   Wt 71.4 kg (157 lb 6.5 oz)   LMP  (LMP Unknown)   SpO2 95%   BMI 24.65 kg/m²     Physical Exam  Vitals and nursing note reviewed.   Constitutional:       General: She is awake.      Appearance: Normal appearance.   HENT:      Head: Normocephalic and atraumatic.      Nose: Nose normal.      Mouth/Throat:      Mouth: Mucous membranes are moist.   Eyes:      Extraocular Movements: Extraocular movements intact.      Pupils: Pupils are equal, round, and reactive to light.   Cardiovascular:      Rate and Rhythm: Normal rate and regular rhythm.      Heart sounds: Normal heart sounds.      Comments: Pedal edema  Pulmonary:      Effort: Pulmonary effort is normal. No respiratory distress.      Breath sounds: Normal breath sounds. No wheezing, rhonchi or rales.   Abdominal:      General: Bowel sounds are normal.      Palpations: Abdomen is soft.      Tenderness: There is no abdominal tenderness. There is right CVA tenderness. There is no guarding or rebound.      Comments: No rigidity   Musculoskeletal:         General: No tenderness. Normal range of motion.      Cervical back: Normal range of motion and neck supple.   Skin:     General: Skin is warm and dry.      Coloration: Skin is not jaundiced.   Neurological:      General: No focal deficit present.      Mental Status: She is alert and oriented to person, place, and time. Mental status is at baseline.      Sensory: Sensation is intact.      Motor: Motor function is intact.      Coordination: Coordination is intact.   Psychiatric:         Attention and Perception: Attention and perception normal.         Mood and Affect: Mood and affect normal.         Speech: Speech normal.         Behavior: Behavior normal.         " Judgment: Judgment normal.                  Procedures:  Procedures      Medical Decision Making:      Comorbidities that affect care:    Hx of stroke  Alcohol dependence    External Notes reviewed:    Previous ED Note      The following orders were placed and all results were independently analyzed by me:  Orders Placed This Encounter   Procedures   • CT Abdomen Pelvis Without Contrast   • Lilly Draw   • Comprehensive Metabolic Panel   • Lipase   • Urinalysis With Microscopic If Indicated (No Culture) - Urine, Clean Catch   • Lactic Acid, Plasma   • CBC Auto Differential   • Urinalysis, Microscopic Only - Urine, Clean Catch   • NPO Diet NPO Type: Strict NPO   • Undress & Gown   • Straight Cath   • Insert Peripheral IV   • CBC & Differential   • Green Top (Gel)   • Lavender Top   • Gold Top - SST   • Light Blue Top       Medications Given in the Emergency Department:  Medications   sodium chloride 0.9 % flush 10 mL (has no administration in time range)   morphine injection 4 mg (4 mg Intravenous Given 2/6/23 0602)   ondansetron (ZOFRAN) injection 4 mg (4 mg Intravenous Given 2/6/23 0601)   sodium chloride 0.9 % bolus 500 mL (0 mL Intravenous Stopped 2/6/23 0806)        ED Course:    The patient was initially evaluated in the triage area where orders were placed. The patient was later dispositioned by Hemant Guadalupe MD.      The patient was advised to stay for completion of workup which includes but is not limited to communication of labs and radiological results, reassessment and plan. The patient was advised that leaving prior to disposition by a provider could result in critical findings that are not communicated to the patient.          Labs:    Lab Results (last 24 hours)     Procedure Component Value Units Date/Time    CBC & Differential [[id number]]  (Abnormal) Collected: 02/06/23 0516    Specimen: Blood Updated: 02/06/23 0529    Narrative:      The following orders were created for panel order CBC &  Differential.  Procedure                               Abnormality         Status                     ---------                               -----------         ------                     CBC Auto Differential[[id number]]        Abnormal            Final result                 Please view results for these tests on the individual orders.    Comprehensive Metabolic Panel [[id number]]  (Abnormal) Collected: 02/06/23 0516    Specimen: Blood Updated: 02/06/23 0549     Glucose 110 mg/dL      BUN 15 mg/dL      Creatinine 0.75 mg/dL      Sodium 137 mmol/L      Potassium 3.5 mmol/L      Chloride 103 mmol/L      CO2 25.3 mmol/L      Calcium 8.7 mg/dL      Total Protein 6.7 g/dL      Albumin 4.2 g/dL      ALT (SGPT) 12 U/L      AST (SGOT) 18 U/L      Alkaline Phosphatase 80 U/L      Total Bilirubin 0.2 mg/dL      Globulin 2.5 gm/dL      A/G Ratio 1.7 g/dL      BUN/Creatinine Ratio 20.0     Anion Gap 8.7 mmol/L      eGFR 87.4 mL/min/1.73     Narrative:      GFR Normal >60  Chronic Kidney Disease <60  Kidney Failure <15      Lipase [[id number]]  (Abnormal) Collected: 02/06/23 0516    Specimen: Blood Updated: 02/06/23 0549     Lipase 61 U/L     Lactic Acid, Plasma [[id number]]  (Normal) Collected: 02/06/23 0516    Specimen: Blood Updated: 02/06/23 0549     Lactate 1.6 mmol/L     CBC Auto Differential [[id number]]  (Abnormal) Collected: 02/06/23 0516    Specimen: Blood Updated: 02/06/23 0529     WBC 7.63 10*3/mm3      RBC 3.62 10*6/mm3      Hemoglobin 12.3 g/dL      Hematocrit 36.3 %      .3 fL      MCH 34.0 pg      MCHC 33.9 g/dL      RDW 12.2 %      RDW-SD 44.9 fl      MPV 9.1 fL      Platelets 334 10*3/mm3      Neutrophil % 54.5 %      Lymphocyte % 33.8 %      Monocyte % 8.4 %      Eosinophil % 1.8 %      Basophil % 1.2 %      Immature Grans % 0.3 %      Neutrophils, Absolute 4.16 10*3/mm3      Lymphocytes, Absolute 2.58 10*3/mm3      Monocytes, Absolute 0.64 10*3/mm3      Eosinophils, Absolute 0.14 10*3/mm3      Basophils,  Absolute 0.09 10*3/mm3      Immature Grans, Absolute 0.02 10*3/mm3      nRBC 0.0 /100 WBC     Urinalysis With Microscopic If Indicated (No Culture) - Urine, Clean Catch [[id number]]  (Abnormal) Collected: 02/06/23 0824    Specimen: Urine, Clean Catch Updated: 02/06/23 0840     Color, UA Yellow     Appearance, UA Clear     pH, UA 6.5     Specific Gravity, UA 1.008     Glucose, UA Negative     Ketones, UA Negative     Bilirubin, UA Negative     Blood, UA Negative     Protein, UA Negative     Leuk Esterase, UA Trace     Nitrite, UA Negative     Urobilinogen, UA 0.2 E.U./dL    Urinalysis, Microscopic Only - Urine, Clean Catch [[id number]]  (Abnormal) Collected: 02/06/23 0824    Specimen: Urine, Clean Catch Updated: 02/06/23 0840     RBC, UA 0-2 /HPF      WBC, UA 6-12 /HPF      Bacteria, UA 4+ /HPF      Squamous Epithelial Cells, UA 0-2 /HPF      Hyaline Casts, UA 0-2 /LPF      Methodology Automated Microscopy           Imaging:    CT Abdomen Pelvis Without Contrast    Result Date: 2/6/2023  PROCEDURE: CT ABDOMEN PELVIS WO CONTRAST  COMPARISON: [facility], CT, CT ABDOMEN PELVIS WO CONTRAST, 1/20/2022, 23:13.  INDICATIONS: LOW BACK PAIN TODAY  TECHNIQUE: CT images were created without intravenous contrast.   PROTOCOL:   Standard imaging protocol performed    RADIATION:   DLP: 459.6 mGy*cm   Automated exposure control was utilized to minimize radiation dose.  FINDINGS:  The lung bases are clear.  The unenhanced liver, adrenal glands, right kidney, and pancreas are unremarkable.  The gallbladder is surgically absent.  There is a small left renal cyst and a nonobstructing left renal stone.  The spleen is absent.  There is a small hiatal hernia.  The small bowel appears normal in caliber and configuration.  The colon appears normal.  The appendix is not well visualized.  There is no ascites or loculated collection.  No abnormally enlarged lymph nodes are identified.  The rectum and urinary bladder are  unremarkable.  There are several calcifications within the uterus, likely partially calcified fibroids.  No aggressive osseous lesions are identified.        1. No acute process identified within abdomen/pelvis. 2. Nonobstructing left renal stone. 3. Small hiatal hernia. 4. Several calcifications within uterus, likely partially calcified fibroids.     PASCUAL SAMAYOA MD       Electronically Signed and Approved By: PASCUAL SAMAYOA MD on 2/06/2023 at 6:31                 Differential Diagnosis and Discussion:      Dysuria: Differential diagnosis includes but is not limited to urethritis, cystitis, pyelonephritis, ureteral calculi, neoplasm, chemical irritant, urethral stricture, and trauma    All labs were reviewed and analyzed by me.  CT scan radiology interpretation was reviewed by me.    MDM  Number of Diagnoses or Management Options  Urinary tract infection without hematuria, site unspecified  Diagnosis management comments: In summary this is a 67-year-old female who presents the emergency room complaining of flank pain.  CT scan is unremarkable.  Urinalysis positive for UTI.  CBC independently reviewed by me and shows no critical abnormalities.  CMP independently reviewed by me and shows no critical abnormalities.  Patient is otherwise well-appearing and in no acute distress.  She is been prescribed cefdinir and Pyridium for home treatment of her UTI.  Very strict return to ER and follow-up instructions have been provided to the patient.             Patient Care Considerations:    NARCOTICS: I considered prescribing opiate pain medication as an outpatient, however Patient's pain is controlled      Consultants/Shared Management Plan:    None    Social Determinants of Health:    Patient is independent, reliable, and has access to care.       Disposition and Care Coordination:    Discharged: The patient is suitable and stable for discharge with no need for consideration of observation or admission.    I have  explained the patient´s condition, diagnoses and treatment plan based on the information available to me at this time. I have answered questions and addressed any concerns. The patient has a good  understanding of the patient´s diagnosis, condition, and treatment plan as can be expected at this point. The vital signs have been stable. The patient´s condition is stable and appropriate for discharge from the emergency department.      The patient will pursue further outpatient evaluation with the primary care physician or other designated or consulting physician as outlined in the discharge instructions. They are agreeable to this plan of care and follow-up instructions have been explained in detail. The patient has received these instructions in written format and have expressed an understanding of the discharge instructions. The patient is aware that any significant change in condition or worsening of symptoms should prompt an immediate return to this or the closest emergency department or call to 911.  I have explained discharge medications and the need for follow up with the patient/caretakers. This was also printed in the discharge instructions. Patient was discharged with the following medications and follow up:      Medication List      New Prescriptions    cefdinir 300 MG capsule  Commonly known as: OMNICEF  Take 1 capsule by mouth 2 (Two) Times a Day for 7 days.     phenazopyridine 200 MG tablet  Commonly known as: PYRIDIUM  Take 1 tablet by mouth 3 (Three) Times a Day As Needed for Bladder Spasms.           Where to Get Your Medications      These medications were sent to [id number] - [place], [place] - [address] [facility] [address]  AT [facility] [address] - [phone]  - [phone] FX  [address] [facility] [address] [facility] [place] [address]    Phone: [phone]   · cefdinir 300 MG capsule  · phenazopyridine 200 MG tablet      Shahida Martinez MD  [address] [place] [address]  [address]  [place] [address]  [phone]    In 1 week         Final diagnoses:   Urinary tract infection without hematuria, site unspecified        ED Disposition     ED Disposition   Discharge    Condition   Stable    Comment   --             This medical record created using voice recognition software.      Documentation assistance provided by Luis Coles acting as scribe for Hemant Guadalupe MD. Information recorded by the scribe was done at my direction and has been verified and validated by me.          Luis Coles  02/06/23 0640       Luis Coles  02/06/23 0645       Hemant Guadalupe MD  02/06/23 0947

## 2023-04-10 ENCOUNTER — APPOINTMENT (OUTPATIENT)
Dept: GENERAL RADIOLOGY | Facility: HOSPITAL | Age: 68
End: 2023-04-10
Payer: COMMERCIAL

## 2023-04-10 ENCOUNTER — APPOINTMENT (OUTPATIENT)
Dept: CT IMAGING | Facility: HOSPITAL | Age: 68
End: 2023-04-10
Payer: COMMERCIAL

## 2023-04-10 ENCOUNTER — HOSPITAL ENCOUNTER (EMERGENCY)
Facility: HOSPITAL | Age: 68
Discharge: HOME | End: 2023-04-10
Attending: EMERGENCY MEDICINE | Admitting: EMERGENCY MEDICINE
Payer: COMMERCIAL

## 2023-04-10 VITALS
RESPIRATION RATE: 18 BRPM | OXYGEN SATURATION: 97 % | HEART RATE: 80 BPM | SYSTOLIC BLOOD PRESSURE: 196 MMHG | DIASTOLIC BLOOD PRESSURE: 116 MMHG

## 2023-04-10 DIAGNOSIS — S40.011A CONTUSION OF RIGHT SHOULDER, INITIAL ENCOUNTER: Primary | ICD-10-CM

## 2023-04-10 PROCEDURE — 73030 X-RAY EXAM OF SHOULDER: CPT

## 2023-04-10 PROCEDURE — 72125 CT NECK SPINE W/O DYE: CPT

## 2023-04-10 PROCEDURE — 99283 EMERGENCY DEPT VISIT LOW MDM: CPT

## 2023-04-10 RX ORDER — CARVEDILOL 12.5 MG/1
12.5 TABLET ORAL 2 TIMES DAILY WITH MEALS
Qty: 60 TABLET | Refills: 0 | Status: SHIPPED | OUTPATIENT
Start: 2023-04-10

## 2023-04-10 RX ORDER — HYDROCODONE BITARTRATE AND ACETAMINOPHEN 5; 325 MG/1; MG/1
1 TABLET ORAL ONCE
Status: COMPLETED | OUTPATIENT
Start: 2023-04-10 | End: 2023-04-10

## 2023-04-10 RX ADMIN — HYDROCODONE BITARTRATE AND ACETAMINOPHEN 1 TABLET: 5; 325 TABLET ORAL at 09:41

## 2023-04-10 NOTE — ED PROVIDER NOTES
Time: 8:27 AM EDT  Date of encounter:  4/10/2023  Independent Historian/Clinical History and Information was obtained by:   Patient  Chief Complaint: fall    History is limited by: N/A    History of Present Illness:  Patient is a 67 y.o. year old female who presents to the emergency department for evaluation of fall 2-3 days ago. Pt injured her R shoulder during the fall. Her pain radiates to the neck. She denies LOC, head injury or back injury from the fall. Denies fever, chills, n/v/, cough.      History provided by:  Patient   used: No        Patient Care Team  Primary Care Provider: Shahida Martinez MD    Past Medical History:     Allergies   Allergen Reactions   • Cephalexin Unknown - Low Severity   • Diphenhydramine Unknown - Low Severity   • Levofloxacin Unknown - Low Severity   • Penicillins Unknown - Low Severity     Past Medical History:   Diagnosis Date   • Asthma    • Elevated troponin level not due to acute coronary syndrome 12/3/2021   • Hypertension    • Mixed hyperlipidemia 12/3/2021   • Stroke 03/2021     Past Surgical History:   Procedure Laterality Date   • CHOLECYSTECTOMY       Family History   Problem Relation Age of Onset   • Kidney disease Mother        Home Medications:  Prior to Admission medications    Medication Sig Start Date End Date Taking? Authorizing Provider   amLODIPine (NORVASC) 2.5 MG tablet Take 2.5 mg by mouth Daily.    Provider, MD Jesi   apixaban (ELIQUIS) 5 MG tablet tablet Take 1 tablet by mouth Every 12 (Twelve) Hours. Indications: Atrial Fibrillation 6/17/22   Nhan Smith DO   aspirin 81 MG EC tablet Take 81 mg by mouth Daily.    ProviderJesi MD   atorvastatin (LIPITOR) 80 MG tablet Take 1 tablet nightly at bedtime 12/3/21   Ave Benson APRN   carvedilol (COREG) 12.5 MG tablet Take 1 tablet by mouth 2 (Two) Times a Day With Meals. 6/17/22   Nhan Smith DO   cholecalciferol (VITAMIN D3) 25 MCG (1000 UT)  tablet Take 1,000 Units by mouth Daily.    ProviderJesi MD   fluticasone (FLONASE) 50 MCG/ACT nasal spray 2 sprays by Each Nare route Daily. 22   Nhan Smith DO   loratadine (CLARITIN) 10 MG tablet Take 10 mg by mouth Daily.    Jesi Richardson MD   ondansetron ODT (ZOFRAN-ODT) 4 MG disintegrating tablet Place 1 tablet on the tongue 4 (Four) Times a Day As Needed for Nausea or Vomiting. 22   Shelli Garcia APRN   phenazopyridine (PYRIDIUM) 200 MG tablet Take 1 tablet by mouth 3 (Three) Times a Day As Needed for Bladder Spasms. 23   Hemant Guadalupe MD   rivaroxaban (XARELTO) 15 MG tablet Take 15 mg by mouth Daily.    ProviderJesi MD   thiamine (VITAMIN B-1) 100 MG tablet  tablet Take 100 mg by mouth Daily.    ProviderJesi MD        Social History:   Social History     Tobacco Use   • Smoking status: Former     Types: Cigarettes     Quit date:      Years since quittin.2   • Smokeless tobacco: Never   Vaping Use   • Vaping Use: Never used   Substance Use Topics   • Alcohol use: Yes     Alcohol/week: 8.0 standard drinks     Types: 4 Shots of liquor, 4 Drinks containing 0.5 oz of alcohol per week     Comment: [place]   • Drug use: Not Currently         Review of Systems:  Review of Systems   Constitutional: Negative for chills and fever.   HENT: Negative for congestion, rhinorrhea and sore throat.    Eyes: Negative for photophobia.   Respiratory: Negative for apnea, cough, chest tightness and shortness of breath.    Cardiovascular: Negative for chest pain and palpitations.   Gastrointestinal: Negative for abdominal pain, diarrhea, nausea and vomiting.   Endocrine: Negative.    Genitourinary: Negative for difficulty urinating and dysuria.   Musculoskeletal: Positive for arthralgias (R shoulder). Negative for back pain, joint swelling and myalgias.   Skin: Negative for color change and wound.   Allergic/Immunologic: Negative.    Neurological: Negative for seizures and  headaches.   Psychiatric/Behavioral: Negative.    All other systems reviewed and are negative.       Physical Exam:  BP (!) 228/113   Pulse 85   Resp 20   LMP  (LMP Unknown)   SpO2 100%     Physical Exam  Vitals and nursing note reviewed.   Constitutional:       General: She is awake.      Appearance: Normal appearance.   HENT:      Head: Normocephalic and atraumatic.      Nose: Nose normal.      Mouth/Throat:      Mouth: Mucous membranes are moist.   Eyes:      Extraocular Movements: Extraocular movements intact.      Pupils: Pupils are equal, round, and reactive to light.   Cardiovascular:      Rate and Rhythm: Normal rate and regular rhythm.      Heart sounds: Normal heart sounds.   Pulmonary:      Effort: Pulmonary effort is normal. No respiratory distress.      Breath sounds: Normal breath sounds. No wheezing, rhonchi or rales.   Abdominal:      General: Bowel sounds are normal.      Palpations: Abdomen is soft.      Tenderness: There is no abdominal tenderness. There is no guarding or rebound.      Comments: No rigidity   Musculoskeletal:         General: Normal range of motion.      Right shoulder: Tenderness (moderate, proximal humerus and shoulder) present.      Cervical back: Normal range of motion and neck supple.   Skin:     General: Skin is warm and dry.      Coloration: Skin is not jaundiced.   Neurological:      General: No focal deficit present.      Mental Status: She is alert and oriented to person, place, and time. Mental status is at baseline.      Sensory: Sensation is intact.      Motor: Motor function is intact.      Coordination: Coordination is intact.   Psychiatric:         Attention and Perception: Attention and perception normal.         Mood and Affect: Mood and affect normal.         Speech: Speech normal.         Behavior: Behavior normal.         Judgment: Judgment normal.                  Procedures:  Procedures      Medical Decision Making:      Comorbidities that affect  care:    Hyperlipidemia, CVA, Hypertension    External Notes reviewed:    Encounter review: Cardiology appointment with Dr. Purdy on 7/12/2022      The following orders were placed and all results were independently analyzed by me:  Orders Placed This Encounter   Procedures   • XR Shoulder 2+ View Right   • CT Cervical Spine Without Contrast       Medications Given in the Emergency Department:  Medications   HYDROcodone-acetaminophen (NORCO) 5-325 MG per tablet 1 tablet (1 tablet Oral Given 4/10/23 0941)        ED Course:    ED Course as of 04/10/23 1016   Mon Apr 10, 2023   1004 10:04 EDT Updated patient on XR results and plan for discharge. Patient expressed understanding and agreement. All questions answered at this time.     [TW]   1013 Patient has been noncompliant with medications.  Uncertain of the name of her physician. [RP]      ED Course User Index  [RP] Scott Becerra MD  [TW] Ana Gonzalez       Labs:    Lab Results (last 24 hours)     ** No results found for the last 24 hours. **           Imaging:    XR Shoulder 2+ View Right    Result Date: 4/10/2023  PROCEDURE: XR SHOULDER 2+ VW RIGHT  COMPARISON: None  INDICATIONS: RIGHT SHOULDER/UPPER ARM PAIN/SWELLING POST FALL  FINDINGS:  There are marked hypertrophic changes at the AC joint.  The glenohumeral joint shows only minimal degenerative change.  No fractures are identified.  No destructive bone lesions are present.        1. Radiographic changes of osteoarthritis particularly at the AC joint.      Emil Jack MD       Electronically Signed and Approved By: Emil Jack MD on 4/10/2023 at 9:08             CT Cervical Spine Without Contrast    Result Date: 4/10/2023  PROCEDURE: CT CERVICAL SPINE WO CONTRAST  COMPARISON: None  INDICATIONS: fall, posterior neck pain into shoulder blades and right arm  PROTOCOL:   Standard imaging protocol performed    RADIATION:   DLP: 277.6mGy*cm   MA and/or KV was adjusted to minimize radiation dose.      TECHNIQUE: After obtaining the patient's consent, multi-planar CT images were created without contrast material.   FINDINGS:  There is normal height and alignment of the cervical vertebral bodies.  There is no evidence of acute fracture.  No significant spinal canal stenosis is identified.  There is moderate degenerative facet changes throughout the cervical spine resulting in moderate right neural foraminal narrowing at the C4/5 level and moderate left neural foraminal narrowing at C5/6 level.  The craniovertebral junction is within normal limits.  Visualized lung apices are clear.  The unenhanced soft tissues of the neck appear within normal limits.  Visualized intracranial contents appear normal.        1. No acute cervical spine fracture 2. Multilevel degenerative facet change resulting in multilevel neural foraminal narrowing as detailed above.     DHIRAJ LEE MD       Electronically Signed and Approved By: DHIRAJ LEE MD on 4/10/2023 at 9:32                 Differential Diagnosis and Discussion:    Extremity Pain: Differential diagnosis includes but is not limited to soft tissue sprain, tendonitis, tendon injury, dislocation, fracture, deep vein thrombosis, arterial insufficiency, osteoarthritis, bursitis, and ligamentous damage.    All X-rays were independently reviewed by me.  CT scan radiology interpretation was reviewed by me.    [facility]         Patient Care Considerations:    LABS: I considered ordering labs, however Patient has no complaints of systemic illness.  Purely a mechanical fall      Consultants/Shared Management Plan:    None    Social Determinants of Health:    Patient is independent, reliable, and has access to care.       Disposition and Care Coordination:    Discharged: The patient is suitable and stable for discharge with no need for consideration of observation or admission.    I have explained the patient´s condition, diagnoses and treatment plan based on the information available to  me at this time. I have answered questions and addressed any concerns. The patient has a good  understanding of the patient´s diagnosis, condition, and treatment plan as can be expected at this point. The vital signs have been stable. The patient´s condition is stable and appropriate for discharge from the emergency department.      The patient will pursue further outpatient evaluation with the primary care physician or other designated or consulting physician as outlined in the discharge instructions. They are agreeable to this plan of care and follow-up instructions have been explained in detail. The patient has received these instructions in written format and have expressed an understanding of the discharge instructions. The patient is aware that any significant change in condition or worsening of symptoms should prompt an immediate return to this or the closest emergency department or call to 911.    Final diagnoses:   Contusion of right shoulder, initial encounter        ED Disposition     ED Disposition   Discharge    Condition   Stable    Comment   --             This medical record created using voice recognition software.    Documentation assistance provided by Ana Gonzalez acting as scribe for Scott Becerra MD. Information recorded by the scribe was done at my direction and has been verified and validated by me.          Ana Gonzalez  04/10/23 0833       Scott Becerra MD  04/10/23 1016

## 2024-02-11 ENCOUNTER — APPOINTMENT (OUTPATIENT)
Dept: GENERAL RADIOLOGY | Facility: HOSPITAL | Age: 69
DRG: 287 | End: 2024-02-11
Payer: COMMERCIAL

## 2024-02-11 ENCOUNTER — HOSPITAL ENCOUNTER (INPATIENT)
Facility: HOSPITAL | Age: 69
LOS: 5 days | Discharge: HOME | DRG: 287 | End: 2024-02-16
Attending: EMERGENCY MEDICINE | Admitting: STUDENT IN AN ORGANIZED HEALTH CARE EDUCATION/TRAINING PROGRAM
Payer: OTHER GOVERNMENT

## 2024-02-11 DIAGNOSIS — R07.2 CHEST PAIN, PRECORDIAL: ICD-10-CM

## 2024-02-11 DIAGNOSIS — F10.929 ALCOHOLIC INTOXICATION WITH COMPLICATION: ICD-10-CM

## 2024-02-11 DIAGNOSIS — I48.91 ATRIAL FIBRILLATION WITH RAPID VENTRICULAR RESPONSE: Primary | ICD-10-CM

## 2024-02-11 DIAGNOSIS — I48.91 ATRIAL FIBRILLATION WITH RVR: ICD-10-CM

## 2024-02-11 DIAGNOSIS — R94.39 ABNORMAL STRESS TEST: ICD-10-CM

## 2024-02-11 LAB
ALBUMIN SERPL-MCNC: 4.6 G/DL (ref 3.5–5.2)
ALBUMIN/GLOB SERPL: 1.4 G/DL
ALP SERPL-CCNC: 54 U/L (ref 39–117)
ALT SERPL W P-5'-P-CCNC: 22 U/L (ref 1–33)
ANION GAP SERPL CALCULATED.3IONS-SCNC: 20.5 MMOL/L (ref 5–15)
APTT PPP: 27.7 SECONDS (ref 78–95.9)
AST SERPL-CCNC: 27 U/L (ref 1–32)
BASOPHILS # BLD AUTO: 0.1 10*3/MM3 (ref 0–0.2)
BASOPHILS NFR BLD AUTO: 1 % (ref 0–1.5)
BILIRUB SERPL-MCNC: <0.2 MG/DL (ref 0–1.2)
BUN SERPL-MCNC: 16 MG/DL (ref 8–23)
BUN/CREAT SERPL: 18.2 (ref 7–25)
CALCIUM SPEC-SCNC: 9.2 MG/DL (ref 8.6–10.5)
CHLORIDE SERPL-SCNC: 105 MMOL/L (ref 98–107)
CO2 SERPL-SCNC: 18.5 MMOL/L (ref 22–29)
CREAT SERPL-MCNC: 0.88 MG/DL (ref 0.57–1)
DEPRECATED RDW RBC AUTO: 47.4 FL (ref 37–54)
EGFRCR SERPLBLD CKD-EPI 2021: 71.7 ML/MIN/1.73
EOSINOPHIL # BLD AUTO: 0.26 10*3/MM3 (ref 0–0.4)
EOSINOPHIL NFR BLD AUTO: 2.6 % (ref 0.3–6.2)
ERYTHROCYTE [DISTWIDTH] IN BLOOD BY AUTOMATED COUNT: 12.9 % (ref 12.3–15.4)
ETHANOL BLD-MCNC: 204 MG/DL (ref 0–10)
ETHANOL UR QL: 0.2 %
GEN 5 2HR TROPONIN T REFLEX: 24 NG/L
GLOBULIN UR ELPH-MCNC: 3.2 GM/DL
GLUCOSE SERPL-MCNC: 75 MG/DL (ref 65–99)
HCT VFR BLD AUTO: 43 % (ref 34–46.6)
HGB BLD-MCNC: 14.3 G/DL (ref 12–15.9)
HOLD SPECIMEN: NORMAL
HOLD SPECIMEN: NORMAL
IMM GRANULOCYTES # BLD AUTO: 0.02 10*3/MM3 (ref 0–0.05)
IMM GRANULOCYTES NFR BLD AUTO: 0.2 % (ref 0–0.5)
INR PPP: 0.94 (ref 0.86–1.15)
LIPASE SERPL-CCNC: 33 U/L (ref 13–60)
LYMPHOCYTES # BLD AUTO: 2 10*3/MM3 (ref 0.7–3.1)
LYMPHOCYTES NFR BLD AUTO: 20.2 % (ref 19.6–45.3)
MAGNESIUM SERPL-MCNC: 1.9 MG/DL (ref 1.6–2.4)
MCH RBC QN AUTO: 32.9 PG (ref 26.6–33)
MCHC RBC AUTO-ENTMCNC: 33.3 G/DL (ref 31.5–35.7)
MCV RBC AUTO: 98.9 FL (ref 79–97)
MONOCYTES # BLD AUTO: 0.87 10*3/MM3 (ref 0.1–0.9)
MONOCYTES NFR BLD AUTO: 8.8 % (ref 5–12)
NEUTROPHILS NFR BLD AUTO: 6.64 10*3/MM3 (ref 1.7–7)
NEUTROPHILS NFR BLD AUTO: 67.2 % (ref 42.7–76)
NRBC BLD AUTO-RTO: 0 /100 WBC (ref 0–0.2)
NT-PROBNP SERPL-MCNC: 55.2 PG/ML (ref 0–900)
PLATELET # BLD AUTO: 482 10*3/MM3 (ref 140–450)
PMV BLD AUTO: 9.6 FL (ref 6–12)
POTASSIUM SERPL-SCNC: 3.8 MMOL/L (ref 3.5–5.2)
PROT SERPL-MCNC: 7.8 G/DL (ref 6–8.5)
PROTHROMBIN TIME: 12.8 SECONDS (ref 11.8–14.9)
QT INTERVAL: 283 MS
QT INTERVAL: 353 MS
QT INTERVAL: 373 MS
QTC INTERVAL: 464 MS
QTC INTERVAL: 478 MS
QTC INTERVAL: 521 MS
RBC # BLD AUTO: 4.35 10*6/MM3 (ref 3.77–5.28)
SODIUM SERPL-SCNC: 144 MMOL/L (ref 136–145)
TROPONIN T DELTA: 13 NG/L
TROPONIN T SERPL HS-MCNC: 11 NG/L
WBC NRBC COR # BLD AUTO: 9.89 10*3/MM3 (ref 3.4–10.8)
WHOLE BLOOD HOLD COAG: NORMAL
WHOLE BLOOD HOLD SPECIMEN: NORMAL

## 2024-02-11 PROCEDURE — 83880 ASSAY OF NATRIURETIC PEPTIDE: CPT | Performed by: EMERGENCY MEDICINE

## 2024-02-11 PROCEDURE — 99222 1ST HOSP IP/OBS MODERATE 55: CPT | Performed by: STUDENT IN AN ORGANIZED HEALTH CARE EDUCATION/TRAINING PROGRAM

## 2024-02-11 PROCEDURE — 25010000002 HEPARIN (PORCINE) 25000-0.45 UT/250ML-% SOLUTION: Performed by: STUDENT IN AN ORGANIZED HEALTH CARE EDUCATION/TRAINING PROGRAM

## 2024-02-11 PROCEDURE — 80053 COMPREHEN METABOLIC PANEL: CPT | Performed by: EMERGENCY MEDICINE

## 2024-02-11 PROCEDURE — 71045 X-RAY EXAM CHEST 1 VIEW: CPT

## 2024-02-11 PROCEDURE — 85025 COMPLETE CBC W/AUTO DIFF WBC: CPT

## 2024-02-11 PROCEDURE — 83690 ASSAY OF LIPASE: CPT | Performed by: EMERGENCY MEDICINE

## 2024-02-11 PROCEDURE — 84484 ASSAY OF TROPONIN QUANT: CPT | Performed by: EMERGENCY MEDICINE

## 2024-02-11 PROCEDURE — 83735 ASSAY OF MAGNESIUM: CPT | Performed by: EMERGENCY MEDICINE

## 2024-02-11 PROCEDURE — 99291 CRITICAL CARE FIRST HOUR: CPT

## 2024-02-11 PROCEDURE — 85730 THROMBOPLASTIN TIME PARTIAL: CPT | Performed by: STUDENT IN AN ORGANIZED HEALTH CARE EDUCATION/TRAINING PROGRAM

## 2024-02-11 PROCEDURE — 85610 PROTHROMBIN TIME: CPT | Performed by: STUDENT IN AN ORGANIZED HEALTH CARE EDUCATION/TRAINING PROGRAM

## 2024-02-11 PROCEDURE — 82077 ASSAY SPEC XCP UR&BREATH IA: CPT | Performed by: EMERGENCY MEDICINE

## 2024-02-11 PROCEDURE — 93005 ELECTROCARDIOGRAM TRACING: CPT | Performed by: EMERGENCY MEDICINE

## 2024-02-11 PROCEDURE — 93005 ELECTROCARDIOGRAM TRACING: CPT

## 2024-02-11 PROCEDURE — 36415 COLL VENOUS BLD VENIPUNCTURE: CPT

## 2024-02-11 RX ORDER — SODIUM CHLORIDE 0.9 % (FLUSH) 0.9 %
10 SYRINGE (ML) INJECTION AS NEEDED
Status: DISCONTINUED | OUTPATIENT
Start: 2024-02-11 | End: 2024-02-16 | Stop reason: HOSPADM

## 2024-02-11 RX ORDER — DILTIAZEM HCL IN NACL,ISO-OSM 125 MG/125
5-15 PLASTIC BAG, INJECTION (ML) INTRAVENOUS
Status: DISCONTINUED | OUTPATIENT
Start: 2024-02-11 | End: 2024-02-16 | Stop reason: HOSPADM

## 2024-02-11 RX ORDER — FOLIC ACID 1 MG/1
1 TABLET ORAL DAILY
Status: DISCONTINUED | OUTPATIENT
Start: 2024-02-12 | End: 2024-02-16 | Stop reason: HOSPADM

## 2024-02-11 RX ORDER — NITROGLYCERIN 0.4 MG/1
0.4 TABLET SUBLINGUAL
Status: DISCONTINUED | OUTPATIENT
Start: 2024-02-11 | End: 2024-02-16 | Stop reason: HOSPADM

## 2024-02-11 RX ORDER — HEPARIN SODIUM 10000 [USP'U]/100ML
12 INJECTION, SOLUTION INTRAVENOUS
Status: DISPENSED | OUTPATIENT
Start: 2024-02-11 | End: 2024-02-15

## 2024-02-11 RX ORDER — SODIUM CHLORIDE 9 MG/ML
75 INJECTION, SOLUTION INTRAVENOUS CONTINUOUS
Status: DISCONTINUED | OUTPATIENT
Start: 2024-02-11 | End: 2024-02-14

## 2024-02-11 RX ORDER — ASPIRIN 81 MG/1
324 TABLET, CHEWABLE ORAL ONCE
Status: COMPLETED | OUTPATIENT
Start: 2024-02-11 | End: 2024-02-11

## 2024-02-11 RX ADMIN — DILTIAZEM HYDROCHLORIDE 5 MG/HR: 5 INJECTION INTRAVENOUS at 19:12

## 2024-02-11 RX ADMIN — ASPIRIN 81 MG 324 MG: 81 TABLET ORAL at 18:58

## 2024-02-11 RX ADMIN — HEPARIN SODIUM 12 UNITS/KG/HR: 10000 INJECTION, SOLUTION INTRAVENOUS at 21:36

## 2024-02-12 ENCOUNTER — APPOINTMENT (OUTPATIENT)
Dept: CARDIOLOGY | Facility: HOSPITAL | Age: 69
DRG: 287 | End: 2024-02-12
Payer: MEDICARE

## 2024-02-12 ENCOUNTER — APPOINTMENT (OUTPATIENT)
Dept: CT IMAGING | Facility: HOSPITAL | Age: 69
DRG: 287 | End: 2024-02-12
Payer: COMMERCIAL

## 2024-02-12 ENCOUNTER — APPOINTMENT (OUTPATIENT)
Dept: NUCLEAR MEDICINE | Facility: HOSPITAL | Age: 69
DRG: 287 | End: 2024-02-12
Payer: COMMERCIAL

## 2024-02-12 PROBLEM — R07.2 CHEST PAIN, PRECORDIAL: Status: ACTIVE | Noted: 2024-02-12

## 2024-02-12 LAB
ANION GAP SERPL CALCULATED.3IONS-SCNC: 20.8 MMOL/L (ref 5–15)
APTT PPP: 64.3 SECONDS (ref 78–95.9)
APTT PPP: 68 SECONDS (ref 78–95.9)
APTT PPP: 79.9 SECONDS (ref 78–95.9)
ASCENDING AORTA: 3.6 CM
BASOPHILS # BLD AUTO: 0.08 10*3/MM3 (ref 0–0.2)
BASOPHILS NFR BLD AUTO: 0.8 % (ref 0–1.5)
BH CV ECHO MEAS - AO MAX PG: 15 MMHG
BH CV ECHO MEAS - AO MEAN PG: 8 MMHG
BH CV ECHO MEAS - AO ROOT DIAM: 2.9 CM
BH CV ECHO MEAS - AO V2 MAX: 191 CM/SEC
BH CV ECHO MEAS - AO V2 VTI: 38.7 CM
BH CV ECHO MEAS - AVA(I,D): 2.46 CM2
BH CV ECHO MEAS - EDV(CUBED): 32.2 ML
BH CV ECHO MEAS - EDV(MOD-SP2): 51.8 ML
BH CV ECHO MEAS - EDV(MOD-SP4): 71.8 ML
BH CV ECHO MEAS - EF(MOD-BP): 70 %
BH CV ECHO MEAS - EF(MOD-SP2): 55.2 %
BH CV ECHO MEAS - EF(MOD-SP4): 81.6 %
BH CV ECHO MEAS - ESV(CUBED): 7.5 ML
BH CV ECHO MEAS - ESV(MOD-SP2): 23.2 ML
BH CV ECHO MEAS - ESV(MOD-SP4): 13.2 ML
BH CV ECHO MEAS - FS: 38.4 %
BH CV ECHO MEAS - IVS/LVPW: 1.02 CM
BH CV ECHO MEAS - IVSD: 1.42 CM
BH CV ECHO MEAS - LA DIMENSION: 3.9 CM
BH CV ECHO MEAS - LAT PEAK E' VEL: 8.4 CM/SEC
BH CV ECHO MEAS - LV MASS(C)D: 152.7 GRAMS
BH CV ECHO MEAS - LV MAX PG: 9.1 MMHG
BH CV ECHO MEAS - LV MEAN PG: 6 MMHG
BH CV ECHO MEAS - LV V1 MAX: 151 CM/SEC
BH CV ECHO MEAS - LV V1 VTI: 30.3 CM
BH CV ECHO MEAS - LVIDD: 3.2 CM
BH CV ECHO MEAS - LVIDS: 1.96 CM
BH CV ECHO MEAS - LVOT AREA: 3.1 CM2
BH CV ECHO MEAS - LVOT DIAM: 2 CM
BH CV ECHO MEAS - LVPWD: 1.39 CM
BH CV ECHO MEAS - MED PEAK E' VEL: 6.4 CM/SEC
BH CV ECHO MEAS - MV A MAX VEL: 118 CM/SEC
BH CV ECHO MEAS - MV DEC SLOPE: 446.5 CM/SEC2
BH CV ECHO MEAS - MV DEC TIME: 0.24 SEC
BH CV ECHO MEAS - MV E MAX VEL: 94.5 CM/SEC
BH CV ECHO MEAS - MV E/A: 0.8
BH CV ECHO MEAS - MV MAX PG: 6.6 MMHG
BH CV ECHO MEAS - MV MEAN PG: 3 MMHG
BH CV ECHO MEAS - MV P1/2T: 80 MSEC
BH CV ECHO MEAS - MV V2 VTI: 32.7 CM
BH CV ECHO MEAS - MVA(P1/2T): 2.7 CM2
BH CV ECHO MEAS - MVA(VTI): 2.9 CM2
BH CV ECHO MEAS - RVDD: 2.32 CM
BH CV ECHO MEAS - SV(LVOT): 95.2 ML
BH CV ECHO MEAS - SV(MOD-SP2): 28.6 ML
BH CV ECHO MEAS - SV(MOD-SP4): 58.6 ML
BH CV ECHO MEASUREMENTS AVERAGE E/E' RATIO: 12.77
BH CV IMMEDIATE POST TECH DATA BLOOD PRESSURE: NORMAL MMHG
BH CV IMMEDIATE POST TECH DATA HEART RATE: 99 BPM
BH CV IMMEDIATE POST TECH DATA OXYGEN SATS: 98 %
BH CV REST NUCLEAR ISOTOPE DOSE: 10.8 MCI
BH CV SIX MINUTE RECOVERY TECH DATA BLOOD PRESSURE: NORMAL
BH CV SIX MINUTE RECOVERY TECH DATA HEART RATE: 97 BPM
BH CV SIX MINUTE RECOVERY TECH DATA OXYGEN SATURATION: 97 %
BH CV STRESS BP STAGE 1: NORMAL
BH CV STRESS COMMENTS STAGE 1: NORMAL
BH CV STRESS DOSE REGADENOSON STAGE 1: 0.4
BH CV STRESS DURATION MIN STAGE 1: 0
BH CV STRESS DURATION SEC STAGE 1: 10
BH CV STRESS HR STAGE 1: 98
BH CV STRESS NUCLEAR ISOTOPE DOSE: 32 MCI
BH CV STRESS O2 STAGE 1: 95
BH CV STRESS PROTOCOL 1: NORMAL
BH CV STRESS RECOVERY BP: NORMAL MMHG
BH CV STRESS RECOVERY HR: 97 BPM
BH CV STRESS RECOVERY O2: 97 %
BH CV STRESS STAGE 1: 1
BH CV THREE MINUTE POST TECH DATA BLOOD PRESSURE: NORMAL MMHG
BH CV THREE MINUTE POST TECH DATA HEART RATE: 98 BPM
BH CV THREE MINUTE POST TECH DATA OXYGEN SATURATION: 98 %
BUN SERPL-MCNC: 18 MG/DL (ref 8–23)
BUN/CREAT SERPL: 24.7 (ref 7–25)
CALCIUM SPEC-SCNC: 8.7 MG/DL (ref 8.6–10.5)
CHLORIDE SERPL-SCNC: 100 MMOL/L (ref 98–107)
CHOLEST SERPL-MCNC: 231 MG/DL (ref 0–200)
CO2 SERPL-SCNC: 16.2 MMOL/L (ref 22–29)
CREAT SERPL-MCNC: 0.73 MG/DL (ref 0.57–1)
DEPRECATED RDW RBC AUTO: 46.4 FL (ref 37–54)
EGFRCR SERPLBLD CKD-EPI 2021: 89.7 ML/MIN/1.73
EOSINOPHIL # BLD AUTO: 0.07 10*3/MM3 (ref 0–0.4)
EOSINOPHIL NFR BLD AUTO: 0.7 % (ref 0.3–6.2)
ERYTHROCYTE [DISTWIDTH] IN BLOOD BY AUTOMATED COUNT: 12.9 % (ref 12.3–15.4)
GLUCOSE BLDC GLUCOMTR-MCNC: 120 MG/DL (ref 70–99)
GLUCOSE SERPL-MCNC: 106 MG/DL (ref 65–99)
HBA1C MFR BLD: 5.9 % (ref 4.8–5.6)
HCT VFR BLD AUTO: 38.4 % (ref 34–46.6)
HDLC SERPL-MCNC: 49 MG/DL (ref 40–60)
HGB BLD-MCNC: 12.7 G/DL (ref 12–15.9)
IMM GRANULOCYTES # BLD AUTO: 0.02 10*3/MM3 (ref 0–0.05)
IMM GRANULOCYTES NFR BLD AUTO: 0.2 % (ref 0–0.5)
IVRT: 100 MS
LDLC SERPL CALC-MCNC: 165 MG/DL (ref 0–100)
LDLC/HDLC SERPL: 3.33 {RATIO}
LEFT ATRIUM VOLUME INDEX: 43.8 ML/M2
LV EF NUC BP: 58 %
LYMPHOCYTES # BLD AUTO: 1.54 10*3/MM3 (ref 0.7–3.1)
LYMPHOCYTES NFR BLD AUTO: 15.2 % (ref 19.6–45.3)
MAXIMAL PREDICTED HEART RATE: 152 BPM
MCH RBC QN AUTO: 32.2 PG (ref 26.6–33)
MCHC RBC AUTO-ENTMCNC: 33.1 G/DL (ref 31.5–35.7)
MCV RBC AUTO: 97.5 FL (ref 79–97)
MONOCYTES # BLD AUTO: 0.99 10*3/MM3 (ref 0.1–0.9)
MONOCYTES NFR BLD AUTO: 9.7 % (ref 5–12)
NEUTROPHILS NFR BLD AUTO: 7.46 10*3/MM3 (ref 1.7–7)
NEUTROPHILS NFR BLD AUTO: 73.4 % (ref 42.7–76)
NRBC BLD AUTO-RTO: 0 /100 WBC (ref 0–0.2)
PERCENT MAX PREDICTED HR: 65.13 %
PLATELET # BLD AUTO: 418 10*3/MM3 (ref 140–450)
PMV BLD AUTO: 9.6 FL (ref 6–12)
POTASSIUM SERPL-SCNC: 4.2 MMOL/L (ref 3.5–5.2)
RBC # BLD AUTO: 3.94 10*6/MM3 (ref 3.77–5.28)
SODIUM SERPL-SCNC: 137 MMOL/L (ref 136–145)
STRESS BASELINE BP: NORMAL MMHG
STRESS BASELINE HR: 83 BPM
STRESS O2 SAT REST: 94 %
STRESS PERCENT HR: 77 %
STRESS POST O2 SAT PEAK: 98 %
STRESS POST PEAK BP: NORMAL MMHG
STRESS POST PEAK HR: 99 BPM
STRESS TARGET HR: 129 BPM
TRIGL SERPL-MCNC: 94 MG/DL (ref 0–150)
VLDLC SERPL-MCNC: 17 MG/DL (ref 5–40)
WBC NRBC COR # BLD AUTO: 10.16 10*3/MM3 (ref 3.4–10.8)

## 2024-02-12 PROCEDURE — 80061 LIPID PANEL: CPT | Performed by: INTERNAL MEDICINE

## 2024-02-12 PROCEDURE — 82948 REAGENT STRIP/BLOOD GLUCOSE: CPT

## 2024-02-12 PROCEDURE — 0 TECHNETIUM TETROFOSMIN KIT: Performed by: INTERNAL MEDICINE

## 2024-02-12 PROCEDURE — 78451 HT MUSCLE IMAGE SPECT SING: CPT

## 2024-02-12 PROCEDURE — 78452 HT MUSCLE IMAGE SPECT MULT: CPT

## 2024-02-12 PROCEDURE — 99252 IP/OBS CONSLTJ NEW/EST SF 35: CPT

## 2024-02-12 PROCEDURE — 85025 COMPLETE CBC W/AUTO DIFF WBC: CPT | Performed by: STUDENT IN AN ORGANIZED HEALTH CARE EDUCATION/TRAINING PROGRAM

## 2024-02-12 PROCEDURE — 25010000002 THIAMINE PER 100 MG: Performed by: INTERNAL MEDICINE

## 2024-02-12 PROCEDURE — 94799 UNLISTED PULMONARY SVC/PX: CPT

## 2024-02-12 PROCEDURE — 25010000002 THIAMINE PER 100 MG: Performed by: STUDENT IN AN ORGANIZED HEALTH CARE EDUCATION/TRAINING PROGRAM

## 2024-02-12 PROCEDURE — 25810000003 SODIUM CHLORIDE 0.9 % SOLUTION 1,000 ML FLEX CONT: Performed by: STUDENT IN AN ORGANIZED HEALTH CARE EDUCATION/TRAINING PROGRAM

## 2024-02-12 PROCEDURE — A9502 TC99M TETROFOSMIN: HCPCS | Performed by: INTERNAL MEDICINE

## 2024-02-12 PROCEDURE — 85730 THROMBOPLASTIN TIME PARTIAL: CPT | Performed by: STUDENT IN AN ORGANIZED HEALTH CARE EDUCATION/TRAINING PROGRAM

## 2024-02-12 PROCEDURE — 99232 SBSQ HOSP IP/OBS MODERATE 35: CPT | Performed by: INTERNAL MEDICINE

## 2024-02-12 PROCEDURE — 25810000003 SODIUM CHLORIDE 0.9 % SOLUTION: Performed by: STUDENT IN AN ORGANIZED HEALTH CARE EDUCATION/TRAINING PROGRAM

## 2024-02-12 PROCEDURE — 93306 TTE W/DOPPLER COMPLETE: CPT

## 2024-02-12 PROCEDURE — 25010000002 HEPARIN (PORCINE) 25000-0.45 UT/250ML-% SOLUTION: Performed by: STUDENT IN AN ORGANIZED HEALTH CARE EDUCATION/TRAINING PROGRAM

## 2024-02-12 PROCEDURE — 25010000002 HYDRALAZINE PER 20 MG: Performed by: INTERNAL MEDICINE

## 2024-02-12 PROCEDURE — 83036 HEMOGLOBIN GLYCOSYLATED A1C: CPT | Performed by: INTERNAL MEDICINE

## 2024-02-12 PROCEDURE — 94761 N-INVAS EAR/PLS OXIMETRY MLT: CPT

## 2024-02-12 PROCEDURE — 70450 CT HEAD/BRAIN W/O DYE: CPT

## 2024-02-12 PROCEDURE — 85730 THROMBOPLASTIN TIME PARTIAL: CPT | Performed by: INTERNAL MEDICINE

## 2024-02-12 PROCEDURE — 25010000002 DIAZEPAM PER 5 MG

## 2024-02-12 PROCEDURE — 80048 BASIC METABOLIC PNL TOTAL CA: CPT | Performed by: STUDENT IN AN ORGANIZED HEALTH CARE EDUCATION/TRAINING PROGRAM

## 2024-02-12 PROCEDURE — 25010000002 REGADENOSON 0.4 MG/5ML SOLUTION: Performed by: INTERNAL MEDICINE

## 2024-02-12 PROCEDURE — 93017 CV STRESS TEST TRACING ONLY: CPT

## 2024-02-12 PROCEDURE — 99254 IP/OBS CNSLTJ NEW/EST MOD 60: CPT | Performed by: INTERNAL MEDICINE

## 2024-02-12 RX ORDER — SODIUM CHLORIDE 0.9 % (FLUSH) 0.9 %
10 SYRINGE (ML) INJECTION EVERY 12 HOURS SCHEDULED
Status: DISCONTINUED | OUTPATIENT
Start: 2024-02-12 | End: 2024-02-16 | Stop reason: HOSPADM

## 2024-02-12 RX ORDER — METHION/INOS/CHOL BT/B COM/LIV 110MG-86MG
100 CAPSULE ORAL DAILY
Status: DISCONTINUED | OUTPATIENT
Start: 2024-02-18 | End: 2024-02-16 | Stop reason: HOSPADM

## 2024-02-12 RX ORDER — LORAZEPAM 0.5 MG/1
1 TABLET ORAL
Status: DISCONTINUED | OUTPATIENT
Start: 2024-02-12 | End: 2024-02-15

## 2024-02-12 RX ORDER — BISACODYL 10 MG
10 SUPPOSITORY, RECTAL RECTAL DAILY PRN
Status: DISCONTINUED | OUTPATIENT
Start: 2024-02-12 | End: 2024-02-16 | Stop reason: HOSPADM

## 2024-02-12 RX ORDER — NITROGLYCERIN 0.4 MG/1
0.4 TABLET SUBLINGUAL
Status: DISCONTINUED | OUTPATIENT
Start: 2024-02-12 | End: 2024-02-16 | Stop reason: HOSPADM

## 2024-02-12 RX ORDER — ACETAMINOPHEN 325 MG/1
650 TABLET ORAL EVERY 6 HOURS PRN
Status: DISCONTINUED | OUTPATIENT
Start: 2024-02-12 | End: 2024-02-16 | Stop reason: HOSPADM

## 2024-02-12 RX ORDER — ACETAMINOPHEN 650 MG/1
650 SUPPOSITORY RECTAL EVERY 4 HOURS PRN
Status: DISCONTINUED | OUTPATIENT
Start: 2024-02-12 | End: 2024-02-16 | Stop reason: HOSPADM

## 2024-02-12 RX ORDER — HYDRALAZINE HYDROCHLORIDE 20 MG/ML
10 INJECTION INTRAMUSCULAR; INTRAVENOUS EVERY 6 HOURS PRN
Status: DISCONTINUED | OUTPATIENT
Start: 2024-02-12 | End: 2024-02-16 | Stop reason: HOSPADM

## 2024-02-12 RX ORDER — SODIUM CHLORIDE 0.9 % (FLUSH) 0.9 %
10 SYRINGE (ML) INJECTION AS NEEDED
Status: DISCONTINUED | OUTPATIENT
Start: 2024-02-12 | End: 2024-02-16 | Stop reason: HOSPADM

## 2024-02-12 RX ORDER — DILTIAZEM HYDROCHLORIDE 180 MG/1
180 CAPSULE, COATED, EXTENDED RELEASE ORAL
Status: DISCONTINUED | OUTPATIENT
Start: 2024-02-12 | End: 2024-02-16 | Stop reason: HOSPADM

## 2024-02-12 RX ORDER — BISACODYL 5 MG/1
5 TABLET, DELAYED RELEASE ORAL DAILY PRN
Status: DISCONTINUED | OUTPATIENT
Start: 2024-02-12 | End: 2024-02-16 | Stop reason: HOSPADM

## 2024-02-12 RX ORDER — OXYCODONE HYDROCHLORIDE AND ACETAMINOPHEN 5; 325 MG/1; MG/1
1 TABLET ORAL EVERY 6 HOURS PRN
Status: DISCONTINUED | OUTPATIENT
Start: 2024-02-12 | End: 2024-02-16 | Stop reason: HOSPADM

## 2024-02-12 RX ORDER — DIAZEPAM 5 MG/ML
2.5 INJECTION, SOLUTION INTRAMUSCULAR; INTRAVENOUS
Status: DISCONTINUED | OUTPATIENT
Start: 2024-02-12 | End: 2024-02-15

## 2024-02-12 RX ORDER — ASPIRIN 81 MG/1
81 TABLET, CHEWABLE ORAL DAILY
Status: DISCONTINUED | OUTPATIENT
Start: 2024-02-12 | End: 2024-02-16 | Stop reason: HOSPADM

## 2024-02-12 RX ORDER — ATORVASTATIN CALCIUM 40 MG/1
40 TABLET, FILM COATED ORAL NIGHTLY
Status: DISCONTINUED | OUTPATIENT
Start: 2024-02-12 | End: 2024-02-16 | Stop reason: HOSPADM

## 2024-02-12 RX ORDER — DIAZEPAM 5 MG/ML
5 INJECTION, SOLUTION INTRAMUSCULAR; INTRAVENOUS
Status: DISCONTINUED | OUTPATIENT
Start: 2024-02-12 | End: 2024-02-15

## 2024-02-12 RX ORDER — MULTIPLE VITAMINS W/ MINERALS TAB 9MG-400MCG
1 TAB ORAL DAILY
Status: DISCONTINUED | OUTPATIENT
Start: 2024-02-12 | End: 2024-02-16 | Stop reason: HOSPADM

## 2024-02-12 RX ORDER — FOLIC ACID 1 MG/1
1 TABLET ORAL DAILY
Status: DISCONTINUED | OUTPATIENT
Start: 2024-02-12 | End: 2024-02-12

## 2024-02-12 RX ORDER — LORAZEPAM 2 MG/1
2 TABLET ORAL
Status: DISCONTINUED | OUTPATIENT
Start: 2024-02-12 | End: 2024-02-15

## 2024-02-12 RX ORDER — SODIUM CHLORIDE 9 MG/ML
40 INJECTION, SOLUTION INTRAVENOUS AS NEEDED
Status: DISCONTINUED | OUTPATIENT
Start: 2024-02-12 | End: 2024-02-16 | Stop reason: HOSPADM

## 2024-02-12 RX ORDER — THIAMINE HYDROCHLORIDE 100 MG/ML
200 INJECTION, SOLUTION INTRAMUSCULAR; INTRAVENOUS EVERY 8 HOURS SCHEDULED
Status: DISCONTINUED | OUTPATIENT
Start: 2024-02-12 | End: 2024-02-16 | Stop reason: HOSPADM

## 2024-02-12 RX ORDER — REGADENOSON 0.08 MG/ML
0.4 INJECTION, SOLUTION INTRAVENOUS
Status: COMPLETED | OUTPATIENT
Start: 2024-02-12 | End: 2024-02-12

## 2024-02-12 RX ORDER — POLYETHYLENE GLYCOL 3350 17 G/17G
17 POWDER, FOR SOLUTION ORAL DAILY PRN
Status: DISCONTINUED | OUTPATIENT
Start: 2024-02-12 | End: 2024-02-16 | Stop reason: HOSPADM

## 2024-02-12 RX ORDER — AMOXICILLIN 250 MG
2 CAPSULE ORAL 2 TIMES DAILY PRN
Status: DISCONTINUED | OUTPATIENT
Start: 2024-02-12 | End: 2024-02-16 | Stop reason: HOSPADM

## 2024-02-12 RX ADMIN — HEPARIN SODIUM 16 UNITS/KG/HR: 10000 INJECTION, SOLUTION INTRAVENOUS at 17:34

## 2024-02-12 RX ADMIN — TETROFOSMIN 1 DOSE: 1.38 INJECTION, POWDER, LYOPHILIZED, FOR SOLUTION INTRAVENOUS at 12:27

## 2024-02-12 RX ADMIN — ATORVASTATIN CALCIUM 40 MG: 40 TABLET, FILM COATED ORAL at 21:07

## 2024-02-12 RX ADMIN — Medication 10 ML: at 21:07

## 2024-02-12 RX ADMIN — DIAZEPAM 5 MG: 5 INJECTION, SOLUTION INTRAMUSCULAR; INTRAVENOUS at 22:16

## 2024-02-12 RX ADMIN — THIAMINE HYDROCHLORIDE 200 MG: 100 INJECTION, SOLUTION INTRAMUSCULAR; INTRAVENOUS at 22:58

## 2024-02-12 RX ADMIN — Medication 1 TABLET: at 18:01

## 2024-02-12 RX ADMIN — OXYCODONE HYDROCHLORIDE AND ACETAMINOPHEN 1 TABLET: 5; 325 TABLET ORAL at 21:07

## 2024-02-12 RX ADMIN — FOLIC ACID 100 ML/HR: 5 INJECTION, SOLUTION INTRAMUSCULAR; INTRAVENOUS; SUBCUTANEOUS at 09:53

## 2024-02-12 RX ADMIN — FOLIC ACID 1 MG: 1 TABLET ORAL at 10:51

## 2024-02-12 RX ADMIN — SODIUM CHLORIDE 75 ML/HR: 9 INJECTION, SOLUTION INTRAVENOUS at 01:10

## 2024-02-12 RX ADMIN — HEPARIN SODIUM 14 UNITS/KG/HR: 10000 INJECTION, SOLUTION INTRAVENOUS at 16:31

## 2024-02-12 RX ADMIN — ACETAMINOPHEN 650 MG: 325 TABLET ORAL at 09:53

## 2024-02-12 RX ADMIN — SODIUM CHLORIDE 75 ML/HR: 9 INJECTION, SOLUTION INTRAVENOUS at 14:46

## 2024-02-12 RX ADMIN — ASPIRIN 81 MG CHEWABLE TABLET 81 MG: 81 TABLET CHEWABLE at 09:53

## 2024-02-12 RX ADMIN — HYDRALAZINE HYDROCHLORIDE 10 MG: 20 INJECTION, SOLUTION INTRAMUSCULAR; INTRAVENOUS at 18:02

## 2024-02-12 RX ADMIN — TETROFOSMIN 1 DOSE: 1.38 INJECTION, POWDER, LYOPHILIZED, FOR SOLUTION INTRAVENOUS at 11:25

## 2024-02-12 RX ADMIN — DILTIAZEM HYDROCHLORIDE 180 MG: 180 CAPSULE, COATED, EXTENDED RELEASE ORAL at 10:20

## 2024-02-12 RX ADMIN — REGADENOSON 0.4 MG: 0.08 INJECTION, SOLUTION INTRAVENOUS at 12:27

## 2024-02-12 RX ADMIN — ACETAMINOPHEN 650 MG: 325 TABLET ORAL at 01:08

## 2024-02-12 NOTE — PLAN OF CARE
Problem: Dysrhythmia  Goal: Normalized Cardiac Rhythm  Outcome: Ongoing, Progressing   Goal Outcome Evaluation:  Plan of Care Reviewed With: patient

## 2024-02-12 NOTE — PROGRESS NOTES
[facility]   Hospitalist Progress Note  Date: 2024  Patient Name: Kiesha Pan  : 1955  MRN: [id number]  Date of admission: 2024  Room/Bed: 256/1      Subjective   Subjective     Chief Complaint:   Chest pain    Summary:  Kiesha Pan is a 68-year-old female with a past medical history of hypertension, atrial fibrillation, hyperlipidemia, depression, alcohol abuse, previous stroke and GERD presenting with chest pain. While watching TV around 5pm, she developed a sharp chest pain. The pain shortly after felt like someone was sitting on her chest. She rated her pain a 6/10 in severity and it was non-radiating. She has not been compliant with her eliquis for several months.  Patient endorses intermittent symptoms similar to these in the past however never persistent.     In ED, blood pressure 112/88, heart rate 160, respiratory rate 18, temperature 98.1 and O2 saturation 97% on room air.  Labs on arrival showed a sodium 144, potassium 3.8, BUN 16, creatinine 0.8, WBC 9.8, hemoglobin 14.3 and platelet count 42.  Troponin 11, proBNP 55.2, chest x-ray showed no active disease.     Interval Followup:   Patient endorsing improvement of chest pressure today on rounds.  Patient converted into normal sinus rhythm while on diltiazem drip.  Patient seen by cardiology recommending stress test.  Patient continues on heparin drip at this time.    Objective   Objective     Vitals:   Temp:  [98.1 °F (36.7 °C)-99 °F (37.2 °C)] 98.2 °F (36.8 °C)  Heart Rate:  [] 95  Resp:  [12-20] 20  BP: (112-175)/(50-97) 172/82    Physical Exam   General: Awake, alert, NAD  HENT: NCAT, MMM  Eyes: pupils equal, no scleral icterus  Cardiovascular: RRR, no murmurs   Pulmonary: CTA bilaterally; no wheezes; no conversational dyspnea  Gastrointestinal: S/ND/NT, +BS  Musculoskeletal: No gross deformities  Skin: No jaundice, no rash on exposed skin appreciated  Neuro: CN II through XII grossly intact; speech clear; no tremor  : No  Watson catheter; no suprapubic tenderness    Result Review    Result Review:  I have personally reviewed these results:  [x]  Laboratory      Lab 02/12/24  0918 02/12/24 0309 02/11/24  1834   WBC  --  10.16 9.89   HEMOGLOBIN  --  12.7 14.3   HEMATOCRIT  --  38.4 43.0   PLATELETS  --  418 482*   NEUTROS ABS  --  7.46* 6.64   IMMATURE GRANS (ABS)  --  0.02 0.02   LYMPHS ABS  --  1.54 2.00   MONOS ABS  --  0.99* 0.87   EOS ABS  --  0.07 0.26   MCV  --  97.5* 98.9*   PROTIME  --   --  12.8   APTT 79.9 68.0* 27.7*         Lab 02/12/24 0309 02/11/24  1834   SODIUM 137 144   POTASSIUM 4.2 3.8   CHLORIDE 100 105   CO2 16.2* 18.5*   ANION GAP 20.8* 20.5*   BUN 18 16   CREATININE 0.73 0.88   EGFR 89.7 71.7   GLUCOSE 106* 75   CALCIUM 8.7 9.2   MAGNESIUM  --  1.9   HEMOGLOBIN A1C 5.90*  --          Lab 02/11/24  1834   TOTAL PROTEIN 7.8   ALBUMIN 4.6   GLOBULIN 3.2   ALT (SGPT) 22   AST (SGOT) 27   BILIRUBIN <0.2   ALK PHOS 54   LIPASE 33         Lab 02/11/24 2054 02/11/24  1834   PROBNP  --  55.2   HSTROP T 24* 11   PROTIME  --  12.8   INR  --  0.94         Lab 02/12/24 0309   CHOLESTEROL 231*   LDL CHOL 165*   HDL CHOL 49   TRIGLYCERIDES 94             Brief Urine Lab Results       None          [x]  Microbiology   Microbiology Results (last 10 days)       ** No results found for the last 240 hours. **          [x]  Radiology  XR Chest 1 View    Result Date: 2/11/2024    Emphysema.  No active pulmonary disease is seen.       SUNIL MCKENNA MD       Electronically Signed and Approved By: SUNIL MCKENNA MD on 2/11/2024 at 19:16            []  EKG/Telemetry   []  Cardiology/Vascular   []  Pathology  []  Old records  []  Other:    Assessment & Plan   Assessment / Plan     Assessment:  Atrial fibrillation with RVR  Chest pain  Hypertension  Hyperlipidemia  Depression  Alcohol abuse, with potential for withdrawal  Stroke  GERD    Plan:  Patient deepak admitted to hospital for further care management  Cardiology consulted,  appreciate assistance  Patient remains on heparin drip at this time  Plan for stress test in the morning per cardiology  Patient is converted over to normal sinus rhythm  Oral diltiazem has been ordered  Patient hypertensive, PRNs available  Echocardiogram ordered, will follow-up  [facility] protocol ordered, potential for withdrawal  Will monitor electrolytes and renal function with BMP and magnesium level in the AM  Will monitor WBC and Hgb with CBC in the AM  Clinical course will dictate further management        Personally reviewed patients labs and imaging, discussed with patient, cardiologist and nurse at bedside.  Part of this note may be an electronic transcription/translation of spoken language to printed text using the Dragon dictation system.      DVT prophylaxis:  Medical and mechanical DVT prophylaxis orders are present.        CODE STATUS:   Code Status (Patient has no pulse and is not breathing): CPR (Attempt to Resuscitate)  Medical Interventions (Patient has pulse or is breathing): Full Support

## 2024-02-12 NOTE — PLAN OF CARE
Goal Outcome Evaluation:   Spouse at bedside. No complaints at this time. Call light in reach. Stress test completed today.

## 2024-02-12 NOTE — CASE MANAGEMENT/SOCIAL WORK
Discharge Planning Assessment  SARIKA Kelley     Patient Name: Kiesha Pan  MRN: [id number]  Today's Date: 2/12/2024    Admit Date: 2/11/2024    Plan: Pt lives with . Pt states that her son is supportive as well. Pt and family states they do not feel that Dr. Martinez, they would like PCP set up.  works outside of the home,  is concidering quiting job to stay at home and care for wife.  is concerned with Pt's drinking and would like for her to go to [facility] substance abuse program. At this time Pt does not want to go to [facility] rehab, Pt plans to return home. SW will continue to follow as needed.   Discharge Needs Assessment       Row Name 02/12/24 0847       Living Environment    People in Home spouse;child(enrique), adult    Current Living Arrangements home    Potentially Unsafe Housing Conditions none    In the past 12 months has the electric, gas, oil, or water company threatened to shut off services in your home? No    Primary Care Provided by self    Provides Primary Care For no one    Family Caregiver if Needed spouse;child(enrique), adult    Quality of Family Relationships helpful;involved    Able to Return to Prior Arrangements yes       Resource/Environmental Concerns    Resource/Environmental Concerns none    Transportation Concerns none       Transportation Needs    In the past 12 months, has lack of transportation kept you from medical appointments or from getting medications? no    In the past 12 months, has lack of transportation kept you from meetings, work, or from getting things needed for daily living? No       Food Insecurity    Within the past 12 months, you worried that your food would run out before you got the money to buy more. Never true    Within the past 12 months, the food you bought just didn't last and you didn't have money to get more. Never true       Transition Planning    Patient/Family Anticipates Transition to home with family    Patient/Family Anticipated Services  at Transition none    Transportation Anticipated family or friend will provide       Discharge Needs Assessment    Readmission Within the Last 30 Days no previous admission in last 30 days    Equipment Currently Used at Home cane, quad;walker, rolling    Concerns to be Addressed discharge planning    Anticipated Changes Related to Illness none    Equipment Needed After Discharge none    Discharge Coordination/Progress Pt lives with . Pt states that her son is supportive as well. Pt and family states they do not feel that Dr. Martinez, they would like PCP set up.  works outside of the home,  is concidering quiting job to stay at home and care for wife.  is concerned with Pt's drinking and would like for her to go to [facility] substance abuse program. At this time Pt does not want to go to [facility] rehab, Pt plans to return home. SW will continue to follow as needed.                   Discharge Plan       Row Name 02/12/24 0923       Plan    Plan Pt lives with . Pt states that her son is supportive as well. Pt and family states they do not feel that Dr. Martinez, they would like PCP set up.  works outside of the home,  is concidering quiting job to stay at home and care for wife.  is concerned with Pt's drinking and would like for her to go to [facility] substance abuse program. At this time Pt does not want to go to [facility] rehab, Pt plans to return home. SW will continue to follow as needed.                  Continued Care and Services - Admitted Since 2/11/2024    Coordination has not been started for this encounter.          Demographic Summary       Row Name 02/12/24 0844       General Information    Admission Type inpatient    Arrived From home    Referral Source admission list    Reason for Consult discharge planning    Preferred Language English       Contact Information    Permission Granted to Share Info With permission denied                   Functional  Status       Row Name 02/12/24 0847       Employment/    Employment Status unemployed      Row Name 02/12/24 0845       Functional Status    Usual Activity Tolerance good    Current Activity Tolerance good       Physical Activity    On average, how many days per week do you engage in moderate to strenuous exercise (like a brisk walk)? 0 days    On average, how many minutes do you engage in exercise at this level? 0 min    Number of minutes of exercise per week 0       Assessment of Health Literacy    How often do you have someone help you read hospital materials? Never    How often do you have problems learning about your medical condition because of difficulty understanding written information? Never    How often do you have a problem understanding what is told to you about your medical condition? Never    How confident are you filling out medical forms by yourself? Quite a bit    Health Literacy Good       Functional Status, IADL    Medications independent    Meal Preparation independent    Housekeeping independent    Laundry independent    Shopping independent       Mental Status    General Appearance WDL WDL       Mental Status Summary    Recent Changes in Mental Status/Cognitive Functioning no changes                   Psychosocial    No documentation.                  Abuse/Neglect    No documentation.                  Legal       Row Name 02/12/24 0806       Financial Resource Strain    How hard is it for you to pay for the very basics like food, housing, medical care, and heating? Not hard       Financial/Legal    Source of Income salary/wages    Application for Public Assistance not applied       Legal    Criminal Activity/Legal Involvement none                   Substance Abuse    No documentation.                  Patient Forms    No documentation.                     Negar Auguste

## 2024-02-12 NOTE — CONSULTS
Cardiology Consult Note  [address]          Patient Identification:  Kiesha Pan      [id number]  68 y.o.        female  1955           Reason for Consultation: Atrial fibrillation chest pain    PCP: Shahida Martinez MD    History of Present Illness:     Patient is a 68-year-old female with history of paroxysmal atrial fibrillation, essential hypertension, dyslipidemia, asthma, and previous CVA who presented with chest discomfort that started yesterday afternoon at rest associated tachycardia.  The pain was associated some nausea and vomiting funny feeling in her face was 8 and 10 intensity.  The episode lasted about 3 hours when she was seen in the emergency room she was found to be in atrial fibrillation with RVR.  This morning she converted back to normal sinus rhythm is resting comfortably.  Initial troponins were within normal limits that she did not have any acute EKG changes.  She has not been taking her medications for some time and has not followed up regular with her cardiologist    Past History:  Past Medical History:   Diagnosis Date    Asthma     Elevated troponin level not due to acute coronary syndrome 12/3/2021    Hypertension     Mixed hyperlipidemia 12/3/2021    Stroke 2021     Past Surgical History:   Procedure Laterality Date    CHOLECYSTECTOMY       Allergies   Allergen Reactions    Cephalexin Unknown - Low Severity    Diphenhydramine Unknown - Low Severity    Levofloxacin Unknown - Low Severity    Penicillins Unknown - Low Severity     Social History     Socioeconomic History    Marital status:    Tobacco Use    Smoking status: Former     Types: Cigarettes     Quit date:      Years since quittin.1    Smokeless tobacco: Never   Vaping Use    Vaping Use: Never used   Substance and Sexual Activity    Alcohol use: Yes     Alcohol/week: 8.0 standard drinks of alcohol     Types: 4 Shots of liquor, 4 Drinks containing 0.5 oz of alcohol per week  "    Comment: [place]    Drug use: Not Currently    Sexual activity: Defer     Family History   Problem Relation Age of Onset    Kidney disease Mother        Medications:  Prior to Admission medications    Not on File      Current medications:  aspirin, 81 mg, Oral, Daily  atorvastatin, 40 mg, Oral, Nightly  dilTIAZem CD, 180 mg, Oral, Q24H  folic acid, 1 mg, Oral, Daily  sodium chloride, 10 mL, Intravenous, Q12H  thiamine (B-1) 100 mg, folic acid 1 mg in sodium chloride 0.9 % 1,000 mL infusion, 100 mL/hr, Intravenous, Daily      Current IV drips:  dilTIAZem, 5-15 mg/hr, Last Rate: 12.5 mg/hr (02/12/24 0730)  heparin, 12 Units/kg/hr, Last Rate: 14 Units/kg/hr (02/12/24 0340)  sodium chloride, 75 mL/hr, Last Rate: 75 mL/hr (02/12/24 0110)        Review of Systems   Constitutional: Negative for chills, fever and weight loss.   HENT:  Negative for congestion and nosebleeds.    Cardiovascular:  Positive for chest pain. Negative for orthopnea and paroxysmal nocturnal dyspnea.   Respiratory:  Positive for shortness of breath. Negative for cough.    Endocrine: Negative for cold intolerance and heat intolerance.   Skin:  Negative for rash.   Musculoskeletal:  Negative for back pain and muscle weakness.   Gastrointestinal:  Negative for abdominal pain, nausea and vomiting.   Genitourinary:  Negative for dysuria and nocturia.   Neurological:  Negative for dizziness and light-headedness.   Psychiatric/Behavioral:  Negative for altered mental status and hallucinations.          Physical Exam    /68 (BP Location: Right arm, Patient Position: Lying)   Pulse 86   Temp 98.2 °F (36.8 °C) (Oral)   Resp 20   Ht 170.2 cm (67\")   Wt 69.9 kg (154 lb 1.6 oz)   LMP  (LMP Unknown)   SpO2 95%   BMI 24.14 kg/m²  Body mass index is 24.14 kg/m².   Oxygen saturation   @FLOWAN(10::1)@ SpO2  Min: 94 %  Max: 99 %    General Appearance:   no acute distress  Alert and oriented x3  HENT:   lips not cyanotic  Atraumatic  Neck:  thyroid not " enlarged  supple  Respiratory:  no respiratory distress  normal breath sounds  no rales  Cardiovascular:  no jugular venous distention  regular rhythm  apical impulse normal  S1 normal, S2 normal  no S3, no S4   no murmur  no rub, no thrill  no carotid bruit  pedal pulses normal  lower extremity edema: Trace   Gastrointestinal:   bowel sounds normal  non-tender  no hepatomegaly, no splenomegaly  Musculoskeletal:  no clubbing of fingers.   normocephalic, head atraumatic  Skin:   warm, dry  No rashes  Neuro/Psychiatric:  normal mood and affect  judgement and insight appropriate      Cardiographics:     ECG  (personally reviewed)              Telemetry:  (personally reviewed) atrial fibrillation with RVR now back in normal sinus rhythm   Results for orders placed during the hospital encounter of 06/10/22    Adult Transthoracic Echo Complete With Contrast if Necessary Per Protocol    Interpretation Summary  · Left ventricular ejection fraction appears to be 61 - 65%. Left ventricular systolic function is normal.  · There are no significant valvular abnormalities by Doppler study.  · This is a technically difficult and limited study.     Results for orders placed during the hospital encounter of 11/06/21    Stress Test With Myocardial Perfusion One Day    Interpretation Summary  · Left ventricular ejection fraction is normal. (Calculated EF = 63%).  · Myocardial perfusion imaging indicates a normal myocardial perfusion study with no evidence of ischemia.  · Impressions are consistent with a low risk study.  · Findings consistent with a normal ECG stress test.      No results found for this or any previous visit.     Cardiolite (Tc-99m Sestamibi) stress test              Results for orders placed during the hospital encounter of 11/06/21    Stress Test With Myocardial Perfusion One Day    Interpretation Summary  · Left ventricular ejection fraction is normal. (Calculated EF = 63%).  · Myocardial perfusion imaging  "indicates a normal myocardial perfusion study with no evidence of ischemia.  · Impressions are consistent with a low risk study.  · Findings consistent with a normal ECG stress test.      Lab Review:       CBC          2/11/2024    18:34 2/12/2024    03:09   CBC   WBC 9.89  10.16    RBC 4.35  3.94    Hemoglobin 14.3  12.7    Hematocrit 43.0  38.4    MCV 98.9  97.5    MCH 32.9  32.2    MCHC 33.3  33.1    RDW 12.9  12.9    Platelets 482  418        CMP          2/11/2024    18:34 2/12/2024    03:09   CMP   Glucose 75  106    BUN 16  18    Creatinine 0.88  0.73    EGFR 71.7  89.7    Sodium 144  137    Potassium 3.8  4.2    Chloride 105  100    Calcium 9.2  8.7    Total Protein 7.8     Albumin 4.6     Globulin 3.2     Total Bilirubin <0.2     Alkaline Phosphatase 54     AST (SGOT) 27     ALT (SGPT) 22     Albumin/Globulin Ratio 1.4     BUN/Creatinine Ratio 18.2  24.7    Anion Gap 20.5  20.8         CARDIAC LABS:      Lab 02/11/24 2054 02/11/24  1834   PROBNP  --  55.2   HSTROP T 24* 11   PROTIME  --  12.8   INR  --  0.94      No results found for: \"DIGOXIN\"   Lab Results   Component Value Date    TSH 6.590 (H) 12/02/2022     Results from last 7 days   Lab Units 02/12/24  0309   CHOLESTEROL mg/dL 231*   HDL CHOL mg/dL 49     Lab Results   Component Value Date    POCTROP 0.06 11/06/2021     No components found for: \"DDIMERQUAN\"  Lab Results   Component Value Date    MG 1.9 02/11/2024             CARDIAC LABS:      Lab 02/11/24 2054 02/11/24  1834   PROBNP  --  55.2   HSTROP T 24* 11   PROTIME  --  12.8   INR  --  0.94        Imaging:  CXR   MPRESSION:                 Emphysema.     No active pulmonary disease is seen.  Assessment:    Atrial fibrillation with RVR      Paroxysmal atrial fibrillation initially patient with RVR now, back to normal sinus rhythm spontaneously suspect this was the  for underlying chest discomfort issues.  Would strongly recommend anticoagulation long-term with NOAC given her [id number] " currently on heparin in the short-term.  Will go ahead and start on p.o. diltiazem 180 as well plan on reassessing echocardiogram as well as stress test given chest pain episode      Chest pain possibly rate related repeat troponin mildly trended up now back in normal sinus rhythm with no symptoms.  In addition to heparin GGT today we will go ahead and work on optimization of heart rate control long-term and plan on getting noninvasive nuclear stress test    Plan:  1.  Heparin GGT  2.  P.o. diltiazem 180 daily and titrate off of drip as tolerated  3.  Echocardiogram  4.  Noninvasive nuclear stress test      Thank you for allowing us to share in [facility].            Emil Dunn MD   2/12/2024    09:37 EST

## 2024-02-12 NOTE — H&P
[facility]    Patient Identification:  Name:  Kiesha Pan  Age:  68 y.o.  Sex:  female  :  1955  MRN:  [id number]   Visit Number:  [id number]  Admit Date: 2024   Room number:  15/15  Primary Care Physician:  Shahida Martinez MD    Date of Admission: 2024     Subjective     Chief complaint:    Chief Complaint   Patient presents with    Chest Pain     chest pain with tingling in left arm       History of presenting illness:    This is a 68-year-old female with a past medical history of hypertension, atrial fibrillation, hyperlipidemia, depression, alcohol abuse, previous stroke and GERD presenting with chest pain. Pt states that while watching TV around 5pm, she developed a sharp chest pain. The pain shortly after felt like someone was sitting on her chest. She rated her pain a 6/10 in severity and it was non-radiating. Pt states that she has not been compliant with her eliquis for several months.     In ED, blood pressure 112/88, heart rate 160, respiratory rate 18, temperature 98.1 and O2 saturation 97% on room air.  Labs on arrival showed a sodium 144, potassium 3.8, BUN 16, creatinine 0.8, WBC 9.8, hemoglobin 14.3 and platelet count 42.  Troponin 11, proBNP 55.2, chest x-ray showed no active disease.    ---------------------------------------------------------------------------------------------------------------------   Review of Systems   Constitutional:  Negative for chills.   HENT:  Negative for drooling and postnasal drip.    Eyes:  Negative for pain.   Respiratory:  Negative for chest tightness.    Cardiovascular:  Negative for leg swelling.   Gastrointestinal:  Negative for abdominal pain.   Endocrine: Negative for polydipsia.   Genitourinary:  Negative for flank pain.   Musculoskeletal:  Negative for back pain.   Allergic/Immunologic: Negative for food allergies.   Neurological:  Negative for light-headedness.    Psychiatric/Behavioral:  Negative for confusion.      ---------------------------------------------------------------------------------------------------------------------   Past Medical History:   Diagnosis Date    Asthma     Elevated troponin level not due to acute coronary syndrome 12/3/2021    Hypertension     Mixed hyperlipidemia 12/3/2021    Stroke 2021     Past Surgical History:   Procedure Laterality Date    CHOLECYSTECTOMY       Family History   Problem Relation Age of Onset    Kidney disease Mother      Social History     Socioeconomic History    Marital status:    Tobacco Use    Smoking status: Former     Types: Cigarettes     Quit date:      Years since quittin.1    Smokeless tobacco: Never   Vaping Use    Vaping Use: Never used   Substance and Sexual Activity    Alcohol use: Yes     Alcohol/week: 8.0 standard drinks of alcohol     Types: 4 Shots of liquor, 4 Drinks containing 0.5 oz of alcohol per week     Comment: [place]    Drug use: Not Currently    Sexual activity: Defer     ---------------------------------------------------------------------------------------------------------------------   Allergies:  Cephalexin, Diphenhydramine, Levofloxacin, and Penicillins  ---------------------------------------------------------------------------------------------------------------------   Medications below are reported home medications pulling from within the system; at this time, these medications have not been reconciled unless otherwise specified and are in the verification process for further verifcation as current home medications.      Prior to Admission Medications       Prescriptions Last Dose Informant Patient Reported? Taking?    amLODIPine (NORVASC) 2.5 MG tablet   Yes No    Take 1 tablet by mouth Daily.    apixaban (ELIQUIS) 5 MG tablet tablet   No No    Take 1 tablet by mouth Every 12 (Twelve) Hours. Indications: Atrial Fibrillation    aspirin 81 MG EC tablet   Yes No     Take 1 tablet by mouth Daily.    atorvastatin (LIPITOR) 80 MG tablet   No No    Take 1 tablet nightly at bedtime    carvedilol (COREG) 12.5 MG tablet   No No    Take 1 tablet by mouth 2 (Two) Times a Day With Meals.    cholecalciferol (VITAMIN D3) 25 MCG (1000 UT) tablet   Yes No    Take 1 tablet by mouth Daily.    fluticasone (FLONASE) 50 MCG/ACT nasal spray   No No    2 sprays by Each Nare route Daily.    loratadine (CLARITIN) 10 MG tablet   Yes No    Take 1 tablet by mouth Daily.    ondansetron ODT (ZOFRAN-ODT) 4 MG disintegrating tablet   No No    Place 1 tablet on the tongue 4 (Four) Times a Day As Needed for Nausea or Vomiting.    phenazopyridine (PYRIDIUM) 200 MG tablet   No No    Take 1 tablet by mouth 3 (Three) Times a Day As Needed for Bladder Spasms.    rivaroxaban (XARELTO) 15 MG tablet   Yes No    Take 1 tablet by mouth Daily.    thiamine (VITAMIN B-1) 100 MG tablet  tablet   Yes No    Take 1 tablet by mouth Daily.          Objective     Vital Signs:  Temp:  [98.1 °F (36.7 °C)] 98.1 °F (36.7 °C)  Heart Rate:  [133-160] 133  Resp:  [12-18] 12  BP: (112-122)/(61-97) 122/61    No data found.  SpO2:  [97 %-99 %] 99 %  on   ;   Device (Oxygen Therapy): room air  Body mass index is 24.07 kg/m².    Wt Readings from Last 3 Encounters:   02/11/24 69.7 kg (153 lb 10.6 oz)   02/06/23 71.4 kg (157 lb 6.5 oz)   12/02/22 69 kg (152 lb 1.9 oz)      ----------------------------------------------------------------------------------------------------------------------  PHYSICAL EXAMINATION:  GENERAL: The patient is well developed and nontoxic.  HEENT: Nonicteric sclerae, PERRLA, EOMI. Oropharynx clear. Moist mucous membranes. Conjunctivae appear well perfused.  CHEST: Chest wall is nontender.  HEART: Regular rate and rhythm without murmurs.  LUNGS: Clear to auscultation bilaterally.  ABDOMEN: Soft, positive bowel sounds, nontender, no organomegaly.  RECTAL: Deferred.  SKIN: No rash, no excessive bruising, petechiae, or  purpura.  NEUROLOGIC: Cranial nerves II-XII intact without motor/sensory deficit.    ---------------------------------------------------------------------------------------------------------------------  --------------------------------------------------------------------------------------------------------------------  LABS:    CBC and coagulation:  Results from last 7 days   Lab Units 02/11/24  1834   WBC 10*3/mm3 9.89   HEMOGLOBIN g/dL 14.3   HEMATOCRIT % 43.0   MCV fL 98.9*   MCHC g/dL 33.3   PLATELETS 10*3/mm3 482*     Acid/base balance:      Renal and electrolytes:  Results from last 7 days   Lab Units 02/11/24  1834   SODIUM mmol/L 144   POTASSIUM mmol/L 3.8   MAGNESIUM mg/dL 1.9   CHLORIDE mmol/L 105   CO2 mmol/L 18.5*   BUN mg/dL 16   CREATININE mg/dL 0.88   CALCIUM mg/dL 9.2   GLUCOSE mg/dL 75     Estimated Creatinine Clearance: 67.3 mL/min (by C-G formula based on SCr of 0.88 mg/dL).    Liver and pancreatic function:  Results from last 7 days   Lab Units 02/11/24  1834   ALBUMIN g/dL 4.6   BILIRUBIN mg/dL <0.2   ALK PHOS U/L 54   AST (SGOT) U/L 27   ALT (SGPT) U/L 22   LIPASE U/L 33     Endocrine function:  Lab Results   Component Value Date    HGBA1C 5.00 06/11/2022     Point of care bedside glucose levels:      Lab Results   Component Value Date    TSH 6.590 (H) 12/02/2022    FREET4 1.36 12/02/2022     Cardiac:  Results from last 7 days   Lab Units 02/11/24  1834   HSTROP T ng/L 11   PROBNP pg/mL 55.2       Cultures:  Lab Results   Component Value Date    COLORU Yellow 02/06/2023    CLARITYU Clear 02/06/2023    PHUR 6.5 02/06/2023    GLUCOSEU Negative 02/06/2023    KETONESU Negative 02/06/2023    BLOODU Negative 02/06/2023    NITRITEU Negative 02/06/2023    LEUKOCYTESUR Trace (A) 02/06/2023    BILIRUBINUR Negative 02/06/2023    UROBILINOGEN 0.2 E.U./dL 02/06/2023    RBCUA 0-2 (A) 02/06/2023    WBCUA 6-12 (A) 02/06/2023    BACTERIA 4+ (A) 02/06/2023     Microbiology Results (last 10 days)       ** No  "results found for the last 240 hours. **            No results found for: \"PREGTESTUR\", \"PREGSERUM\", \"HCG\", \"HCGQUANT\"  Pain Management Panel          Latest Ref Rng & Units 9/10/2022   Pain Management Panel   Barbiturates Screen, Urine Negative Negative    Benzodiazepine Screen, Urine Negative Negative    Cocaine Screen, Urine Negative Negative    Methadone Screen , Urine Negative Negative        I have personally looked at the labs and they are summarized above.  ----------------------------------------------------------------------------------------------------------------------  Detailed radiology reports for the last 24 hours:    Imaging Results (Last 24 Hours)       Procedure Component Value Units Date/Time    XR Chest 1 View [[id number]] Collected: 02/11/24 1916     Updated: 02/11/24 1919    Narrative:      PROCEDURE: XR CHEST 1 VW     COMPARISON: [facility], , XR CHEST 1 VW, 12/02/2022, 18:06.     INDICATIONS: MEDIAL CHEST PAIN     FINDINGS:   The heart is normal in size.  The lungs have a hyperexpanded appearance consistent with emphysema.    No airspace disease is evident.     Healed left rib fractures are again seen.       Impression:         Emphysema.     No active pulmonary disease is seen.                  SUNIL MCKENNA MD         Electronically Signed and Approved By: SUNIL MCKENNA MD on 2/11/2024 at 19:16                           Final impressions for the last 30 days of radiology reports:    XR Chest 1 View    Result Date: 2/11/2024    Emphysema.  No active pulmonary disease is seen.       SUNIL MCKENNA MD       Electronically Signed and Approved By: SUNIL MCKENNA MD on 2/11/2024 at 19:16                Assessment & Plan       This is a 68-year-old female with a past medical history of hypertension, atrial fibrillation, hyperlipidemia, depression, alcohol abuse, previous stroke and GERD presenting with chest pain.     Assessment:  Atrial fibrillation with RVR  Chest " pain  Hypertension  Hyperlipidemia  Depression  Alcohol abuse  Stroke  GERD    Plan:  - Admit to inpatient  - Continue diltiazem drip  - Heparin gtt  - Nitro PRN  - Monitor on telemetry  - Trend troponins  - Follow-up echo  - EKG  - Consult cardiology    Tomy Schmidt MD  [facility]  02/11/24  20:39 EST

## 2024-02-12 NOTE — PLAN OF CARE
Goal Outcome Evaluation:  Plan of Care Reviewed With: patient            Patient oriented to unit. Patient has no concerns at this time and no signs of acute distress present.

## 2024-02-12 NOTE — ED PROVIDER NOTES
Time: 7:16 PM EST  Date of encounter:  2/11/2024  Independent Historian/Clinical History and Information was obtained by:   Patient    History is limited by: N/A    Chief Complaint: Chest discomfort, palpitations      History of Present Illness:  Patient is a 68 y.o. year old female who presents to the emergency department for evaluation of chest discomfort as well as palpitations.  Patient has a history of hypertension, strokes, A-fib.  She is not currently compliant with her medications.  She reports today she started having some chest pain and palpitations.  Does report some shortness of breath.  No other complaints this time.    HPI    Patient Care Team  Primary Care Provider: Shahida Martinez MD    Past Medical History:     Allergies   Allergen Reactions    Cephalexin Unknown - Low Severity    Diphenhydramine Unknown - Low Severity    Levofloxacin Unknown - Low Severity    Penicillins Unknown - Low Severity     Past Medical History:   Diagnosis Date    Asthma     Elevated troponin level not due to acute coronary syndrome 12/3/2021    Hypertension     Mixed hyperlipidemia 12/3/2021    Stroke 03/2021     Past Surgical History:   Procedure Laterality Date    CHOLECYSTECTOMY       Family History   Problem Relation Age of Onset    Kidney disease Mother        Home Medications:  Prior to Admission medications    Medication Sig Start Date End Date Taking? Authorizing Provider   amLODIPine (NORVASC) 2.5 MG tablet Take 1 tablet by mouth Daily.    Provider, MD Jesi   apixaban (ELIQUIS) 5 MG tablet tablet Take 1 tablet by mouth Every 12 (Twelve) Hours. Indications: Atrial Fibrillation 6/17/22   Nhan Smith DO   aspirin 81 MG EC tablet Take 1 tablet by mouth Daily.    Provider, MD Jesi   atorvastatin (LIPITOR) 80 MG tablet Take 1 tablet nightly at bedtime 12/3/21   Ave Benson APRN   carvedilol (COREG) 12.5 MG tablet Take 1 tablet by mouth 2 (Two) Times a Day With Meals. 4/10/23    "Scott Becerra MD   cholecalciferol (VITAMIN D3) 25 MCG (1000 UT) tablet Take 1 tablet by mouth Daily.    ProviderJesi MD   fluticasone (FLONASE) 50 MCG/ACT nasal spray 2 sprays by Each Nare route Daily. 22   Nhan Smith DO   loratadine (CLARITIN) 10 MG tablet Take 1 tablet by mouth Daily.    ProviderJesi MD   ondansetron ODT (ZOFRAN-ODT) 4 MG disintegrating tablet Place 1 tablet on the tongue 4 (Four) Times a Day As Needed for Nausea or Vomiting. 22   Shelli Garcia APRN   phenazopyridine (PYRIDIUM) 200 MG tablet Take 1 tablet by mouth 3 (Three) Times a Day As Needed for Bladder Spasms. 23   Hemant Guadalupe MD   rivaroxaban (XARELTO) 15 MG tablet Take 1 tablet by mouth Daily.    ProviderJesi MD   thiamine (VITAMIN B-1) 100 MG tablet  tablet Take 1 tablet by mouth Daily.    ProviderJesi MD        Social History:   Social History     Tobacco Use    Smoking status: Former     Types: Cigarettes     Quit date:      Years since quittin.1    Smokeless tobacco: Never   Vaping Use    Vaping Use: Never used   Substance Use Topics    Alcohol use: Yes     Alcohol/week: 8.0 standard drinks of alcohol     Types: 4 Shots of liquor, 4 Drinks containing 0.5 oz of alcohol per week     Comment: [place]    Drug use: Not Currently         Review of Systems:  Review of Systems   Respiratory:  Positive for shortness of breath.    Cardiovascular:  Positive for chest pain.        Physical Exam:  /80   Pulse 110   Temp 98.1 °F (36.7 °C) (Oral)   Resp 12   Ht 170.2 cm (67\")   Wt 69.7 kg (153 lb 10.6 oz)   LMP  (LMP Unknown)   SpO2 99%   BMI 24.07 kg/m²     Physical Exam  Vitals and nursing note reviewed.   Constitutional:       Appearance: Normal appearance.   HENT:      Head: Normocephalic and atraumatic.   Eyes:      General: No scleral icterus.  Cardiovascular:      Rate and Rhythm: Tachycardia present. Rhythm irregular.      Heart sounds: Normal heart sounds. "   Pulmonary:      Effort: Pulmonary effort is normal.      Breath sounds: Normal breath sounds.   Abdominal:      Palpations: Abdomen is soft.      Tenderness: There is no abdominal tenderness.   Musculoskeletal:         General: Normal range of motion.      Cervical back: Normal range of motion.   Skin:     Findings: No rash.   Neurological:      General: No focal deficit present.      Mental Status: She is alert.                  Procedures:  Procedures      Medical Decision Making:      Comorbidities that affect care:    Coronary Artery Disease, Hypertension, Substance Abuse, stroke    External Notes reviewed:    Reviewed admission from 6/10/2022      The following orders were placed and all results were independently analyzed by me:  Orders Placed This Encounter   Procedures    XR Chest 1 View    [place] Draw    High Sensitivity Troponin T    Comprehensive Metabolic Panel    Lipase    BNP    Magnesium    CBC Auto Differential    Ethanol    High Sensitivity Troponin T 2Hr    NPO Diet NPO Type: Strict NPO    Undress & Gown    Continuous Pulse Oximetry    Inpatient Hospitalist Consult    Oxygen Therapy- Nasal Cannula; Titrate 1-6 LPM Per SpO2; 90 - 95%    ECG 12 Lead ED Triage Standing Order; Chest Pain    ECG 12 Lead ED Triage Standing Order; Chest Pain    ECG 12 Lead Rhythm Change    Insert Peripheral IV    CBC & Differential    Green Top (Gel)    Lavender Top    Gold Top - SST    Light Blue Top       Medications Given in the Emergency Department:  Medications   sodium chloride 0.9 % flush 10 mL (has no administration in time range)   dilTIAZem (CARDIZEM) 125 mg in 125 mL sodium chloride  infusion (15 mg/hr Intravenous Rate/Dose Change 2/11/24 1946)   aspirin chewable tablet 324 mg (324 mg Oral Given 2/11/24 1858)   dilTIAZem (CARDIZEM) bolus from bag 1 mg/mL 10 mg (10 mg Intravenous Bolus from Bag 2/11/24 1913)        ED Course:    ED Course as of 02/11/24 2055   Sun Feb 11, 2024 2038 Spoke with Dr. Schmidt who  agrees to admit.  [MA]      ED Course User Index  [MA] Keron Wayne MD       Labs:    Lab Results (last 24 hours)       Procedure Component Value Units Date/Time    High Sensitivity Troponin T [[id number]]  (Normal) Collected: 02/11/24 1834    Specimen: Blood from Arm, Right Updated: 02/11/24 1923     HS Troponin T 11 ng/L     Narrative:      High Sensitive Troponin T Reference Range:  <14.0 ng/L- Negative Female for AMI  <22.0 ng/L- Negative Male for AMI  >=14 - Abnormal Female indicating possible myocardial injury.  >=22 - Abnormal Male indicating possible myocardial injury.   Clinicians would have to utilize clinical acumen, EKG, Troponin, and serial changes to determine if it is an Acute Myocardial Infarction or myocardial injury due to an underlying chronic condition.         CBC & Differential [[id number]]  (Abnormal) Collected: 02/11/24 1834    Specimen: Blood from Arm, Right Updated: 02/11/24 1852    Narrative:      The following orders were created for panel order CBC & Differential.  Procedure                               Abnormality         Status                     ---------                               -----------         ------                     CBC Auto Differential[[id number]]        Abnormal            Final result                 Please view results for these tests on the individual orders.    Comprehensive Metabolic Panel [[id number]]  (Abnormal) Collected: 02/11/24 1834    Specimen: Blood from Arm, Right Updated: 02/11/24 1923     Glucose 75 mg/dL      BUN 16 mg/dL      Creatinine 0.88 mg/dL      Sodium 144 mmol/L      Potassium 3.8 mmol/L      Chloride 105 mmol/L      CO2 18.5 mmol/L      Calcium 9.2 mg/dL      Total Protein 7.8 g/dL      Albumin 4.6 g/dL      ALT (SGPT) 22 U/L      AST (SGOT) 27 U/L      Alkaline Phosphatase 54 U/L      Total Bilirubin <0.2 mg/dL      Globulin 3.2 gm/dL      A/G Ratio 1.4 g/dL      BUN/Creatinine Ratio 18.2     Anion Gap 20.5 mmol/L      eGFR 71.7  mL/min/1.73     Narrative:      GFR Normal >60  Chronic Kidney Disease <60  Kidney Failure <15      Lipase [[id number]]  (Normal) Collected: 02/11/24 1834    Specimen: Blood from Arm, Right Updated: 02/11/24 1923     Lipase 33 U/L     BNP [[id number]]  (Normal) Collected: 02/11/24 1834    Specimen: Blood from Arm, Right Updated: 02/11/24 1917     proBNP 55.2 pg/mL     Narrative:      This assay is used as an aid in the diagnosis of individuals suspected of having heart failure. It can be used as an aid in the diagnosis of acute decompensated heart failure (ADHF) in patients presenting with signs and symptoms of ADHF to the emergency department (ED). In addition, NT-proBNP of <300 pg/mL indicates ADHF is not likely.    Age Range Result Interpretation  NT-proBNP Concentration (pg/mL:      <50             Positive            >450                   Gray                 300-450                    Negative             <300    50-75           Positive            >900                  Gray                300-900                  Negative            <300      >75             Positive            >1800                  Gray                300-1800                  Negative            <300    Magnesium [[id number]]  (Normal) Collected: 02/11/24 1834    Specimen: Blood from Arm, Right Updated: 02/11/24 1923     Magnesium 1.9 mg/dL     CBC Auto Differential [[id number]]  (Abnormal) Collected: 02/11/24 1834    Specimen: Blood from Arm, Right Updated: 02/11/24 1852     WBC 9.89 10*3/mm3      RBC 4.35 10*6/mm3      Hemoglobin 14.3 g/dL      Hematocrit 43.0 %      MCV 98.9 fL      MCH 32.9 pg      MCHC 33.3 g/dL      RDW 12.9 %      RDW-SD 47.4 fl      MPV 9.6 fL      Platelets 482 10*3/mm3      Neutrophil % 67.2 %      Lymphocyte % 20.2 %      Monocyte % 8.8 %      Eosinophil % 2.6 %      Basophil % 1.0 %      Immature Grans % 0.2 %      Neutrophils, Absolute 6.64 10*3/mm3      Lymphocytes, Absolute 2.00 10*3/mm3      Monocytes,  Absolute 0.87 10*3/mm3      Eosinophils, Absolute 0.26 10*3/mm3      Basophils, Absolute 0.10 10*3/mm3      Immature Grans, Absolute 0.02 10*3/mm3      nRBC 0.0 /100 WBC     Ethanol [[id number]]  (Abnormal) Collected: 02/11/24 1834    Specimen: Blood from Arm, Right Updated: 02/11/24 1929     Ethanol 204 mg/dL      Ethanol % 0.204 %     Narrative:      Ethanol (Plasma)  <10 Essentially Negative    Toxic Concentrations           mg/dL    Flushing, slowing of reflexes    Impaired visual activity         Depression of CNS              >100  Possible Coma                  >300                Imaging:    XR Chest 1 View    Result Date: 2/11/2024  PROCEDURE: XR CHEST 1 VW  COMPARISON: [facility], CR, XR CHEST 1 VW, 12/02/2022, 18:06.  INDICATIONS: MEDIAL CHEST PAIN  FINDINGS:  The heart is normal in size.  The lungs have a hyperexpanded appearance consistent with emphysema.  No airspace disease is evident.  Healed left rib fractures are again seen.        Emphysema.  No active pulmonary disease is seen.       SUNIL MCKENNA MD       Electronically Signed and Approved By: SUNIL MCKENNA MD on 2/11/2024 at 19:16                Differential Diagnosis and Discussion:    Chest Pain:  Based on the patient's signs and symptoms, I considered aortic dissection, myocardial infaction, pulmonary embolism, cardiac tamponade, pericarditis, pneumothorax, musculoskeletal chest pain and other differential diagnosis as an etiology of the patient's chest pain.   Palpitations: Differential diagnosis includes but is not limited to anxiety, atrioventricular blocks, mitral valve disease, hypoxia, coronary artery disease, hypokalemia, anemia, fever, COPD, congestive heart failure, pericarditis, Nila-Parkinson-White syndrome, pulmonary embolism, SVT, atrial fibrillation, atrial flutter, sinus tachycardia, thyrotoxicosis, and pheochromocytoma.    All labs were reviewed and interpreted by me.  All X-rays impressions were  independently interpreted by me.  EKG was interpreted by me.    MDM       Patient is a 68-year-old female who presents with complaints of chest pain and palpitations.  Found to be in A-fib with RVR.  Does have a history of A-fib as well as substance abuse.  Is not compliant with her medication.  Had to be given Cardizem as well as started on a Cardizem drip.  She also found to be intoxicated.  Will need admission to the hospital for further workup management.    Critical Care Note: Total Critical Care time of 33 minutes. Total critical care time documented does not include time spent on separately billed procedures for services of nurses or physician assistants. I personally saw and examined the patient. I have reviewed all diagnostic interpretations and treatment plans as written. I was present for the key portions of any procedures performed and the inclusive time noted in any critical care statement. Critical care time includes patient management by me, time spent at the patients bedside,  time to review lab and imaging results, discussing patient care, documentation in the medical record, and time spent with family or caregiver.        Patient Care Considerations:          Consultants/Shared Management Plan:    Hospitalist: I have discussed the case with Dr. Schmidt who agrees to accept the patient for admission.    Social Determinants of Health:          Disposition and Care Coordination:    Admit:   Through independent evaluation of the patient's history, physical, and imperical data, the patient meets criteria for inpatient admission to the hospital.        Final diagnoses:   Atrial fibrillation with rapid ventricular response   Alcoholic intoxication with complication        ED Disposition       ED Disposition   Decision to Admit    Condition   --    Comment   --               This medical record created using voice recognition software.             Keron Wayne MD  02/11/24 2570

## 2024-02-13 LAB
ANION GAP SERPL CALCULATED.3IONS-SCNC: 10.9 MMOL/L (ref 5–15)
APTT PPP: 82.4 SECONDS (ref 78–95.9)
APTT PPP: 90.8 SECONDS (ref 78–95.9)
BUN SERPL-MCNC: 6 MG/DL (ref 8–23)
BUN/CREAT SERPL: 10.5 (ref 7–25)
CALCIUM SPEC-SCNC: 8.6 MG/DL (ref 8.6–10.5)
CHLORIDE SERPL-SCNC: 105 MMOL/L (ref 98–107)
CO2 SERPL-SCNC: 20.1 MMOL/L (ref 22–29)
CREAT SERPL-MCNC: 0.57 MG/DL (ref 0.57–1)
EGFRCR SERPLBLD CKD-EPI 2021: 99.1 ML/MIN/1.73
GLUCOSE SERPL-MCNC: 127 MG/DL (ref 65–99)
POTASSIUM SERPL-SCNC: 3.7 MMOL/L (ref 3.5–5.2)
SODIUM SERPL-SCNC: 136 MMOL/L (ref 136–145)

## 2024-02-13 PROCEDURE — 25810000003 SODIUM CHLORIDE 0.9 % SOLUTION 1,000 ML FLEX CONT: Performed by: STUDENT IN AN ORGANIZED HEALTH CARE EDUCATION/TRAINING PROGRAM

## 2024-02-13 PROCEDURE — B2111ZZ FLUOROSCOPY OF MULTIPLE CORONARY ARTERIES USING LOW OSMOLAR CONTRAST: ICD-10-PCS | Performed by: INTERNAL MEDICINE

## 2024-02-13 PROCEDURE — 99232 SBSQ HOSP IP/OBS MODERATE 35: CPT | Performed by: INTERNAL MEDICINE

## 2024-02-13 PROCEDURE — 25010000002 HYDRALAZINE PER 20 MG: Performed by: INTERNAL MEDICINE

## 2024-02-13 PROCEDURE — 94799 UNLISTED PULMONARY SVC/PX: CPT

## 2024-02-13 PROCEDURE — 80048 BASIC METABOLIC PNL TOTAL CA: CPT | Performed by: INTERNAL MEDICINE

## 2024-02-13 PROCEDURE — 25010000002 HEPARIN (PORCINE) 25000-0.45 UT/250ML-% SOLUTION: Performed by: STUDENT IN AN ORGANIZED HEALTH CARE EDUCATION/TRAINING PROGRAM

## 2024-02-13 PROCEDURE — 25810000003 SODIUM CHLORIDE 0.9 % SOLUTION: Performed by: STUDENT IN AN ORGANIZED HEALTH CARE EDUCATION/TRAINING PROGRAM

## 2024-02-13 PROCEDURE — 25810000003 SODIUM CHLORIDE 0.9 % SOLUTION: Performed by: INTERNAL MEDICINE

## 2024-02-13 PROCEDURE — 25010000002 THIAMINE PER 100 MG: Performed by: INTERNAL MEDICINE

## 2024-02-13 PROCEDURE — 4A023N7 MEASUREMENT OF CARDIAC SAMPLING AND PRESSURE, LEFT HEART, PERCUTANEOUS APPROACH: ICD-10-PCS | Performed by: INTERNAL MEDICINE

## 2024-02-13 PROCEDURE — 94761 N-INVAS EAR/PLS OXIMETRY MLT: CPT

## 2024-02-13 PROCEDURE — 85730 THROMBOPLASTIN TIME PARTIAL: CPT | Performed by: INTERNAL MEDICINE

## 2024-02-13 PROCEDURE — 25010000002 MIDAZOLAM PER 1MG: Performed by: INTERNAL MEDICINE

## 2024-02-13 PROCEDURE — 25010000002 FENTANYL CITRATE (PF) 100 MCG/2ML SOLUTION: Performed by: INTERNAL MEDICINE

## 2024-02-13 PROCEDURE — 25010000002 THIAMINE PER 100 MG: Performed by: STUDENT IN AN ORGANIZED HEALTH CARE EDUCATION/TRAINING PROGRAM

## 2024-02-13 RX ORDER — SODIUM CHLORIDE 9 MG/ML
100 INJECTION, SOLUTION INTRAVENOUS CONTINUOUS
Status: DISCONTINUED | OUTPATIENT
Start: 2024-02-14 | End: 2024-02-14

## 2024-02-13 RX ORDER — FENTANYL CITRATE 50 UG/ML
INJECTION, SOLUTION INTRAMUSCULAR; INTRAVENOUS
Status: DISCONTINUED | OUTPATIENT
Start: 2024-02-13 | End: 2024-02-13 | Stop reason: HOSPADM

## 2024-02-13 RX ORDER — MIDAZOLAM HYDROCHLORIDE 2 MG/2ML
INJECTION, SOLUTION INTRAMUSCULAR; INTRAVENOUS
Status: DISCONTINUED | OUTPATIENT
Start: 2024-02-13 | End: 2024-02-13 | Stop reason: HOSPADM

## 2024-02-13 RX ADMIN — ATORVASTATIN CALCIUM 40 MG: 40 TABLET, FILM COATED ORAL at 21:13

## 2024-02-13 RX ADMIN — THIAMINE HYDROCHLORIDE 200 MG: 100 INJECTION, SOLUTION INTRAMUSCULAR; INTRAVENOUS at 05:14

## 2024-02-13 RX ADMIN — FOLIC ACID 1 MG: 1 TABLET ORAL at 08:52

## 2024-02-13 RX ADMIN — Medication 10 ML: at 08:53

## 2024-02-13 RX ADMIN — Medication 10 ML: at 21:14

## 2024-02-13 RX ADMIN — SODIUM CHLORIDE 75 ML/HR: 9 INJECTION, SOLUTION INTRAVENOUS at 21:15

## 2024-02-13 RX ADMIN — SODIUM CHLORIDE 75 ML/HR: 9 INJECTION, SOLUTION INTRAVENOUS at 04:37

## 2024-02-13 RX ADMIN — THIAMINE HYDROCHLORIDE 200 MG: 100 INJECTION, SOLUTION INTRAMUSCULAR; INTRAVENOUS at 21:13

## 2024-02-13 RX ADMIN — THIAMINE HYDROCHLORIDE 200 MG: 100 INJECTION, SOLUTION INTRAMUSCULAR; INTRAVENOUS at 13:28

## 2024-02-13 RX ADMIN — HEPARIN SODIUM 16 UNITS/KG/HR: 10000 INJECTION, SOLUTION INTRAVENOUS at 13:11

## 2024-02-13 RX ADMIN — HYDRALAZINE HYDROCHLORIDE 10 MG: 20 INJECTION, SOLUTION INTRAMUSCULAR; INTRAVENOUS at 00:56

## 2024-02-13 RX ADMIN — Medication 1 TABLET: at 08:52

## 2024-02-13 RX ADMIN — DILTIAZEM HYDROCHLORIDE 180 MG: 180 CAPSULE, COATED, EXTENDED RELEASE ORAL at 08:52

## 2024-02-13 RX ADMIN — ASPIRIN 81 MG CHEWABLE TABLET 81 MG: 81 TABLET CHEWABLE at 08:52

## 2024-02-13 RX ADMIN — FOLIC ACID 100 ML/HR: 5 INJECTION, SOLUTION INTRAMUSCULAR; INTRAVENOUS; SUBCUTANEOUS at 08:53

## 2024-02-13 NOTE — PROGRESS NOTES
" [facility]   Hospitalist Progress Note  Date: 2024  Patient Name: Kiesha aPn  : 1955  MRN: [id number]  Date of admission: 2024  Room/Bed: 256/1      Subjective   Subjective     Chief Complaint:   Chest pain    Summary:  Kiesha Pan is a 68-year-old female with a past medical history of hypertension, atrial fibrillation, hyperlipidemia, depression, alcohol abuse, previous stroke and GERD presenting with chest pain. While watching TV around 5pm, she developed a sharp chest pain. The pain shortly after felt like someone was sitting on her chest. She rated her pain a 6/10 in severity and it was non-radiating. She has not been compliant with her eliquis for several months.  Patient endorses intermittent symptoms similar to these in the past however never persistent.     In ED, blood pressure 112/88, heart rate 160, respiratory rate 18, temperature 98.1 and O2 saturation 97% on room air.  Labs on arrival showed a sodium 144, potassium 3.8, BUN 16, creatinine 0.8, WBC 9.8, hemoglobin 14.3 and platelet count 42.  Troponin 11, proBNP 55.2, chest x-ray showed no active disease.     Interval Followup:   Patient opted to not pursue cardiac cath due to spouse raising concerns. Says if she decides to do it will be \"on my own terms.\" She denies chest pain or other physical complaints.  Her only complaint to me at this time is the hospital food.    Objective   Objective     Vitals:   Temp:  [98.2 °F (36.8 °C)-99.6 °F (37.6 °C)] 98.2 °F (36.8 °C)  Heart Rate:  [] 96  Resp:  [18-20] 18  BP: (105-208)/(62-99) 158/76  Flow (L/min):  [2] 2    Physical Exam   General: Awake, alert, NAD  HENT: NCAT, MMM  Eyes: pupils equal, no scleral icterus  Cardiovascular: RRR, no murmurs   Pulmonary: CTA bilaterally; no wheezes; no conversational dyspnea  Gastrointestinal: S/ND/NT, +BS  Musculoskeletal: No gross deformities  Skin: No jaundice, no rash on exposed skin appreciated  Neuro: CN II through XII grossly intact; " speech clear; no tremor      Result Review    Result Review:  I have personally reviewed results from the last 24 hours 2/13/2024    [x]  Laboratory      Lab 02/13/24  0451 02/12/24  2336 02/12/24  1648 02/12/24  0918 02/12/24 0309 02/11/24  1834   WBC  --   --   --   --  10.16 9.89   HEMOGLOBIN  --   --   --   --  12.7 14.3   HEMATOCRIT  --   --   --   --  38.4 43.0   PLATELETS  --   --   --   --  418 482*   NEUTROS ABS  --   --   --   --  7.46* 6.64   IMMATURE GRANS (ABS)  --   --   --   --  0.02 0.02   LYMPHS ABS  --   --   --   --  1.54 2.00   MONOS ABS  --   --   --   --  0.99* 0.87   EOS ABS  --   --   --   --  0.07 0.26   MCV  --   --   --   --  97.5* 98.9*   PROTIME  --   --   --   --   --  12.8   APTT 82.4 90.8 64.3*   < > 68.0* 27.7*    < > = values in this interval not displayed.         Lab 02/13/24 0451 02/12/24 0309 02/11/24 1834   SODIUM 136 137 144   POTASSIUM 3.7 4.2 3.8   CHLORIDE 105 100 105   CO2 20.1* 16.2* 18.5*   ANION GAP 10.9 20.8* 20.5*   BUN 6* 18 16   CREATININE 0.57 0.73 0.88   EGFR 99.1 89.7 71.7   GLUCOSE 127* 106* 75   CALCIUM 8.6 8.7 9.2   MAGNESIUM  --   --  1.9   HEMOGLOBIN A1C  --  5.90*  --          Lab 02/11/24  1834   TOTAL PROTEIN 7.8   ALBUMIN 4.6   GLOBULIN 3.2   ALT (SGPT) 22   AST (SGOT) 27   BILIRUBIN <0.2   ALK PHOS 54   LIPASE 33         Lab 02/11/24 2054 02/11/24  1834   PROBNP  --  55.2   HSTROP T 24* 11   PROTIME  --  12.8   INR  --  0.94         Lab 02/12/24  0309   CHOLESTEROL 231*   LDL CHOL 165*   HDL CHOL 49   TRIGLYCERIDES 94             Brief Urine Lab Results       None          [x]  Microbiology   Microbiology Results (last 10 days)       ** No results found for the last 240 hours. **          [x]  Radiology  XR Chest 1 View    Result Date: 2/11/2024    Emphysema.  No active pulmonary disease is seen.       SUNIL MCKENNA MD       Electronically Signed and Approved By: SUNIL MCKENNA MD on 2/11/2024 at 19:16            []  EKG/Telemetry   []   Cardiology/Vascular   []  Pathology  []  Old records  []  Other:    Assessment & Plan   Assessment / Plan     Assessment:  Atrial fibrillation with RVR  Chest pain  Hypertension  Hyperlipidemia  Depression  Alcohol abuse, with potential for withdrawal  Stroke  GERD    Plan:  Cardiac cath recommended today but spouse voiced concerns and patient has chosen to defer at this time  Monitor on CIWA protocol  Repeat CBC, CMP, lytes in am  BP meds as needed        Discussed care plan with RN and cardiologist    DVT prophylaxis:  Medical and mechanical DVT prophylaxis orders are present.        CODE STATUS:   Code Status (Patient has no pulse and is not breathing): CPR (Attempt to Resuscitate)  Medical Interventions (Patient has pulse or is breathing): Full Support     Electronically signed by Emil Frazier MD, 02/13/24, 6:38 PM EST.

## 2024-02-13 NOTE — PLAN OF CARE
Goal Outcome Evaluation:   Cardiac catheterization cancelled today. Heparin gtt restarted per Shaun MARTINI. Call light in reach. No complaints at this time.

## 2024-02-13 NOTE — PROGRESS NOTES
CARDIOLOGY  INPATIENT PROGRESS NOTE                [address]    2/13/2024      PATIENT IDENTIFICATION:   Name:  Kiesha Pan      MRN:  [id number]     68 y.o.  female                 SUBJECTIVE:   Patient with no events overnight heart rate has remained in normal sinus rhythm  OBJECTIVE:  Vitals:    02/13/24 0355 02/13/24 0500 02/13/24 0600 02/13/24 0717   BP: 112/80 135/80  154/83   BP Location: Right arm   Right arm   Patient Position: Lying   Lying   Pulse: 96 93  91   Resp: 20   18   Temp: 98.2 °F (36.8 °C)   98.6 °F (37 °C)   TempSrc: Oral   Oral   SpO2: 95%   98%   Weight:   70.1 kg (154 lb 8.7 oz)    Height:               Body mass index is 24.2 kg/m².    Intake/Output Summary (Last 24 hours) at 2/13/2024 0806  Last data filed at 2/12/2024 1808  Gross per 24 hour   Intake 480 ml   Output --   Net 480 ml       Telemetry: Normal sinus rhythm    Physical Exam  General Appearance:   no acute distress  Alert and oriented x3  HENT:   lips not cyanotic  Atraumatic  Neck:  No jvd   supple  Respiratory:  no respiratory distress  normal breath sounds  no rales  Cardiovascular:    regular rhythm  no S3, no S4   no murmur  no rub  lower extremity edema: none    Skin:   warm, dry  No rashes      Allergies   Allergen Reactions    Cephalexin Unknown - Low Severity    Diphenhydramine Unknown - Low Severity    Levofloxacin Unknown - Low Severity    Penicillins Unknown - Low Severity     Scheduled meds:  aspirin, 81 mg, Oral, Daily  atorvastatin, 40 mg, Oral, Nightly  dilTIAZem CD, 180 mg, Oral, Q24H  folic acid, 1 mg, Oral, Daily  multivitamin with minerals, 1 tablet, Oral, Daily  sodium chloride, 10 mL, Intravenous, Q12H  thiamine (B-1) 100 mg, folic acid 1 mg in sodium chloride 0.9 % 1,000 mL infusion, 100 mL/hr, Intravenous, Daily  thiamine (B-1) IV, 200 mg, Intravenous, Q8H   Followed by  [START ON 2/18/2024] thiamine, 100 mg, Oral, Daily      IV meds:                      dilTIAZem, 5-15 mg/hr, Last  "Rate: Stopped (02/12/24 1056)  heparin, 12 Units/kg/hr, Last Rate: 16 Units/kg/hr (02/12/24 1734)  sodium chloride, 75 mL/hr, Last Rate: 75 mL/hr (02/13/24 0437)      Data Review:  CBC          2/11/2024    18:34 2/12/2024    03:09   CBC   WBC 9.89  10.16    RBC 4.35  3.94    Hemoglobin 14.3  12.7    Hematocrit 43.0  38.4    MCV 98.9  97.5    MCH 32.9  32.2    MCHC 33.3  33.1    RDW 12.9  12.9    Platelets 482  418      CMP          2/11/2024    18:34 2/12/2024    03:09 2/13/2024    04:51   CMP   Glucose 75  106  127    BUN 16  18  6    Creatinine 0.88  0.73  0.57    EGFR 71.7  89.7  99.1    Sodium 144  137  136    Potassium 3.8  4.2  3.7    Chloride 105  100  105    Calcium 9.2  8.7  8.6    Total Protein 7.8      Albumin 4.6      Globulin 3.2      Total Bilirubin <0.2      Alkaline Phosphatase 54      AST (SGOT) 27      ALT (SGPT) 22      Albumin/Globulin Ratio 1.4      BUN/Creatinine Ratio 18.2  24.7  10.5    Anion Gap 20.5  20.8  10.9       CARDIAC LABS:      Lab 02/11/24 2054 02/11/24  1834   PROBNP  --  55.2   HSTROP T 24* 11   PROTIME  --  12.8   INR  --  0.94        No results found for: \"DIGOXIN\"   Lab Results   Component Value Date    TSH 6.590 (H) 12/02/2022     Results from last 7 days   Lab Units 02/12/24  0309   CHOLESTEROL mg/dL 231*   HDL CHOL mg/dL 49     Lab Results   Component Value Date    POCTROP 0.06 11/06/2021     Lab Results   Component Value Date    TROPONINT 24 (H) 02/11/2024   (  Lab Results   Component Value Date    MG 1.9 02/11/2024     Results for orders placed during the hospital encounter of 02/11/24    Adult Transthoracic Echo Complete W/ Cont if Necessary Per Protocol    Interpretation Summary    Left ventricular systolic function is hyperdynamic (EF > 70%). Calculated left ventricular EF = 70%    Left ventricular diastolic dysfunction is noted.    The left atrial cavity is mildly dilated.         Results for orders placed during the hospital encounter of 02/11/24    Stress Test " With Myocardial Perfusion One Day    Interpretation Summary    Myocardial perfusion imaging indicates a normal myocardial perfusion study with no evidence of ischemia. Impressions are consistent with a low risk study.    Left ventricular ejection fraction is normal (Calculated EF = 58%).    Mild apical lateral defect consistent with ischemia mild to moderate in size    Findings consistent with a normal ECG stress test.    ASSESSMENT:    Atrial fibrillation with RVR    Chest pain, precordial    Proximal A-fib fib patient maintain normal sinus rhythm continue with diltiazem and heparin for now stress test today revealing evidence of apical lateral.  In discussion with the patient she does report that she has had intermittent episodes of chest pain on and off which she attributes to gas pain when she came in she was also having chest pain seems possible that this could have been demand ischemia from her tachycardia      PLAN:  1.  Diltiazem 180 mg p.o. daily  2.  Heparin GGT for now with conversion over to Eliquis for long-term anticoagulation  3.  Aspirin 81 daily  4.  Given patient's abnormal stress test and intermittent chest discomfort episodes recommend proceed with left heart catheterization went over risk benefits alternatives including the risk of bleeding, kidney dysfunction, death heart attack and stroke patient was agreeable          Emil Dunn MD  2/13/2024    08:06 EST

## 2024-02-13 NOTE — PLAN OF CARE
Goal Outcome Evaluation:  Plan of Care Reviewed With: patient         Patient is now resting peacefully in bed with eyes closed. Arouses to voice. No signs of tremors or discomfort at this time. Patient states that she is no longer experiencing facial numbness or any discomfort. Will continue to monitor.

## 2024-02-13 NOTE — CONSULTS
[facility]  [facility]      Patient Name:   shiva tobin  YOB: 1955  Identification Number:   MRN - [id number]  Date of Service:   02/12/2024 21:40:06    Diagnosis:        R29.810 - Facial numbness/ Facial weakness    Impression:       69 yo female with a history of hypertension and prior strokes was admitted for afib with RVR and suddenly developed bilateral facial numbness and worsening tremors in setting of elevated blood pressure. Symptoms likely secondary to hypertensive urgency and/or withdrawal. No further stroke work-up indicated at this time. Continue heparin drip.    Our recommendations are outlined below.    Recommendations:          Neuro Checks    Dysphagia:        Swallow Evaluation, Bedside        NPO Until Swallow Evaluation    Disposition:        Sign off    Sign Out:        Discussed with Primary Attending        ------------------------------------------------------------------------------    Advanced Imaging:  Advanced Imaging Deferred because:    Non-disabling symptoms as verified by the patient; no cortical signs so not consistent with LVO    Stroke not suspected with clinical presentation and exam      Metrics:  Last Known Well: 02/12/2024 21:20:00  [facility] Notification Time: 02/12/2024 21:39:19  Stamp Time: 02/12/2024 21:40:06  Initial Response Time: 02/12/2024 21:46:31  Symptoms: facial numbness.  Initial patient interaction: 02/12/2024 21:48:52  NIHSS Assessment Completed: 02/12/2024 21:55:22  Patient is not a candidate for Thrombolytic.  Thrombolytic Medical Decision: 02/12/2024 21:55:25  Patient was not deemed candidate for Thrombolytic because of following reasons:  No disabling symptoms.  hypertensive urgency.    I personally Reviewed the CT Head and it Showed no acute findings    Primary Provider Notified of Diagnostic Impression and Management Plan on: 02/12/2024 22:05:05  Spoke With: PA  Able to Reach  02/12/2024  22:05:05        ------------------------------------------------------------------------------    History of Present Illness:  Patient is a 68 year old Female.    Inpatient stroke alert was called for symptoms of facial numbness.  69 yo female with a history of hypertension and prior strokes was admitted for afib with RVR and suddenly developed bilateral facial numbness and worsening tremors at 21:20 in setting of elevated BP. She is currently on a CIWA protocol day 2. She has not been taking medications since April. She is currently on a heparin.       Past Medical History:       Hypertension       Hyperlipidemia       Atrial Fibrillation       Stroke    Medications:    No Anticoagulant use   No Antiplatelet use  Reviewed EMR for current medications    Allergies:   Reviewed    Social History:  Smoking: Former    Family History:    There Is Family History Of:mother - stroke  There is no family history of premature cerebrovascular disease pertinent to this consultation    ROS :  14 Points Review of Systems was performed and was negative except mentioned in HPI.    Past Surgical History:  There Is No Surgical History Contributory To Today’s Visit         Examination:  BP(208/92), Pulse(108), Blood Glucose(120)  1A: Level of Consciousness - Alert; keenly responsive + 0  1B: Ask Month and Age - 1 Question Right + 1  1C: Blink Eyes & Squeeze Hands - Performs Both Tasks + 0  2: Test Horizontal Extraocular Movements - Normal + 0  3: Test Visual Fields - No Visual Loss + 0  4: Test Facial Palsy (Use Grimace if Obtunded) - Normal symmetry + 0  5A: Test Left Arm Motor Drift - No Drift for 10 Seconds + 0  5B: Test Right Arm Motor Drift - No Drift for 10 Seconds + 0  6A: Test Left Leg Motor Drift - No Drift for 5 Seconds + 0  6B: Test Right Leg Motor Drift - No Drift for 5 Seconds + 0  7: Test Limb Ataxia (FNF/Heel-Shin) - No Ataxia + 0  8: Test Sensation - Normal; No sensory loss + 0  9: Test Language/Aphasia - Normal; No  aphasia + 0  10: Test Dysarthria - Normal + 0  11: Test Extinction/Inattention - No abnormality + 0    NIHSS Score: 1  NIHSS Free Text : bilateral tremors    Pre-Morbid Modified Sara Scale:  0 Points = No symptoms at all    Spoke with : PA    Patient/Family was informed the Neurology Consult would occur via TeleHealth consult by way of interactive audio and video telecommunications and consented to receiving care in this manner.      Patient is being evaluated for possible acute neurologic impairment and high probability of imminent or life-threatening deterioration. I spent total of 30 minutes providing care to this patient, including time for face to face visit via telemedicine, review of medical records, imaging studies and discussion of findings with providers, the patient and/or family.      Dr Fallon Kothari      [facility]  For Inpatient follow-up with [facility] physician please call [facility] [phone]. This is not an outpatient service. Post hospital discharge, please contact hospital directly.    Please do not communicate with [facility] physicians via secure chat. If you have any questions, Please contact [facility].  Please call or reconsult our service if there are any clinical or diagnostic changes.

## 2024-02-14 LAB
ALBUMIN SERPL-MCNC: 3.7 G/DL (ref 3.5–5.2)
ALBUMIN/GLOB SERPL: 1.5 G/DL
ALP SERPL-CCNC: 43 U/L (ref 39–117)
ALT SERPL W P-5'-P-CCNC: 22 U/L (ref 1–33)
ANION GAP SERPL CALCULATED.3IONS-SCNC: 10.8 MMOL/L (ref 5–15)
APTT PPP: 162.1 SECONDS (ref 78–95.9)
APTT PPP: 25.8 SECONDS (ref 78–95.9)
APTT PPP: 60.2 SECONDS (ref 78–95.9)
AST SERPL-CCNC: 25 U/L (ref 1–32)
BASOPHILS # BLD AUTO: 0.06 10*3/MM3 (ref 0–0.2)
BASOPHILS # BLD AUTO: 0.07 10*3/MM3 (ref 0–0.2)
BASOPHILS NFR BLD AUTO: 0.9 % (ref 0–1.5)
BASOPHILS NFR BLD AUTO: 1.1 % (ref 0–1.5)
BILIRUB SERPL-MCNC: 0.3 MG/DL (ref 0–1.2)
BUN SERPL-MCNC: 3 MG/DL (ref 8–23)
BUN/CREAT SERPL: 5.6 (ref 7–25)
CALCIUM SPEC-SCNC: 8.8 MG/DL (ref 8.6–10.5)
CHLORIDE SERPL-SCNC: 108 MMOL/L (ref 98–107)
CO2 SERPL-SCNC: 21.2 MMOL/L (ref 22–29)
CREAT SERPL-MCNC: 0.54 MG/DL (ref 0.57–1)
DEPRECATED RDW RBC AUTO: 46.5 FL (ref 37–54)
DEPRECATED RDW RBC AUTO: 47.4 FL (ref 37–54)
EGFRCR SERPLBLD CKD-EPI 2021: 100.4 ML/MIN/1.73
EOSINOPHIL # BLD AUTO: 0.3 10*3/MM3 (ref 0–0.4)
EOSINOPHIL # BLD AUTO: 0.33 10*3/MM3 (ref 0–0.4)
EOSINOPHIL NFR BLD AUTO: 4.5 % (ref 0.3–6.2)
EOSINOPHIL NFR BLD AUTO: 5.2 % (ref 0.3–6.2)
ERYTHROCYTE [DISTWIDTH] IN BLOOD BY AUTOMATED COUNT: 13.1 % (ref 12.3–15.4)
ERYTHROCYTE [DISTWIDTH] IN BLOOD BY AUTOMATED COUNT: 13.2 % (ref 12.3–15.4)
GLOBULIN UR ELPH-MCNC: 2.4 GM/DL
GLUCOSE SERPL-MCNC: 113 MG/DL (ref 65–99)
HCT VFR BLD AUTO: 36 % (ref 34–46.6)
HCT VFR BLD AUTO: 36.9 % (ref 34–46.6)
HGB BLD-MCNC: 12.1 G/DL (ref 12–15.9)
HGB BLD-MCNC: 12.3 G/DL (ref 12–15.9)
IMM GRANULOCYTES # BLD AUTO: 0.01 10*3/MM3 (ref 0–0.05)
IMM GRANULOCYTES # BLD AUTO: 0.01 10*3/MM3 (ref 0–0.05)
IMM GRANULOCYTES NFR BLD AUTO: 0.2 % (ref 0–0.5)
IMM GRANULOCYTES NFR BLD AUTO: 0.2 % (ref 0–0.5)
LYMPHOCYTES # BLD AUTO: 1.8 10*3/MM3 (ref 0.7–3.1)
LYMPHOCYTES # BLD AUTO: 2.32 10*3/MM3 (ref 0.7–3.1)
LYMPHOCYTES NFR BLD AUTO: 27.1 % (ref 19.6–45.3)
LYMPHOCYTES NFR BLD AUTO: 36.3 % (ref 19.6–45.3)
MAGNESIUM SERPL-MCNC: 1.4 MG/DL (ref 1.6–2.4)
MCH RBC QN AUTO: 32.5 PG (ref 26.6–33)
MCH RBC QN AUTO: 33.1 PG (ref 26.6–33)
MCHC RBC AUTO-ENTMCNC: 33.3 G/DL (ref 31.5–35.7)
MCHC RBC AUTO-ENTMCNC: 33.6 G/DL (ref 31.5–35.7)
MCV RBC AUTO: 97.4 FL (ref 79–97)
MCV RBC AUTO: 98.4 FL (ref 79–97)
MONOCYTES # BLD AUTO: 0.65 10*3/MM3 (ref 0.1–0.9)
MONOCYTES # BLD AUTO: 0.82 10*3/MM3 (ref 0.1–0.9)
MONOCYTES NFR BLD AUTO: 10.2 % (ref 5–12)
MONOCYTES NFR BLD AUTO: 12.3 % (ref 5–12)
NEUTROPHILS NFR BLD AUTO: 3.02 10*3/MM3 (ref 1.7–7)
NEUTROPHILS NFR BLD AUTO: 3.64 10*3/MM3 (ref 1.7–7)
NEUTROPHILS NFR BLD AUTO: 47.2 % (ref 42.7–76)
NEUTROPHILS NFR BLD AUTO: 54.8 % (ref 42.7–76)
NRBC BLD AUTO-RTO: 0 /100 WBC (ref 0–0.2)
NRBC BLD AUTO-RTO: 0 /100 WBC (ref 0–0.2)
PHOSPHATE SERPL-MCNC: 2.6 MG/DL (ref 2.5–4.5)
PLATELET # BLD AUTO: 397 10*3/MM3 (ref 140–450)
PLATELET # BLD AUTO: 411 10*3/MM3 (ref 140–450)
PMV BLD AUTO: 10.1 FL (ref 6–12)
PMV BLD AUTO: 10.6 FL (ref 6–12)
POTASSIUM SERPL-SCNC: 3.4 MMOL/L (ref 3.5–5.2)
PROT SERPL-MCNC: 6.1 G/DL (ref 6–8.5)
RBC # BLD AUTO: 3.66 10*6/MM3 (ref 3.77–5.28)
RBC # BLD AUTO: 3.79 10*6/MM3 (ref 3.77–5.28)
SODIUM SERPL-SCNC: 140 MMOL/L (ref 136–145)
WBC NRBC COR # BLD AUTO: 6.39 10*3/MM3 (ref 3.4–10.8)
WBC NRBC COR # BLD AUTO: 6.64 10*3/MM3 (ref 3.4–10.8)

## 2024-02-14 PROCEDURE — 25810000003 SODIUM CHLORIDE 0.9 % SOLUTION: Performed by: INTERNAL MEDICINE

## 2024-02-14 PROCEDURE — 99232 SBSQ HOSP IP/OBS MODERATE 35: CPT | Performed by: INTERNAL MEDICINE

## 2024-02-14 PROCEDURE — 83735 ASSAY OF MAGNESIUM: CPT | Performed by: INTERNAL MEDICINE

## 2024-02-14 PROCEDURE — 85025 COMPLETE CBC W/AUTO DIFF WBC: CPT | Performed by: STUDENT IN AN ORGANIZED HEALTH CARE EDUCATION/TRAINING PROGRAM

## 2024-02-14 PROCEDURE — 94799 UNLISTED PULMONARY SVC/PX: CPT

## 2024-02-14 PROCEDURE — 25010000002 HEPARIN (PORCINE) 25000-0.45 UT/250ML-% SOLUTION: Performed by: INTERNAL MEDICINE

## 2024-02-14 PROCEDURE — 25810000003 SODIUM CHLORIDE 0.9 % SOLUTION 1,000 ML FLEX CONT: Performed by: INTERNAL MEDICINE

## 2024-02-14 PROCEDURE — 85730 THROMBOPLASTIN TIME PARTIAL: CPT | Performed by: INTERNAL MEDICINE

## 2024-02-14 PROCEDURE — 25010000002 THIAMINE PER 100 MG: Performed by: INTERNAL MEDICINE

## 2024-02-14 PROCEDURE — 80053 COMPREHEN METABOLIC PANEL: CPT | Performed by: INTERNAL MEDICINE

## 2024-02-14 PROCEDURE — 94761 N-INVAS EAR/PLS OXIMETRY MLT: CPT

## 2024-02-14 PROCEDURE — 84100 ASSAY OF PHOSPHORUS: CPT | Performed by: INTERNAL MEDICINE

## 2024-02-14 PROCEDURE — 25010000002 MAGNESIUM SULFATE 2 GM/50ML SOLUTION: Performed by: INTERNAL MEDICINE

## 2024-02-14 PROCEDURE — 25010000002 HYDRALAZINE PER 20 MG: Performed by: INTERNAL MEDICINE

## 2024-02-14 PROCEDURE — 25010000002 DIAZEPAM PER 5 MG: Performed by: INTERNAL MEDICINE

## 2024-02-14 PROCEDURE — 85025 COMPLETE CBC W/AUTO DIFF WBC: CPT | Performed by: INTERNAL MEDICINE

## 2024-02-14 RX ORDER — WHITE PETROLATUM 57.7 %-MINERAL OIL 31.9 % EYE OINTMENT
1
Status: DISCONTINUED | OUTPATIENT
Start: 2024-02-14 | End: 2024-02-16 | Stop reason: HOSPADM

## 2024-02-14 RX ORDER — POTASSIUM CHLORIDE 750 MG/1
30 CAPSULE, EXTENDED RELEASE ORAL ONCE
Status: COMPLETED | OUTPATIENT
Start: 2024-02-14 | End: 2024-02-14

## 2024-02-14 RX ORDER — MAGNESIUM SULFATE HEPTAHYDRATE 40 MG/ML
2 INJECTION, SOLUTION INTRAVENOUS
Qty: 150 ML | Refills: 0 | Status: COMPLETED | OUTPATIENT
Start: 2024-02-14 | End: 2024-02-14

## 2024-02-14 RX ADMIN — Medication 10 ML: at 20:23

## 2024-02-14 RX ADMIN — THIAMINE HYDROCHLORIDE 200 MG: 100 INJECTION, SOLUTION INTRAMUSCULAR; INTRAVENOUS at 05:07

## 2024-02-14 RX ADMIN — THIAMINE HYDROCHLORIDE 200 MG: 100 INJECTION, SOLUTION INTRAMUSCULAR; INTRAVENOUS at 15:01

## 2024-02-14 RX ADMIN — DIAZEPAM 5 MG: 5 INJECTION, SOLUTION INTRAMUSCULAR; INTRAVENOUS at 05:09

## 2024-02-14 RX ADMIN — ATORVASTATIN CALCIUM 40 MG: 40 TABLET, FILM COATED ORAL at 20:22

## 2024-02-14 RX ADMIN — ASPIRIN 81 MG CHEWABLE TABLET 81 MG: 81 TABLET CHEWABLE at 09:14

## 2024-02-14 RX ADMIN — MAGNESIUM SULFATE HEPTAHYDRATE 2 G: 2 INJECTION, SOLUTION INTRAVENOUS at 09:13

## 2024-02-14 RX ADMIN — SODIUM CHLORIDE 100 ML/HR: 9 INJECTION, SOLUTION INTRAVENOUS at 09:19

## 2024-02-14 RX ADMIN — Medication 10 ML: at 09:14

## 2024-02-14 RX ADMIN — THIAMINE HYDROCHLORIDE 200 MG: 100 INJECTION, SOLUTION INTRAMUSCULAR; INTRAVENOUS at 22:09

## 2024-02-14 RX ADMIN — HEPARIN SODIUM 19 UNITS/KG/HR: 10000 INJECTION, SOLUTION INTRAVENOUS at 09:05

## 2024-02-14 RX ADMIN — HEPARIN SODIUM 15 UNITS/KG/HR: 10000 INJECTION, SOLUTION INTRAVENOUS at 20:57

## 2024-02-14 RX ADMIN — MAGNESIUM SULFATE HEPTAHYDRATE 2 G: 2 INJECTION, SOLUTION INTRAVENOUS at 13:25

## 2024-02-14 RX ADMIN — FOLIC ACID 1 MG: 1 TABLET ORAL at 09:12

## 2024-02-14 RX ADMIN — MAGNESIUM SULFATE HEPTAHYDRATE 2 G: 2 INJECTION, SOLUTION INTRAVENOUS at 11:38

## 2024-02-14 RX ADMIN — POTASSIUM CHLORIDE 30 MEQ: 10 CAPSULE, COATED, EXTENDED RELEASE ORAL at 11:04

## 2024-02-14 RX ADMIN — HYDRALAZINE HYDROCHLORIDE 10 MG: 20 INJECTION, SOLUTION INTRAMUSCULAR; INTRAVENOUS at 03:56

## 2024-02-14 RX ADMIN — ACETAMINOPHEN 650 MG: 325 TABLET ORAL at 09:11

## 2024-02-14 RX ADMIN — Medication 1 TABLET: at 09:14

## 2024-02-14 RX ADMIN — FOLIC ACID 100 ML/HR: 5 INJECTION, SOLUTION INTRAMUSCULAR; INTRAVENOUS; SUBCUTANEOUS at 09:12

## 2024-02-14 RX ADMIN — DILTIAZEM HYDROCHLORIDE 180 MG: 180 CAPSULE, COATED, EXTENDED RELEASE ORAL at 09:14

## 2024-02-14 NOTE — PROGRESS NOTES
CARDIOLOGY  INPATIENT PROGRESS NOTE                [address]    2/14/2024      PATIENT IDENTIFICATION:   Name:  Kiesha Pan      MRN:  [id number]     68 y.o.  female                 SUBJECTIVE:   Patient with increased agitation overnight appears to be somewhat confused this morning as well states has not been feeling well in general possibility she may be going through some withdrawal  OBJECTIVE:  Vitals:    02/14/24 0708 02/14/24 1043 02/14/24 1108 02/14/24 1509   BP: 134/54  138/94 166/95   BP Location: Right arm  Right arm Right arm   Patient Position: Lying  Lying Lying   Pulse: 91 86 87 98   Resp: 18 18 18   Temp: 98.5 °F (36.9 °C)  98.6 °F (37 °C) 98.2 °F (36.8 °C)   TempSrc: Oral  Oral Oral   SpO2: 95% 96% 95% 98%   Weight:       Height:               Body mass index is 24.86 kg/m².    Intake/Output Summary (Last 24 hours) at 2/14/2024 1556  Last data filed at 2/14/2024 1535  Gross per 24 hour   Intake 480 ml   Output 900 ml   Net -420 ml       Telemetry: Sinus tachycardia normal sinus rhythm    Physical Exam  General Appearance:   no acute distress  Responsive but does appear to be somewhat confused  HENT:   lips not cyanotic  Atraumatic  Neck:  No jvd   supple  Respiratory:  no respiratory distress  normal breath sounds  no rales  Cardiovascular:    regular rhythm  no S3, no S4   no murmur  no rub  lower extremity edema: none    Skin:   warm, dry  No rashes      Allergies   Allergen Reactions    Cephalexin Unknown - Low Severity    Diphenhydramine Unknown - Low Severity    Levofloxacin Unknown - Low Severity    Penicillins Unknown - Low Severity     Scheduled meds:  aspirin, 81 mg, Oral, Daily  atorvastatin, 40 mg, Oral, Nightly  dilTIAZem CD, 180 mg, Oral, Q24H  folic acid, 1 mg, Oral, Daily  multivitamin with minerals, 1 tablet, Oral, Daily  sodium chloride, 10 mL, Intravenous, Q12H  thiamine (B-1) IV, 200 mg, Intravenous, Q8H   Followed by  [START ON 2/18/2024] thiamine, 100 mg, Oral,  "Daily      IV meds:                      dilTIAZem, 5-15 mg/hr, Last Rate: Stopped (02/12/24 1056)  heparin, 12 Units/kg/hr, Last Rate: Stopped (02/14/24 1522)      Data Review:  CBC          2/11/2024    18:34 2/12/2024    03:09 2/14/2024    06:03   CBC   WBC 9.89  10.16  6.39    RBC 4.35  3.94  3.79    Hemoglobin 14.3  12.7  12.3    Hematocrit 43.0  38.4  36.9    MCV 98.9  97.5  97.4    MCH 32.9  32.2  32.5    MCHC 33.3  33.1  33.3    RDW 12.9  12.9  13.1    Platelets 482  418  411      CMP          2/12/2024    03:09 2/13/2024    04:51 2/14/2024    06:03   CMP   Glucose 106  127  113    BUN 18  6  3    Creatinine 0.73  0.57  0.54    EGFR 89.7  99.1  100.4    Sodium 137  136  140    Potassium 4.2  3.7  3.4    Chloride 100  105  108    Calcium 8.7  8.6  8.8    Total Protein   6.1    Albumin   3.7    Globulin   2.4    Total Bilirubin   0.3    Alkaline Phosphatase   43    AST (SGOT)   25    ALT (SGPT)   22    Albumin/Globulin Ratio   1.5    BUN/Creatinine Ratio 24.7  10.5  5.6    Anion Gap 20.8  10.9  10.8       CARDIAC LABS:      Lab 02/11/24 2054 02/11/24  1834   PROBNP  --  55.2   HSTROP T 24* 11   PROTIME  --  12.8   INR  --  0.94        No results found for: \"DIGOXIN\"   Lab Results   Component Value Date    TSH 6.590 (H) 12/02/2022     Results from last 7 days   Lab Units 02/12/24  0309   CHOLESTEROL mg/dL 231*   HDL CHOL mg/dL 49     Lab Results   Component Value Date    POCTROP 0.06 11/06/2021     Lab Results   Component Value Date    TROPONINT 24 (H) 02/11/2024   (  Lab Results   Component Value Date    MG 1.4 (L) 02/14/2024     Results for orders placed during the hospital encounter of 02/11/24    Adult Transthoracic Echo Complete W/ Cont if Necessary Per Protocol    Interpretation Summary    Left ventricular systolic function is hyperdynamic (EF > 70%). Calculated left ventricular EF = 70%    Left ventricular diastolic dysfunction is noted.    The left atrial cavity is mildly dilated.         Results for " orders placed during the hospital encounter of 02/11/24    Stress Test With Myocardial Perfusion One Day    Interpretation Summary    Myocardial perfusion imaging indicates a normal myocardial perfusion study with no evidence of ischemia. Impressions are consistent with a low risk study.    Left ventricular ejection fraction is normal (Calculated EF = 58%).    Mild apical lateral defect consistent with ischemia mild to moderate in size    Findings consistent with a normal ECG stress test.    ASSESSMENT:    Atrial fibrillation with RVR    Chest pain, precordial        PLAN:  1.  Aspirin 81 mg daily  2.  Lipitor 40 nightly  3.  Diltiazem 180 mg daily  4.  Heparin GGT for now with conversion over to Eliquis on discharge.  Patient previously had had a mildly abnormal stress test suggestive of a partial blockage although may be artifactual as well had recommended heart catheterization for further evaluation but there has been some concern from the  about the need as well as cost for the procedure.  Initially both the patient and the  were going to speak and decide about whether or not to proceed it certainly reasonable to hold off on this and see how she does with medical management for her atrial fibrillation as well if this can be controlled feel that the symptomatic anginal pain that she may have been having could also be treated that way.          Emil Dunn MD  2/14/2024    15:56 EST

## 2024-02-14 NOTE — PLAN OF CARE
Goal Outcome Evaluation:  Plan of Care Reviewed With: patient                Patient currently resting in bed with eyes closed. Patient had episode of hypertension treated per PRN order. Patient also treated per CIWA protocol. See MAR and flowsheet.

## 2024-02-14 NOTE — PLAN OF CARE
Problem: Adult Inpatient Plan of Care  Goal: Plan of Care Review  Outcome: Ongoing, Progressing  Goal: Patient-Specific Goal (Individualized)  Outcome: Ongoing, Progressing  Goal: Absence of Hospital-Acquired Illness or Injury  Outcome: Ongoing, Progressing  Intervention: Identify and Manage Fall Risk  Intervention: Prevent Skin Injury  Intervention: Prevent and Manage VTE (Venous Thromboembolism) Risk  Goal: Optimal Comfort and Wellbeing  Outcome: Ongoing, Progressing  Intervention: Monitor Pain and Promote Comfort  Intervention: Provide Person-Centered Care  Goal: Readiness for Transition of Care  Outcome: Ongoing, Progressing   Goal Outcome Evaluation:           Progress: improving  Outcome Evaluation: CIWA done throughout shift.Last score 2. Heparin on hold until new PTT. patient appears calm without distress.  at bedside.

## 2024-02-15 ENCOUNTER — APPOINTMENT (OUTPATIENT)
Dept: CT IMAGING | Facility: HOSPITAL | Age: 69
DRG: 287 | End: 2024-02-15
Payer: MEDICARE

## 2024-02-15 LAB
ANION GAP SERPL CALCULATED.3IONS-SCNC: 13 MMOL/L (ref 5–15)
APTT PPP: 127.4 SECONDS (ref 78–95.9)
APTT PPP: 42.7 SECONDS (ref 78–95.9)
APTT PPP: 53.3 SECONDS (ref 78–95.9)
BASOPHILS # BLD AUTO: 0.08 10*3/MM3 (ref 0–0.2)
BASOPHILS NFR BLD AUTO: 0.9 % (ref 0–1.5)
BUN SERPL-MCNC: 5 MG/DL (ref 8–23)
BUN/CREAT SERPL: 9.3 (ref 7–25)
CALCIUM SPEC-SCNC: 8.7 MG/DL (ref 8.6–10.5)
CHLORIDE SERPL-SCNC: 106 MMOL/L (ref 98–107)
CO2 SERPL-SCNC: 21 MMOL/L (ref 22–29)
CREAT SERPL-MCNC: 0.54 MG/DL (ref 0.57–1)
DEPRECATED RDW RBC AUTO: 46.5 FL (ref 37–54)
EGFRCR SERPLBLD CKD-EPI 2021: 100.4 ML/MIN/1.73
EOSINOPHIL # BLD AUTO: 0.51 10*3/MM3 (ref 0–0.4)
EOSINOPHIL NFR BLD AUTO: 5.9 % (ref 0.3–6.2)
ERYTHROCYTE [DISTWIDTH] IN BLOOD BY AUTOMATED COUNT: 13.1 % (ref 12.3–15.4)
GLUCOSE SERPL-MCNC: 112 MG/DL (ref 65–99)
HCT VFR BLD AUTO: 36.6 % (ref 34–46.6)
HGB BLD-MCNC: 12.2 G/DL (ref 12–15.9)
IMM GRANULOCYTES # BLD AUTO: 0.03 10*3/MM3 (ref 0–0.05)
IMM GRANULOCYTES NFR BLD AUTO: 0.3 % (ref 0–0.5)
LYMPHOCYTES # BLD AUTO: 3.12 10*3/MM3 (ref 0.7–3.1)
LYMPHOCYTES NFR BLD AUTO: 35.9 % (ref 19.6–45.3)
MAGNESIUM SERPL-MCNC: 1.7 MG/DL (ref 1.6–2.4)
MCH RBC QN AUTO: 32.4 PG (ref 26.6–33)
MCHC RBC AUTO-ENTMCNC: 33.3 G/DL (ref 31.5–35.7)
MCV RBC AUTO: 97.1 FL (ref 79–97)
MONOCYTES # BLD AUTO: 0.78 10*3/MM3 (ref 0.1–0.9)
MONOCYTES NFR BLD AUTO: 9 % (ref 5–12)
NEUTROPHILS NFR BLD AUTO: 4.18 10*3/MM3 (ref 1.7–7)
NEUTROPHILS NFR BLD AUTO: 48 % (ref 42.7–76)
NRBC BLD AUTO-RTO: 0 /100 WBC (ref 0–0.2)
PLATELET # BLD AUTO: 407 10*3/MM3 (ref 140–450)
PMV BLD AUTO: 9.7 FL (ref 6–12)
POTASSIUM SERPL-SCNC: 3.5 MMOL/L (ref 3.5–5.2)
RBC # BLD AUTO: 3.77 10*6/MM3 (ref 3.77–5.28)
SODIUM SERPL-SCNC: 140 MMOL/L (ref 136–145)
WBC NRBC COR # BLD AUTO: 8.7 10*3/MM3 (ref 3.4–10.8)

## 2024-02-15 PROCEDURE — 25010000002 DIAZEPAM PER 5 MG: Performed by: INTERNAL MEDICINE

## 2024-02-15 PROCEDURE — 70450 CT HEAD/BRAIN W/O DYE: CPT

## 2024-02-15 PROCEDURE — 25010000002 MAGNESIUM SULFATE 2 GM/50ML SOLUTION: Performed by: PHYSICIAN ASSISTANT

## 2024-02-15 PROCEDURE — 80048 BASIC METABOLIC PNL TOTAL CA: CPT | Performed by: INTERNAL MEDICINE

## 2024-02-15 PROCEDURE — 85730 THROMBOPLASTIN TIME PARTIAL: CPT | Performed by: INTERNAL MEDICINE

## 2024-02-15 PROCEDURE — 99232 SBSQ HOSP IP/OBS MODERATE 35: CPT | Performed by: INTERNAL MEDICINE

## 2024-02-15 PROCEDURE — 85025 COMPLETE CBC W/AUTO DIFF WBC: CPT | Performed by: STUDENT IN AN ORGANIZED HEALTH CARE EDUCATION/TRAINING PROGRAM

## 2024-02-15 PROCEDURE — 99231 SBSQ HOSP IP/OBS SF/LOW 25: CPT | Performed by: INTERNAL MEDICINE

## 2024-02-15 PROCEDURE — 25010000002 HYDRALAZINE PER 20 MG: Performed by: INTERNAL MEDICINE

## 2024-02-15 PROCEDURE — 83735 ASSAY OF MAGNESIUM: CPT | Performed by: INTERNAL MEDICINE

## 2024-02-15 PROCEDURE — 25010000002 THIAMINE PER 100 MG: Performed by: INTERNAL MEDICINE

## 2024-02-15 PROCEDURE — 25010000002 HEPARIN (PORCINE) 25000-0.45 UT/250ML-% SOLUTION: Performed by: PHYSICIAN ASSISTANT

## 2024-02-15 RX ORDER — POTASSIUM CHLORIDE 750 MG/1
20 CAPSULE, EXTENDED RELEASE ORAL ONCE
Status: COMPLETED | OUTPATIENT
Start: 2024-02-15 | End: 2024-02-15

## 2024-02-15 RX ORDER — MAGNESIUM SULFATE HEPTAHYDRATE 40 MG/ML
2 INJECTION, SOLUTION INTRAVENOUS ONCE
Status: COMPLETED | OUTPATIENT
Start: 2024-02-15 | End: 2024-02-15

## 2024-02-15 RX ORDER — BUTALBITAL, ACETAMINOPHEN AND CAFFEINE 300; 40; 50 MG/1; MG/1; MG/1
1 CAPSULE ORAL EVERY 6 HOURS PRN
Status: DISCONTINUED | OUTPATIENT
Start: 2024-02-15 | End: 2024-02-16 | Stop reason: HOSPADM

## 2024-02-15 RX ADMIN — Medication 10 ML: at 08:27

## 2024-02-15 RX ADMIN — HEPARIN SODIUM 18 UNITS/KG/HR: 10000 INJECTION, SOLUTION INTRAVENOUS at 18:23

## 2024-02-15 RX ADMIN — ATORVASTATIN CALCIUM 40 MG: 40 TABLET, FILM COATED ORAL at 21:15

## 2024-02-15 RX ADMIN — POTASSIUM CHLORIDE 20 MEQ: 10 CAPSULE, COATED, EXTENDED RELEASE ORAL at 08:27

## 2024-02-15 RX ADMIN — MAGNESIUM SULFATE HEPTAHYDRATE 2 G: 2 INJECTION, SOLUTION INTRAVENOUS at 10:24

## 2024-02-15 RX ADMIN — FOLIC ACID 1 MG: 1 TABLET ORAL at 08:27

## 2024-02-15 RX ADMIN — DILTIAZEM HYDROCHLORIDE 180 MG: 180 CAPSULE, COATED, EXTENDED RELEASE ORAL at 08:27

## 2024-02-15 RX ADMIN — THIAMINE HYDROCHLORIDE 200 MG: 100 INJECTION, SOLUTION INTRAMUSCULAR; INTRAVENOUS at 21:15

## 2024-02-15 RX ADMIN — WHITE PETROLATUM 57.7 %-MINERAL OIL 31.9 % EYE OINTMENT 1 APPLICATION: at 00:21

## 2024-02-15 RX ADMIN — THIAMINE HYDROCHLORIDE 200 MG: 100 INJECTION, SOLUTION INTRAMUSCULAR; INTRAVENOUS at 13:40

## 2024-02-15 RX ADMIN — ASPIRIN 81 MG CHEWABLE TABLET 81 MG: 81 TABLET CHEWABLE at 08:27

## 2024-02-15 RX ADMIN — OXYCODONE HYDROCHLORIDE AND ACETAMINOPHEN 1 TABLET: 5; 325 TABLET ORAL at 01:45

## 2024-02-15 RX ADMIN — THIAMINE HYDROCHLORIDE 200 MG: 100 INJECTION, SOLUTION INTRAMUSCULAR; INTRAVENOUS at 06:32

## 2024-02-15 RX ADMIN — Medication 10 ML: at 21:15

## 2024-02-15 RX ADMIN — DIAZEPAM 5 MG: 5 INJECTION, SOLUTION INTRAMUSCULAR; INTRAVENOUS at 02:53

## 2024-02-15 RX ADMIN — DIAZEPAM 2.5 MG: 5 INJECTION, SOLUTION INTRAMUSCULAR; INTRAVENOUS at 09:01

## 2024-02-15 RX ADMIN — ACETAMINOPHEN 650 MG: 325 TABLET ORAL at 10:41

## 2024-02-15 RX ADMIN — Medication 1 TABLET: at 08:27

## 2024-02-15 RX ADMIN — HYDRALAZINE HYDROCHLORIDE 10 MG: 20 INJECTION, SOLUTION INTRAMUSCULAR; INTRAVENOUS at 01:43

## 2024-02-15 RX ADMIN — APIXABAN 5 MG: 5 TABLET, FILM COATED ORAL at 21:15

## 2024-02-15 NOTE — PROGRESS NOTES
CARDIOLOGY  INPATIENT PROGRESS NOTE                [address]    2/15/2024      PATIENT IDENTIFICATION:   Name:  Kiesha Pan      MRN:  [id number]     68 y.o.  female                 SUBJECTIVE:   Patient more alert but still very agitated this morning no anginal chest discomfort issues    OBJECTIVE:  Vitals:    02/15/24 0714 02/15/24 0827 02/15/24 1155 02/15/24 1520   BP: 165/98  167/82 162/79   BP Location: Right arm  Right arm Right arm   Patient Position: Lying  Lying Lying   Pulse: 78 100     Resp: 18  20 18   Temp: 98.4 °F (36.9 °C)  98.6 °F (37 °C) 98.4 °F (36.9 °C)   TempSrc: Oral  Oral Oral   SpO2: 98%  97% 98%   Weight:       Height:               Body mass index is 24.86 kg/m².    Intake/Output Summary (Last 24 hours) at 2/15/2024 1531  Last data filed at 2/15/2024 1019  Gross per 24 hour   Intake 240 ml   Output 2800 ml   Net -2560 ml       Telemetry: Normal sinus rhythm    Physical Exam  General Appearance:   no acute distress  Alert and oriented x3  HENT:   lips not cyanotic  Atraumatic  Neck:  No jvd   supple  Respiratory:  no respiratory distress  normal breath sounds  no rales  Cardiovascular:    regular rhythm  no S3, no S4   no murmur  no rub  lower extremity edema: none    Skin:   warm, dry  No rashes      Allergies   Allergen Reactions    Cephalexin Unknown - Low Severity    Diphenhydramine Unknown - Low Severity    Levofloxacin Unknown - Low Severity    Penicillins Unknown - Low Severity     Scheduled meds:  aspirin, 81 mg, Oral, Daily  atorvastatin, 40 mg, Oral, Nightly  dilTIAZem CD, 180 mg, Oral, Q24H  folic acid, 1 mg, Oral, Daily  multivitamin with minerals, 1 tablet, Oral, Daily  sodium chloride, 10 mL, Intravenous, Q12H  thiamine (B-1) IV, 200 mg, Intravenous, Q8H   Followed by  [START ON 2/18/2024] thiamine, 100 mg, Oral, Daily      IV meds:                      dilTIAZem, 5-15 mg/hr, Last Rate: Stopped (02/12/24 1056)  heparin, 12 Units/kg/hr, Last Rate: 15 Units/kg/hr  "(02/15/24 1142)      Data Review:  CBC          2/12/2024    03:09 2/14/2024    06:03 2/14/2024    16:48 2/15/2024    02:45   CBC   WBC 10.16  6.39  6.64  8.70    RBC 3.94  3.79  3.66  3.77    Hemoglobin 12.7  12.3  12.1  12.2    Hematocrit 38.4  36.9  36.0  36.6    MCV 97.5  97.4  98.4  97.1    MCH 32.2  32.5  33.1  32.4    MCHC 33.1  33.3  33.6  33.3    RDW 12.9  13.1  13.2  13.1    Platelets 418  411  397  407      CMP          2/13/2024    04:51 2/14/2024    06:03 2/15/2024    02:45   CMP   Glucose 127  113  112    BUN 6  3  5    Creatinine 0.57  0.54  0.54    EGFR 99.1  100.4  100.4    Sodium 136  140  140    Potassium 3.7  3.4  3.5    Chloride 105  108  106    Calcium 8.6  8.8  8.7    Total Protein  6.1     Albumin  3.7     Globulin  2.4     Total Bilirubin  0.3     Alkaline Phosphatase  43     AST (SGOT)  25     ALT (SGPT)  22     Albumin/Globulin Ratio  1.5     BUN/Creatinine Ratio 10.5  5.6  9.3    Anion Gap 10.9  10.8  13.0       CARDIAC LABS:      Lab 02/11/24 2054 02/11/24  1834   PROBNP  --  55.2   HSTROP T 24* 11   PROTIME  --  12.8   INR  --  0.94        No results found for: \"DIGOXIN\"   Lab Results   Component Value Date    TSH 6.590 (H) 12/02/2022     Results from last 7 days   Lab Units 02/12/24  0309   CHOLESTEROL mg/dL 231*   HDL CHOL mg/dL 49     Lab Results   Component Value Date    POCTROP 0.06 11/06/2021     Lab Results   Component Value Date    TROPONINT 24 (H) 02/11/2024   (  Lab Results   Component Value Date    MG 1.7 02/15/2024     Results for orders placed during the hospital encounter of 02/11/24    Adult Transthoracic Echo Complete W/ Cont if Necessary Per Protocol    Interpretation Summary    Left ventricular systolic function is hyperdynamic (EF > 70%). Calculated left ventricular EF = 70%    Left ventricular diastolic dysfunction is noted.    The left atrial cavity is mildly dilated.         Results for orders placed during the hospital encounter of 02/11/24    Stress Test With " Myocardial Perfusion One Day    Interpretation Summary    Myocardial perfusion imaging indicates a normal myocardial perfusion study with no evidence of ischemia. Impressions are consistent with a low risk study.    Left ventricular ejection fraction is normal (Calculated EF = 58%).    Mild apical lateral defect consistent with ischemia mild to moderate in size    Findings consistent with a normal ECG stress test.    ASSESSMENT:    Atrial fibrillation with RVR    Chest pain, precordial        PLAN:  1.  Diltiazem 180 daily  2.  Aspirin 81 mg daily  3.  Heparin GGT can change to Eliquis          Emil Dunn MD  2/15/2024    15:31 EST

## 2024-02-15 NOTE — PROGRESS NOTES
[facility]   Hospitalist Progress Note  Date: 2/15/2024  Patient Name: Kiesha Pan  : 1955  MRN: [id number]  Date of admission: 2024  Room/Bed: 256/1      Subjective   Subjective     Chief Complaint:   Chest pain    Summary:  Kiesha Pan is a 68-year-old female with a past medical history of hypertension, atrial fibrillation, hyperlipidemia, depression, alcohol abuse, previous stroke and GERD presenting with chest pain. While watching TV around 5pm, she developed a sharp chest pain. The pain shortly after felt like someone was sitting on her chest. She rated her pain a 6/10 in severity and it was non-radiating. She has not been compliant with her eliquis for several months.  Patient endorses intermittent symptoms similar to these in the past however never persistent.     In ED, blood pressure 112/88, heart rate 160, respiratory rate 18, temperature 98.1 and O2 saturation 97% on room air.  Labs on arrival showed a sodium 144, potassium 3.8, BUN 16, creatinine 0.8, WBC 9.8, hemoglobin 14.3 and platelet count 42.  Troponin 11, proBNP 55.2, chest x-ray showed no active disease.     Interval Followup: Patient seen and examined resting comfortably complaining about headaches.  Had CT scan of the head last night which showed no acute abnormality.  Continuing on CIWA protocol for withdrawal symptoms added Fioricet for headaches.  Ventricular rate remains controlled remains on heparin drip can likely transition to Eliquis if no planned procedures.  Discussed case with patient and patient's  at the bedside they want to hold off on cardiac catheterization at this time.      Review of systems: All systems reviewed and negative except the following: Patient complains of generalized weakness fatigue some shakiness  Objective   Objective     Vitals:   Temp:  [97.7 °F (36.5 °C)-98.6 °F (37 °C)] 98.6 °F (37 °C)  Heart Rate:  [] 100  Resp:  [16-20] 20  BP: (165-185)/() 167/82    Physical  Exam   General: Awake, alert, NAD  HENT: NCAT, MMM  Eyes: pupils equal, no scleral icterus  Cardiovascular: RRR, no murmurs   Pulmonary: CTA bilaterally; no wheezes; no conversational dyspnea  Gastrointestinal: S/ND/NT, +BS  Musculoskeletal: No gross deformities  Skin: No jaundice, no rash on exposed skin appreciated  Neuro: CN II through XII grossly intact; speech clear; no tremor      Result Review    Result Review:  I have personally reviewed results from the last 24 hours 2/15/2024    [x]  Laboratory      Lab 02/15/24  0928 02/15/24  0245 02/14/24  1904 02/14/24  1648 02/14/24  1310 02/14/24  0603 02/12/24  0309 02/11/24  1834   WBC  --  8.70  --  6.64  --  6.39   < > 9.89   HEMOGLOBIN  --  12.2  --  12.1  --  12.3   < > 14.3   HEMATOCRIT  --  36.6  --  36.0  --  36.9   < > 43.0   PLATELETS  --  407  --  397  --  411   < > 482*   NEUTROS ABS  --  4.18  --  3.64  --  3.02   < > 6.64   IMMATURE GRANS (ABS)  --  0.03  --  0.01  --  0.01   < > 0.02   LYMPHS ABS  --  3.12*  --  1.80  --  2.32   < > 2.00   MONOS ABS  --  0.78  --  0.82  --  0.65   < > 0.87   EOS ABS  --  0.51*  --  0.30  --  0.33   < > 0.26   MCV  --  97.1*  --  98.4*  --  97.4*   < > 98.9*   PROTIME  --   --   --   --   --   --   --  12.8   APTT 127.4* 42.7* 25.8*  --    < > 60.2*   < > 27.7*    < > = values in this interval not displayed.         Lab 02/15/24  0245 02/14/24  0603 02/13/24  0451 02/12/24  0309 02/11/24  1834   SODIUM 140 140 136 137 144   POTASSIUM 3.5 3.4* 3.7 4.2 3.8   CHLORIDE 106 108* 105 100 105   CO2 21.0* 21.2* 20.1* 16.2* 18.5*   ANION GAP 13.0 10.8 10.9 20.8* 20.5*   BUN 5* 3* 6* 18 16   CREATININE 0.54* 0.54* 0.57 0.73 0.88   EGFR 100.4 100.4 99.1 89.7 71.7   GLUCOSE 112* 113* 127* 106* 75   CALCIUM 8.7 8.8 8.6 8.7 9.2   MAGNESIUM 1.7 1.4*  --   --  1.9   PHOSPHORUS  --  2.6  --   --   --    HEMOGLOBIN A1C  --   --   --  5.90*  --          Lab 02/14/24  0603 02/11/24  1834   TOTAL PROTEIN 6.1 7.8   ALBUMIN 3.7 4.6    GLOBULIN 2.4 3.2   ALT (SGPT) 22 22   AST (SGOT) 25 27   BILIRUBIN 0.3 <0.2   ALK PHOS 43 54   LIPASE  --  33         Lab 02/11/24 2054 02/11/24  1834   PROBNP  --  55.2   HSTROP T 24* 11   PROTIME  --  12.8   INR  --  0.94         Lab 02/12/24  0309   CHOLESTEROL 231*   LDL CHOL 165*   HDL CHOL 49   TRIGLYCERIDES 94             Brief Urine Lab Results       None          [x]  Microbiology   Microbiology Results (last 10 days)       ** No results found for the last 240 hours. **          [x]  Radiology  CT Head Without Contrast    Result Date: 2/15/2024    No acute intracranial abnormality is identified.     ANTONIO SALINAS MD       Electronically Signed and Approved By: ANTONIO SALINAS MD on 2/15/2024 at 2:47             XR Chest 1 View    Result Date: 2/11/2024    Emphysema.  No active pulmonary disease is seen.       SUNIL MCKENNA MD       Electronically Signed and Approved By: SUNIL MCKENNA MD on 2/11/2024 at 19:16            []  EKG/Telemetry   []  Cardiology/Vascular   []  Pathology  []  Old records  []  Other:    Assessment & Plan   Assessment / Plan     Assessment:  Atrial fibrillation with RVR now rate controlled  Chest pain  Hypertension  Hyperlipidemia  Depression  Alcohol abuse, with potential for withdrawal  Stroke  GERD  Concern for alcohol withdrawal    Plan:  Holding off on cardiac catheterization secondary to patient and family wishes they can follow-up outpatient with cardiology at discharge  Continue heparin drip can likely transition to Eliquis  Continue current rate controlling medications diltiazem 180 mg daily  Replacing potassium and magnesium as needed  Continuing cardiac telemetry  Continue CIWA protocol for alcohol withdrawal  CT scan of the head negative Fioricet for headaches if persistent consider MRI  Further treatment contingent upon h her hospital course  Discussed with RN      DVT prophylaxis:  Medical and mechanical DVT prophylaxis orders are present.        CODE STATUS:    Code Status (Patient has no pulse and is not breathing): CPR (Attempt to Resuscitate)  Medical Interventions (Patient has pulse or is breathing): Full Support  Electronically signed by BRITTANY Elise, 02/15/24, 12:12 PM EST.      Attending Documentation:  Patient independently seen and evaluated, above documentation reflects plan put forth during bedside rounds.  More than 51% of the time of this patient encounter was performed by me. I discussed the care plan with NIRU Toscano PA-C, I agree with his findings and plan as documented, what I have added to the care plan and modified is as follows in my documentation and my medical decision making; 68-year-old female presented with chest pain, was in A-fib with RVR.  Underwent a stress test.  Plan was to pursue cardiac cath, patient was actually down in the Cath Lab preparing to undergo the procedure and spouse raise some concerns and questions.  Procedure was held and ultimately has been deferred by patient and spouse.  She is without chest pain.  Not currently showing signs of withdrawal. We can change heparin to Eliquis and dc home tomorrow if no new issues.  Electronically signed by Emil Frazier MD, 02/15/24, 4:42 PM EST.

## 2024-02-15 NOTE — PLAN OF CARE
Goal Outcome Evaluation:  Plan of Care Reviewed With: patient         Heparin gtt @15. IV mag given x1. Patient was nauseous and vomited. Valium x1 given for CIWA: 10. Tylenol x1 given with relief noted. No other complaints from patient during shift.

## 2024-02-16 ENCOUNTER — TELEPHONE (OUTPATIENT)
Dept: CARDIOLOGY | Facility: CLINIC | Age: 69
End: 2024-02-16
Payer: OTHER GOVERNMENT

## 2024-02-16 ENCOUNTER — READMISSION MANAGEMENT (OUTPATIENT)
Dept: CALL CENTER | Facility: HOSPITAL | Age: 69
End: 2024-02-16
Payer: OTHER GOVERNMENT

## 2024-02-16 VITALS
SYSTOLIC BLOOD PRESSURE: 162 MMHG | TEMPERATURE: 98.5 F | DIASTOLIC BLOOD PRESSURE: 78 MMHG | HEIGHT: 67 IN | HEART RATE: 88 BPM | BODY MASS INDEX: 25.81 KG/M2 | RESPIRATION RATE: 19 BRPM | WEIGHT: 164.46 LBS | OXYGEN SATURATION: 96 %

## 2024-02-16 PROBLEM — R94.39 ABNORMAL STRESS TEST: Status: ACTIVE | Noted: 2024-02-16

## 2024-02-16 LAB
ALBUMIN SERPL-MCNC: 3.8 G/DL (ref 3.5–5.2)
ALP SERPL-CCNC: 49 U/L (ref 39–117)
ALT SERPL W P-5'-P-CCNC: 28 U/L (ref 1–33)
ANION GAP SERPL CALCULATED.3IONS-SCNC: 10.8 MMOL/L (ref 5–15)
AST SERPL-CCNC: 25 U/L (ref 1–32)
BASOPHILS # BLD AUTO: 0.07 10*3/MM3 (ref 0–0.2)
BASOPHILS NFR BLD AUTO: 0.8 % (ref 0–1.5)
BILIRUB CONJ SERPL-MCNC: <0.2 MG/DL (ref 0–0.3)
BILIRUB INDIRECT SERPL-MCNC: NORMAL MG/DL
BILIRUB SERPL-MCNC: 0.2 MG/DL (ref 0–1.2)
BUN SERPL-MCNC: 5 MG/DL (ref 8–23)
BUN/CREAT SERPL: 7.8 (ref 7–25)
CALCIUM SPEC-SCNC: 9.2 MG/DL (ref 8.6–10.5)
CHLORIDE SERPL-SCNC: 102 MMOL/L (ref 98–107)
CO2 SERPL-SCNC: 26.2 MMOL/L (ref 22–29)
CREAT SERPL-MCNC: 0.64 MG/DL (ref 0.57–1)
DEPRECATED RDW RBC AUTO: 47.2 FL (ref 37–54)
EGFRCR SERPLBLD CKD-EPI 2021: 96.4 ML/MIN/1.73
EOSINOPHIL # BLD AUTO: 0.31 10*3/MM3 (ref 0–0.4)
EOSINOPHIL NFR BLD AUTO: 3.7 % (ref 0.3–6.2)
ERYTHROCYTE [DISTWIDTH] IN BLOOD BY AUTOMATED COUNT: 13.1 % (ref 12.3–15.4)
GLUCOSE SERPL-MCNC: 193 MG/DL (ref 65–99)
HCT VFR BLD AUTO: 36.5 % (ref 34–46.6)
HGB BLD-MCNC: 12.1 G/DL (ref 12–15.9)
IMM GRANULOCYTES # BLD AUTO: 0.03 10*3/MM3 (ref 0–0.05)
IMM GRANULOCYTES NFR BLD AUTO: 0.4 % (ref 0–0.5)
LYMPHOCYTES # BLD AUTO: 1.97 10*3/MM3 (ref 0.7–3.1)
LYMPHOCYTES NFR BLD AUTO: 23.8 % (ref 19.6–45.3)
MAGNESIUM SERPL-MCNC: 1.8 MG/DL (ref 1.6–2.4)
MCH RBC QN AUTO: 32.8 PG (ref 26.6–33)
MCHC RBC AUTO-ENTMCNC: 33.2 G/DL (ref 31.5–35.7)
MCV RBC AUTO: 98.9 FL (ref 79–97)
MONOCYTES # BLD AUTO: 0.61 10*3/MM3 (ref 0.1–0.9)
MONOCYTES NFR BLD AUTO: 7.4 % (ref 5–12)
NEUTROPHILS NFR BLD AUTO: 5.3 10*3/MM3 (ref 1.7–7)
NEUTROPHILS NFR BLD AUTO: 63.9 % (ref 42.7–76)
NRBC BLD AUTO-RTO: 0 /100 WBC (ref 0–0.2)
PHOSPHATE SERPL-MCNC: 2.8 MG/DL (ref 2.5–4.5)
PLATELET # BLD AUTO: 410 10*3/MM3 (ref 140–450)
PMV BLD AUTO: 10.6 FL (ref 6–12)
POTASSIUM SERPL-SCNC: 3.4 MMOL/L (ref 3.5–5.2)
PROT SERPL-MCNC: 6.3 G/DL (ref 6–8.5)
RBC # BLD AUTO: 3.69 10*6/MM3 (ref 3.77–5.28)
SODIUM SERPL-SCNC: 139 MMOL/L (ref 136–145)
WBC NRBC COR # BLD AUTO: 8.29 10*3/MM3 (ref 3.4–10.8)

## 2024-02-16 PROCEDURE — 80048 BASIC METABOLIC PNL TOTAL CA: CPT | Performed by: PHYSICIAN ASSISTANT

## 2024-02-16 PROCEDURE — 83735 ASSAY OF MAGNESIUM: CPT | Performed by: PHYSICIAN ASSISTANT

## 2024-02-16 PROCEDURE — 94761 N-INVAS EAR/PLS OXIMETRY MLT: CPT

## 2024-02-16 PROCEDURE — 99232 SBSQ HOSP IP/OBS MODERATE 35: CPT | Performed by: INTERNAL MEDICINE

## 2024-02-16 PROCEDURE — 25010000002 THIAMINE PER 100 MG: Performed by: INTERNAL MEDICINE

## 2024-02-16 PROCEDURE — 25010000002 MAGNESIUM SULFATE IN D5W 1G/100ML (PREMIX) 1-5 GM/100ML-% SOLUTION: Performed by: PHYSICIAN ASSISTANT

## 2024-02-16 PROCEDURE — 80076 HEPATIC FUNCTION PANEL: CPT | Performed by: PHYSICIAN ASSISTANT

## 2024-02-16 PROCEDURE — 99239 HOSP IP/OBS DSCHRG MGMT >30: CPT | Performed by: INTERNAL MEDICINE

## 2024-02-16 PROCEDURE — 94799 UNLISTED PULMONARY SVC/PX: CPT

## 2024-02-16 PROCEDURE — 84100 ASSAY OF PHOSPHORUS: CPT | Performed by: PHYSICIAN ASSISTANT

## 2024-02-16 PROCEDURE — 85025 COMPLETE CBC W/AUTO DIFF WBC: CPT | Performed by: STUDENT IN AN ORGANIZED HEALTH CARE EDUCATION/TRAINING PROGRAM

## 2024-02-16 RX ORDER — FOLIC ACID 1 MG/1
1 TABLET ORAL DAILY
Qty: 15 TABLET | Refills: 0 | Status: SHIPPED | OUTPATIENT
Start: 2024-02-17 | End: 2024-03-03

## 2024-02-16 RX ORDER — HYDRALAZINE HYDROCHLORIDE 10 MG/1
10 TABLET, FILM COATED ORAL 2 TIMES DAILY
Qty: 40 TABLET | Refills: 0 | Status: SHIPPED | OUTPATIENT
Start: 2024-02-16 | End: 2024-02-21

## 2024-02-16 RX ORDER — HYDRALAZINE HYDROCHLORIDE 10 MG/1
10 TABLET, FILM COATED ORAL EVERY 12 HOURS SCHEDULED
Status: DISCONTINUED | OUTPATIENT
Start: 2024-02-16 | End: 2024-02-16 | Stop reason: HOSPADM

## 2024-02-16 RX ORDER — MAGNESIUM SULFATE 1 G/100ML
1 INJECTION INTRAVENOUS ONCE
Status: COMPLETED | OUTPATIENT
Start: 2024-02-16 | End: 2024-02-16

## 2024-02-16 RX ORDER — POTASSIUM CHLORIDE 750 MG/1
30 CAPSULE, EXTENDED RELEASE ORAL ONCE
Status: COMPLETED | OUTPATIENT
Start: 2024-02-16 | End: 2024-02-16

## 2024-02-16 RX ORDER — LANOLIN ALCOHOL/MO/W.PET/CERES
100 CREAM (GRAM) TOPICAL DAILY
Qty: 14 TABLET | Refills: 0 | Status: SHIPPED | OUTPATIENT
Start: 2024-02-18 | End: 2024-03-03

## 2024-02-16 RX ORDER — ASPIRIN 81 MG/1
81 TABLET, CHEWABLE ORAL DAILY
Qty: 30 TABLET | Refills: 0 | Status: SHIPPED | OUTPATIENT
Start: 2024-02-16 | End: 2024-02-26 | Stop reason: SDUPTHER

## 2024-02-16 RX ORDER — ATORVASTATIN CALCIUM 40 MG/1
40 TABLET, FILM COATED ORAL NIGHTLY
Qty: 30 TABLET | Refills: 0 | Status: SHIPPED | OUTPATIENT
Start: 2024-02-16 | End: 2024-02-26 | Stop reason: SDUPTHER

## 2024-02-16 RX ORDER — PANTOPRAZOLE SODIUM 40 MG/1
40 TABLET, DELAYED RELEASE ORAL DAILY
Qty: 30 TABLET | Refills: 0 | Status: SHIPPED | OUTPATIENT
Start: 2024-02-16 | End: 2024-03-17

## 2024-02-16 RX ORDER — DILTIAZEM HYDROCHLORIDE 180 MG/1
180 CAPSULE, COATED, EXTENDED RELEASE ORAL
Qty: 30 CAPSULE | Refills: 0 | Status: SHIPPED | OUTPATIENT
Start: 2024-02-17 | End: 2024-02-26 | Stop reason: SDUPTHER

## 2024-02-16 RX ADMIN — ASPIRIN 81 MG CHEWABLE TABLET 81 MG: 81 TABLET CHEWABLE at 10:01

## 2024-02-16 RX ADMIN — MAGNESIUM SULFATE 1 G: 1 INJECTION INTRAVENOUS at 10:01

## 2024-02-16 RX ADMIN — THIAMINE HYDROCHLORIDE 200 MG: 100 INJECTION, SOLUTION INTRAMUSCULAR; INTRAVENOUS at 15:20

## 2024-02-16 RX ADMIN — FOLIC ACID 1 MG: 1 TABLET ORAL at 10:01

## 2024-02-16 RX ADMIN — APIXABAN 5 MG: 5 TABLET, FILM COATED ORAL at 10:01

## 2024-02-16 RX ADMIN — THIAMINE HYDROCHLORIDE 200 MG: 100 INJECTION, SOLUTION INTRAMUSCULAR; INTRAVENOUS at 06:04

## 2024-02-16 RX ADMIN — Medication 1 TABLET: at 10:01

## 2024-02-16 RX ADMIN — DILTIAZEM HYDROCHLORIDE 180 MG: 180 CAPSULE, COATED, EXTENDED RELEASE ORAL at 10:01

## 2024-02-16 RX ADMIN — Medication 10 ML: at 10:01

## 2024-02-16 RX ADMIN — POTASSIUM CHLORIDE 30 MEQ: 10 CAPSULE, COATED, EXTENDED RELEASE ORAL at 10:01

## 2024-02-16 RX ADMIN — HYDRALAZINE HYDROCHLORIDE 10 MG: 10 TABLET, FILM COATED ORAL at 12:56

## 2024-02-16 NOTE — PROGRESS NOTES
CARDIOLOGY  INPATIENT PROGRESS NOTE                [address]    2/16/2024      PATIENT IDENTIFICATION:   Name:  Kiesha Pan      MRN:  [id number]     68 y.o.  female                 SUBJECTIVE:   Patient with no issues overnight clinically looks better this morning has not had recurrent atrial fibrillation episodes  OBJECTIVE:  Vitals:    02/16/24 0340 02/16/24 0718 02/16/24 0724 02/16/24 0758   BP: 166/87 163/82     BP Location: Right arm Right arm     Patient Position: Lying Lying     Pulse: 103      Resp: 18 20     Temp: 98.2 °F (36.8 °C) 98.4 °F (36.9 °C)     TempSrc: Oral Oral     SpO2: 90% 92%  92%   Weight:   74.6 kg (164 lb 7.4 oz)    Height:               Body mass index is 25.76 kg/m².    Intake/Output Summary (Last 24 hours) at 2/16/2024 0945  Last data filed at 2/16/2024 0848  Gross per 24 hour   Intake 1839.28 ml   Output 2125 ml   Net -285.72 ml       Telemetry: Normal sinus rhythm brief SVT seen just about 6 beats in duration    Physical Exam  General Appearance:   no acute distress  Alert and oriented x3  HENT:   lips not cyanotic  Atraumatic  Neck:  No jvd   supple  Respiratory:  no respiratory distress  normal breath sounds  no rales  Cardiovascular:    regular rhythm  no S3, no S4   no murmur  no rub  lower extremity edema: none    Skin:   warm, dry  No rashes      Allergies   Allergen Reactions    Cephalexin Unknown - Low Severity    Diphenhydramine Unknown - Low Severity    Levofloxacin Unknown - Low Severity    Penicillins Unknown - Low Severity     Scheduled meds:  apixaban, 5 mg, Oral, Q12H  aspirin, 81 mg, Oral, Daily  atorvastatin, 40 mg, Oral, Nightly  dilTIAZem CD, 180 mg, Oral, Q24H  folic acid, 1 mg, Oral, Daily  magnesium sulfate, 1 g, Intravenous, Once  multivitamin with minerals, 1 tablet, Oral, Daily  potassium chloride, 30 mEq, Oral, Once  sodium chloride, 10 mL, Intravenous, Q12H  thiamine (B-1) IV, 200 mg, Intravenous, Q8H   Followed by  [START ON 2/18/2024]  "thiamine, 100 mg, Oral, Daily      IV meds:                      dilTIAZem, 5-15 mg/hr, Last Rate: Stopped (02/12/24 1056)      Data Review:  CBC          2/14/2024    06:03 2/14/2024    16:48 2/15/2024    02:45 2/16/2024    04:14   CBC   WBC 6.39  6.64  8.70  8.29    RBC 3.79  3.66  3.77  3.69    Hemoglobin 12.3  12.1  12.2  12.1    Hematocrit 36.9  36.0  36.6  36.5    MCV 97.4  98.4  97.1  98.9    MCH 32.5  33.1  32.4  32.8    MCHC 33.3  33.6  33.3  33.2    RDW 13.1  13.2  13.1  13.1    Platelets 411  397  407  410      CMP          2/14/2024    06:03 2/15/2024    02:45 2/16/2024    04:14   CMP   Glucose 113  112  193    BUN 3  5  5    Creatinine 0.54  0.54  0.64    EGFR 100.4  100.4  96.4    Sodium 140  140  139    Potassium 3.4  3.5  3.4    Chloride 108  106  102    Calcium 8.8  8.7  9.2    Total Protein 6.1   6.3    Albumin 3.7   3.8    Globulin 2.4      Total Bilirubin 0.3   0.2    Alkaline Phosphatase 43   49    AST (SGOT) 25   25    ALT (SGPT) 22   28    Albumin/Globulin Ratio 1.5      BUN/Creatinine Ratio 5.6  9.3  7.8    Anion Gap 10.8  13.0  10.8       CARDIAC LABS:      Lab 02/11/24 2054 02/11/24  1834   PROBNP  --  55.2   HSTROP T 24* 11   PROTIME  --  12.8   INR  --  0.94        No results found for: \"DIGOXIN\"   Lab Results   Component Value Date    TSH 6.590 (H) 12/02/2022     Results from last 7 days   Lab Units 02/12/24  0309   CHOLESTEROL mg/dL 231*   HDL CHOL mg/dL 49     Lab Results   Component Value Date    POCTROP 0.06 11/06/2021     Lab Results   Component Value Date    TROPONINT 24 (H) 02/11/2024   (  Lab Results   Component Value Date    MG 1.8 02/16/2024     Results for orders placed during the hospital encounter of 02/11/24    Adult Transthoracic Echo Complete W/ Cont if Necessary Per Protocol    Interpretation Summary    Left ventricular systolic function is hyperdynamic (EF > 70%). Calculated left ventricular EF = 70%    Left ventricular diastolic dysfunction is noted.    The left " atrial cavity is mildly dilated.         Results for orders placed during the hospital encounter of 02/11/24    Stress Test With Myocardial Perfusion One Day    Interpretation Summary    Myocardial perfusion imaging indicates a normal myocardial perfusion study with no evidence of ischemia. Impressions are consistent with a low risk study.    Left ventricular ejection fraction is normal (Calculated EF = 58%).    Mild apical lateral defect consistent with ischemia mild to moderate in size    Findings consistent with a normal ECG stress test.    ASSESSMENT:    Atrial fibrillation with RVR    Chest pain, precordial        PLAN:  1.  Diltiazem 180 daily  2.  Eliquis 5 twice daily  3.  Aspirin 81 mg daily  4.  Follow-up in 2 weeks in office we will see if patient has any recurrent chest pain episodes if so could consider heart catheterization for treatment of anginal symptoms given mildly abnormal stress test if patient is agreeable Will sign off.             Emil Dunn MD  2/16/2024    09:45 EST

## 2024-02-16 NOTE — DISCHARGE SUMMARY
[facility]         HOSPITALIST  DISCHARGE SUMMARY    Patient Name: Kiesha Pan  : 1955  MRN: [id number]    Date of Admission: 2024  Date of Discharge: 2024  Primary Care Physician: Shahida Martinez MD  Reason for admission:  Elevated heart rate    Final diagnosis:  Atrial fibrillation with RVR now rate controlled  Chest pain precordial patient follow-up outpatient with cardiology  Hypertension  Hyperlipidemia  Depression  Alcohol abuse, with potential for withdrawal  Stroke  GERD  Concern for alcohol withdrawal  Consults       Date and Time Order Name Status Description    2024  9:33 PM Inpatient Neurology Consult Stroke Completed     2024  9:04 PM Inpatient Cardiology Consult      2024  8:27 PM Inpatient Hospitalist Consult              Active and Resolved Hospital Problems:  Active Hospital Problems    Diagnosis POA    **Atrial fibrillation with RVR [I48.91] Yes    Abnormal stress test [R94.39] No    Chest pain, precordial [R07.2] Yes      Resolved Hospital Problems   No resolved problems to display.       Hospital Course     Hospital Course:  Kiesha Pan is a 68 y.o. female past medical history significant for alcohol abuse, hypertension, hyperlipidemia, depression who presents emergency department with chest pain patient evaluated emergency department was noted to be in atrial fibrillation with RVR admitted to hospitalist service.  Patient admitted to a monitored bed maintained on Cardizem drip for rate control cardiology consulted started on oral Cardizem and wean from Cardizem drip started on heparin drip which has been transition to Eliquis stress test pursued was noted to have a mild apical lateral defect consistent with ischemia mild to moderate in size initially cardiac catheterization to be pursued but patient and family have refused for now patient to follow-up with cardiology outpatient for further discussion.  Reasonable to pursue  medical management at this time.  Patient seen and examined 2/16/2024 hemodynamically stable for discharge will be asked to follow-up with her PCP 1 week follow-up with cardiology 1 week return to ED for worsening symptoms patient advised to discontinue all ETOH use        DISCHARGE Follow Up Recommendations for labs and diagnostics: As above      Day of Discharge     Vital Signs:  Temp:  [98.2 °F (36.8 °C)-98.6 °F (37 °C)] 98.6 °F (37 °C)  Heart Rate:  [] 92  Resp:  [17-20] 17  BP: (149-166)/(71-87) 161/79  Physical Exam:   Constitutional: Awake alert and oriented no acute distress  Respiratory: Clear  Cardiovascular: IR IR  GI abdomen soft nontender bowel sounds present      Discharge Details        Discharge Medications        New Medications        Instructions Start Date   apixaban 5 MG tablet tablet  Commonly known as: ELIQUIS   5 mg, Oral, Every 12 Hours Scheduled      aspirin 81 MG chewable tablet   81 mg, Oral, Daily      atorvastatin 40 MG tablet  Commonly known as: LIPITOR   40 mg, Oral, Nightly      dilTIAZem  MG 24 hr capsule  Commonly known as: CARDIZEM CD   180 mg, Oral, Every 24 Hours Scheduled   Start Date: February 17, 2024     folic acid 1 MG tablet  Commonly known as: FOLVITE   1 mg, Oral, Daily   Start Date: February 17, 2024     hydrALAZINE 10 MG tablet  Commonly known as: APRESOLINE   10 mg, Oral, 2 Times Daily      pantoprazole 40 MG EC tablet  Commonly known as: Protonix   40 mg, Oral, Daily      thiamine 100 MG tablet  Commonly known as: VITAMIN B1   100 mg, Oral, Daily   Start Date: February 18, 2024              Allergies   Allergen Reactions    Cephalexin Unknown - Low Severity    Diphenhydramine Unknown - Low Severity    Levofloxacin Unknown - Low Severity    Penicillins Unknown - Low Severity       Discharge Disposition:  Home or Self Care    Diet:  Hospital:  Diet Order   Procedures    Diet: Regular/House Diet; No Caffeine; Texture: Regular Texture (IDDSI 7); Fluid  Consistency: Thin (IDDSI 0)       Discharge Activity: As tolerated      CODE STATUS:  Code Status and Medical Interventions:   Ordered at: 02/11/24 2059     Code Status (Patient has no pulse and is not breathing):    CPR (Attempt to Resuscitate)     Medical Interventions (Patient has pulse or is breathing):    Full Support         Future Appointments   Date Time Provider Department Center   2/21/2024 10:30 AM Shahida Martinez MD [facility]       Additional Instructions for the Follow-ups that You Need to Schedule       Discharge Follow-up with PCP   As directed       Currently Documented PCP:    Shahida Martinez MD    PCP Phone Number:    [phone]     Follow Up Details: 5 days                Pertinent  and/or Most Recent Results     PROCEDURES:   Stress test    Interpretation Summary    Myocardial perfusion imaging indicates a normal myocardial perfusion study with no evidence of ischemia. Impressions are consistent with a low risk study.    Left ventricular ejection fraction is normal (Calculated EF = 58%).    Mild apical lateral defect consistent with ischemia mild to moderate in size    Findings consistent with a normal ECG stress test.    LAB RESULTS:      Lab 02/16/24  0414 02/15/24  1740 02/15/24  0928 02/15/24  0245 02/14/24  1904 02/14/24  1648 02/14/24  1310 02/14/24  0603 02/12/24  0918 02/12/24  0309 02/11/24  1834   WBC 8.29  --   --  8.70  --  6.64  --  6.39  --  10.16 9.89   HEMOGLOBIN 12.1  --   --  12.2  --  12.1  --  12.3  --  12.7 14.3   HEMATOCRIT 36.5  --   --  36.6  --  36.0  --  36.9  --  38.4 43.0   PLATELETS 410  --   --  407  --  397  --  411  --  418 482*   NEUTROS ABS 5.30  --   --  4.18  --  3.64  --  3.02  --  7.46* 6.64   IMMATURE GRANS (ABS) 0.03  --   --  0.03  --  0.01  --  0.01  --  0.02 0.02   LYMPHS ABS 1.97  --   --  3.12*  --  1.80  --  2.32  --  1.54 2.00   MONOS ABS 0.61  --   --  0.78  --  0.82  --  0.65  --  0.99* 0.87   EOS ABS 0.31   --   --  0.51*  --  0.30  --  0.33  --  0.07 0.26   MCV 98.9*  --   --  97.1*  --  98.4*  --  97.4*  --  97.5* 98.9*   PROTIME  --   --   --   --   --   --   --   --   --   --  12.8   APTT  --  53.3* 127.4* 42.7* 25.8*  --  162.1* 60.2*   < > 68.0* 27.7*    < > = values in this interval not displayed.         Lab 02/16/24  0414 02/15/24  0245 02/14/24  0603 02/13/24 0451 02/12/24 0309 02/11/24  1834   SODIUM 139 140 140 136 137 144   POTASSIUM 3.4* 3.5 3.4* 3.7 4.2 3.8   CHLORIDE 102 106 108* 105 100 105   CO2 26.2 21.0* 21.2* 20.1* 16.2* 18.5*   ANION GAP 10.8 13.0 10.8 10.9 20.8* 20.5*   BUN 5* 5* 3* 6* 18 16   CREATININE 0.64 0.54* 0.54* 0.57 0.73 0.88   EGFR 96.4 100.4 100.4 99.1 89.7 71.7   GLUCOSE 193* 112* 113* 127* 106* 75   CALCIUM 9.2 8.7 8.8 8.6 8.7 9.2   MAGNESIUM 1.8 1.7 1.4*  --   --  1.9   PHOSPHORUS 2.8  --  2.6  --   --   --    HEMOGLOBIN A1C  --   --   --   --  5.90*  --          Lab 02/16/24 0414 02/14/24  0603 02/11/24  1834   TOTAL PROTEIN 6.3 6.1 7.8   ALBUMIN 3.8 3.7 4.6   GLOBULIN  --  2.4 3.2   ALT (SGPT) 28 22 22   AST (SGOT) 25 25 27   BILIRUBIN 0.2 0.3 <0.2   BILIRUBIN DIRECT <0.2  --   --    ALK PHOS 49 43 54   LIPASE  --   --  33         Lab 02/11/24 2054 02/11/24  1834   PROBNP  --  55.2   HSTROP T 24* 11   PROTIME  --  12.8   INR  --  0.94         Lab 02/12/24  0309   CHOLESTEROL 231*   LDL CHOL 165*   HDL CHOL 49   TRIGLYCERIDES 94             Brief Urine Lab Results       None          Microbiology Results (last 10 days)       ** No results found for the last 240 hours. **            CT Head Without Contrast    Result Date: 2/15/2024  Impression:   No acute intracranial abnormality is identified.     ANTONIO SALINAS MD       Electronically Signed and Approved By: ANTONIO SALINAS MD on 2/15/2024 at 2:47             XR Chest 1 View    Result Date: 2/11/2024  Impression:   Emphysema.  No active pulmonary disease is seen.       SUNIL MCKENNA MD       Electronically Signed and  Approved By: SUNIL MCKENNA MD on 2/11/2024 at 19:16                      Results for orders placed during the hospital encounter of 02/11/24    Adult Transthoracic Echo Complete W/ Cont if Necessary Per Protocol    Interpretation Summary    Left ventricular systolic function is hyperdynamic (EF > 70%). Calculated left ventricular EF = 70%    Left ventricular diastolic dysfunction is noted.    The left atrial cavity is mildly dilated.      Labs Pending at Discharge:        Time spent on Discharge including face to face service: 35 minutes    Electronically signed by BRITTANY Elise, 02/16/24, 12:32 PM EST.    Attending Documentation:  Patient independently seen and evaluated, above documentation reflects plan put forth during bedside rounds.  More than 51% of the time of this patient encounter was performed by me. I discussed the care plan with NIRU Toscano PA-C, I agree with his findings and plan as documented, what I have added to the care plan and modified is as follows in my documentation and my medical decision making; 68-year-old female presenting with chest discomfort and A-fib with RVR.  Seen by cardiology, cardiac cath was recommended but patient wished to defer that for the time being.  Will be medically managed upon discharge.  There was question of alcohol abuse and withdrawal, no signs of withdrawal at the time of discharge.  Patient declined the need for any substance follow-up.  She was agreeable to outpatient follow-up with cardiology.  Electronically signed by Emil Frazier MD, 02/17/24, 12:50 PM EST.

## 2024-02-19 ENCOUNTER — TRANSITIONAL CARE MANAGEMENT TELEPHONE ENCOUNTER (OUTPATIENT)
Dept: CALL CENTER | Facility: HOSPITAL | Age: 69
End: 2024-02-19
Payer: COMMERCIAL

## 2024-02-19 NOTE — OUTREACH NOTE
Call Center TCM Note      Flowsheet Row Responses   [facility] facility patient discharged from? [facility]   Does the patient have one of the following disease processes/diagnoses(primary or secondary)? Other   TCM attempt successful? No  [verbal release ]   Unsuccessful attempts Attempt 1   Call Status Voice mail issues            Izzy Sandoval RN    2024, 14:06 EST

## 2024-02-19 NOTE — OUTREACH NOTE
Call Center TCM Note      Flowsheet Row Responses   [facility] patient discharged from? [facility]   Does the patient have one of the following disease processes/diagnoses(primary or secondary)? Other   TCM attempt successful? Yes   Call start time 1626   Call end time 1628   Discharge diagnosis Atrial fibrillation with RVR, heart cath   Medication alerts for this patient ELIQUIS   Meds reviewed with patient/caregiver? Yes   Is the patient having any side effects they believe may be caused by any medication additions or changes? No   Does the patient have all medications ordered at discharge? Yes   Is the patient taking all medications as directed (includes completed medication regime)? Yes   Comments Hospital f/u on 2/21/24@1030am, pt will f/u with Dr. Dunn   Does the patient have an appointment with their PCP within 7-14 days of discharge? Yes   Has home health visited the patient within 72 hours of discharge? N/A   Psychosocial issues? No   Did the patient receive a copy of their discharge instructions? Yes   Nursing interventions Reviewed instructions with patient   What is the patient's perception of their health status since discharge? Improving  [Pt denies any issues or concerns today--denies chest pain, palpitations or shortness of air.  Has no questions for this RN today]   Is the patient/caregiver able to teach back signs and symptoms related to disease process for when to call PCP? Yes   Is the patient/caregiver able to teach back signs and symptoms related to disease process for when to call 911? Yes   Additional teach back comments Cardiac cath postponed   TCM call completed? Yes   Call end time 1628            Izzy Sandoval, RN    2/19/2024, 16:29 EST

## 2024-02-21 ENCOUNTER — OFFICE VISIT (OUTPATIENT)
Dept: INTERNAL MEDICINE | Facility: CLINIC | Age: 69
End: 2024-02-21
Payer: OTHER GOVERNMENT

## 2024-02-21 VITALS
HEIGHT: 67 IN | WEIGHT: 161.4 LBS | SYSTOLIC BLOOD PRESSURE: 158 MMHG | HEART RATE: 67 BPM | OXYGEN SATURATION: 97 % | TEMPERATURE: 98 F | BODY MASS INDEX: 25.33 KG/M2 | DIASTOLIC BLOOD PRESSURE: 90 MMHG

## 2024-02-21 DIAGNOSIS — Z23 NEED FOR VACCINATION: Primary | ICD-10-CM

## 2024-02-21 DIAGNOSIS — I10 ESSENTIAL HYPERTENSION: ICD-10-CM

## 2024-02-21 DIAGNOSIS — Z86.73 HISTORY OF TRANSIENT ISCHEMIC ATTACK (TIA): ICD-10-CM

## 2024-02-21 DIAGNOSIS — I48.91 ATRIAL FIBRILLATION, UNSPECIFIED TYPE: ICD-10-CM

## 2024-02-21 DIAGNOSIS — Z86.73 HISTORY OF STROKE: ICD-10-CM

## 2024-02-21 DIAGNOSIS — F10.288 ALCOHOL DEPENDENCE WITH OTHER ALCOHOL-INDUCED DISORDER: ICD-10-CM

## 2024-02-21 DIAGNOSIS — R73.9 HYPERGLYCEMIA: ICD-10-CM

## 2024-02-21 DIAGNOSIS — E78.2 MIXED HYPERLIPIDEMIA: ICD-10-CM

## 2024-02-21 DIAGNOSIS — F32.9 REACTIVE DEPRESSION: ICD-10-CM

## 2024-02-21 RX ORDER — LISINOPRIL 20 MG/1
20 TABLET ORAL DAILY
Qty: 30 TABLET | Refills: 2 | Status: SHIPPED | OUTPATIENT
Start: 2024-02-21 | End: 2024-02-26 | Stop reason: SDUPTHER

## 2024-02-21 NOTE — PROGRESS NOTES
TPt is new to me -here to establish care        Transitional Care Follow Up Visit--  French Pop is a 68 y.o. female who presents for a transitional care management visit.    Within 48 business hours after discharge our office contacted her via telephone to coordinate her care and needs.      I reviewed and discussed the details of that call along with the discharge summary, hospital problems, inpatient lab results, inpatient diagnostic studies, and consultation reports with Kiesha.     Current outpatient and discharge medications have been reconciled for the patient.  Reviewed by: Shahida Martinez MD          2/16/2024     6:55 PM   Date of TCM Phone Call   [facility]   Date of Admission 2/11/2024   Date of Discharge 2/16/2024   Discharge Disposition Home or Self Care     Final diagnosis:  Atrial fibrillation with RVR now rate controlled  Chest pain precordial patient follow-up outpatient with cardiology  Hypertension  Hyperlipidemia  Depression  Alcohol abuse, with potential for withdrawal  Stroke  GERD  Concern for alcohol withdrawal  Risk for Readmission (LACE) Score: 9 (2/16/2024  6:00 AM)      History of Present Illness     History of presenting illness:    This is a 68-year-old female with a past medical history of hypertension, atrial fibrillation, hyperlipidemia, depression, alcohol abuse, previous stroke and GERD presenting with chest pain. Pt states that while watching TV around 5pm, she developed a sharp chest pain. The pain shortly after felt like someone was sitting on her chest. She rated her pain a 6/10 in severity and it was non-radiating. Pt states that she has not been compliant with her eliquis for several months.       Course During Hospital Stay The following information was reviewed by: Shahida Martinez MD on 02/21/2024:     Hospital Course:  Kiesha Pan is a 68 y.o. female past medical history significant for alcohol abuse, hypertension,  hyperlipidemia, depression who presents emergency department with chest pain patient evaluated emergency department was noted to be in atrial fibrillation with RVR admitted to hospitalist service.  Patient admitted to a monitored bed maintained on Cardizem drip for rate control cardiology consulted started on oral Cardizem and wean from Cardizem drip started on heparin drip which has been transition to Eliquis stress test pursued was noted to have a mild apical lateral defect consistent with ischemia mild to moderate in size initially cardiac catheterization to be pursued but patient and family have refused for now patient to follow-up with cardiology outpatient for further discussion.  Reasonable to pursue medical management at this time.  Patient seen and examined 2/16/2024 hemodynamically stable for discharge will be asked to follow-up with her PCP 1 week follow-up with cardiology 1 week return to ED for worsening symptoms patient advised to discontinue all ETOH use        New Medications         Instructions Start Date   apixaban 5 MG tablet tablet  Commonly known as: ELIQUIS    5 mg, Oral, Every 12 Hours Scheduled        aspirin 81 MG chewable tablet    81 mg, Oral, Daily        atorvastatin 40 MG tablet  Commonly known as: LIPITOR    40 mg, Oral, Nightly        dilTIAZem  MG 24 hr capsule  Commonly known as: CARDIZEM CD    180 mg, Oral, Every 24 Hours Scheduled    Start Date: February 17, 2024      folic acid 1 MG tablet  Commonly known as: FOLVITE    1 mg, Oral, Daily    Start Date: February 17, 2024      hydrALAZINE 10 MG tablet  Commonly known as: APRESOLINE    10 mg, Oral, 2 Times Daily        pantoprazole 40 MG EC tablet  Commonly known as: Protonix    40 mg, Oral, Daily        thiamine 100 MG tablet  Commonly known as: VITAMIN B1    100 mg, Oral, Daily    Start Date: February 18, 2024            The following portions of the patient's history were reviewed and updated as appropriate: allergies,  "current medications, past family history, past medical history, past social history, past surgical history, and problem list. {Review (Popup) :23}    Vitals:    02/21/24 1006   BP: 170/88   BP Location: Left arm   Patient Position: Sitting   Cuff Size: Small Adult   Pulse: 67   Temp: 98 °F (36.7 °C)   TempSrc: Temporal   SpO2: 97%   Weight: 73.2 kg (161 lb 6.4 oz)   Height: 170.2 cm (67\")       Review of Systems-no more cp    Objective   Physical Exam  Vitals and nursing note reviewed.   Constitutional:       Appearance: Normal appearance.   HENT:      Head: Normocephalic and atraumatic.   Cardiovascular:      Rate and Rhythm: Normal rate and regular rhythm.   Pulmonary:      Effort: Pulmonary effort is normal.      Breath sounds: Normal breath sounds.   Abdominal:      Palpations: Abdomen is soft.   Musculoskeletal:      Cervical back: Normal range of motion and neck supple.   Neurological:      General: No focal deficit present.      Mental Status: She is alert and oriented to person, place, and time.           Assessment & Plan     Diagnoses and all orders for this visit:    1. Atrial fibrillation, unspecified type (Primary)    2. Essential hypertension    3. Mixed hyperlipidemia    4. Alcohol dependence with other alcohol-induced disorder    5. History of stroke    6. Reactive depression         Follow Up {  Wrapup  Review (Popup)  Communications :23}    There are no Patient Instructions on file for this visit.     No follow-ups on file.               "

## 2024-02-21 NOTE — PROGRESS NOTES
CHIEF COMPLAINT  Kiesha Pan presents to [facility] to Hospital Follow Up Visit (Patient was in the hospital for chest pain elevated heart rate) Here to est care-New Pt      HPI  Here with   68 y pt here to est care-does not have a PCP    Just d/c'd from hosp for AF new onset-see records below-now on Eliquis, aspirin, diltiazem    Records reviewed, meds reviewed in detail, labs reviewed with patient.  Plan of care is as follows below.    Echo EF>70 %  Stress test with ischemia  Cath postponed    A-fib-patient with BP at clinic equals 150 history of being on Eliquis in the past but not compliant with continuing this medication-previous admissions per [facility] in the ER for this and then also in the hospital    Hypertension-not controlled on current meds    Cholesterol-no myalgias-taking meds    Past medical history-new onset atrial fibrillation-, hypertension, cholesterol, stroke-2020, alcohol abuse    [id number] one cup bourbon at times-none for past 5 days-no cigs for >20 yrs-lives with  and son        2/16/2024     6:55 PM   Date of TCM Phone Call   [facility]   Date of Admission 2/11/2024   Date of Discharge 2/16/2024   Discharge Disposition Home or Self Care     Final diagnosis:  Atrial fibrillation with RVR now rate controlled  Chest pain precordial patient follow-up outpatient with cardiology  Hypertension  Hyperlipidemia  Depression  Alcohol abuse, with potential for withdrawal  Stroke  GERD  Concern for alcohol withdrawal  Risk for Readmission (LACE) Score: 9 (2/16/2024  6:00 AM)      History of Present Illness     History of presenting illness:    This is a 68-year-old female with a past medical history of hypertension, atrial fibrillation, hyperlipidemia, depression, alcohol abuse, previous stroke and GERD presenting with chest pain. Pt states that while watching TV around 5pm, she developed a sharp chest pain. The pain shortly after  felt like someone was sitting on her chest. She rated her pain a 6/10 in severity and it was non-radiating. Pt states that she has not been compliant with her eliquis for several months.       Course During Hospital Stay The following information was reviewed by: Shahida Martinez MD on 02/21/2024:     Hospital Course:  Kiesha Pan is a 68 y.o. female past medical history significant for alcohol abuse, hypertension, hyperlipidemia, depression who presents emergency department with chest pain patient evaluated emergency department was noted to be in atrial fibrillation with RVR admitted to hospitalist service.  Patient admitted to a monitored bed maintained on Cardizem drip for rate control cardiology consulted started on oral Cardizem and wean from Cardizem drip started on heparin drip which has been transition to Eliquis stress test pursued was noted to have a mild apical lateral defect consistent with ischemia mild to moderate in size initially cardiac catheterization to be pursued but patient and family have refused for now patient to follow-up with cardiology outpatient for further discussion.  Reasonable to pursue medical management at this time.  Patient seen and examined 2/16/2024 hemodynamically stable for discharge will be asked to follow-up with her PCP 1 week follow-up with cardiology 1 week return to ED for worsening symptoms patient advised to discontinue all ETOH use        New Medications         Instructions Start Date   apixaban 5 MG tablet tablet  Commonly known as: ELIQUIS    5 mg, Oral, Every 12 Hours Scheduled        aspirin 81 MG chewable tablet    81 mg, Oral, Daily        atorvastatin 40 MG tablet  Commonly known as: LIPITOR    40 mg, Oral, Nightly        dilTIAZem  MG 24 hr capsule  Commonly known as: CARDIZEM CD    180 mg, Oral, Every 24 Hours Scheduled    Start Date: February 17, 2024      folic acid 1 MG tablet  Commonly known as: FOLVITE    1 mg, Oral, Daily    Start  Date: February 17, 2024      hydrALAZINE 10 MG tablet  Commonly known as: APRESOLINE    10 mg, Oral, 2 Times Daily        pantoprazole 40 MG EC tablet  Commonly known as: Protonix    40 mg, Oral, Daily        thiamine 100 MG tablet  Commonly known as: VITAMIN B1    100 mg, Oral, Daily    Start Date: February 18, 2024         Past History:  Allergies: Cephalexin, Diphenhydramine, Levofloxacin, and Penicillins   Medical History: has a past medical history of Asthma, Elevated troponin level not due to acute coronary syndrome (12/3/2021), Hypertension, Mixed hyperlipidemia (12/3/2021), and Stroke (03/2021).   Surgical History: has a past surgical history that includes Cholecystectomy and Cardiac catheterization (N/A, 2/13/2024).   Family History: family history includes Kidney disease in her mother.   Social History: reports that she quit smoking about 27 years ago. Her smoking use included cigarettes. She has never used smokeless tobacco. She reports current alcohol use of about 8.0 standard drinks of alcohol per week. She reports that she does not currently use drugs.  Social History     Social History Narrative    Not on file         Current Outpatient Medications:     apixaban (ELIQUIS) 5 MG tablet tablet, Take 1 tablet by mouth Every 12 (Twelve) Hours for 30 days. Indications: Atrial Fibrillation, Disp: 60 tablet, Rfl: 0    aspirin 81 MG chewable tablet, Chew 1 tablet Daily for 30 days., Disp: 30 tablet, Rfl: 0    atorvastatin (LIPITOR) 40 MG tablet, Take 1 tablet by mouth Every Night for 30 days., Disp: 30 tablet, Rfl: 0    dilTIAZem CD (CARDIZEM CD) 180 MG 24 hr capsule, Take 1 capsule by mouth Daily for 30 days., Disp: 30 capsule, Rfl: 0    folic acid (FOLVITE) 1 MG tablet, Take 1 tablet by mouth Daily for 15 days., Disp: 15 tablet, Rfl: 0    pantoprazole (Protonix) 40 MG EC tablet, Take 1 tablet by mouth Daily for 30 days., Disp: 30 tablet, Rfl: 0    thiamine (VITAMIN B1) 100 MG tablet, Take 1 tablet by mouth  "Daily for 14 days., Disp: 14 tablet, Rfl: 0    lisinopril (PRINIVIL,ZESTRIL) 20 MG tablet, Take 1 tablet by mouth Daily., Disp: 30 tablet, Rfl: 2     OBJECTIVE  Vital Signs  Vitals:    02/21/24 1006 02/21/24 1112   BP: 170/88 158/90   BP Location: Left arm Left arm   Patient Position: Sitting Sitting   Cuff Size: Small Adult Adult   Pulse: 67    Temp: 98 °F (36.7 °C)    TempSrc: Temporal    SpO2: 97%    Weight: 73.2 kg (161 lb 6.4 oz)    Height: 170.2 cm (67\")       Body mass index is 25.28 kg/m².      Physical Exam  Vitals and nursing note reviewed.   Constitutional:       Appearance: Normal appearance.   HENT:      Head: Normocephalic and atraumatic.   Cardiovascular:      Rate and Rhythm: Normal rate and regular rhythm.   Pulmonary:      Effort: Pulmonary effort is normal.      Breath sounds: Normal breath sounds.   Abdominal:      Palpations: Abdomen is soft.   Musculoskeletal:      Cervical back: Normal range of motion and neck supple.   Neurological:      General: No focal deficit present.      Mental Status: She is alert and oriented to person, place, and time.      Comments: Gait instability able to do finger-to-nose negative pronator drift no significant decrease in strength noted in the upper and lower extremities         RESULTS REVIEW  Lab Results   Component Value Date    PROBNP 55.2 02/11/2024    PROBNP 391.2 11/06/2021     CMP          2/14/2024    06:03 2/15/2024    02:45 2/16/2024    04:14   CMP   Glucose 113  112  193    BUN 3  5  5    Creatinine 0.54  0.54  0.64    EGFR 100.4  100.4  96.4    Sodium 140  140  139    Potassium 3.4  3.5  3.4    Chloride 108  106  102    Calcium 8.8  8.7  9.2    Total Protein 6.1   6.3    Albumin 3.7   3.8    Globulin 2.4      Total Bilirubin 0.3   0.2    Alkaline Phosphatase 43   49    AST (SGOT) 25   25    ALT (SGPT) 22   28    Albumin/Globulin Ratio 1.5      BUN/Creatinine Ratio 5.6  9.3  7.8    Anion Gap 10.8  13.0  10.8      CBC w/diff          2/14/2024    06:03 " 2/14/2024    16:48 2/15/2024    02:45 2/16/2024    04:14   CBC w/Diff   WBC 6.39  6.64  8.70  8.29    RBC 3.79  3.66  3.77  3.69    Hemoglobin 12.3  12.1  12.2  12.1    Hematocrit 36.9  36.0  36.6  36.5    MCV 97.4  98.4  97.1  98.9    MCH 32.5  33.1  32.4  32.8    MCHC 33.3  33.6  33.3  33.2    RDW 13.1  13.2  13.1  13.1    Platelets 411  397  407  410    Neutrophil Rel % 47.2  54.8  48.0  63.9    Immature Granulocyte Rel % 0.2  0.2  0.3  0.4    Lymphocyte Rel % 36.3  27.1  35.9  23.8    Monocyte Rel % 10.2  12.3  9.0  7.4    Eosinophil Rel % 5.2  4.5  5.9  3.7    Basophil Rel % 0.9  1.1  0.9  0.8       Lipid Panel          2/12/2024    03:09   Lipid Panel   Total Cholesterol 231    Triglycerides 94    HDL Cholesterol 49    VLDL Cholesterol 17    LDL Cholesterol  165    LDL/HDL Ratio 3.33       Lab Results   Component Value Date    TSH 6.590 (H) 12/02/2022    TSH 3.520 06/10/2022    TSH 1.380 11/06/2021      Lab Results   Component Value Date    FREET4 1.36 12/02/2022    FREET4 1.2 03/28/2021    FREET4 1.3 08/12/2019      A1C Last 3 Results          2/12/2024    03:09   HGBA1C Last 3 Results   Hemoglobin A1C 5.90          CT Head Without Contrast    Result Date: 2/15/2024    No acute intracranial abnormality is identified.     ANTONIO SALINAS MD       Electronically Signed and Approved By: ANTONIO SALINAS MD on 2/15/2024 at 2:47             XR Chest 1 View    Result Date: 2/11/2024    Emphysema.  No active pulmonary disease is seen.       SUNIL MCKENNA MD       Electronically Signed and Approved By: SUNIL MCKENNA MD on 2/11/2024 at 19:16                         ASSESSMENT & PLAN  Diagnoses and all orders for this visit:    1. Atrial fibrillation, unspecified type (Primary)  Assessment & Plan:  Patient with long history of atrial fibrillation noncompliant with Eliquis has had several admissions to the hospital for this.      Plan-cont with eliquis 5 mg BID-2 boxes of samples given-and diltiazem  mg  daily  Discussed with cardiology and [address] [facility] will see him back February 26 at 1:15 PM.    Orders:  -     Comprehensive Metabolic Panel; Future  -     TSH+Free T4; Future  -     T3, Free; Future  -     Vitamin B12; Future  -     Folate; Future  -     Magnesium; Future  -     Vitamin D,25-Hydroxy; Future  -     Hemoglobin A1c; Future    2. Essential hypertension  Assessment & Plan:  Blood pressure is not controlled-BP is 158/90 at clinic denies any headaches  Patient was discharged on hydralazine 10 twice daily and diltiazem 180 mg daily    Plan-will stop the hydralazine since it is a twice a day dose and very low dosage and will start lisinopril 20 mg 1 daily  Continue diltiazem CD1 180 mg daily  Monitor blood pressure goal is less than 140/80.  Do labs today check comp vitamin D and B12 and folate  Follow a low-salt diet encouraged to stop alcohol.  Follow-up in 4 weeks to check blood pressure        Orders:  -     lisinopril (PRINIVIL,ZESTRIL) 20 MG tablet; Take 1 tablet by mouth Daily.  Dispense: 30 tablet; Refill: 2  -     Comprehensive Metabolic Panel; Future  -     TSH+Free T4; Future  -     T3, Free; Future  -     Vitamin B12; Future  -     Folate; Future  -     Magnesium; Future  -     Vitamin D,25-Hydroxy; Future  -     Hemoglobin A1c; Future    3. Mixed hyperlipidemia  Assessment & Plan:  Lipids not controlled few weeks ago cholesterol was 231 HDL 49  goal is LDL closer to 70    Plan continue for now with atorvastatin milligrams 1 nightly follow low cholesterol diet  Recheck lipids in 4 to 6 months.    Orders:  -     Comprehensive Metabolic Panel; Future  -     TSH+Free T4; Future  -     T3, Free; Future  -     Vitamin B12; Future  -     Folate; Future  -     Magnesium; Future  -     Vitamin D,25-Hydroxy; Future  -     Hemoglobin A1c; Future    4. Alcohol dependence with other alcohol-induced disorder  Assessment & Plan:  Patient states only drinks bourbon now and then denies binging.    Plan  discussed need to stop alcohol use continue thiamine until gone  Check CBC initially was within normal limits.  Will monitor    Orders:  -     Comprehensive Metabolic Panel; Future  -     TSH+Free T4; Future  -     T3, Free; Future  -     Vitamin B12; Future  -     Folate; Future  -     Magnesium; Future  -     Vitamin D,25-Hydroxy; Future  -     Hemoglobin A1c; Future    5. History of stroke  Assessment & Plan:  Patient with unstable gait but also with history of alcohol dependence.  Recent CT of the head few days ago negative for any intracranial abnormality.  MRI in June 2022 also negative for any acute stroke     Plan continue with aspirin monitor CBC check TFTs.    Orders:  -     Comprehensive Metabolic Panel; Future  -     TSH+Free T4; Future  -     T3, Free; Future  -     Vitamin B12; Future  -     Folate; Future  -     Magnesium; Future  -     Vitamin D,25-Hydroxy; Future  -     Hemoglobin A1c; Future    6. Reactive depression  -     Comprehensive Metabolic Panel; Future  -     TSH+Free T4; Future  -     T3, Free; Future  -     Vitamin B12; Future  -     Folate; Future  -     Magnesium; Future  -     Vitamin D,25-Hydroxy; Future  -     Hemoglobin A1c; Future    7. Hyperglycemia  Assessment & Plan:  Family history of diabetes.    Plan check A1c follow low-carb diet    Orders:  -     Comprehensive Metabolic Panel; Future  -     TSH+Free T4; Future  -     T3, Free; Future  -     Vitamin B12; Future  -     Folate; Future  -     Magnesium; Future  -     Vitamin D,25-Hydroxy; Future  -     Hemoglobin A1c; Future    8. History of transient ischemic attack (TIA)  Assessment & Plan:  Patient with unstable gait but also with history of alcohol dependence.  Recent CT of the head few days ago negative for any intracranial abnormality.  MRI in June 2022 also negative for any acute stroke     Plan continue with aspirin monitor CBC check TFTs.         Rec flu shot and prevnar-20    BMI is >= 25 and <30. (Overweight) The  following options were offered after discussion;: weight loss educational material (shared in after visit summary), exercise counseling/recommendations, and nutrition counseling/recommendations       Patient Instructions   Appointment with [facility] cardiology's Feb 26th at 1:15 PM  2 weeks samples of Eliquis given  Cont with  diltiazem CD  Start lisinopril 20 mg once a day for blood pressure control goal is less than 130/80  can stop hydralazine 10 mg twice a day       FOLLOW UP  Return in about 4 weeks (around 3/20/2024), or if symptoms worsen or fail to improve, for Recheck, Annual physical.    Patient was given instructions and counseling regarding her condition or for health maintenance advice. Please see specific information pulled into the AVS if appropriate.

## 2024-02-21 NOTE — ASSESSMENT & PLAN NOTE
Patient with long history of atrial fibrillation noncompliant with Eliquis has had several admissions to the hospital for this.      Plan-cont with eliquis 5 mg BID-2 boxes of samples given-and diltiazem  mg daily  Discussed with cardiology and [facility] Benson will see him back February 26 at 1:15 PM.

## 2024-02-21 NOTE — PATIENT INSTRUCTIONS
Appointment with [facility] cardiology's Feb 26th at 1:15 PM  2 weeks samples of Eliquis given  Cont with  diltiazem CD  Start lisinopril 20 mg once a day for blood pressure control goal is less than 130/80  can stop hydralazine 10 mg twice a day

## 2024-02-21 NOTE — ASSESSMENT & PLAN NOTE
Blood pressure is not controlled-BP is 158/90 at clinic denies any headaches  Patient was discharged on hydralazine 10 twice daily and diltiazem 180 mg daily    Plan-will stop the hydralazine since it is a twice a day dose and very low dosage and will start lisinopril 20 mg 1 daily  Continue diltiazem CD1 180 mg daily  Monitor blood pressure goal is less than 140/80.  Do labs today check comp vitamin D and B12 and folate  Follow a low-salt diet encouraged to stop alcohol.  Follow-up in 4 weeks to check blood pressure

## 2024-02-21 NOTE — ASSESSMENT & PLAN NOTE
Patient with unstable gait but also with history of alcohol dependence.  Recent CT of the head few days ago negative for any intracranial abnormality.  MRI in June 2022 also negative for any acute stroke     Plan continue with aspirin monitor CBC check TFTs.

## 2024-02-21 NOTE — ASSESSMENT & PLAN NOTE
Lipids not controlled few weeks ago cholesterol was 231 HDL 49  goal is LDL closer to 70    Plan continue for now with atorvastatin milligrams 1 nightly follow low cholesterol diet  Recheck lipids in 4 to 6 months.

## 2024-02-21 NOTE — ASSESSMENT & PLAN NOTE
Patient states only drinks bourbon now and then denies binging.    Plan discussed need to stop alcohol use continue thiamine until gone  Check CBC initially was within normal limits.  Will monitor

## 2024-02-23 PROBLEM — R79.89 ELEVATED TROPONIN LEVEL NOT DUE TO ACUTE CORONARY SYNDROME: Status: RESOLVED | Noted: 2021-12-03 | Resolved: 2024-02-23

## 2024-02-23 PROBLEM — R07.2 CHEST PAIN, PRECORDIAL: Status: RESOLVED | Noted: 2024-02-12 | Resolved: 2024-02-23

## 2024-02-23 PROBLEM — R94.39 ABNORMAL STRESS TEST: Status: RESOLVED | Noted: 2024-02-16 | Resolved: 2024-02-23

## 2024-02-23 PROBLEM — U07.1 COVID-19: Status: RESOLVED | Noted: 2021-04-06 | Resolved: 2024-02-23

## 2024-02-23 NOTE — PROGRESS NOTES
Chief Complaint  Hospital Follow Up Visit    Subjective            History of Present Illness  Kiesha Pan is a 68-year-old female patient who presents to the office today for hospital follow-up.  She was admitted to [facility] from 2024 to 2024 for new onset atrial fibrillation with RVR.  She was weaned from Cardizem and heparin drip to oral Cardizem and Eliquis.  Stress test was noted to have mild apical lateral defect consistent with ischemia mild moderate-sized.  Cardiac catheterization was discussed however family refused and wanted patient to follow-up on outpatient basis to discuss further.  She was sent home with new prescriptions for Eliquis, aspirin, atorvastatin, diltiazem and, and hydralazine.  Today she reports she is tolerating all medications well. She reports stable anginal symptoms. She denies lightheadedness/dizziness, palpitations, or edema.    [facility]  Past Medical History:   Diagnosis Date    Asthma     COVID-19     Hypertension     Mixed hyperlipidemia 2021    Stroke 2021         ALLERGY  Allergies   Allergen Reactions    Cephalexin Unknown - Low Severity    Diphenhydramine Unknown - Low Severity    Levofloxacin Unknown - Low Severity    Penicillins Unknown - Low Severity          SURGICALHX  Past Surgical History:   Procedure Laterality Date    CARDIAC CATHETERIZATION N/A 2024    Procedure: Left Heart Cath;  Surgeon: Emil Dunn MD;  Location: [facility] CATH INVASIVE LOCATION;  Service: Cardiovascular;  Laterality: N/A;    CHOLECYSTECTOMY            SOC  Social History     Socioeconomic History    Marital status:    Tobacco Use    Smoking status: Former     Types: Cigarettes     Quit date:      Years since quittin.1    Smokeless tobacco: Never   Vaping Use    Vaping Use: Never used   Substance and Sexual Activity    Alcohol use: Yes     Alcohol/week: 8.0 standard drinks of alcohol     Types: 4 Shots of liquor, 4 Drinks containing 0.5 oz of alcohol  "per week     Comment: [place]    Drug use: Not Currently    Sexual activity: Defer         FAMHX  Family History   Problem Relation Age of Onset    Kidney disease Mother           MEDSIGONLY  Current Outpatient Medications on File Prior to Visit   Medication Sig    folic acid (FOLVITE) 1 MG tablet Take 1 tablet by mouth Daily for 15 days.    pantoprazole (Protonix) 40 MG EC tablet Take 1 tablet by mouth Daily for 30 days.    thiamine (VITAMIN B1) 100 MG tablet Take 1 tablet by mouth Daily for 14 days.     No current facility-administered medications on file prior to visit.         Objective   /74   Pulse 75   Ht 170.2 cm (67\")   Wt 72.6 kg (160 lb)   BMI 25.06 kg/m²       Physical Exam  HENT:      Head: Normocephalic.   Neck:      Vascular: No carotid bruit.   Cardiovascular:      Rate and Rhythm: Normal rate and regular rhythm.      Pulses: Normal pulses.      Heart sounds: Normal heart sounds. No murmur heard.  Pulmonary:      Effort: Pulmonary effort is normal.      Breath sounds: Normal breath sounds.   Musculoskeletal:      Cervical back: Neck supple.      Right lower leg: No edema.      Left lower leg: No edema.   Skin:     General: Skin is dry.   Neurological:      Mental Status: She is alert and oriented to person, place, and time.   Psychiatric:         Behavior: Behavior normal.         Result Review :   The following data was reviewed by: MIHAI Mayer on 02/26/2024:  proBNP   Date Value Ref Range Status   02/11/2024 55.2 0.0 - 900.0 pg/mL Final     CMP          2/16/2024    04:14   CMP   Glucose 193    BUN 5    Creatinine 0.64    EGFR 96.4    Sodium 139    Potassium 3.4    Chloride 102    Calcium 9.2    Total Protein 6.3    Albumin 3.8    Globulin    Total Bilirubin 0.2    Alkaline Phosphatase 49    AST (SGOT) 25    ALT (SGPT) 28    Albumin/Globulin Ratio    BUN/Creatinine Ratio 7.8    Anion Gap 10.8          2/15/2024    02:45 2/16/2024    04:14   CBC w/Diff   WBC 8.70  8.29    RBC " "3.77  3.69    Hemoglobin 12.2  12.1    Hematocrit 36.6  36.5    MCV 97.1  98.9    MCH 32.4  32.8    MCHC 33.3  33.2    RDW 13.1  13.1    Platelets 407  410    Neutrophil Rel % 48.0  63.9    Immature Granulocyte Rel % 0.3  0.4    Lymphocyte Rel % 35.9  23.8    Monocyte Rel % 9.0  7.4    Eosinophil Rel % 5.9  3.7    Basophil Rel % 0.9  0.8       Lab Results   Component Value Date    TSH 6.590 (H) 12/02/2022      Lab Results   Component Value Date    FREET4 1.36 12/02/2022      No results found for: \"DDIMERQUANT\"  Magnesium   Date Value Ref Range Status   02/16/2024 1.8 1.6 - 2.4 mg/dL Final      No results found for: \"DIGOXIN\"   Lab Results   Component Value Date    TROPONINT 24 (H) 02/11/2024 2/12/2024    03:09   Lipid Panel   Total Cholesterol 231    Triglycerides 94    HDL Cholesterol 49    VLDL Cholesterol 17    LDL Cholesterol  165    LDL/HDL Ratio 3.33        Results for orders placed during the hospital encounter of 02/11/24    Adult Transthoracic Echo Complete W/ Cont if Necessary Per Protocol    Interpretation Summary    Left ventricular systolic function is hyperdynamic (EF > 70%). Calculated left ventricular EF = 70%    Left ventricular diastolic dysfunction is noted.    The left atrial cavity is mildly dilated.           Assessment and Plan    Diagnoses and all orders for this visit:    1. Paroxysmal atrial fibrillation (Primary)  Symptomatically stable, rate controlled, continue diltiazem 180 mg daily. Continue eliquis for CVA prevention.    2. Coronary artery disease of native artery of native heart with stable angina pectoris  Her anginal  symptoms are stable and chronic in nature. Start isosorbide to see if she is able to obtain any relief. Continue aspirin 81 mg daily. Repeat fasting lipid and hepatic function panel to make sure current atorvastatin dose is appropriate.    3. Essential hypertension  Currently controlled and without adverse effects from medication, continue lisinopril 20 " mg daily.    Other orders  -     isosorbide mononitrate (IMDUR) 30 MG 24 hr tablet; Take 1 tablet by mouth Daily.  Dispense: 30 tablet; Refill: 1  -     dilTIAZem CD (CARDIZEM CD) 180 MG 24 hr capsule; Take 1 capsule by mouth Daily.  Dispense: 90 capsule; Refill: 3  -     apixaban (ELIQUIS) 5 MG tablet tablet; Take 1 tablet by mouth Every 12 (Twelve) Hours. Indications: Atrial Fibrillation  Dispense: 180 tablet; Refill: 3  -     aspirin 81 MG chewable tablet; Chew 1 tablet Daily.  Dispense: 90 tablet; Refill: 3  -     atorvastatin (LIPITOR) 40 MG tablet; Take 1 tablet by mouth Every Night.  Dispense: 90 tablet; Refill: 3  -     lisinopril (PRINIVIL,ZESTRIL) 20 MG tablet; Take 1 tablet by mouth Daily.  Dispense: 90 tablet; Refill: 3            Follow Up   Return in about 6 months (around 8/26/2024) for Follow up with Dr Dunn.    Patient was given instructions and counseling regarding her condition or for health maintenance advice. Please see specific information pulled into the AVS if appropriate.     Kiesha Pan  reports that she quit smoking about 27 years ago. Her smoking use included cigarettes. She has never used smokeless tobacco.         MIHAI Mayer  02/27/24  13:39 EST    Dictated Utilizing Dragon Dictation

## 2024-02-26 ENCOUNTER — OFFICE VISIT (OUTPATIENT)
Dept: CARDIOLOGY | Facility: CLINIC | Age: 69
End: 2024-02-26
Payer: COMMERCIAL

## 2024-02-26 VITALS
HEIGHT: 67 IN | BODY MASS INDEX: 25.11 KG/M2 | DIASTOLIC BLOOD PRESSURE: 74 MMHG | WEIGHT: 160 LBS | HEART RATE: 75 BPM | SYSTOLIC BLOOD PRESSURE: 140 MMHG

## 2024-02-26 DIAGNOSIS — I48.0 PAROXYSMAL ATRIAL FIBRILLATION: Primary | ICD-10-CM

## 2024-02-26 DIAGNOSIS — I25.118 CORONARY ARTERY DISEASE OF NATIVE ARTERY OF NATIVE HEART WITH STABLE ANGINA PECTORIS: ICD-10-CM

## 2024-02-26 DIAGNOSIS — I10 ESSENTIAL HYPERTENSION: ICD-10-CM

## 2024-02-26 PROCEDURE — 99214 OFFICE O/P EST MOD 30 MIN: CPT | Performed by: NURSE PRACTITIONER

## 2024-02-26 RX ORDER — ASPIRIN 81 MG/1
81 TABLET, CHEWABLE ORAL DAILY
Qty: 90 TABLET | Refills: 3 | Status: SHIPPED | OUTPATIENT
Start: 2024-02-26

## 2024-02-26 RX ORDER — ISOSORBIDE MONONITRATE 30 MG/1
30 TABLET, EXTENDED RELEASE ORAL DAILY
Qty: 30 TABLET | Refills: 1 | Status: SHIPPED | OUTPATIENT
Start: 2024-02-26

## 2024-02-26 RX ORDER — ISOSORBIDE MONONITRATE 30 MG/1
30 TABLET, EXTENDED RELEASE ORAL DAILY
Qty: 90 TABLET | OUTPATIENT
Start: 2024-02-26

## 2024-02-26 RX ORDER — ATORVASTATIN CALCIUM 40 MG/1
40 TABLET, FILM COATED ORAL NIGHTLY
Qty: 90 TABLET | Refills: 3 | Status: SHIPPED | OUTPATIENT
Start: 2024-02-26

## 2024-02-26 RX ORDER — DILTIAZEM HYDROCHLORIDE 180 MG/1
180 CAPSULE, COATED, EXTENDED RELEASE ORAL
Qty: 90 CAPSULE | Refills: 3 | Status: SHIPPED | OUTPATIENT
Start: 2024-02-26

## 2024-02-26 RX ORDER — LISINOPRIL 20 MG/1
20 TABLET ORAL DAILY
Qty: 90 TABLET | Refills: 3 | Status: SHIPPED | OUTPATIENT
Start: 2024-02-26

## 2024-03-11 LAB
QT INTERVAL: 283 MS
QT INTERVAL: 353 MS
QT INTERVAL: 373 MS
QTC INTERVAL: 464 MS
QTC INTERVAL: 478 MS
QTC INTERVAL: 521 MS

## 2024-03-21 ENCOUNTER — OFFICE VISIT (OUTPATIENT)
Dept: INTERNAL MEDICINE | Age: 69
End: 2024-03-21
Payer: OTHER GOVERNMENT

## 2024-03-21 VITALS
WEIGHT: 158.8 LBS | OXYGEN SATURATION: 93 % | TEMPERATURE: 97.8 F | HEART RATE: 80 BPM | SYSTOLIC BLOOD PRESSURE: 120 MMHG | HEIGHT: 67 IN | DIASTOLIC BLOOD PRESSURE: 70 MMHG | BODY MASS INDEX: 24.92 KG/M2

## 2024-03-21 DIAGNOSIS — I48.91 ATRIAL FIBRILLATION, UNSPECIFIED TYPE: ICD-10-CM

## 2024-03-21 DIAGNOSIS — I10 ESSENTIAL HYPERTENSION: ICD-10-CM

## 2024-03-21 DIAGNOSIS — E78.2 MIXED HYPERLIPIDEMIA: ICD-10-CM

## 2024-03-21 DIAGNOSIS — Z23 NEED FOR COVID-19 VACCINE: ICD-10-CM

## 2024-03-21 DIAGNOSIS — Z00.00 ROUTINE HISTORY AND PHYSICAL EXAMINATION OF ADULT: Primary | ICD-10-CM

## 2024-03-21 DIAGNOSIS — M81.0 OSTEOPOROSIS, POST-MENOPAUSAL: ICD-10-CM

## 2024-03-21 DIAGNOSIS — Z12.31 SCREENING MAMMOGRAM FOR BREAST CANCER: ICD-10-CM

## 2024-03-21 DIAGNOSIS — Z12.11 SCREEN FOR COLON CANCER: ICD-10-CM

## 2024-03-21 DIAGNOSIS — Z23 NEED FOR SHINGLES VACCINE: ICD-10-CM

## 2024-03-21 DIAGNOSIS — K21.9 GASTROESOPHAGEAL REFLUX DISEASE WITHOUT ESOPHAGITIS: ICD-10-CM

## 2024-03-21 DIAGNOSIS — F10.288 ALCOHOL DEPENDENCE WITH OTHER ALCOHOL-INDUCED DISORDER: ICD-10-CM

## 2024-03-21 DIAGNOSIS — Z86.73 HISTORY OF STROKE: ICD-10-CM

## 2024-03-21 DIAGNOSIS — R73.9 HYPERGLYCEMIA: ICD-10-CM

## 2024-03-21 DIAGNOSIS — Z29.11 NEED FOR RSV VACCINATION: ICD-10-CM

## 2024-03-21 DIAGNOSIS — F32.9 REACTIVE DEPRESSION: ICD-10-CM

## 2024-03-21 LAB
25(OH)D3 SERPL-MCNC: 28 NG/ML (ref 30–100)
ALBUMIN SERPL-MCNC: 4.5 G/DL (ref 3.5–5.2)
ALBUMIN/GLOB SERPL: 2.1 G/DL
ALP SERPL-CCNC: 56 U/L (ref 39–117)
ALT SERPL W P-5'-P-CCNC: 17 U/L (ref 1–33)
ANION GAP SERPL CALCULATED.3IONS-SCNC: 11.5 MMOL/L (ref 5–15)
AST SERPL-CCNC: 21 U/L (ref 1–32)
BILIRUB SERPL-MCNC: <0.2 MG/DL (ref 0–1.2)
BUN SERPL-MCNC: 22 MG/DL (ref 8–23)
BUN/CREAT SERPL: 14.2 (ref 7–25)
CALCIUM SPEC-SCNC: 8.9 MG/DL (ref 8.6–10.5)
CHLORIDE SERPL-SCNC: 106 MMOL/L (ref 98–107)
CO2 SERPL-SCNC: 23.5 MMOL/L (ref 22–29)
CREAT SERPL-MCNC: 1.55 MG/DL (ref 0.57–1)
EGFRCR SERPLBLD CKD-EPI 2021: 36.3 ML/MIN/1.73
FOLATE SERPL-MCNC: 11.8 NG/ML (ref 4.78–24.2)
GLOBULIN UR ELPH-MCNC: 2.1 GM/DL
GLUCOSE SERPL-MCNC: 71 MG/DL (ref 65–99)
HBA1C MFR BLD: 5.9 % (ref 4.8–5.6)
MAGNESIUM SERPL-MCNC: 2.2 MG/DL (ref 1.6–2.4)
POTASSIUM SERPL-SCNC: 4.9 MMOL/L (ref 3.5–5.2)
PROT SERPL-MCNC: 6.6 G/DL (ref 6–8.5)
SODIUM SERPL-SCNC: 141 MMOL/L (ref 136–145)
T3FREE SERPL-MCNC: 3.4 PG/ML (ref 2–4.4)
T4 FREE SERPL-MCNC: 1.32 NG/DL (ref 0.93–1.7)
TSH SERPL DL<=0.05 MIU/L-ACNC: 1.51 UIU/ML (ref 0.27–4.2)
VIT B12 BLD-MCNC: 282 PG/ML (ref 211–946)

## 2024-03-21 PROCEDURE — 83036 HEMOGLOBIN GLYCOSYLATED A1C: CPT | Performed by: INTERNAL MEDICINE

## 2024-03-21 PROCEDURE — 83735 ASSAY OF MAGNESIUM: CPT | Performed by: INTERNAL MEDICINE

## 2024-03-21 PROCEDURE — 84443 ASSAY THYROID STIM HORMONE: CPT | Performed by: INTERNAL MEDICINE

## 2024-03-21 PROCEDURE — 80053 COMPREHEN METABOLIC PANEL: CPT | Performed by: INTERNAL MEDICINE

## 2024-03-21 PROCEDURE — 84481 FREE ASSAY (FT-3): CPT | Performed by: INTERNAL MEDICINE

## 2024-03-21 PROCEDURE — 82607 VITAMIN B-12: CPT | Performed by: INTERNAL MEDICINE

## 2024-03-21 PROCEDURE — 82306 VITAMIN D 25 HYDROXY: CPT | Performed by: INTERNAL MEDICINE

## 2024-03-21 PROCEDURE — 82746 ASSAY OF FOLIC ACID SERUM: CPT | Performed by: INTERNAL MEDICINE

## 2024-03-21 PROCEDURE — 84439 ASSAY OF FREE THYROXINE: CPT | Performed by: INTERNAL MEDICINE

## 2024-03-21 RX ORDER — ZOSTER VACCINE RECOMBINANT, ADJUVANTED 50 MCG/0.5
0.5 KIT INTRAMUSCULAR ONCE
Qty: 1 EACH | Refills: 0 | Status: SHIPPED | OUTPATIENT
Start: 2024-03-21 | End: 2024-03-21

## 2024-03-21 RX ORDER — PANTOPRAZOLE SODIUM 40 MG/1
TABLET, DELAYED RELEASE ORAL
Qty: 30 TABLET | Refills: 2 | Status: SHIPPED | OUTPATIENT
Start: 2024-03-21

## 2024-03-21 NOTE — PATIENT INSTRUCTIONS
Schedule appointment for 4 months and do labs 1 week prior  Recommend RSV vaccine and Shingrix vaccines at pharmacy  Follow a low-cholesterol low-salt diet  Continue all medications  Prescriptions were sent by MIHAI Cohn from the cardiology clinic in February 2024-call if with questions to the clinic  For heartburn take pantoprazole 40 mg 1 tablet 30 minutes before a meal for the next 4 weeks and then take once every other day until gone

## 2024-03-21 NOTE — ASSESSMENT & PLAN NOTE
Patient with long history of atrial fibrillation noncompliant with Eliquis has had several admissions to the hospital for this.      Plan-cont with eliquis 5 mg BID- diltiazem  mg daily  Follow-up with with cardiology /[facility]

## 2024-03-21 NOTE — ASSESSMENT & PLAN NOTE
Ages 65 and over Counseling/Anticipatory Guidance Discussed: nutrition, physical activity, healthy weight, injury prevention, misuse of tobacco, alcohol and drugs, dental health, mental health, immunizations, screenings, self-breast exam, and use of seat belts.

## 2024-03-21 NOTE — ASSESSMENT & PLAN NOTE
Lipids not controlled-Cholesterol equals 231, HDL 49, -now on atorvastatin 40 mg-forgets to take     Plan continue  with atorvastatin 40milligrams 1 nightly-patient states she is ready using a pillbox and her son reminds her to take this daily   follow low cholesterol diet  Recheck lipids in 4 to 6 months.

## 2024-03-21 NOTE — ASSESSMENT & PLAN NOTE
Blood pressure is not controlled-BP is 158/90 at clinic denies any headaches  Stable on lisinopril 20 mg 1 daily and diltiazem  mg daily    Plan-continue lisinopril 20 mg 1 daily and diltiazem  daily monitor   blood pressure goal is less than 140/80.  Follow a low-salt diet encouraged to stop alcohol.

## 2024-03-21 NOTE — PROGRESS NOTES
CHIEF COMPLAINT  Kiesha Pan presents to [facility] INTERNAL MEDICINE for follow-up of Annual Exam (Pt is a 68 yr old female presenting to Internal Medicine for an annual physical; Pt reports no further concerns and/or complaints. /).    HPI  68-year-old female here for Physical and f/u HTN, chol, Gerd    Records reviewed, meds reviewed in detail, labs reviewed with patient.  Plan of care is as follows below.    Seen in cardiology clinic February 26 24th by [facility] for new onset atrial fibrillation-now on Eliquis aspirin cholesterol diltiazem    Atrial fibrillation-stable with meds    Hypertension-stable-BP equals 120/70 at clinic    Cholesterol equals 231, HDL 49, -now on atorvastatin 40 mg-forgets to take     Able to do  clock drawing-3/3 word recall  Ct head 2/2024 -neg     SH-not driving anymore for past 5 years--x 2019 -not drinking anymore-per pt - was drinking bourbon at night -no cigs for >20 yrs-lives with  and son    Patient Instructions for every 2/21/24   Appointment with [facility] cardiology's Feb 26th at 1:15 PM  2 weeks samples of Eliquis given  Cont with  diltiazem CD  Start lisinopril 20 mg once a day for blood pressure control goal is less than 130/80  can stop hydralazine 10 mg twice a day  February 21, 2024 visit  here to [facility]-does not have a PCP     Just d/c'd from [facility] for AF new onset-see records below-now on Eliquis, aspirin, diltiazem     Records reviewed, meds reviewed in detail, labs reviewed with patient.  Plan of care is as follows below.     Echo EF>70 %  Stress test with ischemia  Cath postponed     A-fib-patient with BP at clinic equals 150 history of being on Eliquis in the past but not compliant with continuing this medication-previous admissions per [facility] in the ER for this and then also in the hospital     Hypertension-stable on current meds     Cholesterol-no myalgias-taking meds     Past medical history-new onset atrial  "fibrillation-, hypertension, cholesterol, stroke-2020, alcohol abuse     JH-sizwvqm-wvindr one cup bourbon at times-none for past 5 days-no cigs for >20 yrs-lives with  and son  Smoked >2 PPD-started at age 12 yo- [id number]---jnvr 28 yrs ago              Current Outpatient Medications:     apixaban (ELIQUIS) 5 MG tablet tablet, Take 1 tablet by mouth Every 12 (Twelve) Hours. Indications: Atrial Fibrillation, Disp: 180 tablet, Rfl: 3    aspirin 81 MG chewable tablet, Chew 1 tablet Daily., Disp: 90 tablet, Rfl: 3    atorvastatin (LIPITOR) 40 MG tablet, Take 1 tablet by mouth Every Night., Disp: 90 tablet, Rfl: 3    dilTIAZem CD (CARDIZEM CD) 180 MG 24 hr capsule, Take 1 capsule by mouth Daily., Disp: 90 capsule, Rfl: 3    isosorbide mononitrate (IMDUR) 30 MG 24 hr tablet, Take 1 tablet by mouth Daily., Disp: 30 tablet, Rfl: 1    lisinopril (PRINIVIL,ZESTRIL) 20 MG tablet, Take 1 tablet by mouth Daily., Disp: 90 tablet, Rfl: 3    pantoprazole (Protonix) 40 MG EC tablet, 1 tablet p.o. 30 minutes before a meal for 4 weeks then 1 tablet every other day, Disp: 30 tablet, Rfl: 2    RSVPreF3 Vac Recomb Adjuvanted (AREXVY) 120 MCG/0.5ML reconstituted suspension injection, Inject 0.5 mL into the appropriate muscle as directed by prescriber 1 (One) Time for 1 dose., Disp: 0.5 mL, Rfl: 0    Zoster Vac Recomb Adjuvanted (Shingrix) 50 MCG/0.5ML reconstituted suspension, Inject 0.5 mL into the appropriate muscle as directed by prescriber 1 (One) Time for 1 dose., Disp: 1 each, Rfl: 0   PFSH reviewed.      OBJECTIVE  Vital Signs  Vitals:    03/21/24 1018   BP: 120/70   BP Location: Right arm   Patient Position: Sitting   Cuff Size: Adult   Pulse: 80   Temp: 97.8 °F (36.6 °C)   TempSrc: Infrared   SpO2: 93%   Weight: 72 kg (158 lb 12.8 oz)   Height: 170.2 cm (67.01\")      Body mass index is 24.87 kg/m².    Physical Exam  Vitals and nursing note reviewed.   Constitutional:       Appearance: Normal appearance.   HENT:      Head: " Normocephalic and atraumatic.   Cardiovascular:      Rate and Rhythm: Normal rate and regular rhythm.   Pulmonary:      Effort: Pulmonary effort is normal.      Breath sounds: Normal breath sounds.   Abdominal:      Palpations: Abdomen is soft.   Musculoskeletal:      Cervical back: Normal range of motion and neck supple.   Neurological:      General: No focal deficit present.      Mental Status: She is alert and oriented to person, place, and time.          RESULTS REVIEW  Lab Results   Component Value Date    PROBNP 55.2 02/11/2024    PROBNP 391.2 11/06/2021     CMP          2/14/2024    06:03 2/15/2024    02:45 2/16/2024    04:14   CMP   Glucose 113  112  193    BUN 3  5  5    Creatinine 0.54  0.54  0.64    EGFR 100.4  100.4  96.4    Sodium 140  140  139    Potassium 3.4  3.5  3.4    Chloride 108  106  102    Calcium 8.8  8.7  9.2    Total Protein 6.1   6.3    Albumin 3.7   3.8    Globulin 2.4      Total Bilirubin 0.3   0.2    Alkaline Phosphatase 43   49    AST (SGOT) 25   25    ALT (SGPT) 22   28    Albumin/Globulin Ratio 1.5      BUN/Creatinine Ratio 5.6  9.3  7.8    Anion Gap 10.8  13.0  10.8      CBC w/diff          2/14/2024    06:03 2/14/2024    16:48 2/15/2024    02:45 2/16/2024    04:14   CBC w/Diff   WBC 6.39  6.64  8.70  8.29    RBC 3.79  3.66  3.77  3.69    Hemoglobin 12.3  12.1  12.2  12.1    Hematocrit 36.9  36.0  36.6  36.5    MCV 97.4  98.4  97.1  98.9    MCH 32.5  33.1  32.4  32.8    MCHC 33.3  33.6  33.3  33.2    RDW 13.1  13.2  13.1  13.1    Platelets 411  397  407  410    Neutrophil Rel % 47.2  54.8  48.0  63.9    Immature Granulocyte Rel % 0.2  0.2  0.3  0.4    Lymphocyte Rel % 36.3  27.1  35.9  23.8    Monocyte Rel % 10.2  12.3  9.0  7.4    Eosinophil Rel % 5.2  4.5  5.9  3.7    Basophil Rel % 0.9  1.1  0.9  0.8       Lipid Panel          2/12/2024    03:09   Lipid Panel   Total Cholesterol 231    Triglycerides 94    HDL Cholesterol 49    VLDL Cholesterol 17    LDL Cholesterol  165     LDL/HDL Ratio 3.33       Lab Results   Component Value Date    TSH 6.590 (H) 12/02/2022    TSH 3.520 06/10/2022    TSH 1.380 11/06/2021      Lab Results   Component Value Date    FREET4 1.36 12/02/2022    FREET4 1.2 03/28/2021    FREET4 1.3 08/12/2019      A1C Last 3 Results          2/12/2024    03:09   HGBA1C Last 3 Results   Hemoglobin A1C 5.90       Lab Results   Component Value Date    QPVLRGIN48 235 11/07/2021    LMAI31CT 22.8 (L) 06/18/2022    MG 1.8 02/16/2024        CT Head Without Contrast    Result Date: 2/15/2024    No acute intracranial abnormality is identified.     ANTONIO SALINAS MD       Electronically Signed and Approved By: ANTONIO SALINAS MD on 2/15/2024 at 2:47             XR Chest 1 View    Result Date: 2/11/2024    Emphysema.  No active pulmonary disease is seen.       SUNIL MCKENNA MD       Electronically Signed and Approved By: SUNIL MCKENNA MD on 2/11/2024 at 19:16                        ASSESSMENT & PLAN  Diagnoses and all orders for this visit:    1. Routine history and physical examination of adult (Primary)  Assessment & Plan:  Ages 65 and over Counseling/Anticipatory Guidance Discussed: nutrition, physical activity, healthy weight, injury prevention, misuse of tobacco, alcohol and drugs, dental health, mental health, immunizations, screenings, self-breast exam, and use of seat belts.      2. Essential hypertension  Assessment & Plan:  Blood pressure is not controlled-BP is 158/90 at clinic denies any headaches  Stable on lisinopril 20 mg 1 daily and diltiazem  mg daily    Plan-continue lisinopril 20 mg 1 daily and diltiazem  daily monitor   blood pressure goal is less than 140/80.  Follow a low-salt diet encouraged to stop alcohol.          Orders:  -     Vitamin D,25-Hydroxy  -     Magnesium  -     Folate  -     Vitamin B12  -     T3, Free  -     TSH+Free T4  -     Comprehensive Metabolic Panel  -     Hemoglobin A1c  -     Comprehensive Metabolic Panel; Future  -      Lipid Panel; Future  -     TSH+Free T4; Future  -     T3, Free; Future  -     Vitamin B12; Future  -     Folate; Future  -     Magnesium; Future  -     Vitamin D,25-Hydroxy; Future  -     CBC & Differential; Future  -     MicroAlbumin, Urine, Random - Urine, Clean Catch; Future    3. Gastroesophageal reflux disease without esophagitis  Comments:  Patient was started on Imdur at cardiology clinic last month no change in pain-try pantoprazole 40 mg 1 daily for 4 weeks then q. other day  Orders:  -     pantoprazole (Protonix) 40 MG EC tablet; 1 tablet p.o. 30 minutes before a meal for 4 weeks then 1 tablet every other day  Dispense: 30 tablet; Refill: 2    4. Atrial fibrillation, unspecified type  Assessment & Plan:  Patient with long history of atrial fibrillation noncompliant with Eliquis has had several admissions to the hospital for this.      Plan-cont with eliquis 5 mg BID- diltiazem  mg daily  Follow-up with with cardiology /[facility]    Orders:  -     Vitamin D,25-Hydroxy  -     Magnesium  -     Folate  -     Vitamin B12  -     T3, Free  -     TSH+Free T4  -     Comprehensive Metabolic Panel  -     Hemoglobin A1c  -     Comprehensive Metabolic Panel; Future  -     Lipid Panel; Future  -     TSH+Free T4; Future  -     T3, Free; Future  -     Vitamin B12; Future  -     Folate; Future  -     Magnesium; Future  -     Vitamin D,25-Hydroxy; Future  -     CBC & Differential; Future  -     MicroAlbumin, Urine, Random - Urine, Clean Catch; Future    5. Mixed hyperlipidemia  Assessment & Plan:  Lipids not controlled-Cholesterol equals 231, HDL 49, -now on atorvastatin 40 mg-forgets to take     Plan continue  with atorvastatin 40milligrams 1 nightly-patient states she is ready using a pillbox and her son reminds her to take this daily   follow low cholesterol diet  Recheck lipids in 4 to 6 months.    Orders:  -     Vitamin D,25-Hydroxy  -     Magnesium  -     Folate  -     Vitamin B12  -     T3, Free  -      TSH+Free T4  -     Comprehensive Metabolic Panel  -     Hemoglobin A1c  -     Comprehensive Metabolic Panel; Future  -     Lipid Panel; Future  -     TSH+Free T4; Future  -     T3, Free; Future  -     Vitamin B12; Future  -     Folate; Future  -     Magnesium; Future  -     Vitamin D,25-Hydroxy; Future  -     CBC & Differential; Future  -     MicroAlbumin, Urine, Random - Urine, Clean Catch; Future    6. Alcohol dependence with other alcohol-induced disorder  -     Vitamin D,25-Hydroxy  -     Magnesium  -     Folate  -     Vitamin B12  -     T3, Free  -     TSH+Free T4  -     Comprehensive Metabolic Panel  -     Hemoglobin A1c  -     Comprehensive Metabolic Panel; Future  -     Lipid Panel; Future  -     TSH+Free T4; Future  -     T3, Free; Future  -     Vitamin B12; Future  -     Folate; Future  -     Magnesium; Future  -     Vitamin D,25-Hydroxy; Future  -     CBC & Differential; Future  -     MicroAlbumin, Urine, Random - Urine, Clean Catch; Future    7. History of stroke  -     Vitamin D,25-Hydroxy  -     Magnesium  -     Folate  -     Vitamin B12  -     T3, Free  -     TSH+Free T4  -     Comprehensive Metabolic Panel  -     Hemoglobin A1c  -     Comprehensive Metabolic Panel; Future  -     Lipid Panel; Future  -     TSH+Free T4; Future  -     T3, Free; Future  -     Vitamin B12; Future  -     Folate; Future  -     Magnesium; Future  -     Vitamin D,25-Hydroxy; Future  -     CBC & Differential; Future  -     MicroAlbumin, Urine, Random - Urine, Clean Catch; Future    8. Reactive depression  -     Vitamin D,25-Hydroxy  -     Magnesium  -     Folate  -     Vitamin B12  -     T3, Free  -     TSH+Free T4  -     Comprehensive Metabolic Panel  -     Hemoglobin A1c  -     Comprehensive Metabolic Panel; Future  -     Lipid Panel; Future  -     TSH+Free T4; Future  -     T3, Free; Future  -     Vitamin B12; Future  -     Folate; Future  -     Magnesium; Future  -     Vitamin D,25-Hydroxy; Future  -     CBC &  Differential; Future  -     MicroAlbumin, Urine, Random - Urine, Clean Catch; Future    9. Hyperglycemia  -     Vitamin D,25-Hydroxy  -     Magnesium  -     Folate  -     Vitamin B12  -     T3, Free  -     TSH+Free T4  -     Comprehensive Metabolic Panel  -     Hemoglobin A1c  -     Comprehensive Metabolic Panel; Future  -     Lipid Panel; Future  -     TSH+Free T4; Future  -     T3, Free; Future  -     Vitamin B12; Future  -     Folate; Future  -     Magnesium; Future  -     Vitamin D,25-Hydroxy; Future  -     CBC & Differential; Future  -     MicroAlbumin, Urine, Random - Urine, Clean Catch; Future    10. Screening mammogram for breast cancer  -     Mammo Screening Digital Tomosynthesis Bilateral With CAD; Future  -     Comprehensive Metabolic Panel; Future  -     Lipid Panel; Future  -     TSH+Free T4; Future  -     T3, Free; Future  -     Vitamin B12; Future  -     Folate; Future  -     Magnesium; Future  -     Vitamin D,25-Hydroxy; Future  -     CBC & Differential; Future  -     MicroAlbumin, Urine, Random - Urine, Clean Catch; Future    11. Osteoporosis, post-menopausal  -     DEXA Bone Density Axial; Future  -     Comprehensive Metabolic Panel; Future  -     Lipid Panel; Future  -     TSH+Free T4; Future  -     T3, Free; Future  -     Vitamin B12; Future  -     Folate; Future  -     Magnesium; Future  -     Vitamin D,25-Hydroxy; Future  -     CBC & Differential; Future  -     MicroAlbumin, Urine, Random - Urine, Clean Catch; Future    12. Screen for colon cancer  -     Ambulatory Referral For Screening Colonoscopy  -     Comprehensive Metabolic Panel; Future  -     Lipid Panel; Future  -     TSH+Free T4; Future  -     T3, Free; Future  -     Vitamin B12; Future  -     Folate; Future  -     Magnesium; Future  -     Vitamin D,25-Hydroxy; Future  -     CBC & Differential; Future  -     MicroAlbumin, Urine, Random - Urine, Clean Catch; Future    13. Need for RSV vaccination  -     RSVPreF3 Vac Recomb Adjuvanted  (AREXVY) 120 MCG/0.5ML reconstituted suspension injection; Inject 0.5 mL into the appropriate muscle as directed by prescriber 1 (One) Time for 1 dose.  Dispense: 0.5 mL; Refill: 0  -     Comprehensive Metabolic Panel; Future  -     Lipid Panel; Future  -     TSH+Free T4; Future  -     T3, Free; Future  -     Vitamin B12; Future  -     Folate; Future  -     Magnesium; Future  -     Vitamin D,25-Hydroxy; Future  -     CBC & Differential; Future  -     MicroAlbumin, Urine, Random - Urine, Clean Catch; Future    14. Need for shingles vaccine  -     Zoster Vac Recomb Adjuvanted (Shingrix) 50 MCG/0.5ML reconstituted suspension; Inject 0.5 mL into the appropriate muscle as directed by prescriber 1 (One) Time for 1 dose.  Dispense: 1 each; Refill: 0  -     Comprehensive Metabolic Panel; Future  -     Lipid Panel; Future  -     TSH+Free T4; Future  -     T3, Free; Future  -     Vitamin B12; Future  -     Folate; Future  -     Magnesium; Future  -     Vitamin D,25-Hydroxy; Future  -     CBC & Differential; Future  -     MicroAlbumin, Urine, Random - Urine, Clean Catch; Future    15. Need for COVID-19 vaccine  -     COVID-19 F23 (Pfizer) 12yrs+ (COMIRNATY)         BMI is within normal parameters. No other follow-up for BMI required.       Patient Instructions   Schedule appointment for 4 months and do labs 1 week prior  Recommend RSV vaccine and Shingrix vaccines at pharmacy  Follow a low-cholesterol low-salt diet  Continue all medications  Prescriptions were sent by MIHAI Cohn from the cardiology clinic in February 2024-call if with questions to the clinic  For heartburn take pantoprazole 40 mg 1 tablet 30 minutes before a meal for the next 4 weeks and then take once every other day until gone     FOLLOW UP  Return in about 4 months (around 7/21/2024) for Recheck.  With labs prior    Patient was given instructions and counseling regarding her condition or for health maintenance advice. Please see specific  information pulled into the AVS if appropriate.

## 2024-03-24 ENCOUNTER — TELEPHONE (OUTPATIENT)
Dept: INTERNAL MEDICINE | Age: 69
End: 2024-03-24
Payer: OTHER GOVERNMENT

## 2024-03-24 DIAGNOSIS — N28.9 RENAL INSUFFICIENCY: Primary | ICD-10-CM

## 2024-03-24 NOTE — TELEPHONE ENCOUNTER
Cr=1.55 BUN=22 GFR=36 from 96  States stopped drinking?    Drink lots of fluids, no NSAIDS or ETOH-Repeat BMP in 2 weeks-

## 2024-03-25 ENCOUNTER — TELEPHONE (OUTPATIENT)
Dept: INTERNAL MEDICINE | Age: 69
End: 2024-03-25
Payer: OTHER GOVERNMENT

## 2024-03-25 NOTE — TELEPHONE ENCOUNTER
Tried to call patient. Mailbox is full. Cannot leave a message. HUB may Relay message to patient.     ----- Message from Shahida Martinez MD sent at 3/24/2024 10:10 AM EDT -----  Please call the patient regarding her abnormal result.Inform her her kidney function is worse but also appears dehydrated-Ask her not to kinza eany NSAIDS-inbuprofen, naprosyn etc--drink lots of fluids/water  6-7 glasses water daily and make appt to see me in 2 weeks-and get blood work drawn 2-3 days prior at our office or [employer] -etc

## 2024-03-26 ENCOUNTER — TELEPHONE (OUTPATIENT)
Dept: INTERNAL MEDICINE | Age: 69
End: 2024-03-26
Payer: OTHER GOVERNMENT

## 2024-03-26 NOTE — TELEPHONE ENCOUNTER
HUB may relay message to patient.     ----- Message from Shahida Martinez MD sent at 3/24/2024 10:10 AM EDT -----  Please call the patient regarding her abnormal result.Inform her her kidney function is worse but also appears dehydrated-Ask her not to kinza eany NSAIDS-inbuprofen, naprosyn etc--drink lots of fluids/water  6-7 glasses water daily and make appt to see me in 2 weeks-and get blood work drawn 2-3 days prior at our office or [facility] -etc

## 2024-03-27 RX ORDER — ATORVASTATIN CALCIUM 40 MG/1
40 TABLET, FILM COATED ORAL NIGHTLY
Qty: 90 TABLET | Refills: 3 | Status: SHIPPED | OUTPATIENT
Start: 2024-03-27

## 2024-03-28 ENCOUNTER — TELEPHONE (OUTPATIENT)
Dept: INTERNAL MEDICINE | Age: 69
End: 2024-03-28
Payer: OTHER GOVERNMENT

## 2024-03-28 NOTE — TELEPHONE ENCOUNTER
----- Message from Shahida Martinez MD sent at 3/24/2024 10:10 AM EDT -----  Please call the patient regarding her abnormal result.Inform her her kidney function is worse but also appears dehydrated-Ask her not to kinza eany NSAIDS-inbuprofen, naprosyn etc--drink lots of fluids/water  6-7 glasses water daily and make appt to see me in 2 weeks-and get blood work drawn 2-3 days prior at our office or S[facility] -etc

## 2024-03-28 NOTE — TELEPHONE ENCOUNTER
Spoke to Kiesha Pan, gave results. Pt verbalized understanding. Pt stated she would like for office to contact her son to set up appointment.  Called son, and left vm for him to call office back to set up Kiesha Pan's appt in two weeks.    Hub to relay following message to son Fransisco Pan, and schedule pt for 2 week follow up:  Please call the patient regarding her abnormal result.Inform her her kidney function is worse but also appears dehydrated-Ask her not to kinza eany NSAIDS-inbuprofen, naprosyn etc--drink lots of fluids/water 6-7 glasses water daily and make appt to see me in 2 weeks-and get blood work drawn 2-3 days prior at our office or [employer] -etc

## 2024-04-17 DIAGNOSIS — K21.9 GASTROESOPHAGEAL REFLUX DISEASE WITHOUT ESOPHAGITIS: ICD-10-CM

## 2024-04-17 RX ORDER — PANTOPRAZOLE SODIUM 40 MG/1
TABLET, DELAYED RELEASE ORAL
Qty: 30 TABLET | Refills: 2 | Status: SHIPPED | OUTPATIENT
Start: 2024-04-17

## 2024-04-17 RX ORDER — ISOSORBIDE MONONITRATE 30 MG/1
30 TABLET, EXTENDED RELEASE ORAL DAILY
Qty: 90 TABLET | OUTPATIENT
Start: 2024-04-17

## 2024-04-17 RX ORDER — ISOSORBIDE MONONITRATE 30 MG/1
30 TABLET, EXTENDED RELEASE ORAL DAILY
Qty: 30 TABLET | Refills: 1 | Status: SHIPPED | OUTPATIENT
Start: 2024-04-17

## 2024-04-17 NOTE — TELEPHONE ENCOUNTER
Caller: SINDY MOCK (POA)    Relationship: Emergency Contact    Best call back number:[phone]    Requested Prescriptions:   Requested Prescriptions     Pending Prescriptions Disp Refills    pantoprazole (Protonix) 40 MG EC tablet 30 tablet 2     Si tablet p.o. 30 minutes before a meal for 4 weeks then 1 tablet every other day    isosorbide mononitrate (IMDUR) 30 MG 24 hr tablet 30 tablet 1     Sig: Take 1 tablet by mouth Daily.        Pharmacy where request should be sent: [id number] [place] [facility] [address] AT [facility] [phone] [facility] [phone] FX     Last office visit with prescribing clinician: 3/21/2024   Last telemedicine visit with prescribing clinician: Visit date not found   Next office visit with prescribing clinician: 2024     Does the patient have less than a 3 day supply:  [] Yes  [x] No

## 2024-04-26 ENCOUNTER — HOSPITAL ENCOUNTER (OUTPATIENT)
Dept: MAMMOGRAPHY | Facility: HOSPITAL | Age: 69
Discharge: HOME | End: 2024-04-26
Admitting: INTERNAL MEDICINE
Payer: COMMERCIAL

## 2024-04-26 DIAGNOSIS — Z12.31 SCREENING MAMMOGRAM FOR BREAST CANCER: ICD-10-CM

## 2024-04-26 PROCEDURE — 77063 BREAST TOMOSYNTHESIS BI: CPT

## 2024-04-26 PROCEDURE — 77067 SCR MAMMO BI INCL CAD: CPT

## 2024-04-30 ENCOUNTER — TELEPHONE (OUTPATIENT)
Dept: INTERNAL MEDICINE | Age: 69
End: 2024-04-30
Payer: COMMERCIAL

## 2024-04-30 NOTE — TELEPHONE ENCOUNTER
Called PT no answer no vm     HUB to RELAY     Shahida Martinez MD      Benign Mammogram, repeat in 1 year. Please call the patient regarding her abnormal result.

## 2024-05-03 ENCOUNTER — TELEPHONE (OUTPATIENT)
Dept: INTERNAL MEDICINE | Age: 69
End: 2024-05-03
Payer: COMMERCIAL

## 2024-05-03 DIAGNOSIS — Z12.31 SCREENING MAMMOGRAM FOR BREAST CANCER: Primary | ICD-10-CM

## 2024-05-03 NOTE — TELEPHONE ENCOUNTER
----- Message from Fiorella MASSEY sent at 4/30/2024  2:09 PM EDT -----  Benign Mammogram, repeat in 1 year. Please call the patient regarding her abnormal result.

## 2024-05-24 ENCOUNTER — HOSPITAL ENCOUNTER (OUTPATIENT)
Dept: BONE DENSITY | Facility: HOSPITAL | Age: 69
Discharge: HOME | End: 2024-05-24
Admitting: INTERNAL MEDICINE
Payer: COMMERCIAL

## 2024-05-24 DIAGNOSIS — M81.0 OSTEOPOROSIS, POST-MENOPAUSAL: ICD-10-CM

## 2024-05-24 PROCEDURE — 77080 DXA BONE DENSITY AXIAL: CPT

## 2024-05-29 NOTE — PLAN OF CARE
Goal Outcome Evaluation:              Outcome Summary: Patient gets up 1 assist to bs and does well. Patient complaints of nightmares throughout the night but has been an ongoing issue since her stroke. Patient has made no more complaints. Will continue to monitor.   resilient/elastic

## 2024-09-10 ENCOUNTER — APPOINTMENT (OUTPATIENT)
Dept: CT IMAGING | Facility: HOSPITAL | Age: 69
DRG: 101 | End: 2024-09-10
Payer: MEDICARE

## 2024-09-10 ENCOUNTER — APPOINTMENT (OUTPATIENT)
Dept: GENERAL RADIOLOGY | Facility: HOSPITAL | Age: 69
DRG: 101 | End: 2024-09-10

## 2024-09-10 ENCOUNTER — HOSPITAL ENCOUNTER (INPATIENT)
Facility: HOSPITAL | Age: 69
LOS: 2 days | Discharge: HOME HEALTH | DRG: 101 | End: 2024-09-13
Attending: EMERGENCY MEDICINE | Admitting: STUDENT IN AN ORGANIZED HEALTH CARE EDUCATION/TRAINING PROGRAM
Payer: MEDICARE

## 2024-09-10 DIAGNOSIS — I10 UNCONTROLLED HYPERTENSION: ICD-10-CM

## 2024-09-10 DIAGNOSIS — R26.2 DIFFICULTY IN WALKING: ICD-10-CM

## 2024-09-10 DIAGNOSIS — E87.20 LACTIC ACIDOSIS: ICD-10-CM

## 2024-09-10 DIAGNOSIS — F10.21 HISTORY OF ALCOHOLISM: ICD-10-CM

## 2024-09-10 DIAGNOSIS — R41.89 COGNITIVE IMPAIRMENT: ICD-10-CM

## 2024-09-10 DIAGNOSIS — E87.1 HYPONATREMIA: ICD-10-CM

## 2024-09-10 DIAGNOSIS — R56.9 NEW ONSET SEIZURE: Primary | ICD-10-CM

## 2024-09-10 DIAGNOSIS — S00.512A ABRASION OF TONGUE, INITIAL ENCOUNTER: ICD-10-CM

## 2024-09-10 DIAGNOSIS — Z86.73 HISTORY OF STROKE: ICD-10-CM

## 2024-09-10 DIAGNOSIS — R56.9 SEIZURE: ICD-10-CM

## 2024-09-10 DIAGNOSIS — Z78.9 DECREASED ACTIVITIES OF DAILY LIVING (ADL): ICD-10-CM

## 2024-09-10 LAB
ACETONE BLD QL: NEGATIVE
ALBUMIN SERPL-MCNC: 4.7 G/DL (ref 3.5–5.2)
ALBUMIN/GLOB SERPL: 1.4 G/DL
ALP SERPL-CCNC: 56 U/L (ref 39–117)
ALT SERPL W P-5'-P-CCNC: 22 U/L (ref 1–33)
AMMONIA BLD-SCNC: 11 UMOL/L (ref 11–51)
AMPHET+METHAMPHET UR QL: NEGATIVE
ANION GAP SERPL CALCULATED.3IONS-SCNC: 22.3 MMOL/L (ref 5–15)
AST SERPL-CCNC: 33 U/L (ref 1–32)
BACTERIA UR QL AUTO: ABNORMAL /HPF
BARBITURATES UR QL SCN: NEGATIVE
BASOPHILS # BLD AUTO: 0.01 10*3/MM3 (ref 0–0.2)
BASOPHILS NFR BLD AUTO: 0.1 % (ref 0–1.5)
BENZODIAZ UR QL SCN: NEGATIVE
BILIRUB SERPL-MCNC: 0.8 MG/DL (ref 0–1.2)
BILIRUB UR QL STRIP: NEGATIVE
BUN SERPL-MCNC: 8 MG/DL (ref 8–23)
BUN/CREAT SERPL: 9.8 (ref 7–25)
CALCIUM SPEC-SCNC: 9 MG/DL (ref 8.6–10.5)
CANNABINOIDS SERPL QL: NEGATIVE
CHLORIDE SERPL-SCNC: 79 MMOL/L (ref 98–107)
CLARITY UR: CLEAR
CO2 SERPL-SCNC: 19.7 MMOL/L (ref 22–29)
COCAINE UR QL: NEGATIVE
COLOR UR: YELLOW
CREAT SERPL-MCNC: 0.82 MG/DL (ref 0.57–1)
D-LACTATE SERPL-SCNC: 7.3 MMOL/L (ref 0.5–2)
DEPRECATED RDW RBC AUTO: 42.5 FL (ref 37–54)
EGFRCR SERPLBLD CKD-EPI 2021: 78 ML/MIN/1.73
EOSINOPHIL # BLD AUTO: 0 10*3/MM3 (ref 0–0.4)
EOSINOPHIL NFR BLD AUTO: 0 % (ref 0.3–6.2)
ERYTHROCYTE [DISTWIDTH] IN BLOOD BY AUTOMATED COUNT: 12.2 % (ref 12.3–15.4)
ETHANOL BLD-MCNC: <10 MG/DL (ref 0–10)
ETHANOL UR QL: <0.01 %
FENTANYL UR-MCNC: NEGATIVE NG/ML
GLOBULIN UR ELPH-MCNC: 3.4 GM/DL
GLUCOSE BLDC GLUCOMTR-MCNC: 239 MG/DL (ref 70–99)
GLUCOSE SERPL-MCNC: 236 MG/DL (ref 65–99)
GLUCOSE UR STRIP-MCNC: ABNORMAL MG/DL
HCT VFR BLD AUTO: 40.9 % (ref 34–46.6)
HGB BLD-MCNC: 14.2 G/DL (ref 12–15.9)
HGB UR QL STRIP.AUTO: ABNORMAL
HOLD SPECIMEN: NORMAL
HOLD SPECIMEN: NORMAL
HYALINE CASTS UR QL AUTO: ABNORMAL /LPF
IMM GRANULOCYTES # BLD AUTO: 0.02 10*3/MM3 (ref 0–0.05)
IMM GRANULOCYTES NFR BLD AUTO: 0.3 % (ref 0–0.5)
KETONES UR QL STRIP: ABNORMAL
LEUKOCYTE ESTERASE UR QL STRIP.AUTO: NEGATIVE
LYMPHOCYTES # BLD AUTO: 1.29 10*3/MM3 (ref 0.7–3.1)
LYMPHOCYTES NFR BLD AUTO: 17.7 % (ref 19.6–45.3)
MAGNESIUM SERPL-MCNC: 1.7 MG/DL (ref 1.6–2.4)
MCH RBC QN AUTO: 32.4 PG (ref 26.6–33)
MCHC RBC AUTO-ENTMCNC: 34.7 G/DL (ref 31.5–35.7)
MCV RBC AUTO: 93.4 FL (ref 79–97)
METHADONE UR QL SCN: NEGATIVE
MONOCYTES # BLD AUTO: 0.46 10*3/MM3 (ref 0.1–0.9)
MONOCYTES NFR BLD AUTO: 6.3 % (ref 5–12)
NEUTROPHILS NFR BLD AUTO: 5.52 10*3/MM3 (ref 1.7–7)
NEUTROPHILS NFR BLD AUTO: 75.6 % (ref 42.7–76)
NITRITE UR QL STRIP: NEGATIVE
NRBC BLD AUTO-RTO: 0.3 /100 WBC (ref 0–0.2)
OPIATES UR QL: NEGATIVE
OXYCODONE UR QL SCN: NEGATIVE
PH UR STRIP.AUTO: 6.5 [PH] (ref 5–8)
PLATELET # BLD AUTO: 434 10*3/MM3 (ref 140–450)
PMV BLD AUTO: 9.5 FL (ref 6–12)
POTASSIUM SERPL-SCNC: 3.7 MMOL/L (ref 3.5–5.2)
PROT SERPL-MCNC: 8.1 G/DL (ref 6–8.5)
PROT UR QL STRIP: ABNORMAL
QT INTERVAL: 324 MS
QTC INTERVAL: 473 MS
RBC # BLD AUTO: 4.38 10*6/MM3 (ref 3.77–5.28)
RBC # UR STRIP: ABNORMAL /HPF
REF LAB TEST METHOD: ABNORMAL
SODIUM SERPL-SCNC: 121 MMOL/L (ref 136–145)
SP GR UR STRIP: 1.01 (ref 1–1.03)
SQUAMOUS #/AREA URNS HPF: ABNORMAL /HPF
TROPONIN T SERPL HS-MCNC: <6 NG/L
UROBILINOGEN UR QL STRIP: ABNORMAL
WBC # UR STRIP: ABNORMAL /HPF
WBC NRBC COR # BLD AUTO: 7.3 10*3/MM3 (ref 3.4–10.8)
WHOLE BLOOD HOLD COAG: NORMAL
WHOLE BLOOD HOLD SPECIMEN: NORMAL

## 2024-09-10 PROCEDURE — 36415 COLL VENOUS BLD VENIPUNCTURE: CPT

## 2024-09-10 PROCEDURE — 87040 BLOOD CULTURE FOR BACTERIA: CPT

## 2024-09-10 PROCEDURE — 82009 KETONE BODYS QUAL: CPT | Performed by: EMERGENCY MEDICINE

## 2024-09-10 PROCEDURE — 80307 DRUG TEST PRSMV CHEM ANLYZR: CPT

## 2024-09-10 PROCEDURE — 80053 COMPREHEN METABOLIC PANEL: CPT

## 2024-09-10 PROCEDURE — 83935 ASSAY OF URINE OSMOLALITY: CPT | Performed by: INTERNAL MEDICINE

## 2024-09-10 PROCEDURE — 84484 ASSAY OF TROPONIN QUANT: CPT

## 2024-09-10 PROCEDURE — 87086 URINE CULTURE/COLONY COUNT: CPT | Performed by: EMERGENCY MEDICINE

## 2024-09-10 PROCEDURE — 71045 X-RAY EXAM CHEST 1 VIEW: CPT

## 2024-09-10 PROCEDURE — 99291 CRITICAL CARE FIRST HOUR: CPT

## 2024-09-10 PROCEDURE — 83605 ASSAY OF LACTIC ACID: CPT

## 2024-09-10 PROCEDURE — 82077 ASSAY SPEC XCP UR&BREATH IA: CPT

## 2024-09-10 PROCEDURE — 81001 URINALYSIS AUTO W/SCOPE: CPT

## 2024-09-10 PROCEDURE — 82550 ASSAY OF CK (CPK): CPT | Performed by: EMERGENCY MEDICINE

## 2024-09-10 PROCEDURE — 93005 ELECTROCARDIOGRAM TRACING: CPT

## 2024-09-10 PROCEDURE — 82948 REAGENT STRIP/BLOOD GLUCOSE: CPT

## 2024-09-10 PROCEDURE — 85025 COMPLETE CBC W/AUTO DIFF WBC: CPT

## 2024-09-10 PROCEDURE — 87077 CULTURE AEROBIC IDENTIFY: CPT | Performed by: EMERGENCY MEDICINE

## 2024-09-10 PROCEDURE — 83930 ASSAY OF BLOOD OSMOLALITY: CPT | Performed by: INTERNAL MEDICINE

## 2024-09-10 PROCEDURE — 70450 CT HEAD/BRAIN W/O DYE: CPT

## 2024-09-10 PROCEDURE — 83735 ASSAY OF MAGNESIUM: CPT

## 2024-09-10 PROCEDURE — 82140 ASSAY OF AMMONIA: CPT

## 2024-09-10 PROCEDURE — 87186 SC STD MICRODIL/AGAR DIL: CPT | Performed by: EMERGENCY MEDICINE

## 2024-09-10 PROCEDURE — 93005 ELECTROCARDIOGRAM TRACING: CPT | Performed by: EMERGENCY MEDICINE

## 2024-09-10 RX ORDER — SODIUM CHLORIDE 0.9 % (FLUSH) 0.9 %
10 SYRINGE (ML) INJECTION AS NEEDED
Status: DISCONTINUED | OUTPATIENT
Start: 2024-09-10 | End: 2024-09-11

## 2024-09-11 ENCOUNTER — APPOINTMENT (OUTPATIENT)
Dept: NEUROLOGY | Facility: HOSPITAL | Age: 69
DRG: 101 | End: 2024-09-11
Payer: MEDICARE

## 2024-09-11 ENCOUNTER — APPOINTMENT (OUTPATIENT)
Dept: CARDIOLOGY | Facility: HOSPITAL | Age: 69
DRG: 101 | End: 2024-09-11
Payer: MEDICARE

## 2024-09-11 ENCOUNTER — APPOINTMENT (OUTPATIENT)
Dept: MRI IMAGING | Facility: HOSPITAL | Age: 69
DRG: 101 | End: 2024-09-11
Payer: MEDICARE

## 2024-09-11 PROBLEM — R56.9 SEIZURE: Status: ACTIVE | Noted: 2024-09-11

## 2024-09-11 LAB
ANION GAP SERPL CALCULATED.3IONS-SCNC: 11.2 MMOL/L (ref 5–15)
ANION GAP SERPL CALCULATED.3IONS-SCNC: 15.7 MMOL/L (ref 5–15)
ASCENDING AORTA: 3.4 CM
BASOPHILS # BLD AUTO: 0.02 10*3/MM3 (ref 0–0.2)
BASOPHILS NFR BLD AUTO: 0.2 % (ref 0–1.5)
BH CV ECHO MEAS - AO MAX PG: 7.3 MMHG
BH CV ECHO MEAS - AO MEAN PG: 4 MMHG
BH CV ECHO MEAS - AO ROOT DIAM: 3.1 CM
BH CV ECHO MEAS - AO V2 MAX: 135.4 CM/SEC
BH CV ECHO MEAS - AO V2 VTI: 25.9 CM
BH CV ECHO MEAS - EDV(MOD-SP2): 49 ML
BH CV ECHO MEAS - EDV(MOD-SP4): 64 ML
BH CV ECHO MEAS - EF(MOD-SP2): 58.4 %
BH CV ECHO MEAS - EF(MOD-SP4): 51.7 %
BH CV ECHO MEAS - ESV(MOD-SP2): 20.4 ML
BH CV ECHO MEAS - ESV(MOD-SP4): 30.9 ML
BH CV ECHO MEAS - IVS/LVPW: 2 CM
BH CV ECHO MEAS - IVSD: 1.8 CM
BH CV ECHO MEAS - LA DIMENSION: 3.6 CM
BH CV ECHO MEAS - LAT PEAK E' VEL: 7.2 CM/SEC
BH CV ECHO MEAS - LV MAX PG: 5.8 MMHG
BH CV ECHO MEAS - LV MEAN PG: 3.2 MMHG
BH CV ECHO MEAS - LV V1 MAX: 120 CM/SEC
BH CV ECHO MEAS - LV V1 VTI: 24 CM
BH CV ECHO MEAS - LVIDD: 3.2 CM
BH CV ECHO MEAS - LVIDS: 2.5 CM
BH CV ECHO MEAS - LVOT DIAM: 2 CM
BH CV ECHO MEAS - LVPWD: 0.9 CM
BH CV ECHO MEAS - MED PEAK E' VEL: 5.6 CM/SEC
BH CV ECHO MEAS - MV A MAX VEL: 96.2 CM/SEC
BH CV ECHO MEAS - MV E MAX VEL: 49.7 CM/SEC
BH CV ECHO MEAS - MV E/A: 0.52
BH CV ECHO MEAS - PI END-D VEL: 134.3 CM/SEC
BH CV ECHO MEAS - SV(MOD-SP2): 28.6 ML
BH CV ECHO MEAS - SV(MOD-SP4): 33.1 ML
BH CV ECHO MEAS - TAPSE (>1.6): 1.86 CM
BH CV ECHO MEASUREMENTS AVERAGE E/E' RATIO: 7.77
BH CV XLRA - TDI S': 15.3 CM/SEC
BUN SERPL-MCNC: 7 MG/DL (ref 8–23)
BUN SERPL-MCNC: 8 MG/DL (ref 8–23)
BUN/CREAT SERPL: 10.3 (ref 7–25)
BUN/CREAT SERPL: 12.1 (ref 7–25)
CALCIUM SPEC-SCNC: 8.6 MG/DL (ref 8.6–10.5)
CALCIUM SPEC-SCNC: 9.1 MG/DL (ref 8.6–10.5)
CHLORIDE SERPL-SCNC: 91 MMOL/L (ref 98–107)
CHLORIDE SERPL-SCNC: 95 MMOL/L (ref 98–107)
CHOLEST SERPL-MCNC: 222 MG/DL (ref 0–200)
CK SERPL-CCNC: 257 U/L (ref 20–180)
CO2 SERPL-SCNC: 22.3 MMOL/L (ref 22–29)
CO2 SERPL-SCNC: 25.8 MMOL/L (ref 22–29)
CREAT SERPL-MCNC: 0.66 MG/DL (ref 0.57–1)
CREAT SERPL-MCNC: 0.68 MG/DL (ref 0.57–1)
D-LACTATE SERPL-SCNC: 1.8 MMOL/L (ref 0.5–2)
D-LACTATE SERPL-SCNC: 2.2 MMOL/L (ref 0.5–2)
DEPRECATED RDW RBC AUTO: 41.6 FL (ref 37–54)
EGFRCR SERPLBLD CKD-EPI 2021: 95 ML/MIN/1.73
EGFRCR SERPLBLD CKD-EPI 2021: 95.7 ML/MIN/1.73
EOSINOPHIL # BLD AUTO: 0 10*3/MM3 (ref 0–0.4)
EOSINOPHIL NFR BLD AUTO: 0 % (ref 0.3–6.2)
ERYTHROCYTE [DISTWIDTH] IN BLOOD BY AUTOMATED COUNT: 12.3 % (ref 12.3–15.4)
FLUAV SUBTYP SPEC NAA+PROBE: NOT DETECTED
FLUBV RNA ISLT QL NAA+PROBE: NOT DETECTED
GLUCOSE SERPL-MCNC: 115 MG/DL (ref 65–99)
GLUCOSE SERPL-MCNC: 135 MG/DL (ref 65–99)
HBA1C MFR BLD: 5.6 % (ref 4.8–5.6)
HCT VFR BLD AUTO: 38.2 % (ref 34–46.6)
HDLC SERPL-MCNC: 48 MG/DL (ref 40–60)
HGB BLD-MCNC: 13.3 G/DL (ref 12–15.9)
IMM GRANULOCYTES # BLD AUTO: 0.04 10*3/MM3 (ref 0–0.05)
IMM GRANULOCYTES NFR BLD AUTO: 0.4 % (ref 0–0.5)
IVRT: 158 MS
LDLC SERPL CALC-MCNC: 145 MG/DL (ref 0–100)
LDLC/HDLC SERPL: 2.95 {RATIO}
LYMPHOCYTES # BLD AUTO: 1.31 10*3/MM3 (ref 0.7–3.1)
LYMPHOCYTES NFR BLD AUTO: 12.1 % (ref 19.6–45.3)
MCH RBC QN AUTO: 32.1 PG (ref 26.6–33)
MCHC RBC AUTO-ENTMCNC: 34.8 G/DL (ref 31.5–35.7)
MCV RBC AUTO: 92.3 FL (ref 79–97)
MONOCYTES # BLD AUTO: 0.78 10*3/MM3 (ref 0.1–0.9)
MONOCYTES NFR BLD AUTO: 7.2 % (ref 5–12)
NEUTROPHILS NFR BLD AUTO: 8.66 10*3/MM3 (ref 1.7–7)
NEUTROPHILS NFR BLD AUTO: 80.1 % (ref 42.7–76)
NRBC BLD AUTO-RTO: 0 /100 WBC (ref 0–0.2)
OSMOLALITY SERPL: 266 MOSM/KG (ref 280–301)
OSMOLALITY UR: 663 MOSM/KG (ref 50–1400)
PLATELET # BLD AUTO: 394 10*3/MM3 (ref 140–450)
PMV BLD AUTO: 9 FL (ref 6–12)
POTASSIUM SERPL-SCNC: 3.6 MMOL/L (ref 3.5–5.2)
POTASSIUM SERPL-SCNC: 3.8 MMOL/L (ref 3.5–5.2)
RBC # BLD AUTO: 4.14 10*6/MM3 (ref 3.77–5.28)
RSV RNA NPH QL NAA+NON-PROBE: NOT DETECTED
SARS-COV-2 RNA RESP QL NAA+PROBE: NOT DETECTED
SODIUM SERPL-SCNC: 128 MMOL/L (ref 136–145)
SODIUM SERPL-SCNC: 132 MMOL/L (ref 136–145)
SODIUM SERPL-SCNC: 133 MMOL/L (ref 136–145)
TRIGL SERPL-MCNC: 162 MG/DL (ref 0–150)
TSH SERPL DL<=0.05 MIU/L-ACNC: 2.58 UIU/ML (ref 0.27–4.2)
VLDLC SERPL-MCNC: 29 MG/DL (ref 5–40)
WBC NRBC COR # BLD AUTO: 10.81 10*3/MM3 (ref 3.4–10.8)

## 2024-09-11 PROCEDURE — 83605 ASSAY OF LACTIC ACID: CPT | Performed by: EMERGENCY MEDICINE

## 2024-09-11 PROCEDURE — 87637 SARSCOV2&INF A&B&RSV AMP PRB: CPT | Performed by: EMERGENCY MEDICINE

## 2024-09-11 PROCEDURE — 25010000002 THIAMINE PER 100 MG: Performed by: EMERGENCY MEDICINE

## 2024-09-11 PROCEDURE — 25810000003 SODIUM CHLORIDE 0.9 % SOLUTION: Performed by: STUDENT IN AN ORGANIZED HEALTH CARE EDUCATION/TRAINING PROGRAM

## 2024-09-11 PROCEDURE — 99223 1ST HOSP IP/OBS HIGH 75: CPT | Performed by: STUDENT IN AN ORGANIZED HEALTH CARE EDUCATION/TRAINING PROGRAM

## 2024-09-11 PROCEDURE — 25010000002 THIAMINE PER 100 MG: Performed by: INTERNAL MEDICINE

## 2024-09-11 PROCEDURE — 84443 ASSAY THYROID STIM HORMONE: CPT | Performed by: STUDENT IN AN ORGANIZED HEALTH CARE EDUCATION/TRAINING PROGRAM

## 2024-09-11 PROCEDURE — 25810000003 LACTATED RINGERS SOLUTION: Performed by: EMERGENCY MEDICINE

## 2024-09-11 PROCEDURE — 93306 TTE W/DOPPLER COMPLETE: CPT

## 2024-09-11 PROCEDURE — 87040 BLOOD CULTURE FOR BACTERIA: CPT | Performed by: EMERGENCY MEDICINE

## 2024-09-11 PROCEDURE — 25010000002 LEVETRIRACETAM PER 10 MG: Performed by: STUDENT IN AN ORGANIZED HEALTH CARE EDUCATION/TRAINING PROGRAM

## 2024-09-11 PROCEDURE — 80048 BASIC METABOLIC PNL TOTAL CA: CPT | Performed by: INTERNAL MEDICINE

## 2024-09-11 PROCEDURE — 25010000002 LABETALOL 5 MG/ML SOLUTION: Performed by: EMERGENCY MEDICINE

## 2024-09-11 PROCEDURE — 93306 TTE W/DOPPLER COMPLETE: CPT | Performed by: INTERNAL MEDICINE

## 2024-09-11 PROCEDURE — 99222 1ST HOSP IP/OBS MODERATE 55: CPT | Performed by: INTERNAL MEDICINE

## 2024-09-11 PROCEDURE — 84295 ASSAY OF SERUM SODIUM: CPT | Performed by: INTERNAL MEDICINE

## 2024-09-11 PROCEDURE — 83036 HEMOGLOBIN GLYCOSYLATED A1C: CPT | Performed by: STUDENT IN AN ORGANIZED HEALTH CARE EDUCATION/TRAINING PROGRAM

## 2024-09-11 PROCEDURE — 80048 BASIC METABOLIC PNL TOTAL CA: CPT | Performed by: STUDENT IN AN ORGANIZED HEALTH CARE EDUCATION/TRAINING PROGRAM

## 2024-09-11 PROCEDURE — 25010000002 LORAZEPAM PER 2 MG: Performed by: EMERGENCY MEDICINE

## 2024-09-11 PROCEDURE — 0 DEXTROSE 5 % SOLUTION: Performed by: INTERNAL MEDICINE

## 2024-09-11 PROCEDURE — 95816 EEG AWAKE AND DROWSY: CPT

## 2024-09-11 PROCEDURE — 25010000002 CEFTRIAXONE PER 250 MG: Performed by: INTERNAL MEDICINE

## 2024-09-11 PROCEDURE — 85025 COMPLETE CBC W/AUTO DIFF WBC: CPT | Performed by: STUDENT IN AN ORGANIZED HEALTH CARE EDUCATION/TRAINING PROGRAM

## 2024-09-11 PROCEDURE — 80061 LIPID PANEL: CPT | Performed by: STUDENT IN AN ORGANIZED HEALTH CARE EDUCATION/TRAINING PROGRAM

## 2024-09-11 PROCEDURE — 70551 MRI BRAIN STEM W/O DYE: CPT

## 2024-09-11 RX ORDER — LORAZEPAM 2 MG/ML
1 INJECTION INTRAMUSCULAR ONCE
Status: COMPLETED | OUTPATIENT
Start: 2024-09-11 | End: 2024-09-11

## 2024-09-11 RX ORDER — LISINOPRIL 20 MG/1
20 TABLET ORAL DAILY
Status: DISCONTINUED | OUTPATIENT
Start: 2024-09-11 | End: 2024-09-13 | Stop reason: HOSPADM

## 2024-09-11 RX ORDER — LEVETIRACETAM 500 MG/5ML
500 INJECTION, SOLUTION, CONCENTRATE INTRAVENOUS EVERY 12 HOURS SCHEDULED
Status: DISCONTINUED | OUTPATIENT
Start: 2024-09-11 | End: 2024-09-12

## 2024-09-11 RX ORDER — SODIUM CHLORIDE 0.9 % (FLUSH) 0.9 %
10 SYRINGE (ML) INJECTION AS NEEDED
Status: DISCONTINUED | OUTPATIENT
Start: 2024-09-11 | End: 2024-09-13 | Stop reason: HOSPADM

## 2024-09-11 RX ORDER — LIDOCAINE 4 G/G
1 PATCH TOPICAL DAILY PRN
Status: DISCONTINUED | OUTPATIENT
Start: 2024-09-11 | End: 2024-09-13 | Stop reason: HOSPADM

## 2024-09-11 RX ORDER — SODIUM CHLORIDE 9 MG/ML
1000 INJECTION, SOLUTION INTRAVENOUS ONCE
Status: DISCONTINUED | OUTPATIENT
Start: 2024-09-11 | End: 2024-09-11

## 2024-09-11 RX ORDER — LABETALOL HYDROCHLORIDE 5 MG/ML
20 INJECTION, SOLUTION INTRAVENOUS ONCE
Status: COMPLETED | OUTPATIENT
Start: 2024-09-11 | End: 2024-09-11

## 2024-09-11 RX ORDER — NICOTINE 21 MG/24HR
1 PATCH, TRANSDERMAL 24 HOURS TRANSDERMAL DAILY PRN
Status: DISCONTINUED | OUTPATIENT
Start: 2024-09-11 | End: 2024-09-13 | Stop reason: HOSPADM

## 2024-09-11 RX ORDER — ATORVASTATIN CALCIUM 40 MG/1
40 TABLET, FILM COATED ORAL NIGHTLY
Status: DISCONTINUED | OUTPATIENT
Start: 2024-09-11 | End: 2024-09-13 | Stop reason: HOSPADM

## 2024-09-11 RX ORDER — ACETAMINOPHEN 325 MG/1
650 TABLET ORAL EVERY 6 HOURS PRN
Status: DISCONTINUED | OUTPATIENT
Start: 2024-09-11 | End: 2024-09-13 | Stop reason: HOSPADM

## 2024-09-11 RX ORDER — SODIUM CHLORIDE 9 MG/ML
100 INJECTION, SOLUTION INTRAVENOUS CONTINUOUS
Status: DISCONTINUED | OUTPATIENT
Start: 2024-09-11 | End: 2024-09-11

## 2024-09-11 RX ORDER — HYDRALAZINE HYDROCHLORIDE 20 MG/ML
10 INJECTION INTRAMUSCULAR; INTRAVENOUS EVERY 6 HOURS PRN
Status: DISCONTINUED | OUTPATIENT
Start: 2024-09-11 | End: 2024-09-11

## 2024-09-11 RX ORDER — MULTIVITAMIN WITH IRON
1 TABLET ORAL DAILY
COMMUNITY

## 2024-09-11 RX ORDER — ACETAMINOPHEN 325 MG/1
650 TABLET ORAL EVERY 6 HOURS PRN
Status: DISCONTINUED | OUTPATIENT
Start: 2024-09-11 | End: 2024-09-11

## 2024-09-11 RX ORDER — ONDANSETRON 2 MG/ML
4 INJECTION INTRAMUSCULAR; INTRAVENOUS EVERY 4 HOURS PRN
Status: DISCONTINUED | OUTPATIENT
Start: 2024-09-11 | End: 2024-09-13 | Stop reason: HOSPADM

## 2024-09-11 RX ORDER — THIAMINE HYDROCHLORIDE 100 MG/ML
200 INJECTION, SOLUTION INTRAMUSCULAR; INTRAVENOUS ONCE
Status: COMPLETED | OUTPATIENT
Start: 2024-09-11 | End: 2024-09-11

## 2024-09-11 RX ORDER — POLYETHYLENE GLYCOL 3350 17 G/17G
17 POWDER, FOR SOLUTION ORAL 2 TIMES DAILY PRN
Status: DISCONTINUED | OUTPATIENT
Start: 2024-09-11 | End: 2024-09-13 | Stop reason: HOSPADM

## 2024-09-11 RX ORDER — ISOSORBIDE MONONITRATE 30 MG/1
30 TABLET, EXTENDED RELEASE ORAL DAILY
Status: DISCONTINUED | OUTPATIENT
Start: 2024-09-11 | End: 2024-09-13 | Stop reason: HOSPADM

## 2024-09-11 RX ORDER — DEXTROSE MONOHYDRATE 50 MG/ML
200 INJECTION, SOLUTION INTRAVENOUS CONTINUOUS
Status: ACTIVE | OUTPATIENT
Start: 2024-09-11 | End: 2024-09-12

## 2024-09-11 RX ORDER — DILTIAZEM HYDROCHLORIDE 180 MG/1
180 CAPSULE, COATED, EXTENDED RELEASE ORAL
Status: DISCONTINUED | OUTPATIENT
Start: 2024-09-11 | End: 2024-09-13 | Stop reason: HOSPADM

## 2024-09-11 RX ORDER — ONDANSETRON 2 MG/ML
4 INJECTION INTRAMUSCULAR; INTRAVENOUS EVERY 6 HOURS PRN
Status: DISCONTINUED | OUTPATIENT
Start: 2024-09-11 | End: 2024-09-11

## 2024-09-11 RX ORDER — THIAMINE HYDROCHLORIDE 100 MG/ML
250 INJECTION, SOLUTION INTRAMUSCULAR; INTRAVENOUS DAILY
Status: DISCONTINUED | OUTPATIENT
Start: 2024-09-11 | End: 2024-09-13 | Stop reason: HOSPADM

## 2024-09-11 RX ORDER — SACCHAROMYCES BOULARDII 250 MG
250 CAPSULE ORAL 2 TIMES DAILY
Status: DISCONTINUED | OUTPATIENT
Start: 2024-09-11 | End: 2024-09-13 | Stop reason: HOSPADM

## 2024-09-11 RX ORDER — ASPIRIN 81 MG/1
81 TABLET, CHEWABLE ORAL DAILY
Status: DISCONTINUED | OUTPATIENT
Start: 2024-09-11 | End: 2024-09-13 | Stop reason: HOSPADM

## 2024-09-11 RX ORDER — ACETAMINOPHEN 500 MG
500 TABLET ORAL DAILY
Status: ON HOLD | COMMUNITY
End: 2024-09-13

## 2024-09-11 RX ORDER — ALUMINA, MAGNESIA, AND SIMETHICONE 2400; 2400; 240 MG/30ML; MG/30ML; MG/30ML
15 SUSPENSION ORAL EVERY 6 HOURS PRN
Status: DISCONTINUED | OUTPATIENT
Start: 2024-09-11 | End: 2024-09-13 | Stop reason: HOSPADM

## 2024-09-11 RX ORDER — HYDROXYZINE HYDROCHLORIDE 25 MG/1
25 TABLET, FILM COATED ORAL 3 TIMES DAILY PRN
Status: DISCONTINUED | OUTPATIENT
Start: 2024-09-11 | End: 2024-09-13 | Stop reason: HOSPADM

## 2024-09-11 RX ORDER — PROCHLORPERAZINE EDISYLATE 5 MG/ML
5 INJECTION INTRAMUSCULAR; INTRAVENOUS EVERY 6 HOURS PRN
Status: DISCONTINUED | OUTPATIENT
Start: 2024-09-11 | End: 2024-09-13 | Stop reason: HOSPADM

## 2024-09-11 RX ORDER — PANTOPRAZOLE SODIUM 40 MG/1
40 TABLET, DELAYED RELEASE ORAL
Status: DISCONTINUED | OUTPATIENT
Start: 2024-09-11 | End: 2024-09-13 | Stop reason: HOSPADM

## 2024-09-11 RX ORDER — SODIUM CHLORIDE 9 MG/ML
40 INJECTION, SOLUTION INTRAVENOUS AS NEEDED
Status: DISCONTINUED | OUTPATIENT
Start: 2024-09-11 | End: 2024-09-13 | Stop reason: HOSPADM

## 2024-09-11 RX ORDER — LORAZEPAM 2 MG/ML
2 INJECTION INTRAMUSCULAR EVERY 4 HOURS PRN
Status: DISCONTINUED | OUTPATIENT
Start: 2024-09-11 | End: 2024-09-13 | Stop reason: HOSPADM

## 2024-09-11 RX ORDER — SODIUM CHLORIDE 0.9 % (FLUSH) 0.9 %
10 SYRINGE (ML) INJECTION EVERY 12 HOURS SCHEDULED
Status: DISCONTINUED | OUTPATIENT
Start: 2024-09-11 | End: 2024-09-13 | Stop reason: HOSPADM

## 2024-09-11 RX ADMIN — LORAZEPAM 1 MG: 2 INJECTION INTRAMUSCULAR; INTRAVENOUS at 00:26

## 2024-09-11 RX ADMIN — LABETALOL HYDROCHLORIDE 20 MG: 5 INJECTION, SOLUTION INTRAVENOUS at 00:27

## 2024-09-11 RX ADMIN — SODIUM CHLORIDE 100 ML/HR: 9 INJECTION, SOLUTION INTRAVENOUS at 05:48

## 2024-09-11 RX ADMIN — ATORVASTATIN CALCIUM 40 MG: 40 TABLET, FILM COATED ORAL at 21:45

## 2024-09-11 RX ADMIN — ASPIRIN 81 MG: 81 TABLET, CHEWABLE ORAL at 13:20

## 2024-09-11 RX ADMIN — DILTIAZEM HYDROCHLORIDE 180 MG: 180 CAPSULE, EXTENDED RELEASE ORAL at 13:20

## 2024-09-11 RX ADMIN — FOLIC ACID 1 MG: 5 INJECTION, SOLUTION INTRAMUSCULAR; INTRAVENOUS; SUBCUTANEOUS at 01:18

## 2024-09-11 RX ADMIN — Medication 10 ML: at 21:46

## 2024-09-11 RX ADMIN — THIAMINE HYDROCHLORIDE 250 MG: 100 INJECTION, SOLUTION INTRAMUSCULAR; INTRAVENOUS at 13:20

## 2024-09-11 RX ADMIN — SODIUM CHLORIDE, POTASSIUM CHLORIDE, SODIUM LACTATE AND CALCIUM CHLORIDE 2112 ML: 600; 310; 30; 20 INJECTION, SOLUTION INTRAVENOUS at 00:27

## 2024-09-11 RX ADMIN — DEXTROSE MONOHYDRATE 200 ML/HR: 50 INJECTION, SOLUTION INTRAVENOUS at 19:22

## 2024-09-11 RX ADMIN — Medication 5 MG: at 22:59

## 2024-09-11 RX ADMIN — Medication 250 MG: at 13:20

## 2024-09-11 RX ADMIN — LEVETIRACETAM 500 MG: 100 INJECTION, SOLUTION INTRAVENOUS at 21:45

## 2024-09-11 RX ADMIN — Medication 250 MG: at 21:45

## 2024-09-11 RX ADMIN — HYDROXYZINE HYDROCHLORIDE 25 MG: 25 TABLET, FILM COATED ORAL at 22:59

## 2024-09-11 RX ADMIN — APIXABAN 5 MG: 5 TABLET, FILM COATED ORAL at 13:20

## 2024-09-11 RX ADMIN — ISOSORBIDE MONONITRATE 30 MG: 30 TABLET, EXTENDED RELEASE ORAL at 13:20

## 2024-09-11 RX ADMIN — LEVETIRACETAM 500 MG: 100 INJECTION, SOLUTION INTRAVENOUS at 05:48

## 2024-09-11 RX ADMIN — PANTOPRAZOLE SODIUM 40 MG: 40 TABLET, DELAYED RELEASE ORAL at 13:20

## 2024-09-11 RX ADMIN — THIAMINE HYDROCHLORIDE 200 MG: 100 INJECTION, SOLUTION INTRAMUSCULAR; INTRAVENOUS at 00:26

## 2024-09-11 RX ADMIN — APIXABAN 5 MG: 5 TABLET, FILM COATED ORAL at 21:45

## 2024-09-11 RX ADMIN — CEFTRIAXONE SODIUM 1000 MG: 1 INJECTION, POWDER, FOR SOLUTION INTRAMUSCULAR; INTRAVENOUS at 13:20

## 2024-09-11 NOTE — H&P
[facility] HISTORY AND PHYSICAL  Date: 2024   Patient Name: Kiesha Pan  : 1955  MRN: [id number]  Primary Care Physician:  Shahida Martinez MD  Date of admission: 9/10/2024    Subjective   Subjective     Chief Complaint: Seizure    HPI:    Kiesha Pan is a 68 y.o. female with past medical history of hypertension, CVA and hyperlipidemia presents to ED due to seizures.  Patient was in the kitchen around 9 PM and began to have tonic-clonic seizures and was witnessed by her son.  As per son patient was having seizures for 5 to 10 minutes to the point that she got pale.  Patient has history of alcohol use but denies any alcohol use for the past couple months.  In addition patient states feeling some lightheadedness for the past couple of days.  Also due to insurance issue patient has not been taking any of her medications for the past couple months.  Denies chest pain, headache, blurry vision, abdominal pain, nausea combine, diarrhea or fever.  In the ED, patient's vitals showed temperature 99.9, heart rate 94 blood pressure 139/99.  Labs show CK2 57, sodium 121, creatinine 0.82, glucose 236, lactic 7.3 and repeat 2.2, white blood cell 7.3.  Chest ray shows no acute findings.  CT head shows no acute findings.  Patient admitted to floors for further management.    Personal History     Past Medical History:  Past Medical History:   Diagnosis Date    Asthma     COVID-19     Hypertension     Mixed hyperlipidemia 2021    Stroke 2021       Past Surgical History:  Past Surgical History:   Procedure Laterality Date    CARDIAC CATHETERIZATION N/A 2024    Procedure: Left Heart Cath;  Surgeon: Emil Dunn MD;  Location: [facility] CATH INVASIVE LOCATION;  Service: Cardiovascular;  Laterality: N/A;    CHOLECYSTECTOMY         Family History:   Family History   Problem Relation Age of Onset    Kidney disease Mother        Social History:   Social History     Socioeconomic  History    Marital status:    Tobacco Use    Smoking status: Former     Current packs/day: 0.00     Average packs/day: 2.0 packs/day for 27.0 years (54.0 ttl pk-yrs)     Types: Cigarettes     Start date:      Quit date:      Years since quittin.    Smokeless tobacco: Never   Vaping Use    Vaping status: Never Used   Substance and Sexual Activity    Alcohol use: Yes     Alcohol/week: 8.0 standard drinks of alcohol     Types: 4 Shots of liquor, 4 Drinks containing 0.5 oz of alcohol per week     Comment: [place]    Drug use: Not Currently    Sexual activity: Defer       Home Medications:  apixaban, aspirin, atorvastatin, dilTIAZem CD, isosorbide mononitrate, lisinopril, and pantoprazole    Allergies:  Allergies   Allergen Reactions    Cephalexin Unknown - Low Severity    Diphenhydramine Unknown - Low Severity    Levofloxacin Unknown - Low Severity    Penicillins Unknown - Low Severity       Review of Systems   All systems were reviewed and negative except for: Above    Objective   Objective     Vitals:   Temp:  [98 °F (36.7 °C)-99.9 °F (37.7 °C)] 99.9 °F (37.7 °C)  Heart Rate:  [] 94  Resp:  [18-28] 18  BP: (122-201)/() 131/99    Physical Exam    Constitutional: Awake, alert, no acute distress   Eyes: Pupils equal, sclerae anicteric, no conjunctival injection   HENT: NCAT, mucous membranes moist   Neck: Supple, no thyromegaly, no lymphadenopathy, trachea midline   Respiratory: Clear to auscultation bilaterally, nonlabored respirations    Cardiovascular: RRR, no murmurs, rubs, or gallops, palpable pedal pulses bilaterally   Gastrointestinal: Positive bowel sounds, soft, nontender, nondistended   Musculoskeletal: No bilateral ankle edema, no clubbing or cyanosis to extremities   Psychiatric: Appropriate affect, cooperative   Neurologic: Oriented x 3, strength symmetric in all extremities, Cranial Nerves grossly intact to confrontation, speech clear   Skin: No rashes     Result Review     Result Review:  I have personally reviewed the results from the time of this admission to 9/11/2024 04:51 EDT and agree with these findings:  []  Laboratory  [x]  Microbiology  []  Radiology  []  EKG/Telemetry   []  Cardiology/Vascular   []  Pathology  [x]  Old records  []  Other:      Assessment & Plan   Assessment / Plan     Assessment/Plan:     Assessment  New onset seizure; rule out stroke  Moderate hyponatremia  Presyncope  Atrial fibrillation, not on AC   history of hemorrhagic stroke  Essential hypertension  Hyperlipidemia  History of alcohol use    Plan  Admit to [facility]  Telemetry  Monitor vitals  CBC BMP   BMP every 4  TSH, hemoglobin C, lipid panel  Echo ordered  Chest reviewed  CT head reviewed  MRI brain ordered  Start home medications   Start aspirin Eliquis  Will start patient on Keppra 500 IV twice daily for now  Fluids  Patient will need teleneurology consult in the morning    VTE Prophylaxis:  Pharmacologic VTE prophylaxis orders are signed & held. Mechanical VTE prophylaxis orders are present.        CODE STATUS:    Code Status (Patient has no pulse and is not breathing): CPR (Attempt to Resuscitate)  Medical Interventions (Patient has pulse or is breathing): Full Support      Admission Status:  I believe this patient meets inpt status.    Electronically signed by Prabhakar Brink MD, 09/11/24, 4:51 AM EDT.

## 2024-09-11 NOTE — DISCHARGE INSTR - APPOINTMENTS
[facility]  September 25, 2024 1p  [address] [address]  [phone]

## 2024-09-11 NOTE — ED TRIAGE NOTES
Son found her convulsing and foaming at mouth at 2100..  Family reports she has been vomiting past 2 days.  Family reports she is confused.   in triage

## 2024-09-11 NOTE — ED PROVIDER NOTES
Time: 9:40 PM EDT  Date of encounter:  9/10/2024  Independent Historian/Clinical History and Information was obtained by:   Family    History is limited by: N/A    Chief complaint: New onset seizure    History of Present Illness:  Patient is a 68 y.o. year old female who presents to the emergency department for evaluation of seizure activity.  Patient's family states patient had seizure activity around 9 PM tonight.  The son who witnessed the event said he was in the kitchen and his mother was in the living room which he could see.  He states that he looked over and looked like her arm was up in the air shaking.  He went over there and evaluated her and felt that she was seizing.  He states that she was very stiff her body was rigid and she was having shaking activity.  He also notes that she was foaming at the mouth and looked like she was probably not breathing.  He states the seizure lasted for at least 2 minutes.  He states the patient was confused and difficult to arouse for at least 10 minutes afterwards.  The patient's had no further events.  The patient does not have a history of seizures.  The family does note that the patient has a history of hemorrhagic stroke.  They also note that the patient has not been without medication for at least 2 months.  The  who is at bedside also notes that the patient used to have a history of alcoholism but they state that she has not drank in several months.  They do note that the patient had nausea and vomiting over the last 2 days and overall was not feeling well.  Patient right now states that she feels slightly anxious.  Patient has some slight nausea.  She denies any headache.  She denies any chest pain or shortness of breath.  She denies any abdominal pain at this time.      Patient Care Team  Primary Care Provider: Shahida Martinez MD    Past Medical History:     Allergies   Allergen Reactions    Cephalexin Unknown - Low Severity      Previously tolerated ceftriaxone - Mojgan TIJERINA, PharmD    Diphenhydramine Unknown - Low Severity    Levofloxacin Unknown - Low Severity    Penicillins Unknown - Low Severity     Past Medical History:   Diagnosis Date    Asthma     COVID-19     Hypertension     Mixed hyperlipidemia 2021    Stroke 2021     Past Surgical History:   Procedure Laterality Date    CARDIAC CATHETERIZATION N/A 2024    Procedure: Left Heart Cath;  Surgeon: Emil Dunn MD;  Location: [facility] CATH INVASIVE LOCATION;  Service: Cardiovascular;  Laterality: N/A;    CHOLECYSTECTOMY       Family History   Problem Relation Age of Onset    Kidney disease Mother        Home Medications:  Prior to Admission medications    Medication Sig Start Date End Date Taking? Authorizing Provider   apixaban (ELIQUIS) 5 MG tablet tablet Take 1 tablet by mouth Every 12 (Twelve) Hours. Indications: Atrial Fibrillation 24   Ave Benson APRN   aspirin 81 MG chewable tablet Chew 1 tablet Daily. 24   Ave Benson APRN   atorvastatin (LIPITOR) 40 MG tablet Take 1 tablet by mouth Every Night. 3/27/24   Josué Purdy MD   dilTIAZem CD (CARDIZEM CD) 180 MG 24 hr capsule Take 1 capsule by mouth Daily. 24   Ave Benson APRN   isosorbide mononitrate (IMDUR) 30 MG 24 hr tablet Take 1 tablet by mouth Daily. 24   Shahida Martinez MD   lisinopril (PRINIVIL,ZESTRIL) 20 MG tablet Take 1 tablet by mouth Daily. 24   Ave Benson APRN   pantoprazole (Protonix) 40 MG EC tablet 1 tablet p.o. 30 minutes before a meal for 4 weeks then 1 tablet every other day 24   Shahida Martinez MD        Social History:   Social History     Tobacco Use    Smoking status: Former     Current packs/day: 0.00     Average packs/day: 2.0 packs/day for 27.0 years (54.0 ttl pk-yrs)     Types: Cigarettes     Start date:      Quit date:      Years since quittin.7    Smokeless tobacco:  "Never   Vaping Use    Vaping status: Never Used   Substance Use Topics    Alcohol use: Yes     Alcohol/week: 8.0 standard drinks of alcohol     Types: 4 Shots of liquor, 4 Drinks containing 0.5 oz of alcohol per week     Comment: Clinch    Drug use: Not Currently         Review of Systems:  Review of Systems   Constitutional:  Negative for chills, diaphoresis and fever.   HENT:  Negative for congestion, postnasal drip, rhinorrhea and sore throat.    Eyes:  Negative for photophobia.   Respiratory:  Negative for cough, chest tightness and shortness of breath.    Cardiovascular:  Negative for chest pain, palpitations and leg swelling.   Gastrointestinal:  Positive for nausea and vomiting. Negative for abdominal pain and diarrhea.   Genitourinary:  Negative for difficulty urinating, dysuria, flank pain, frequency, hematuria and urgency.   Musculoskeletal:  Negative for neck pain and neck stiffness.   Skin:  Negative for pallor and rash.   Neurological:  Positive for tremors and seizures. Negative for dizziness, syncope, weakness, numbness and headaches.   Hematological:  Negative for adenopathy. Does not bruise/bleed easily.   Psychiatric/Behavioral:  Positive for confusion.         Physical Exam:  /84 (BP Location: Right arm, Patient Position: Lying)   Pulse 80   Temp 99 °F (37.2 °C) (Oral)   Resp 18   Ht 170.2 cm (67\")   Wt 70.4 kg (155 lb 3.3 oz)   LMP  (LMP Unknown)   SpO2 95%   BMI 24.31 kg/m²         Physical Exam  Vitals and nursing note reviewed.   Constitutional:       General: She is not in acute distress.     Appearance: Normal appearance. She is not ill-appearing, toxic-appearing or diaphoretic.   HENT:      Head: Normocephalic and atraumatic.      Jaw: There is normal jaw occlusion.      Mouth/Throat:      Mouth: Mucous membranes are moist.        Comments: The patient has bilateral superficial abrasions and contusions to the tongue  Eyes:      General: No visual field deficit.     Pupils: " Pupils are equal, round, and reactive to light.   Cardiovascular:      Rate and Rhythm: Normal rate and regular rhythm.      Pulses: Normal pulses.           Carotid pulses are 2+ on the right side and 2+ on the left side.       Radial pulses are 2+ on the right side and 2+ on the left side.        Femoral pulses are 2+ on the right side and 2+ on the left side.       Popliteal pulses are 2+ on the right side and 2+ on the left side.        Dorsalis pedis pulses are 2+ on the right side and 2+ on the left side.        Posterior tibial pulses are 2+ on the right side and 2+ on the left side.      Heart sounds: Normal heart sounds. No murmur heard.  Pulmonary:      Effort: Pulmonary effort is normal. No accessory muscle usage, respiratory distress or retractions.      Breath sounds: Examination of the right-upper field reveals decreased breath sounds. Examination of the left-upper field reveals decreased breath sounds. Examination of the right-middle field reveals decreased breath sounds. Examination of the left-middle field reveals decreased breath sounds. Examination of the right-lower field reveals decreased breath sounds. Examination of the left-lower field reveals decreased breath sounds. Decreased breath sounds present. No wheezing, rhonchi or rales.   Abdominal:      General: Abdomen is flat. There is no distension.      Palpations: Abdomen is soft. There is no mass or pulsatile mass.      Tenderness: There is no abdominal tenderness. There is no right CVA tenderness, left CVA tenderness, guarding or rebound.      Comments: No rigidity   Musculoskeletal:         General: No swelling, tenderness or deformity.      Cervical back: Neck supple. No tenderness.      Right lower leg: No edema.      Left lower leg: No edema.   Skin:     General: Skin is warm and dry.      Capillary Refill: Capillary refill takes less than 2 seconds.      Coloration: Skin is not jaundiced or pale.      Findings: No erythema.    Neurological:      General: No focal deficit present.      Mental Status: She is alert and oriented to person, place, and time. Mental status is at baseline.      Cranial Nerves: Cranial nerves 2-12 are intact. Facial asymmetry present. No cranial nerve deficit or dysarthria.      Sensory: Sensation is intact. No sensory deficit.      Motor: Motor function is intact. Tremor present. No weakness or pronator drift.   Psychiatric:         Mood and Affect: Mood is anxious.         Behavior: Behavior normal.                  Procedures:  Procedures      Medical Decision Making:      Comorbidities that affect care:    Hypertension, asthma, hemorrhagic stroke, hyperlipidemia, alcohol abuse, current smoker, probable COPD    External Notes reviewed:    None      The following orders were placed and all results were independently analyzed by me:  Orders Placed This Encounter   Procedures    Blood Culture - Blood,    Blood Culture - Blood,    Urine Culture - Urine,    COVID-19, FLU A/B, RSV PCR 1 HR TAT - Swab, Nasopharynx    CT Head Without Contrast    XR Chest 1 View    MRI Brain Without Contrast    [place] Draw    Comprehensive Metabolic Panel    Single High Sensitivity Troponin T    Magnesium    Urinalysis With Microscopic If Indicated (No Culture) - Urine, Clean Catch    CBC Auto Differential    Lactic Acid, Plasma    Ethanol    Urine Drug Screen - Urine, Clean Catch    Ammonia    STAT Lactic Acid, Reflex    Urinalysis, Microscopic Only - Urine, Clean Catch    Acetone    CK    STAT Lactic Acid, Reflex    Lipid Panel    TSH Rfx On Abnormal To Free T4    Hemoglobin A1c    CBC Auto Differential    Basic Metabolic Panel    Basic Metabolic Panel    Basic Metabolic Panel    Magnesium    Phosphorus    Osmolality, Urine - Urine, Clean Catch    Sodium, Urine, Random - Urine, Clean Catch    Osmolality, Serum    Sodium    CBC Auto Differential    Diet: Regular/House; Fluid Consistency: Thin (IDDSI 0)    Undress & Gown    Vital  Signs    Orthostatic Blood Pressure    Undress & Gown    Continuous Pulse Oximetry    Vital Signs    Please perform rectal temperature  Nursing Communication    Vital Signs    Intake & Output    Weigh Patient    Oral Care    Saline Lock & Maintain IV Access    Discontinue Cardiac Monitoring    Please stop pt's ivf  Nursing Communication    Code Status and Medical Interventions: CPR (Attempt to Resuscitate); Full Support    Inpatient Hospitalist Consult    Inpatient Neurology Consult General    OT Consult: Eval & Treat ADL Performance Below Baseline    PT Consult: Eval & Treat Functional Mobility Below Baseline    Oxygen Therapy- Nasal Cannula; Titrate 1-6 LPM Per SpO2; 90 - 95%    Oxygen Therapy- Nasal Cannula; Titrate 1-6 LPM Per SpO2; 90 - 95%    POC Glucose Once    POC Glucose Once    ECG 12 Lead ED Triage Standing Order; Weak / Dizzy / AMS    Adult Transthoracic Echo Complete W/ Cont if Necessary Per Protocol    EEG    Insert Peripheral IV    Insert Peripheral IV    Insert Peripheral IV    Inpatient Admission    Fall Precautions    Seizure Precautions    CBC & Differential    Green Top (Gel)    Lavender Top    Gold Top - SST    Light Blue Top    CBC & Differential    CBC & Differential       Medications Given in the Emergency Department:  Medications   sodium chloride 0.9 % flush 10 mL (10 mL Intravenous Given 9/11/24 2146)   sodium chloride 0.9 % flush 10 mL (has no administration in time range)   sodium chloride 0.9 % infusion 40 mL (has no administration in time range)   aspirin chewable tablet 81 mg (81 mg Oral Given 9/11/24 1320)   LORazepam (ATIVAN) injection 2 mg (has no administration in time range)   apixaban (ELIQUIS) tablet 5 mg (5 mg Oral Given 9/11/24 2145)   atorvastatin (LIPITOR) tablet 40 mg (40 mg Oral Given 9/11/24 2145)   dilTIAZem CD (CARDIZEM CD) 24 hr capsule 180 mg (180 mg Oral Given 9/11/24 1320)   isosorbide mononitrate (IMDUR) 24 hr tablet 30 mg (30 mg Oral Given 9/11/24 1320)    lisinopril (PRINIVIL,ZESTRIL) tablet 20 mg ( Oral Dose Auto Held 9/27/24 0900)   pantoprazole (PROTONIX) EC tablet 40 mg (40 mg Oral Given 9/11/24 1320)   levETIRAcetam (KEPPRA) injection 500 mg (500 mg Intravenous Given 9/11/24 2145)   thiamine (B-1) injection 250 mg (250 mg Intravenous Given 9/11/24 1320)   melatonin tablet 5 mg (5 mg Oral Given 9/11/24 2259)   aluminum-magnesium hydroxide-simethicone (MAALOX MAX) 400-400-40 MG/5ML suspension 15 mL (has no administration in time range)   nicotine (NICODERM CQ) 21 MG/24HR patch 1 patch (has no administration in time range)   hydrOXYzine (ATARAX) tablet 25 mg (25 mg Oral Given 9/11/24 2259)   ondansetron (ZOFRAN) injection 4 mg (has no administration in time range)   polyethylene glycol (MIRALAX) packet 17 g (has no administration in time range)   acetaminophen (TYLENOL) tablet 650 mg (has no administration in time range)   Diclofenac Sodium (VOLTAREN) 1 % gel 2 g (has no administration in time range)   Lidocaine 4 % 1 patch (has no administration in time range)   prochlorperazine (COMPAZINE) injection 5 mg (has no administration in time range)   cefTRIAXone (ROCEPHIN) 1,000 mg in sodium chloride 0.9 % 100 mL IVPB-VTB (1,000 mg Intravenous New Bag 9/11/24 1320)   saccharomyces boulardii (FLORASTOR) capsule 250 mg (250 mg Oral Given 9/11/24 2145)   dextrose (D5W) 5 % infusion (200 mL/hr Intravenous New Bag 9/11/24 1922)   LORazepam (ATIVAN) injection 1 mg (1 mg Intravenous Given 9/11/24 0026)   lactated ringers bolus 2,112 mL (0 mL Intravenous Stopped 9/11/24 0121)   folic acid 1 mg in sodium chloride 0.9 % 50 mL IVPB (0 mg Intravenous Stopped 9/11/24 0151)   thiamine (B-1) injection 200 mg (200 mg Intravenous Given 9/11/24 0026)   labetalol (NORMODYNE,TRANDATE) injection 20 mg (20 mg Intravenous Given 9/11/24 0027)        ED Course:    The patient was initially evaluated in the triage area where orders were placed. The patient was later dispositioned by Adolfo  DO Asha.      The patient was advised to stay for completion of workup which includes but is not limited to communication of labs and radiological results, reassessment and plan. The patient was advised that leaving prior to disposition by a provider could result in critical findings that are not communicated to the patient.     ED Course as of 09/12/24 0329   Tue Sep 10, 2024   2142 PROVIDER IN TRIAGE  Patient was evaluated by me in triage, Gio Coulter PA-C.  Orders were placed and patient is currently awaiting final results and disposition.  [MD]   2200 EKG:    Rhythm: Sinus tachycardia  Rate: 128  Intervals: Normal DE and QT interval  LVH by criteria in aVL  T-wave: No pathological T waves  ST Segment: No pathological ST elevation or reciprocal ST depression to suggest STEMI    EKG Comparison: No EKG available for comparison    Interpreted by me   [SD]      ED Course User Index  [MD] Gio Coulter PA-C  [SD] Adolfo Barry DO       Labs:    Lab Results (last 24 hours)       Procedure Component Value Units Date/Time    CBC & Differential [[id number]]  (Abnormal) Collected: 09/11/24 0648    Specimen: Blood from Hand, Right Updated: 09/11/24 0655    Narrative:      The following orders were created for panel order CBC & Differential.  Procedure                               Abnormality         Status                     ---------                               -----------         ------                     CBC Auto Differential[[id number]]        Abnormal            Final result                 Please view results for these tests on the individual orders.    Lipid Panel [[id number]]  (Abnormal) Collected: 09/11/24 0648    Specimen: Blood from Hand, Right Updated: 09/11/24 0736     Total Cholesterol 222 mg/dL      Triglycerides 162 mg/dL      HDL Cholesterol 48 mg/dL      LDL Cholesterol  145 mg/dL      VLDL Cholesterol 29 mg/dL      LDL/HDL Ratio 2.95    Narrative:      Cholesterol Reference Ranges  (U.S.  Department of Health and Human Services ATP III Classifications)    Desirable          <200 mg/dL  Borderline High    200-239 mg/dL  High Risk          >240 mg/dL      Triglyceride Reference Ranges  (U.S. Department of Health and Human Services ATP III Classifications)    Normal           <150 mg/dL  Borderline High  150-199 mg/dL  High             200-499 mg/dL  Very High        >500 mg/dL    HDL Reference Ranges  (U.S. Department of Health and Human Services ATP III Classifications)    Low     <40 mg/dl (major risk factor for CHD)  High    >60 mg/dl ('negative' risk factor for CHD)        LDL Reference Ranges  (U.S. Department of Health and Human Services ATP III Classifications)    Optimal          <100 mg/dL  Near Optimal     100-129 mg/dL  Borderline High  130-159 mg/dL  High             160-189 mg/dL  Very High        >189 mg/dL    TSH Rfx On Abnormal To Free T4 [[id number]]  (Normal) Collected: 09/11/24 0648    Specimen: Blood from Hand, Right Updated: 09/11/24 0721     TSH 2.580 uIU/mL     Hemoglobin A1c [[id number]]  (Normal) Collected: 09/11/24 0648    Specimen: Blood from Hand, Right Updated: 09/11/24 1017     Hemoglobin A1C 5.60 %     Narrative:      Hemoglobin A1C Ranges:    Increased Risk for Diabetes  5.7% to 6.4%  Diabetes                     >= 6.5%  Diabetic Goal                < 7.0%    CBC Auto Differential [[id number]]  (Abnormal) Collected: 09/11/24 0648    Specimen: Blood from Hand, Right Updated: 09/11/24 0655     WBC 10.81 10*3/mm3      RBC 4.14 10*6/mm3      Hemoglobin 13.3 g/dL      Hematocrit 38.2 %      MCV 92.3 fL      MCH 32.1 pg      MCHC 34.8 g/dL      RDW 12.3 %      RDW-SD 41.6 fl      MPV 9.0 fL      Platelets 394 10*3/mm3      Neutrophil % 80.1 %      Lymphocyte % 12.1 %      Monocyte % 7.2 %      Eosinophil % 0.0 %      Basophil % 0.2 %      Immature Grans % 0.4 %      Neutrophils, Absolute 8.66 10*3/mm3      Lymphocytes, Absolute 1.31 10*3/mm3      Monocytes, Absolute 0.78  10*3/mm3      Eosinophils, Absolute 0.00 10*3/mm3      Basophils, Absolute 0.02 10*3/mm3      Immature Grans, Absolute 0.04 10*3/mm3      nRBC 0.0 /100 WBC     Basic Metabolic Panel [[id number]]  (Abnormal) Collected: 24 0648    Specimen: Blood from Hand, Right Updated: 24 0736     Glucose 135 mg/dL      BUN 7 mg/dL      Creatinine 0.68 mg/dL      Sodium 128 mmol/L      Potassium 3.6 mmol/L      Chloride 91 mmol/L      CO2 25.8 mmol/L      Calcium 9.1 mg/dL      BUN/Creatinine Ratio 10.3     Anion Gap 11.2 mmol/L      eGFR 95.0 mL/min/1.73     Narrative:      GFR Normal >60  Chronic Kidney Disease <60  Kidney Failure <15      STAT Lactic Acid, Reflex [[id number]]  (Normal) Collected: 24 0652    Specimen: Blood Updated: 24 0712     Lactate 1.8 mmol/L     Basic Metabolic Panel [[id number]]  (Abnormal) Collected: 24 1153    Specimen: Blood from Hand, Left Updated: 24 1309     Glucose 115 mg/dL      BUN 8 mg/dL      Creatinine 0.66 mg/dL      Sodium 133 mmol/L      Potassium 3.8 mmol/L      Comment: Slight hemolysis detected by analyzer. Result may be falsely elevated.        Chloride 95 mmol/L      CO2 22.3 mmol/L      Calcium 8.6 mg/dL      BUN/Creatinine Ratio 12.1     Anion Gap 15.7 mmol/L      eGFR 95.7 mL/min/1.73     Narrative:      GFR Normal >60  Chronic Kidney Disease <60  Kidney Failure <15      Sodium [[id number]]  (Abnormal) Collected: 24    Specimen: Blood Updated: 24     Sodium 132 mmol/L              Imaging:    EEG    Result Date: 2024  Table formatting from the original result was not included. Images from the original result were not included. [address], [place] [id number] [phone] ELECTROENCEPHALOGRAPHIC REPORT Patient: Kiesha Pan EEG # :    SWD-Y- : 1955  ID:      [id number]    Age: 68 y.o. female    Primary  Physician: Severo Chin MD Technician: Shey Marquez Ordering  Physician: Prabhakar Brink MD     Recording Date: September 11, 2024 Report  Date: 9/11/2024     History:  Seizures Medications: apixaban, aspirin, atorvastatin, dilTIAZem CD, isosorbide mononitrate, lisinopril, and pantoprazole Reading: The EEG was obtained portable room during the waking and light stages of sleep as well as on photic stimulation with video monitoring.  There are normalized sleep tonight before the testing. The dominant waking background activity consists of symmetric and irregular 20 to 65 µV 9 cps which were prominent on both parieto-occipital regions.  During the recording, she had several episodes wherein her had turned to the left which was immediately followed by elevation of the left shoulder followed by elevation of the right shoulder with flexion of the right arm at the elbow and pressing on the right forearm and hand to his trunk.  These movements were followed by runs of rhythmic 30 to 80 µV 5 to 6 cps waves which were generalized more prominent on both anterior quadrants and more prominent on the right side.  Simultaneously, there was a 30 to 75 µV focal slow activity of 1 to 2 cps noted over the right frontotemporal region.  The body movements last from 1 to 2 seconds and the changes in the electrocerebral activity last from 3 seconds to as long as 80 seconds.  Some of this electrocerebral changes were not preceded by the body movements.  Rarely, in the middle of these electrocerebral activity changes, there were occasional 30 to 85 µV single sharp discharge noted over the right frontotemporal region.  There were no discernible responses on photic stimulation at various frequencies.  There was no amplitude asymmetry. Impression: Abnormal EEG because of several ictal events which  would be categorized as focal motor seizure with secondary generalization. Neuroradiographic imaging may be of some help to rule out pathology in the right brain. Electronically signed by Kamryn Richey Jr., MD, 09/11/24, 10:55 PM EDT.  Please note that portions of this note were completed with a voice recognition program.  Part of this note is an electric or electronic transcription/translation of spoken language to printed text using the dragon dictating system.     MRI Brain Without Contrast    Result Date: 9/11/2024  MRI BRAIN WO CONTRAST Date of Exam: 9/11/2024 8:51 PM EDT Indication: r/o stroke.  Comparison: Noncontrast CT head 9/10/2024, MRI brain 6/10/2022 Technique:  Routine multiplanar/multisequence sequence images of the brain were obtained without contrast administration. Findings: There is no diffusion restriction to suggest acute infarct. There is no evidence of acute or chronic intracranial hemorrhage. No mass effect or midline shift. No abnormal extra-axial collections. Slight worsening of mild to moderate nonspecific periventricular and subcortical foci of white matter signal abnormality. There is generalized cerebral atrophy. However, there is more advanced atrophy involving the right mesial temporal lobe compared to left which has progressed since prior examination. The basal ganglia, brainstem and cerebellum appear within normal limits. Midline structures are intact. No additional cortical abnormality is identified. Calvarial and superficial soft tissue signal is within normal limits. Orbits appear unremarkable. Paranasal sinuses are well aerated. There is nonspecific fluid in the right mastoid air cells.     Impression: 1.No acute process is identified. 2.Progressive nonspecific white matter changes most frequently seen in the setting of chronic microvascular ischemic disease. 3.There is generalized cerebral atrophy with progressive focal atrophy involving the right mesial temporal lobe. 4.Right mastoid effusion. Electronically Signed: Calixto Eid MD  9/11/2024 9:21 PM EDT  Workstation ID: [id number]    Adult Transthoracic Echo Complete W/ Cont if Necessary Per Protocol    Result Date: 9/11/2024    The study is technically  difficult for diagnosis.   Left ventricular systolic function is hyperdynamic (EF > 70%).   Left ventricular wall thickness is consistent with mild to moderate concentric hypertrophy.   Left ventricular diastolic function was normal.        Differential Diagnosis and Discussion:      Altered Mental Status: Based on the patient's signs and symptoms, differential diagnosis includes but is not limited to meningitis, stroke, sepsis, subarachnoid hemorrhage, intracranial bleeding, encephalitis, and metabolic encephalopathy.  Seizure: Differential diagnosis includes but is not limited to meningitis, hypoglycemia, electrolyte abnormalities, intracranial hemorrhage, toxin induced, and pseudoseizure.    All labs were reviewed and interpreted by me.  All X-rays impressions were independently interpreted by me.  EKG was interpreted by me.    MDM  Number of Diagnoses or Management Options  Abrasion of tongue, initial encounter  History of alcoholism  History of stroke  Hyponatremia  Lactic acidosis  New onset seizure  Uncontrolled hypertension  Diagnosis management comments:   The patient's CBC was reviewed and shows no abnormalities of critical concern.  Of note, there is no anemia requiring a blood transfusion and the platelet count is acceptable    Patient's urine toxicology screen was clean    The patient's Covid swab was negative   The patient's Influenza swab was negative     2 blood cultures were ordered.  The results are pending at the time of disposition    The patient's TSH and free T4 were normal.  I do not feel that the patient had thyrotoxicosis or thyroid storm and thus not the cause of the patient's symptoms  Patient's total CK was elevated to 257 which is consistent with the patient's probable seizure      Patient's initial lactate returned at 7.3 this is also consistent with the patient's possible seizure.  The patient was given 2 L of normal saline.    Patient's magnesium level was normal and thus thought to  be noncontributory to the patient's symptoms    The patient's EKG demonstrated sinus tachycardia but there is no evidence of myocardial ischemia, injury or dysrhythmia    The patient's CMP was reviewed and shows no abnormalities of critical concern.  Of note, the patient's potassium was acceptable.  The patient's liver enzymes are unremarkable.  The patient's renal function including creatinine is preserved.  The patient has a normal anion gap.  The patient sodium is moderately low at 121.  I do feel this is most likely related to hypovolemia hyponatremia.  The patient was given normal saline 1 in the emergency room    CT scan of the brain demonstrated no acute abnormalities including no acute intracranial hemorrhage, mass or ischemic stroke    The patient was in the emergency room for approximately 5 hours.  The patient had no further seizure-like activity.  The patient's tachycardia and tremors resolved with the Ativan and fluids.  The patient's blood pressure improved with the labetalol.  Patient's blood pressure at the time of admission was 166/99.       Amount and/or Complexity of Data Reviewed  Clinical lab tests: reviewed  Tests in the radiology section of CPT®: reviewed  Tests in the medicine section of CPT®: reviewed  Discuss the patient with other providers: yes (03:20 EDT  I discussed the case with the hospitalist, Dr. Douglas.  We have discussed the patient's presenting symptoms, laboratory values, imaging and condition at the time of admission.  They will evaluate the patient in the emergency room and admit the patient to the hospital  )    Critical Care  Total time providing critical care: 35 minutes (Total critical care time of.  Total critical care time documented does not include time spent on separately billed procedures for services of nurses or physician assistants.  I personally saw and examined the patient.  I have reviewed all diagnostic interpretations and treatment plans as written.  I  was present for the key portions of any procedures performed and the inclusive time noted in any critical care statement.  Critical care time includes patient management by me, time spent at the patient bedside, time to review labs/ ABG's, imaging results, discussing patient care, documentation in the medical record and time spent with family or caregiver)         Social Determinants of Health:    Patient has presented with family members who are responsible, reliable and will ensure follow up care.      Disposition and Care Coordination:    Admit:   Through independent evaluation of the patient's history, physical, and imperical data, the patient meets criteria for inpatient admission to the hospital.        Final diagnoses:   New onset seizure   Uncontrolled hypertension   Lactic acidosis   Hyponatremia   Abrasion of tongue, initial encounter   History of alcoholism   History of stroke        ED Disposition       ED Disposition   Decision to Admit    Condition   --    Comment   Level of Care: Med/Surg [1]   Diagnosis: Seizure [[id number]]   Admitting Physician: CHARLEEN DUNN [[id number]]   Certification: I Certify That Inpatient Hospital Services Are Medically Necessary For Greater Than 2 Midnights                 This medical record created using voice recognition software.             Adolfo Barry DO  09/12/24 0329

## 2024-09-11 NOTE — PROGRESS NOTES
[facility]   Hospitalist Progress Note    Date of admission: 9/10/2024  Patient Name: Kiesha Pan  1955  Date: 9/11/2024      Subjective     Chief Complaint   Patient presents with    Altered Mental Status    Seizures     Son found her convulsing and foaming at mouth at 2100..  Family reports she has been vomiting past 2 days.  Family reports she is confused.   in triage       Interval Followup: drinks 3x16oz water per day plus several caffeinated drinks /soda with no substantial electrolyte content, diet limited to some microwave options limited by her son who helps provide for them but financial limitations have limited more consistent/improved diet.  No hx of seizures.  Pt also notes having been more active outside /sweating a lot as well.  No recent alcohol use (more remotely did have increased intake).  Not on afib meds related to this      Objective     Vitals:   Temp:  [98 °F (36.7 °C)-99.9 °F (37.7 °C)] 98.8 °F (37.1 °C)  Heart Rate:  [] 85  Resp:  [18-28] 20  BP: (122-201)/() 151/82    Physical Exam  Awake conversant, appears older than stated age, poor dentition  Eomi, aox3   Ctab no wrr  Rrr abd soft nt nd   Abd soft nt nd      Result Review:  Vital signs, labs and recent relevant imaging reviewed.      CBC          2/16/2024    04:14 9/10/2024    21:54 9/11/2024    06:48   CBC   WBC 8.29  7.30  10.81    RBC 3.69  4.38  4.14    Hemoglobin 12.1  14.2  13.3    Hematocrit 36.5  40.9  38.2    MCV 98.9  93.4  92.3    MCH 32.8  32.4  32.1    MCHC 33.2  34.7  34.8    RDW 13.1  12.2  12.3    Platelets 410  434  394      CMP          3/21/2024    10:16 9/10/2024    21:54 9/11/2024    06:48   CMP   Glucose 71  236  135    BUN 22  8  7    Creatinine 1.55  0.82  0.68    EGFR 36.3  78.0  95.0    Sodium 141  121  128    Potassium 4.9  3.7  3.6    Chloride 106  79  91    Calcium 8.9  9.0  9.1    Total Protein 6.6  8.1     Albumin 4.5  4.7     Globulin 2.1  3.4     Total Bilirubin <0.2  0.8      Alkaline Phosphatase 56  56     AST (SGOT) 21  33     ALT (SGPT) 17  22     Albumin/Globulin Ratio 2.1  1.4     BUN/Creatinine Ratio 14.2  9.8  10.3    Anion Gap 11.5  22.3  11.2          acetaminophen    aluminum-magnesium hydroxide-simethicone    Diclofenac Sodium    hydrOXYzine    Lidocaine    LORazepam    melatonin    nicotine    ondansetron    polyethylene glycol    prochlorperazine    sodium chloride    sodium chloride    apixaban, 5 mg, Oral, Q12H  aspirin, 81 mg, Oral, Daily  atorvastatin, 40 mg, Oral, Nightly  cefTRIAXone, 1,000 mg, Intravenous, Q24H  dilTIAZem CD, 180 mg, Oral, Q24H  isosorbide mononitrate, 30 mg, Oral, Daily  levETIRAcetam, 500 mg, Intravenous, Q12H  [Held by provider] lisinopril, 20 mg, Oral, Daily  pantoprazole, 40 mg, Oral, Q AM  saccharomyces boulardii, 250 mg, Oral, BID  sodium chloride, 10 mL, Intravenous, Q12H  sodium chloride, 1,000 mL, Intravenous, Once  thiamine (B-1) IV, 250 mg, Intravenous, Daily        CT Head Without Contrast    Result Date: 9/10/2024  No acute brain abnormality is appreciated. No acute intracranial hemorrhage. No definite acute infarct. Please see above comments for further detail.    Portions of this note were completed with a voice recognition program.   Electronically Signed By-Brannon Lan MD On:9/10/2024 11:26 PM      XR Chest 1 View    Result Date: 9/10/2024  Impression: 1.No acute process identified Electronically Signed: Calixto Eid MD  9/10/2024 10:26 PM EDT  Workstation ID: [id number]     Assessment / Plan     Summary: 68 y.o. with hx of cva and pafib not currently on medications given insurance and financial difficulties who presented after having a witnissed gtc type seizure by her son (~5-10 minutes) and a few days of dizziness.  Found to have significant hyponatremia 121 and possibly provoked seizure episode.  Neurology consulted     Assessment/Plan (clinically significant if listed here)  Acute seizure episode question provoked from prior  CVA versus hyponatremia  Severe symptomatic hyponatremia, question acute (normal last labs from 3/2024)  Severe lactic acidosis suspect secondary to seizure  Suspect iatrogenic component of hyperovolemic hyponatremia 2/2 significant ivf intake and somewhat limited po intake  Simple cystitis   Paroxysmal A-fib not currently on anticoagulation outpatient given financial difficulties  Hx of right parieto-occiptal CVA  HLD  Insurance issues / no recent medications related to this    Telemetry sinus tachycardia improving with initial resuscitation, no A-fib noted  Chest x-ray no acute infiltrate on my review,  CT head no acute  Tox negative    Echo     The study is technically difficult for diagnosis.    Left ventricular systolic function is hyperdynamic (EF > 70%).    Left ventricular wall thickness is consistent with mild to moderate concentric hypertrophy.    Left ventricular diastolic function was normal.       Give IV thiamine trend sodium closely, defer additional IV fluids with saline given initial rapid correction, initial repeat sodium today with significant increase may need D5 repeat and adjust further as needed  Repeat sodium increasing further on f/u labs, give d5w at 200ml/hr, repeat levels later (has already received iv thiamine), repeat / cont to trend sodium closely   As outpatient have discussed with pt needs to decrease her free water intake as well   tsh wnl, add on urine studies, /osm  Lactic improving with resuscitation  No recent labs for comparison question acuity of hyponatremia - suspect low sodium contribution to seizure and / or prior CVA, follow-up MRI brain to eval for structural abnormality that may have contributed, CT head without acute infarct, small vessel disease noted some sinus disease noted  IV Keppra for now, seizure precautions, Ativan as needed for seizures  Neurology consult for further assistance with seizure monitoring/medications   mri brain and echo pending monitor neuro  status   cont aspirin/statin for now   Give IV thiamine given malnutrition concerns  Resume meds cautiously for blood pressure/A-fib, diltiazem, Eliquis, has been sinus on telemetry  cont ppi  UA with 4+ bacteria, microscopic hematuria, leukocytosis today, will treat with short course in case of UTI component ceftriaxone x 3 days follow-up urine culture  CM consult for assistance with cost / meds  PT/OT  Check a.m. CBC, BMP, magnesium, phosphorus  Continue hospitalization at current level of care    Dispo: likely home with family support /hh  once medically stable.   D/w SW    VTE Prophylaxis:  Pharmacologic & mechanical VTE prophylaxis orders are present.      Code Status (Patient has no pulse and is not breathing): CPR (Attempt to Resuscitate)  Medical Interventions (Patient has pulse or is breathing): Full Support      Patient is critically ill due to severe symptomatic hyponatremia with seizure, at risk for CPM and refeeding syndrome, pafib, and additional issues as noted above.  I have spent >31 minutes of critical care time reviewing documentation, pertinent labs, imaging studies, examining the patient, modifying care plan, and discussing patient's condition and care plan with the family, patient, nursing and sw.

## 2024-09-11 NOTE — PLAN OF CARE
Goal Outcome Evaluation:   PT ADMIT FROM THE ED. NO SEIZURE ACTIVITY NOTED

## 2024-09-12 ENCOUNTER — APPOINTMENT (OUTPATIENT)
Dept: MRI IMAGING | Facility: HOSPITAL | Age: 69
DRG: 101 | End: 2024-09-12
Payer: MEDICARE

## 2024-09-12 LAB
ANION GAP SERPL CALCULATED.3IONS-SCNC: 10 MMOL/L (ref 5–15)
BASOPHILS # BLD AUTO: 0.04 10*3/MM3 (ref 0–0.2)
BASOPHILS NFR BLD AUTO: 0.4 % (ref 0–1.5)
BUN SERPL-MCNC: 9 MG/DL (ref 8–23)
BUN/CREAT SERPL: 13 (ref 7–25)
CALCIUM SPEC-SCNC: 8.8 MG/DL (ref 8.6–10.5)
CHLORIDE SERPL-SCNC: 101 MMOL/L (ref 98–107)
CO2 SERPL-SCNC: 27 MMOL/L (ref 22–29)
CREAT SERPL-MCNC: 0.69 MG/DL (ref 0.57–1)
DEPRECATED RDW RBC AUTO: 43.6 FL (ref 37–54)
EGFRCR SERPLBLD CKD-EPI 2021: 94.7 ML/MIN/1.73
EOSINOPHIL # BLD AUTO: 0.03 10*3/MM3 (ref 0–0.4)
EOSINOPHIL NFR BLD AUTO: 0.3 % (ref 0.3–6.2)
ERYTHROCYTE [DISTWIDTH] IN BLOOD BY AUTOMATED COUNT: 12.6 % (ref 12.3–15.4)
GLUCOSE SERPL-MCNC: 106 MG/DL (ref 65–99)
HCT VFR BLD AUTO: 35.8 % (ref 34–46.6)
HGB BLD-MCNC: 12 G/DL (ref 12–15.9)
IMM GRANULOCYTES # BLD AUTO: 0.03 10*3/MM3 (ref 0–0.05)
IMM GRANULOCYTES NFR BLD AUTO: 0.3 % (ref 0–0.5)
LYMPHOCYTES # BLD AUTO: 2.63 10*3/MM3 (ref 0.7–3.1)
LYMPHOCYTES NFR BLD AUTO: 26.9 % (ref 19.6–45.3)
MAGNESIUM SERPL-MCNC: 2.2 MG/DL (ref 1.6–2.4)
MCH RBC QN AUTO: 31.8 PG (ref 26.6–33)
MCHC RBC AUTO-ENTMCNC: 33.5 G/DL (ref 31.5–35.7)
MCV RBC AUTO: 95 FL (ref 79–97)
MONOCYTES # BLD AUTO: 1 10*3/MM3 (ref 0.1–0.9)
MONOCYTES NFR BLD AUTO: 10.2 % (ref 5–12)
NEUTROPHILS NFR BLD AUTO: 6.06 10*3/MM3 (ref 1.7–7)
NEUTROPHILS NFR BLD AUTO: 61.9 % (ref 42.7–76)
NRBC BLD AUTO-RTO: 0 /100 WBC (ref 0–0.2)
PHOSPHATE SERPL-MCNC: 3.8 MG/DL (ref 2.5–4.5)
PLATELET # BLD AUTO: 389 10*3/MM3 (ref 140–450)
PMV BLD AUTO: 9.4 FL (ref 6–12)
POTASSIUM SERPL-SCNC: 3.4 MMOL/L (ref 3.5–5.2)
RBC # BLD AUTO: 3.77 10*6/MM3 (ref 3.77–5.28)
SODIUM SERPL-SCNC: 138 MMOL/L (ref 136–145)
WBC NRBC COR # BLD AUTO: 9.79 10*3/MM3 (ref 3.4–10.8)

## 2024-09-12 PROCEDURE — 97161 PT EVAL LOW COMPLEX 20 MIN: CPT

## 2024-09-12 PROCEDURE — 99233 SBSQ HOSP IP/OBS HIGH 50: CPT | Performed by: INTERNAL MEDICINE

## 2024-09-12 PROCEDURE — 25010000002 CEFTRIAXONE PER 250 MG: Performed by: INTERNAL MEDICINE

## 2024-09-12 PROCEDURE — 25010000002 THIAMINE PER 100 MG: Performed by: INTERNAL MEDICINE

## 2024-09-12 PROCEDURE — 99222 1ST HOSP IP/OBS MODERATE 55: CPT | Performed by: FAMILY MEDICINE

## 2024-09-12 PROCEDURE — 84100 ASSAY OF PHOSPHORUS: CPT | Performed by: INTERNAL MEDICINE

## 2024-09-12 PROCEDURE — 85025 COMPLETE CBC W/AUTO DIFF WBC: CPT | Performed by: INTERNAL MEDICINE

## 2024-09-12 PROCEDURE — 97165 OT EVAL LOW COMPLEX 30 MIN: CPT

## 2024-09-12 PROCEDURE — 80048 BASIC METABOLIC PNL TOTAL CA: CPT | Performed by: INTERNAL MEDICINE

## 2024-09-12 PROCEDURE — 83735 ASSAY OF MAGNESIUM: CPT | Performed by: INTERNAL MEDICINE

## 2024-09-12 RX ORDER — LEVETIRACETAM 750 MG/1
750 TABLET ORAL 2 TIMES DAILY
Status: DISCONTINUED | OUTPATIENT
Start: 2024-09-12 | End: 2024-09-13 | Stop reason: HOSPADM

## 2024-09-12 RX ORDER — LEVETIRACETAM 500 MG/1
500 TABLET ORAL 2 TIMES DAILY
Status: DISCONTINUED | OUTPATIENT
Start: 2024-09-12 | End: 2024-09-12

## 2024-09-12 RX ADMIN — LEVETIRACETAM 750 MG: 750 TABLET, FILM COATED ORAL at 20:39

## 2024-09-12 RX ADMIN — Medication 250 MG: at 09:00

## 2024-09-12 RX ADMIN — CEFTRIAXONE SODIUM 1000 MG: 1 INJECTION, POWDER, FOR SOLUTION INTRAMUSCULAR; INTRAVENOUS at 13:32

## 2024-09-12 RX ADMIN — THIAMINE HYDROCHLORIDE 250 MG: 100 INJECTION, SOLUTION INTRAMUSCULAR; INTRAVENOUS at 08:59

## 2024-09-12 RX ADMIN — HYDROXYZINE HYDROCHLORIDE 25 MG: 25 TABLET, FILM COATED ORAL at 20:44

## 2024-09-12 RX ADMIN — ACETAMINOPHEN 650 MG: 325 TABLET ORAL at 20:44

## 2024-09-12 RX ADMIN — Medication 5 MG: at 20:44

## 2024-09-12 RX ADMIN — LEVETIRACETAM 500 MG: 500 TABLET, FILM COATED ORAL at 08:58

## 2024-09-12 RX ADMIN — PANTOPRAZOLE SODIUM 40 MG: 40 TABLET, DELAYED RELEASE ORAL at 05:29

## 2024-09-12 RX ADMIN — DILTIAZEM HYDROCHLORIDE 180 MG: 180 CAPSULE, EXTENDED RELEASE ORAL at 08:58

## 2024-09-12 RX ADMIN — Medication 10 ML: at 08:59

## 2024-09-12 RX ADMIN — ASPIRIN 81 MG: 81 TABLET, CHEWABLE ORAL at 09:00

## 2024-09-12 RX ADMIN — ISOSORBIDE MONONITRATE 30 MG: 30 TABLET, EXTENDED RELEASE ORAL at 09:00

## 2024-09-12 RX ADMIN — APIXABAN 5 MG: 5 TABLET, FILM COATED ORAL at 09:00

## 2024-09-12 RX ADMIN — ATORVASTATIN CALCIUM 40 MG: 40 TABLET, FILM COATED ORAL at 20:39

## 2024-09-12 RX ADMIN — APIXABAN 5 MG: 5 TABLET, FILM COATED ORAL at 20:39

## 2024-09-12 RX ADMIN — Medication 10 ML: at 20:44

## 2024-09-12 RX ADMIN — Medication 250 MG: at 20:39

## 2024-09-12 NOTE — THERAPY EVALUATION
Acute Care - Physical Therapy Initial Evaluation   Allie     Patient Name: Kiesha Pan  : 1955  MRN: [id number]  Today's Date: 2024      Visit Dx:     ICD-10-CM ICD-9-CM   1. New onset seizure  R56.9 780.39   2. Uncontrolled hypertension  I10 401.9   3. Lactic acidosis  E87.20 276.2   4. Hyponatremia  E87.1 276.1   5. Abrasion of tongue, initial encounter  S00.512A 910.0   6. History of alcoholism  F10.21 V11.3   7. History of stroke  Z86.73 V12.54   8. Decreased activities of daily living (ADL)  Z78.9 V49.89   9. Difficulty in walking  R26.2 719.7     Patient Active Problem List   Diagnosis    Alcohol dependence    Allergic rhinitis due to allergen    Anxiety    Asthma    Cataracts, bilateral    Depression    Essential hypertension    History of transient ischemic attack (TIA)    Hypothyroid    Other specified chronic obstructive pulmonary disease    Mixed hyperlipidemia    Atrial fibrillation    Hyperglycemia    Routine history and physical examination of adult    Seizure     Past Medical History:   Diagnosis Date    Asthma     COVID-19     Hypertension     Mixed hyperlipidemia 2021    Stroke 2021     Past Surgical History:   Procedure Laterality Date    CARDIAC CATHETERIZATION N/A 2024    Procedure: Left Heart Cath;  Surgeon: Emil Dunn MD;  Location: [facility] INVASIVE LOCATION;  Service: Cardiovascular;  Laterality: N/A;    CHOLECYSTECTOMY       PT Assessment (Last 12 Hours)       PT Evaluation and Treatment       Row Name 24 1406          Physical Therapy Time and Intention    Subjective Information no complaints (P)   -EW     Document Type evaluation (P)   -EW     Mode of Treatment individual therapy;physical therapy (P)   -EW     Patient Effort good (P)   -EW       Row Name 24 1402          General Information    Patient Profile Reviewed yes (P)   -EW     Prior Level of Function independent:;gait;transfer;bed mobility;ADL's (P)   -EW     Equipment Currently Used  at Home cane, straight (P)   -EW     Existing Precautions/Restrictions fall;seizures (P)   -EW     Barriers to Rehab none identified (P)   -EW       Row Name 09/12/24 1408          Living Environment    Current Living Arrangements home (P)   -EW     Home Accessibility stairs to enter home (P)   -EW     People in Home spouse;child(enrique), adult (P)   -EW     Primary Care Provided by self (P)   -EW       Row Name 09/12/24 1408          Home Main Entrance    Number of Stairs, Main Entrance three (P)   -EW       Row Name 09/12/24 1408          Home Use of Assistive/Adaptive Equipment    Equipment Currently Used at Home cane, straight (P)   -EW       Row Name 09/12/24 1408          Range of Motion (ROM)    Range of Motion ROM is WFL;bilateral lower extremities (P)   -EW       Row Name 09/12/24 1408          Strength (Manual Muscle Testing)    Strength (Manual Muscle Testing) bilateral lower extremities;strength is WFL (P)   -EW       Row Name 09/12/24 1408          Bed Mobility    Bed Mobility other (see comments) (P)   not assessed  -EW       Row Name 09/12/24 1408          Transfers    Transfers sit-stand transfer (P)   -EW       Row Name 09/12/24 1408          Sit-Stand Transfer    Sit-Stand [place] (Transfers) standby assist (P)   -EW     Assistive Device (Sit-Stand Transfers) other (see comments) (P)   no AD  -EW       Row Name 09/12/24 1408          Gait/Stairs (Locomotion)    Gait/Stairs Locomotion gait/ambulation independence (P)   -EW     [place] Level (Gait) contact guard (P)   -EW     Assistive Device (Gait) other (see comments) (P)   no AD  -EW     Patient was able to Ambulate yes (P)   -EW     Distance in Feet (Gait) 80 (P)   -EW     Pattern (Gait) step-through (P)   -EW     Deviations/Abnormal Patterns (Gait) marco a decreased;stride length decreased (P)   -EW     Bilateral Gait Deviations forward flexed posture (P)   -EW       Row Name 09/12/24 1408          Safety Issues, Functional Mobility     Impairments Affecting Function (Mobility) balance;endurance/activity tolerance;cognition (P)   -EW       Row Name 09/12/24 1408          Balance    Balance Assessment standing dynamic balance (P)   -EW     Dynamic Standing Balance contact guard (P)   -EW     Position/Device Used, Standing Balance unsupported (P)   -EW       Row Name 09/12/24 1404          Plan of Care Review    Plan of Care Reviewed With patient (P)   -EW     Outcome Evaluation Pt presents with deficits related to transfers, ambulation, and endurance. Inpatient PT services are indicated at this time. Recommend continued rehab services upon hospital discharge. (P)   -EW       Row Name 09/12/24 1408          Positioning and Restraints    Pre-Treatment Position bedside commode (P)   -EW     Post Treatment Position chair (P)   -EW     In Chair reclined;call light within reach;encouraged to call for assist;exit alarm on;with nsg (P)   -EW       Row Name 09/12/24 1406          Therapy Assessment/Plan (PT)    Rehab Potential (PT) good, to achieve stated therapy goals (P)   -EW     Criteria for Skilled Interventions Met (PT) yes;meets criteria (P)   -EW     Therapy Frequency (PT) daily (P)   -EW     Predicted Duration of Therapy Intervention (PT) 10 days (P)   -EW     Problem List (PT) problems related to;balance;coordination;mobility;range of motion (ROM);strength (P)   -EW     Activity Limitations Related to Problem List (PT) unable to ambulate safely;unable to transfer safely (P)   -EW       Row Name 09/12/24 1403          PT Evaluation Complexity    History, PT Evaluation Complexity no personal factors and/or comorbidities (P)   -EW     Examination of Body Systems (PT Eval Complexity) total of 4 or more elements (P)   -EW     Clinical Presentation (PT Evaluation Complexity) stable (P)   -EW     Clinical Decision Making (PT Evaluation Complexity) low complexity (P)   -EW     Overall Complexity (PT Evaluation Complexity) low complexity (P)   -EW       Row  Name 09/12/24 1408          Therapy Plan Review/Discharge Plan (PT)    Therapy Plan Review (PT) evaluation/treatment results reviewed;patient (P)   -EW       Row Name 09/12/24 1408          Physical Therapy Goals    Transfer Goal Selection (PT) transfer, PT goal 1 (P)   -EW     Gait Training Goal Selection (PT) gait training, PT goal 1 (P)   -EW       Row Name 09/12/24 1408          Transfer Goal 1 (PT)    Activity/Assistive Device (Transfer Goal 1, PT) sit-to-stand/stand-to-sit (P)   -EW     [place] Level/Cues Needed (Transfer Goal 1, PT) independent (P)   -EW     Time Frame (Transfer Goal 1, PT) 10 days (P)   -EW     Progress/Outcome (Transfer Goal 1, PT) new goal (P)   -EW       Row Name 09/12/24 1408          Gait Training Goal 1 (PT)    Activity/Assistive Device (Gait Training Goal 1, PT) gait (walking locomotion) (P)   -EW     [place] Level (Gait Training Goal 1, PT) modified independence (P)   -EW     Distance (Gait Training Goal 1, PT) 200 (P)   -EW     Time Frame (Gait Training Goal 1, PT) 10 days (P)   -EW     Progress/Outcome (Gait Training Goal 1, PT) new goal (P)   -EW               User Key  (r) = Recorded By, (t) = Taken By, (c) = Cosigned By      Initials Name Provider Type    EW Candie Fuentes, PT Student PT Student                    Physical Therapy Education       Title: PT OT SLP Therapies (In Progress)       Topic: Physical Therapy (Not Started)       Point: Mobility training (Not Started)       Learner Progress:  Not documented in this visit.              Point: Home exercise program (Not Started)       Learner Progress:  Not documented in this visit.              Point: Body mechanics (Not Started)       Learner Progress:  Not documented in this visit.              Point: Precautions (Not Started)       Learner Progress:  Not documented in this visit.                                  PT Recommendation and Plan  Anticipated Discharge Disposition (PT): (P) home with assist, home with  home health  Planned Therapy Interventions (PT): (P) balance training, bed mobility training, gait training, neuromuscular re-education, ROM (range of motion), strengthening, transfer training  Therapy Frequency (PT): (P) daily  Plan of Care Reviewed With: (P) patient  Outcome Evaluation: (P) Pt presents with deficits related to transfers, ambulation, and endurance. Inpatient PT services are indicated at this time. Recommend continued rehab services upon hospital discharge.   Outcome Measures       Row Name 09/12/24 1423             How much help from another person do you currently need...    Turning from your back to your side while in flat bed without using bedrails? 4 (P)   -EW      Moving from lying on back to sitting on the side of a flat bed without bedrails? 4 (P)   -EW      Moving to and from a bed to a chair (including a wheelchair)? 3 (P)   -EW      Standing up from a chair using your arms (e.g., wheelchair, bedside chair)? 3 (P)   -EW      Climbing 3-5 steps with a railing? 2 (P)   -EW      To walk in hospital room? 3 (P)   -EW      AM-PAC 6 Clicks Score (PT) 19 (P)   -EW      Highest Level of Mobility Goal 6 --> Walk 10 steps or more (P)   -EW                User Key  (r) = Recorded By, (t) = Taken By, (c) = Cosigned By      Initials Name Provider Type    Candie Velazquez, PT Student PT Student                     Time Calculation:    PT Charges       Row Name 09/12/24 1426             Time Calculation    PT Received On 09/12/24 (P)   -EW      PT Goal Re-Cert Due Date 09/21/24 (P)   -EW         Untimed Charges    PT Eval/Re-eval Minutes 25 (P)   -EW         Total Minutes    Untimed Charges Total Minutes 25 (P)   -EW       Total Minutes 25 (P)   -EW                User Key  (r) = Recorded By, (t) = Taken By, (c) = Cosigned By      Initials Name Provider Type    Candie Velazquez, PT Student PT Student                  Therapy Charges for Today       Code Description Service Date Service Provider Modifiers  Qty    98490139175 HC PT EVAL LOW COMPLEXITY 2 9/12/2024 Candie Fuentes, PT Student GP 1            PT G-Codes  Outcome Measure Options: AM-PAC 6 Clicks Daily Activity (OT), Optimal Instrument  AM-PAC 6 Clicks Score (PT): (P) 19  AM-PAC 6 Clicks Score (OT): 21    Candie Fuentes PT Student  9/12/2024

## 2024-09-12 NOTE — PLAN OF CARE
Goal Outcome Evaluation:           Progress: improving  Outcome Evaluation: Pt has tolerated sitting up in recliner today. Waiting for MRI.

## 2024-09-12 NOTE — PLAN OF CARE
Goal Outcome Evaluation:  Plan of Care Reviewed With: (P) patient           Outcome Evaluation: (P) Pt presents with deficits related to transfers, ambulation, and endurance. Inpatient PT services are indicated at this time. Recommend continued rehab services upon hospital discharge.      Anticipated Discharge Disposition (PT): (P) home with assist, home with home health

## 2024-09-12 NOTE — PROGRESS NOTES
[facility]   Hospitalist Progress Note    Date of admission: 9/10/2024  Patient Name: Kiesha Pan  1955  Date: 9/12/2024      Subjective     Chief Complaint   Patient presents with    Altered Mental Status    Seizures     Son found her convulsing and foaming at mouth at 2100..  Family reports she has been vomiting past 2 days.  Family reports she is confused.   in triage       Interval Followup: No reported seizure episodes.  Discussed findings thus far with patient.  She does note baseline issues with poor memory and confusion at times does not keep track of dates of what year it is or often will forget family children's birthdays and how long they are etc.      Objective     Vitals:   Temp:  [98.2 °F (36.8 °C)-99 °F (37.2 °C)] 98.8 °F (37.1 °C)  Heart Rate:  [] 95  Resp:  [16-18] 18  BP: (118-149)/(78-91) 126/80    Physical Exam  Awake conversant, appears older than stated age, poor dentition  Eomi   able to tell me who the president is, thought the year was 1994, no she is in the hospital and answers other basic questions appropriately  Ctab no wrr  Rrr abd soft nt nd   Abd soft nt nd      Result Review:  Vital signs, labs and recent relevant imaging reviewed.      CBC          9/10/2024    21:54 9/11/2024    06:48 9/12/2024    04:48   CBC   WBC 7.30  10.81  9.79    RBC 4.38  4.14  3.77    Hemoglobin 14.2  13.3  12.0    Hematocrit 40.9  38.2  35.8    MCV 93.4  92.3  95.0    MCH 32.4  32.1  31.8    MCHC 34.7  34.8  33.5    RDW 12.2  12.3  12.6    Platelets 434  394  389      CMP          9/10/2024    21:54 9/11/2024    06:48 9/11/2024    11:53 9/11/2024    22:09 9/12/2024    04:48   CMP   Glucose 236  135  115   106    BUN 8  7  8   9    Creatinine 0.82  0.68  0.66   0.69    EGFR 78.0  95.0  95.7   94.7    Sodium 121  128  133  132  138    Potassium 3.7  3.6  3.8   3.4    Chloride 79  91  95   101    Calcium 9.0  9.1  8.6   8.8    Total Protein 8.1        Albumin 4.7        Globulin 3.4         Total Bilirubin 0.8        Alkaline Phosphatase 56        AST (SGOT) 33        ALT (SGPT) 22        Albumin/Globulin Ratio 1.4        BUN/Creatinine Ratio 9.8  10.3  12.1   13.0    Anion Gap 22.3  11.2  15.7   10.0          acetaminophen    aluminum-magnesium hydroxide-simethicone    Diclofenac Sodium    hydrOXYzine    Lidocaine    LORazepam    melatonin    nicotine    ondansetron    polyethylene glycol    prochlorperazine    sodium chloride    sodium chloride    apixaban, 5 mg, Oral, Q12H  aspirin, 81 mg, Oral, Daily  atorvastatin, 40 mg, Oral, Nightly  cefTRIAXone, 1,000 mg, Intravenous, Q24H  dilTIAZem CD, 180 mg, Oral, Q24H  isosorbide mononitrate, 30 mg, Oral, Daily  levETIRAcetam, 750 mg, Oral, BID  [Held by provider] lisinopril, 20 mg, Oral, Daily  pantoprazole, 40 mg, Oral, Q AM  saccharomyces boulardii, 250 mg, Oral, BID  sodium chloride, 10 mL, Intravenous, Q12H  thiamine (B-1) IV, 250 mg, Intravenous, Daily        MRI Brain Without Contrast    Result Date: 9/11/2024  Impression: 1.No acute process is identified. 2.Progressive nonspecific white matter changes most frequently seen in the setting of chronic microvascular ischemic disease. 3.There is generalized cerebral atrophy with progressive focal atrophy involving the right mesial temporal lobe. 4.Right mastoid effusion. Electronically Signed: Calixto Eid MD  9/11/2024 9:21 PM EDT  Workstation ID: [id number]    CT Head Without Contrast    Result Date: 9/10/2024  No acute brain abnormality is appreciated. No acute intracranial hemorrhage. No definite acute infarct. Please see above comments for further detail.    Portions of this note were completed with a voice recognition program.   Electronically Signed By-Brannon Lan MD On:9/10/2024 11:26 PM      XR Chest 1 View    Result Date: 9/10/2024  Impression: 1.No acute process identified Electronically Signed: Calixto Eid MD  9/10/2024 10:26 PM EDT  Workstation ID: [id number]     Assessment / Plan      Summary: 68 y.o. with hx of cva and pafib not currently on medications given insurance and financial difficulties who presented after having a witnissed gtc type seizure by her son (~5-10 minutes) and a few days of dizziness.  Found to have significant hyponatremia 121 and possibly provoked seizure episode.  Neurology consulted     Assessment/Plan (clinically significant if listed here)  Acute seizure episode question provoked from prior CVA versus hyponatremia  Severe symptomatic hyponatremia, question acute (normal last labs from 3/2024)  Severe lactic acidosis suspect secondary to seizure  Suspect iatrogenic component of hyperovolemic hyponatremia 2/2 significant ivf intake and somewhat limited po intake  Simple cystitis secondary to gram-negative bacilli  Paroxysmal A-fib not currently on anticoagulation outpatient given financial difficulties  Hx of right parieto-occiptal CVA  HLD  Insurance issues / no recent medications related to this    Telemetry sinus tachycardia improving with initial resuscitation, no A-fib noted  Chest x-ray no acute infiltrate on my review,  CT head no acute  Tox negative    Echo     The study is technically difficult for diagnosis.    Left ventricular systolic function is hyperdynamic (EF > 70%).    Left ventricular wall thickness is consistent with mild to moderate concentric hypertrophy.    Left ventricular diastolic function was normal.     EEG several ictal events which  would be categorized as focal motor seizure with secondary generalization.       Eeg abnormalities noted above, discussed with dr marcos/reading neurologist  MRI brain findings noted as above, repeat study with contrast to better evaluate, cerebral atrophy with progressive focal atrophy involving the right mesial temporal lobe noted and chronic microvascular ischemic disease changes  Needs outpatient follow-up with neurology and additional neurocognitive testing and monitoring at that time  Continue seizure  precautions, Keppra with dose increased to 750 twice daily today as per neurology appreciate assistance  Continue ceftriaxone for UTI, greater than 100,000 CFU's preliminary on urine culture, urinary issues likely related to this monitor response to continue treatment  Hyponatremia resolved, given with D5 fluids corrected to normal fairly quickly suspect this is more acute change given this is likely related to low solute and too much free water intake we will continue to monitor discussed water limitations with patient as well.  TSH within normal limits  Continue IV thiamine course  cont aspirin/statin for now   Give IV thiamine given malnutrition concerns  Cont cardiac meds, apixaban for afib, needs refills for discharge  PT/OT  Check a.m. CBC, BMP, magnesium, phosphorus  Continue hospitalization at current level of care    Dispo: likely home with family support /hh  once medically stable.   D/w     VTE Prophylaxis:  Pharmacologic & mechanical VTE prophylaxis orders are present.      Code Status (Patient has no pulse and is not breathing): CPR (Attempt to Resuscitate)  Medical Interventions (Patient has pulse or is breathing): Full Support

## 2024-09-12 NOTE — THERAPY EVALUATION
Patient Name: Kiesha Pan  : 1955    MRN: [id number]                              Today's Date: 2024       Admit Date: 9/10/2024    Visit Dx:     ICD-10-CM ICD-9-CM   1. New onset seizure  R56.9 780.39   2. Uncontrolled hypertension  I10 401.9   3. Lactic acidosis  E87.20 276.2   4. Hyponatremia  E87.1 276.1   5. Abrasion of tongue, initial encounter  S00.512A 910.0   6. History of alcoholism  F10.21 V11.3   7. History of stroke  Z86.73 V12.54   8. Decreased activities of daily living (ADL)  Z78.9 V49.89     Patient Active Problem List   Diagnosis    Alcohol dependence    Allergic rhinitis due to allergen    Anxiety    Asthma    Cataracts, bilateral    Depression    Essential hypertension    History of transient ischemic attack (TIA)    Hypothyroid    Other specified chronic obstructive pulmonary disease    Mixed hyperlipidemia    Atrial fibrillation    Hyperglycemia    Routine history and physical examination of adult    Seizure     Past Medical History:   Diagnosis Date    Asthma     COVID-19     Hypertension     Mixed hyperlipidemia 2021    Stroke 2021     Past Surgical History:   Procedure Laterality Date    CARDIAC CATHETERIZATION N/A 2024    Procedure: Left Heart Cath;  Surgeon: Emil Dunn MD;  Location: [facility] CATH INVASIVE LOC[facility];  Service: Cardiovascular;  Laterality: N/A;    CHOLECYSTECTOMY        General Information       Row Name 24 1017          OT Time and Intention    Document Type evaluation  -AV     Mode of Treatment individual therapy;occupational therapy  -AV       Row Name 24 1017          General Information    Patient Profile Reviewed yes  -AV     Prior Level of Function ADL's;transfer;all household mobility  stands to shower (tub without DME). stands at sink to groom. standard commode. ambulates [facility]. does not perform home managemet tasks. no home O2.  -AV     Existing Precautions/Restrictions fall;seizures  -AV     Barriers to Rehab none  identified  -AV       Row Name 09/12/24 1017          Occupational Profile    Reason for Services/Referral (Occupational Profile) Patient is a 68 year old female admitted to [facility] on 9/10/24 with seizure and altered mental status. She is currently on 3W/ room air. OT consulted due to recent decline in ADL/ transfer independence. No previous OT services for current condition.  -AV       Row Name 09/12/24 1017          Living Environment    People in Home --   and son  -AV       Row Name 09/12/24 1017          Cognition    Orientation Status (Cognition) --  alert, pleasant and cooperative. able to follow commands. questionable ability to retain information/ safety awareness.  -AV       Row Name 09/12/24 1017          Safety Issues, Functional Mobility    Impairments Affecting Function (Mobility) balance;endurance/activity tolerance;cognition  -AV               User Key  (r) = Recorded By, (t) = Taken By, (c) = Cosigned By      Initials Name Provider Type    Jackson Jarvis OT Occupational Therapist                     Mobility/ADL's       Row Name 09/12/24 1023          Bed Mobility    Bed Mobility supine-sit-supine  -AV     Supine-Sit-Supine [place] (Bed Mobility) independent  -AV       Row Name 09/12/24 1023          Transfers    Transfers sit-stand transfer  -AV     Comment, (Transfers) CGA  -AV       Row Name 09/12/24 1023          Sit-Stand Transfer    Sit-Stand [place] (Transfers) contact guard  -AV       Row Name 09/12/24 1023          Activities of Daily Living    BADL Assessment/Intervention --  Independent feeding and grooming with setup while seated. CGA/ min assist bathing and dressing. able to don slipper socks independently seated bedside. CGA toilet hygiene using BSC.  -AV               User Key  (r) = Recorded By, (t) = Taken By, (c) = Cosigned By      Initials Name Provider Type    Jackson Jarvis OT Occupational Therapist                   Obj/Interventions        Row Name 09/12/24 1024          Sensory Assessment (Somatosensory)    Sensory Assessment (Somatosensory) UE sensation intact  -AV       Row Name 09/12/24 1024          Vision Assessment/Intervention    Visual Impairment/Limitations WFL;corrective lenses for reading  -AV       Row Name 09/12/24 1024          Range of Motion Comprehensive    General Range of Motion bilateral upper extremity ROM WFL  -AV     Comment, General Range of Motion AROM including full opposition  -AV       Row Name 09/12/24 1024          Strength Comprehensive (MMT)    Comment, General Manual Muscle Testing (MMT) Assessment 4/5 bilateral biceps, triceps and   -AV       Row Name 09/12/24 1024          Motor Skills    Motor Skills coordination;functional endurance  -AV     Coordination WFL  left dominant  -AV     Functional Endurance fair  -AV       Row Name 09/12/24 1024          Balance    Comment, Balance CGA  -AV               User Key  (r) = Recorded By, (t) = Taken By, (c) = Cosigned By      Initials Name Provider Type    Jackson Jarvis OT Occupational Therapist                   Goals/Plan       Row Name 09/12/24 1027          Transfer Goal 1 (OT)    Activity/Assistive Device (Transfer Goal 1, OT) transfers, all;walker, rolling  -AV     [place] Level/Cues Needed (Transfer Goal 1, OT) supervision required;verbal cues required  -AV     Time Frame (Transfer Goal 1, OT) long term goal (LTG);10 days  -AV       Row Name 09/12/24 1027          Bathing Goal 1 (OT)    Activity/Device (Bathing Goal 1, OT) bathing skills, all;tub bench  -AV     [place] Level/Cues Needed (Bathing Goal 1, OT) supervision required;verbal cues required  -AV     Time Frame (Bathing Goal 1, OT) long term goal (LTG);10 days  -AV       Row Name 09/12/24 1027          Dressing Goal 1 (OT)    Activity/Device (Dressing Goal 1, OT) dressing skills, all  -AV     [place]/Cues Needed (Dressing Goal 1, OT) supervision required;verbal cues required  -AV      Time Frame (Dressing Goal 1, OT) long term goal (LTG);10 days  -AV       Row Name 09/12/24 1027          Toileting Goal 1 (OT)    Activity/Device (Toileting Goal 1, OT) toileting skills, all;raised toilet seat  -AV     [place] Level/Cues Needed (Toileting Goal 1, OT) supervision required;verbal cues required  -AV     Time Frame (Toileting Goal 1, OT) long term goal (LTG);10 days  -AV       Row Name 09/12/24 1027          Grooming Goal 1 (OT)    Activity/Device (Grooming Goal 1, OT) grooming skills, all  standing at sink  -AV     [place] (Grooming Goal 1, OT) supervision required;verbal cues required  -AV     Time Frame (Grooming Goal 1, OT) long term goal (LTG);10 days  -AV       Row Name 09/12/24 1027          Problem Specific Goal 1 (OT)    Problem Specific Goal 1 (OT) Patient will demonstrate fair plus/ good endurance/ activity tolerance needed to support ADLs.  -AV     Time Frame (Problem Specific Goal 1, OT) long term goal (LTG);10 days  -AV       Row Name 09/12/24 1027          Therapy Assessment/Plan (OT)    Planned Therapy Interventions (OT) activity tolerance training;BADL retraining;functional balance retraining;occupation/activity based interventions;patient/caregiver education/training;ROM/therapeutic exercise;transfer/mobility retraining  -AV               User Key  (r) = Recorded By, (t) = Taken By, (c) = Cosigned By      Initials Name Provider Type    AV Jackson Valdovinos, OT Occupational Therapist                   Clinical Impression       Row Name 09/12/24 1025          Pain Assessment    Pretreatment Pain Rating 0/10 - no pain  -AV     Posttreatment Pain Rating 0/10 - no pain  -AV       Row Name 09/12/24 1025          Plan of Care Review    Plan of Care Reviewed With patient  -AV     Progress no change  first session for evaluation  -AV     Outcome Evaluation Patient presents with limitations of balance, endurance/ activity tolerance and cognition/safety awareness which are impacting  ADL/ transfer independence. Skilled OT services are indicated to remediate/ compensate for deficits to maximize independence and safety with functional tasks.  -AV       Row Name 09/12/24 1025          Therapy Assessment/Plan (OT)    Patient/Family Therapy Goal Statement (OT) hopes to discharge home soon  -AV     Rehab Potential (OT) good, to achieve stated therapy goals  -AV     Criteria for Skilled Therapeutic Interventions Met (OT) yes;meets criteria;skilled treatment is necessary  -AV     Therapy Frequency (OT) 5 times/wk  -AV       Row Name 09/12/24 1025          Therapy Plan Review/Discharge Plan (OT)    Equipment Needs Upon Discharge (OT) walker, rolling;commode chair;tub bench  -AV     Anticipated Discharge Disposition (OT) home with assist;home with home health  -AV       Row Name 09/12/24 1025          Vital Signs    O2 Delivery Pre Treatment room air  -AV     O2 Delivery Intra Treatment room air  -AV     O2 Delivery Post Treatment room air  -AV       Row Name 09/12/24 1025          Positioning and Restraints    Pre-Treatment Position in bed  -AV     Post Treatment Position bed  -AV     In Bed call light within reach;encouraged to call for assist;exit alarm on  -AV               User Key  (r) = Recorded By, (t) = Taken By, (c) = Cosigned By      Initials Name Provider Type    AV Jackson Valdovinos, OT Occupational Therapist                   Outcome Measures       Row Name 09/12/24 1029          How much help from another is currently needed...    Putting on and taking off regular lower body clothing? 3  -AV     Bathing (including washing, rinsing, and drying) 3  -AV     Toileting (which includes using toilet bed pan or urinal) 3  -AV     Putting on and taking off regular upper body clothing 4  -AV     Taking care of personal grooming (such as brushing teeth) 4  -AV     Eating meals 4  -AV     AM-PAC 6 Clicks Score (OT) 21  -AV       Row Name 09/12/24 1029          Functional Assessment    Outcome Measure  Options AM-PAC 6 Clicks Daily Activity (OT);Optimal Instrument  -AV       Row Name 09/12/24 1029          Optimal Instrument    Optimal Instrument Optimal - 3  -AV     Bending/Stooping 2  -AV     Standing 2  -AV     Reaching 1  -AV     From the list, choose the 3 activities you would most like to be able to do without any difficulty Bending/stooping;Standing;Reaching  -AV     Total Score Optimal - 3 5  -AV               User Key  (r) = Recorded By, (t) = Taken By, (c) = Cosigned By      Initials Name Provider Type    Jackson Jarvis OT Occupational Therapist                    Occupational Therapy Education       Title: PT OT SLP Therapies (Done)       Topic: Occupational Therapy (Done)       Point: ADL training (Done)       Description:   Instruct learner(s) on proper safety adaptation and remediation techniques during self care or transfers.   Instruct in proper use of assistive devices.                  Learning Progress Summary             Patient Acceptance, E, VU by AV at 9/12/2024 1030                         Point: Home exercise program (Done)       Description:   Instruct learner(s) on appropriate technique for monitoring, assisting and/or progressing therapeutic exercises/activities.                  Learning Progress Summary             Patient Acceptance, E, VU by AV at 9/12/2024 1030                         Point: Precautions (Done)       Description:   Instruct learner(s) on prescribed precautions during self-care and functional transfers.                  Learning Progress Summary             Patient Acceptance, E, VU by AV at 9/12/2024 1030                         Point: Body mechanics (Done)       Description:   Instruct learner(s) on proper positioning and spine alignment during self-care, functional mobility activities and/or exercises.                  Learning Progress Summary             Patient Acceptance, E, VU by AV at 9/12/2024 1030                                         User Key        Initials Effective Dates Name Provider Type Discipline    AV 06/16/21 -  Jackson Valdovinos OT Occupational Therapist OT                  OT Recommendation and Plan  Planned Therapy Interventions (OT): activity tolerance training, BADL retraining, functional balance retraining, occupation/activity based interventions, patient/caregiver education/training, ROM/therapeutic exercise, transfer/mobility retraining  Therapy Frequency (OT): 5 times/wk  Plan of Care Review  Plan of Care Reviewed With: patient  Progress: no change (first session for evaluation)  Outcome Evaluation: Patient presents with limitations of balance, endurance/ activity tolerance and cognition/safety awareness which are impacting ADL/ transfer independence. Skilled OT services are indicated to remediate/ compensate for deficits to maximize independence and safety with functional tasks.     Time Calculation:   Evaluation Complexity (OT)  Review Occupational Profile/Medical/Therapy History Complexity: expanded/moderate complexity  Assessment, Occupational Performance/Identification of Deficit Complexity: 1-3 performance deficits  Clinical Decision Making Complexity (OT): problem focused assessment/low complexity  Overall Complexity of Evaluation (OT): low complexity     Time Calculation- OT       Row Name 09/12/24 1031             Time Calculation- OT    OT Received On 09/12/24  -AV      OT Goal Re-Cert Due Date 09/21/24  -AV         Untimed Charges    OT Eval/Re-eval Minutes 35  -AV         Total Minutes    Untimed Charges Total Minutes 35  -AV       Total Minutes 35  -AV                User Key  (r) = Recorded By, (t) = Taken By, (c) = Cosigned By      Initials Name Provider Type    AV Jackson Valdovinos OT Occupational Therapist                  Therapy Charges for Today       Code Description Service Date Service Provider Modifiers Qty    [id number] HC OT EVAL LOW COMPLEXITY 3 9/12/2024 Jackson Valdovinos OT GO 1                 Jackson Valdovinos,  OT  9/12/2024   CAP (community acquired pneumonia)

## 2024-09-12 NOTE — CONSULTS
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  Patient noncompliant with medications d/t financial hardship.  PAtient requires Eliquis d/t a.fib as reported by her for secondary stroke prevention.    Recommendations:  Repeat MRI Brain w/ contrast  Restart Eliquis  Start keppra 750/750 ordered.  NSx consultation after imaging for mesio-Temporal Lobe sclerosis. Likely outpatient f/u.  Outpatient f/u w/ neurology.  Appreciate CM involvement.  Consider urological/primary team evaluation for reported urinary incontinence.    Our recommendations are outlined below    Nursing Recommendations :  Maintain Euglycemia and EuthermiaNeuro checks q1-2 hrs during ICU stay if critical    Seizure precautions :  Seizure precautions including no driving for state mandated time frame were discussed with patient with clear understanding    DVT Prophylaxis :  Choice of Primary Team    Disposition :  Neurology will follow    ----------------------------------------------------------------------------------------------------    Imaging  MRI Brain 9/11/2024  IMPRESSION:  Impression:  1.No acute process is identified.  2.Progressive nonspecific white matter changes most frequently seen in the setting of chronic microvascular ischemic disease.  3.There is generalized cerebral atrophy with progressive focal atrophy involving the right mesial temporal lobe.  4.Right mastoid effusion.    EEG  Impression:  Abnormal EEG because of several ictal events which would be categorized as focal  motor seizure with secondary generalization.    Neuroradiographic imaging may be of some help to rule out pathology in the right brain.    MRI 2021  CONCLUSION:  MR examination of the brain without and with IV contrast demonstrating 5 cm area of subacute  infarction in the right parietal occipital region, in the posterior cerebral artery distribution.  I called report to Autumn in the PCU at 1330 hours.      Chief Complaint:  Seizure.    History of Present Illness:  Patient is a 68 year old Female.  Patient is a 68 y.o. year old female who presents to the emergency department for evaluation of seizure activity. Patient's family states patient had seizure activity around 9 PM tonight. The son who witnessed the event said he was in the kitchen and his mother was in the living room which he could see. He states that he looked over and looked like her arm was up in the air shaking. He went over there and evaluated her and felt that she was seizing. He states that she was very stiff her body was rigid and she was having shaking activity. He also notes that she was foaming at the mouth and looked like she was probably not breathing. He states the seizure lasted for at least 2 minutes. He states the patient was confused and difficult to arouse for at least 10 minutes afterwards. The patient's had no further events. The patient does not have a history of seizures. The family does note that the patient has a history of hemorrhagic stroke. They also note that the patient has not been without medication for at least 2 months. The  who is at bedside also notes that the patient used to have a history of alcoholism but they state that she has not drank in several months. They do note that the patient had nausea and vomiting over the last 2 days and overall was not feeling well. Patient right now states that she feels slightly anxious. Patient has some slight nausea. She denies any headache. She denies any chest pain or  shortness of breath. She denies any abdominal pain at this time.  **    Patient was seen and examined at bedside. She is with her . She has returned to baseline.  They both report significant difficulties with accessing medical care and prescriptions. They express financial difficulties.  Patient reports she takes no medications.  Patient notes stroke in the past. Patient notes history of A. fib.  confirms.  notes patient was on eliquis in the past and discontinued d/t financial and access issues.  In addition, patient reports urge incontinence, mild.      Past Medical History:       Hypertension       Hyperlipidemia       Coronary Artery Disease       There is no history of Diabetes Mellitus    Medications:    No Anticoagulant use   No Antiplatelet use  Reviewed EMR for current medications    Allergies:   Reviewed    Social History:  Smoking: No  Alcohol Use: No    Family History:    There Is Family History Of:Sister with seizure in the past 2/2 burn trauma.  There is no family history of premature cerebrovascular disease pertinent to this consultation    ROS :  14 Points Review of Systems was performed and was negative except mentioned in HPI.    Past Surgical History:  There Is No Surgical History Contributory To Today’s Visit    Examination  BP(123/78),  1A: Level of Consciousness - Alert; keenly responsive + 0  1B: Ask Month and Age - Both Questions Right + 0  1C: Blink Eyes & Squeeze Hands - Performs Both Tasks + 0  2: Test Horizontal Extraocular Movements - Normal + 0  3: Test Visual Fields - No Visual Loss + 0  4: Test Facial Palsy (Use Grimace if Obtunded) - Normal symmetry + 0  5A: Test Left Arm Motor Drift - No Drift for 10 Seconds + 0  5B: Test Right Arm Motor Drift - No Drift for 10 Seconds + 0  6A: Test Left Leg Motor Drift - No Drift for 5 Seconds + 0  6B: Test Right Leg Motor Drift - No Drift for 5 Seconds + 0  7: Test Limb Ataxia (FNF/Heel-Shin) - No Ataxia + 0  8: Test  Sensation - Normal; No sensory loss + 0  9: Test Language/Aphasia - Normal; No aphasia + 0  10: Test Dysarthria - Normal + 0  11: Test Extinction/Inattention - No abnormality + 0    NIHSS Score: 0          This consult was conducted in real time using interactive audio and video technology. Patient was informed of the technology being used for this visit and agreed to proceed. Patient located in hospital and provider located at home/office setting.    Telehealth Neurology consultation was provided. I spent minutes providing telehealth care. This includes time spent for face to face visit via telemedicine, review of medical records, imaging studies and discussion of findings with providers, the patient and/or family.      Dr Gordo Alcala      [facility]  For Inpatient follow-up with [facility] physician please call [facility] [phone]. This is not an outpatient service. Post hospital discharge, please contact hospital directly.    Please do not communicate with [facility] physicians via secure chat. If you have any questions, Please contact [facility].  Please call or reconsult our service if there are any clinical or diagnostic changes.

## 2024-09-12 NOTE — PLAN OF CARE
Goal Outcome Evaluation:  Plan of Care Reviewed With: patient        Progress: no change (first session for evaluation)  Outcome Evaluation: Patient presents with limitations of balance, endurance/ activity tolerance and cognition/safety awareness which are impacting ADL/ transfer independence. Skilled OT services are indicated to remediate/ compensate for deficits to maximize independence and safety with functional tasks.      Anticipated Discharge Disposition (OT): home with assist, home with home health

## 2024-09-13 ENCOUNTER — APPOINTMENT (OUTPATIENT)
Dept: MRI IMAGING | Facility: HOSPITAL | Age: 69
DRG: 101 | End: 2024-09-13
Payer: MEDICARE

## 2024-09-13 ENCOUNTER — TELEPHONE (OUTPATIENT)
Dept: NEUROLOGY | Facility: CLINIC | Age: 69
End: 2024-09-13

## 2024-09-13 ENCOUNTER — READMISSION MANAGEMENT (OUTPATIENT)
Dept: CALL CENTER | Facility: HOSPITAL | Age: 69
End: 2024-09-13

## 2024-09-13 VITALS
SYSTOLIC BLOOD PRESSURE: 153 MMHG | RESPIRATION RATE: 18 BRPM | TEMPERATURE: 99.2 F | HEIGHT: 67 IN | HEART RATE: 88 BPM | OXYGEN SATURATION: 100 % | DIASTOLIC BLOOD PRESSURE: 83 MMHG | WEIGHT: 155.2 LBS | BODY MASS INDEX: 24.36 KG/M2

## 2024-09-13 LAB
ANION GAP SERPL CALCULATED.3IONS-SCNC: 10 MMOL/L (ref 5–15)
BACTERIA SPEC AEROBE CULT: ABNORMAL
BASOPHILS # BLD AUTO: 0.11 10*3/MM3 (ref 0–0.2)
BASOPHILS NFR BLD AUTO: 1 % (ref 0–1.5)
BUN SERPL-MCNC: 11 MG/DL (ref 8–23)
BUN/CREAT SERPL: 15.7 (ref 7–25)
CALCIUM SPEC-SCNC: 8.5 MG/DL (ref 8.6–10.5)
CHLORIDE SERPL-SCNC: 102 MMOL/L (ref 98–107)
CHOLEST SERPL-MCNC: 167 MG/DL (ref 0–200)
CO2 SERPL-SCNC: 27 MMOL/L (ref 22–29)
CREAT SERPL-MCNC: 0.7 MG/DL (ref 0.57–1)
DEPRECATED RDW RBC AUTO: 43.7 FL (ref 37–54)
EGFRCR SERPLBLD CKD-EPI 2021: 94.3 ML/MIN/1.73
EOSINOPHIL # BLD AUTO: 0.2 10*3/MM3 (ref 0–0.4)
EOSINOPHIL NFR BLD AUTO: 1.8 % (ref 0.3–6.2)
ERYTHROCYTE [DISTWIDTH] IN BLOOD BY AUTOMATED COUNT: 12.5 % (ref 12.3–15.4)
GLUCOSE SERPL-MCNC: 95 MG/DL (ref 65–99)
HCT VFR BLD AUTO: 35.7 % (ref 34–46.6)
HDLC SERPL-MCNC: 43 MG/DL (ref 40–60)
HGB BLD-MCNC: 12 G/DL (ref 12–15.9)
IMM GRANULOCYTES # BLD AUTO: 0.09 10*3/MM3 (ref 0–0.05)
IMM GRANULOCYTES NFR BLD AUTO: 0.8 % (ref 0–0.5)
LDLC SERPL CALC-MCNC: 106 MG/DL (ref 0–100)
LDLC/HDLC SERPL: 2.43 {RATIO}
LYMPHOCYTES # BLD AUTO: 3.9 10*3/MM3 (ref 0.7–3.1)
LYMPHOCYTES NFR BLD AUTO: 35.8 % (ref 19.6–45.3)
MAGNESIUM SERPL-MCNC: 2 MG/DL (ref 1.6–2.4)
MCH RBC QN AUTO: 32.4 PG (ref 26.6–33)
MCHC RBC AUTO-ENTMCNC: 33.6 G/DL (ref 31.5–35.7)
MCV RBC AUTO: 96.5 FL (ref 79–97)
MONOCYTES # BLD AUTO: 0.74 10*3/MM3 (ref 0.1–0.9)
MONOCYTES NFR BLD AUTO: 6.8 % (ref 5–12)
NEUTROPHILS NFR BLD AUTO: 5.84 10*3/MM3 (ref 1.7–7)
NEUTROPHILS NFR BLD AUTO: 53.8 % (ref 42.7–76)
NRBC BLD AUTO-RTO: 0 /100 WBC (ref 0–0.2)
PHOSPHATE SERPL-MCNC: 4 MG/DL (ref 2.5–4.5)
PLATELET # BLD AUTO: 370 10*3/MM3 (ref 140–450)
PMV BLD AUTO: 9.7 FL (ref 6–12)
POTASSIUM SERPL-SCNC: 3.4 MMOL/L (ref 3.5–5.2)
RBC # BLD AUTO: 3.7 10*6/MM3 (ref 3.77–5.28)
SODIUM SERPL-SCNC: 139 MMOL/L (ref 136–145)
TRIGL SERPL-MCNC: 97 MG/DL (ref 0–150)
VLDLC SERPL-MCNC: 18 MG/DL (ref 5–40)
WBC NRBC COR # BLD AUTO: 10.88 10*3/MM3 (ref 3.4–10.8)

## 2024-09-13 PROCEDURE — 85025 COMPLETE CBC W/AUTO DIFF WBC: CPT | Performed by: INTERNAL MEDICINE

## 2024-09-13 PROCEDURE — 25010000002 THIAMINE PER 100 MG: Performed by: INTERNAL MEDICINE

## 2024-09-13 PROCEDURE — 80061 LIPID PANEL: CPT | Performed by: FAMILY MEDICINE

## 2024-09-13 PROCEDURE — 80048 BASIC METABOLIC PNL TOTAL CA: CPT | Performed by: INTERNAL MEDICINE

## 2024-09-13 PROCEDURE — A9577 INJ MULTIHANCE: HCPCS | Performed by: INTERNAL MEDICINE

## 2024-09-13 PROCEDURE — 84100 ASSAY OF PHOSPHORUS: CPT | Performed by: INTERNAL MEDICINE

## 2024-09-13 PROCEDURE — 0 GADOBENATE DIMEGLUMINE 529 MG/ML SOLUTION: Performed by: INTERNAL MEDICINE

## 2024-09-13 PROCEDURE — 83735 ASSAY OF MAGNESIUM: CPT | Performed by: INTERNAL MEDICINE

## 2024-09-13 PROCEDURE — 70553 MRI BRAIN STEM W/O & W/DYE: CPT

## 2024-09-13 PROCEDURE — 99239 HOSP IP/OBS DSCHRG MGMT >30: CPT | Performed by: INTERNAL MEDICINE

## 2024-09-13 PROCEDURE — 99232 SBSQ HOSP IP/OBS MODERATE 35: CPT | Performed by: FAMILY MEDICINE

## 2024-09-13 RX ORDER — DILTIAZEM HYDROCHLORIDE 180 MG/1
180 CAPSULE, COATED, EXTENDED RELEASE ORAL
Qty: 90 CAPSULE | Refills: 0 | Status: SHIPPED | OUTPATIENT
Start: 2024-09-13 | End: 2024-12-12

## 2024-09-13 RX ORDER — SACCHAROMYCES BOULARDII 250 MG
250 CAPSULE ORAL 2 TIMES DAILY
Qty: 14 CAPSULE | Refills: 0 | Status: SHIPPED | OUTPATIENT
Start: 2024-09-13 | End: 2024-09-20

## 2024-09-13 RX ORDER — ACETAMINOPHEN 500 MG
500 TABLET ORAL EVERY 6 HOURS PRN
Start: 2024-09-13

## 2024-09-13 RX ORDER — ISOSORBIDE MONONITRATE 30 MG/1
30 TABLET, EXTENDED RELEASE ORAL DAILY
Qty: 90 TABLET | Refills: 0 | Status: SHIPPED | OUTPATIENT
Start: 2024-09-13

## 2024-09-13 RX ORDER — LEVETIRACETAM 750 MG/1
750 TABLET ORAL 2 TIMES DAILY
Qty: 120 TABLET | Refills: 0 | Status: SHIPPED | OUTPATIENT
Start: 2024-09-13 | End: 2024-11-12

## 2024-09-13 RX ORDER — FAMOTIDINE 20 MG/1
20 TABLET, FILM COATED ORAL 2 TIMES DAILY
Qty: 60 TABLET | Refills: 0 | Status: SHIPPED | OUTPATIENT
Start: 2024-09-13 | End: 2024-10-13

## 2024-09-13 RX ORDER — POTASSIUM CHLORIDE 750 MG/1
40 CAPSULE, EXTENDED RELEASE ORAL ONCE
Status: COMPLETED | OUTPATIENT
Start: 2024-09-13 | End: 2024-09-13

## 2024-09-13 RX ORDER — ATORVASTATIN CALCIUM 40 MG/1
40 TABLET, FILM COATED ORAL NIGHTLY
Qty: 90 TABLET | Refills: 0 | Status: SHIPPED | OUTPATIENT
Start: 2024-09-13

## 2024-09-13 RX ORDER — ASPIRIN 81 MG/1
81 TABLET, CHEWABLE ORAL DAILY
Qty: 90 TABLET | Refills: 0 | Status: SHIPPED | OUTPATIENT
Start: 2024-09-14

## 2024-09-13 RX ORDER — CEPHALEXIN 500 MG/1
500 CAPSULE ORAL 4 TIMES DAILY
Qty: 20 CAPSULE | Refills: 0 | Status: SHIPPED | OUTPATIENT
Start: 2024-09-13 | End: 2024-09-16 | Stop reason: SDUPTHER

## 2024-09-13 RX ORDER — MULTIVITAMIN
1 CAPSULE ORAL DAILY
Qty: 360 CAPSULE | Refills: 0 | Status: SHIPPED | OUTPATIENT
Start: 2024-09-13 | End: 2025-09-13

## 2024-09-13 RX ADMIN — THIAMINE HYDROCHLORIDE 250 MG: 100 INJECTION, SOLUTION INTRAMUSCULAR; INTRAVENOUS at 09:52

## 2024-09-13 RX ADMIN — APIXABAN 5 MG: 5 TABLET, FILM COATED ORAL at 09:52

## 2024-09-13 RX ADMIN — GADOBENATE DIMEGLUMINE 14 ML: 529 INJECTION, SOLUTION INTRAVENOUS at 08:36

## 2024-09-13 RX ADMIN — Medication 10 ML: at 09:53

## 2024-09-13 RX ADMIN — POTASSIUM CHLORIDE 40 MEQ: 750 CAPSULE, EXTENDED RELEASE ORAL at 09:52

## 2024-09-13 RX ADMIN — DILTIAZEM HYDROCHLORIDE 180 MG: 180 CAPSULE, EXTENDED RELEASE ORAL at 09:52

## 2024-09-13 RX ADMIN — LEVETIRACETAM 750 MG: 750 TABLET, FILM COATED ORAL at 09:52

## 2024-09-13 RX ADMIN — PANTOPRAZOLE SODIUM 40 MG: 40 TABLET, DELAYED RELEASE ORAL at 05:31

## 2024-09-13 RX ADMIN — ASPIRIN 81 MG: 81 TABLET, CHEWABLE ORAL at 09:52

## 2024-09-13 RX ADMIN — Medication 250 MG: at 09:52

## 2024-09-13 RX ADMIN — ISOSORBIDE MONONITRATE 30 MG: 30 TABLET, EXTENDED RELEASE ORAL at 09:52

## 2024-09-13 NOTE — SIGNIFICANT NOTE
09/13/24 0959   OTHER   Discipline occupational therapist   Rehab Time/Intention   Session Not Performed patient unavailable for treatment  (unavailable on 2 attempts: 0815 off unit/MRI, 0955 with nurse)

## 2024-09-13 NOTE — CONSULTS
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  Patient noncompliant with medications d/t financial hardship.  Patient requires Eliquis d/t a.fib as reported by her for secondary stroke prevention.  Patient requires keppra for seizure prevention.    MRI Brain w/wo complete. mTL sclerosis ntoed.      Recommendations:  Restart Eliquis  Continue keppra 750/750 .  Outpatient f/u w/ neurology.  Appreciate CM involvement.  Consider urological/primary team evaluation for reported urinary incontinence.  No further inpatient neuro w/u.    Our recommendations are outlined below    Nursing Recommendations :  Maintain Euglycemia and EuthermiaNeuro checks q1-2 hrs during ICU stay if critical    Seizure precautions :  Seizure precautions including no driving for state mandated time frame were discussed with patient with clear understanding    DVT Prophylaxis :  Choice of Primary Team    Disposition :  Outpatient Neurology follow up in 1-3 weeks    Subjective  Patient doing well  Expresses understanding about eliquis and keppra.  Patient notes that she has been having transient mental status changes and occasionally losing time for several years. These episodes may reflect prior undiagnosed seizure activity.  Answered all questions.    Hospital Course  Patient is a 68 year old Female.  Patient is a 68 y.o. year old female who presents to the emergency department for evaluation of seizure activity. Patient's family states patient had seizure activity around 9 PM tonight.  The son who witnessed the event said he was in the kitchen and his mother was in the living room which he could see. He states that he looked over and looked like her arm was up in the air shaking. He went over there and evaluated her and felt that she was seizing. He states that she was very stiff her body was rigid and she was having shaking activity. He also notes that she was foaming at the mouth and looked like she was probably not breathing. He states the seizure lasted for at least 2 minutes. He states the patient was confused and difficult to arouse for at least 10 minutes afterwards. The patient's had no further events. The patient does not have a history of seizures. The family does note that the patient has a history of hemorrhagic stroke. They also note that the patient has not been without medication for at least 2 months. The  who is at bedside also notes that the patient used to have a history of alcoholism but they state that she has not drank in several months. They do note that the patient had nausea and vomiting over the last 2 days and overall was not feeling well. Patient right now states that she feels slightly anxious. Patient has some slight nausea. She denies any headache. She denies any chest pain or shortness of breath. She denies any abdominal pain at this time.  **  Patient was seen and examined at bedside. She is with her . She has returned to baseline.  They both report significant difficulties with accessing medical care and prescriptions. They express financial difficulties.  Patient reports she takes no medications.  Patient notes stroke in the past. Patient notes history of A. fib.  confirms.  notes patient was on eliquis in the past and discontinued d/t financial and access issues.  In addition, patient reports urge incontinence, mild.    Imaging  MRI Brain w/ contrast  IMPRESSION:  Impression:  Essentially stable exam from noncontrast comparison,  demonstrating asymmetric right temporal lobe atrophy, as well as a tiny probable benign perivascular space within the left hippocampal formation. There is otherwise no evidence of acute ischemia,  hemorrhage, mass or abnormal enhancement.    MRI Brain 9/11/2024  IMPRESSION:  Impression:  1.No acute process is identified.  2.Progressive nonspecific white matter changes most frequently seen in the setting of chronic microvascular ischemic disease.  3.There is generalized cerebral atrophy with progressive focal atrophy involving the right mesial temporal lobe.  4.Right mastoid effusion.    EEG  Impression:  Abnormal EEG because of several ictal events which would be categorized as focal motor seizure with secondary generalization.    Neuroradiographic imaging may be of some help to rule out pathology in the right brain.    MRI 2021  CONCLUSION:  MR examination of the brain without and with IV contrast demonstrating 5 cm area of subacute  infarction in the right parietal occipital region, in the posterior cerebral artery distribution.      Examination  1A: Level of Consciousness - Alert; keenly responsive + 0  1B: Ask Month and Age - Both Questions Right + 0  1C: Blink Eyes & Squeeze Hands - Performs Both Tasks + 0  2: Test Horizontal Extraocular Movements - Normal + 0  3: Test Visual Fields - No Visual Loss + 0  4: Test Facial Palsy (Use Grimace if Obtunded) - Normal symmetry + 0  5A: Test Left Arm Motor Drift - No Drift for 10 Seconds + 0  5B: Test Right Arm Motor Drift - No Drift for 10 Seconds + 0  6A: Test Left Leg Motor Drift - No Drift for 5 Seconds + 0  6B: Test Right Leg Motor Drift - No Drift for 5 Seconds + 0  7: Test Limb Ataxia (FNF/Heel-Shin) - No Ataxia + 0  8: Test Sensation - Normal; No sensory loss + 0  9: Test Language/Aphasia - Normal; No aphasia + 0  10: Test Dysarthria - Normal + 0  11: Test Extinction/Inattention - No abnormality + 0    NIHSS Score: 0          This consult was conducted in real  time using interactive audio and video technology. Patient was informed of the technology being used for this visit and agreed to proceed. Patient located in hospital and provider located at home/office setting.    Telehealth Neurology consultation was provided. I spent minutes providing telehealth care. This includes time spent for face to face visit via telemedicine, review of medical records, imaging studies and discussion of findings with providers, the patient and/or family.      Dr Gordo Alcala      [facility]  For Inpatient follow-up with [facility] physician please call [facility] [phone]. This is not an outpatient service. Post hospital discharge, please contact hospital directly.    Please do not communicate with [facility] physicians via secure chat. If you have any questions, Please contact [facility].  Please call or reconsult our service if there are any clinical or diagnostic changes.

## 2024-09-13 NOTE — PLAN OF CARE
Goal Outcome Evaluation:                      Alert and oriented x4.  Vitals within normal limits for patient.  No complaints of pain.  Discharging home with self-care.  Discharge instructions provided; verbalized understanding.  Neurology office to call patient with appointment.  IV removed with tip intact.

## 2024-09-13 NOTE — DISCHARGE SUMMARY
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 coli UTI  Paroxysmal A-fib not currently on anticoagulation outpatient given financial difficulties  Hx of right parieto-occiptal CVA  HLD  Mild cognitive impairment, difficulty with year, suspect in setting of history of alcohol abuse as well as prior CVA  Medication nonadherence issues in setting of social/financial difficulties    Hospital Course     Hospital Course:  68 y.o. with hx of cva and pafib not currently on medications given insurance and financial difficulties who presented after having a witnissed gtc type seizure by her son (~5-10 minutes) and a few days of dizziness.  Found to have significant hyponatremia 121 and possibly provoked seizure episode.  Neurology consulted     EEG with seizure activity noted, showing focal motor seizure activity with secondary generalization.  Patient was treated with Keppra while inpatient stable at time of discharge.  Counseled on seizure precautions.  She does not drive at baseline anyways but precautions were reiterated with the patient.    Sodium improved with cutting back on free water, patient  counseled on her home intake which was substantial and not supplemented with enough p.o. intake.  Responded/stabilized with supportive care    E. coli UTI treated with ceftriaxone switching to cephalexin for discharge, monitor for resolution as outpatient    Sent new prescriptions for patient's medications as she has been out of these for some time as adherence difficulties as outpatient partially financial/social related.    Outpatient follow-up with neurology and neurocognitive testing and for further monitoring for seizures as well as with PCP    Patient discharged in stable condition with close outpatient follow up. Return precautions and follow up discussed and patient, discussed case with patient's son on the phone as well, voiced agreement and understanding of treatment plan.     DISCHARGE Follow Up Recommendations for labs and diagnostics:   As above    CODE STATUS:  Code Status and Medical Interventions: CPR (Attempt to Resuscitate); Full Support   Ordered at: 09/11/24 0448     Code Status (Patient has no pulse and is not breathing):    CPR (Attempt to Resuscitate)     Medical Interventions (Patient has pulse or is breathing):    Full Support           Day of Discharge     Vital Signs:  Temp:  [98.2 °F (36.8 °C)-98.8 °F (37.1 °C)] 98.2 °F (36.8 °C)  Heart Rate:  [] 94  Resp:  [18] 18  BP: (126-159)/(65-89) 130/89    Physical Exam    Gen: awake, resting in bed, conversant  Resp: breathing comfortably on room air  CV: Currently sinus RRR, no LE pitting edema  AOx3 but struggles with year, knows the president, poor insight    Discharge Details        Discharge Medications        New Medications        Instructions Start Date   aspirin 81 MG chewable tablet   81 mg, Oral, Daily   Start Date: September 14, 2024     cephalexin 500 MG capsule  Commonly known as: Keflex   500 mg, Oral, 4 Times Daily      famotidine 20 MG tablet  Commonly known as: Pepcid   20 mg, Oral, 2 Times Daily      levETIRAcetam 750 MG  tablet  Commonly known as: KEPPRA   750 mg, Oral, 2 Times Daily      multivitamin capsule   1 capsule, Oral, Daily      saccharomyces boulardii 250 MG capsule  Commonly known as: FLORASTOR   250 mg, Oral, 2 Times Daily             Changes to Medications        Instructions Start Date   acetaminophen 500 MG tablet  Commonly known as: TYLENOL  What changed:   when to take this  reasons to take this   500 mg, Oral, Every 6 Hours PRN             Continue These Medications        Instructions Start Date   apixaban 5 MG tablet tablet  Commonly known as: ELIQUIS   5 mg, Oral, Every 12 Hours Scheduled      atorvastatin 40 MG tablet  Commonly known as: LIPITOR   40 mg, Oral, Nightly      dilTIAZem  MG 24 hr capsule  Commonly known as: CARDIZEM CD   180 mg, Oral, Every 24 Hours Scheduled      Hair Vitamins tablet tablet  Generic drug: multivitamin with minerals   1 tablet, Oral, Daily      isosorbide mononitrate 30 MG 24 hr tablet  Commonly known as: IMDUR   30 mg, Oral, Daily                 Discharge Disposition:      Diet: continue on diet / dietary restrictions from hospitalization     Discharge Activity: advance as tolerated      Additional Instructions for the Follow-ups that You Need to Schedule       Ambulatory Referral to Neurology   As directed      2-3 weeks seizure and neurocognitive testing follow up    Discharge Follow-up with PCP   As directed       Currently Documented PCP:    Shahida Martinez MD    PCP Phone Number:    [phone]     Follow Up Details: 3-5 days hospital f/u seizures        Discharge Follow-up with Specified Provider: neurology 2-3 weeks seizure and neurocognitive testing follow up   As directed      To: neurology 2-3 weeks seizure and neurocognitive testing follow up                Pertinent  and/or Most Recent Results       LAB RESULTS:      Lab 09/13/24  0531 09/12/24  0448 09/11/24  0652 09/11/24  0648 09/11/24  0152 09/10/24  2154   WBC 10.88* 9.79  --  10.81*  --   7.30   HEMOGLOBIN 12.0 12.0  --  13.3  --  14.2   HEMATOCRIT 35.7 35.8  --  38.2  --  40.9   PLATELETS 370 389  --  394  --  434   NEUTROS ABS 5.84 6.06  --  8.66*  --  5.52   IMMATURE GRANS (ABS) 0.09* 0.03  --  0.04  --  0.02   LYMPHS ABS 3.90* 2.63  --  1.31  --  1.29   MONOS ABS 0.74 1.00*  --  0.78  --  0.46   EOS ABS 0.20 0.03  --  0.00  --  0.00   MCV 96.5 95.0  --  92.3  --  93.4   LACTATE  --   --  1.8  --  2.2* 7.3*         Lab 09/13/24  0531 09/12/24  0448 09/11/24  2209 09/11/24  1153 09/11/24  0648 09/10/24  2154   SODIUM 139 138 132* 133* 128* 121*   POTASSIUM 3.4* 3.4*  --  3.8 3.6 3.7   CHLORIDE 102 101  --  95* 91* 79*   CO2 27.0 27.0  --  22.3 25.8 19.7*   ANION GAP 10.0 10.0  --  15.7* 11.2 22.3*   BUN 11 9  --  8 7* 8   CREATININE 0.70 0.69  --  0.66 0.68 0.82   EGFR 94.3 94.7  --  95.7 95.0 78.0   GLUCOSE 95 106*  --  115* 135* 236*   CALCIUM 8.5* 8.8  --  8.6 9.1 9.0   MAGNESIUM 2.0 2.2  --   --   --  1.7   PHOSPHORUS 4.0 3.8  --   --   --   --    HEMOGLOBIN A1C  --   --   --   --  5.60  --    TSH  --   --   --   --  2.580  --          Lab 09/10/24  2154   TOTAL PROTEIN 8.1   ALBUMIN 4.7   GLOBULIN 3.4   ALT (SGPT) 22   AST (SGOT) 33*   BILIRUBIN 0.8   ALK PHOS 56         Lab 09/10/24  2154   HSTROP T <6         Lab 09/13/24  0531 09/11/24  0648   CHOLESTEROL 167 222*   LDL CHOL 106* 145*   HDL CHOL 43 48   TRIGLYCERIDES 97 162*             Brief Urine Lab Results  (Last result in the past 365 days)        Color   Clarity   Blood   Leuk Est   Nitrite   Protein   CREAT   Urine HCG        09/10/24 2249 Yellow   Clear   Moderate (2+)   Negative   Negative   >=300 mg/dL (3+)                 Microbiology Results (last 10 days)       Procedure Component Value - Date/Time    Blood Culture - Blood, Arm, Right [[id number]]  (Normal) Collected: 09/11/24 0152    Lab Status: Preliminary result Specimen: Blood from Arm, Right Updated: 09/13/24 0200     Blood Culture No growth at 2 days    COVID-19, FLU A/B,  RSV PCR 1 HR TAT - Swab, Nasopharynx [[id number]]  (Normal) Collected: 09/11/24 0034    Lab Status: Final result Specimen: Swab from Nasopharynx Updated: 09/11/24 0144     COVID19 Not Detected     Influenza A PCR Not Detected     Influenza B PCR Not Detected     RSV, PCR Not Detected    Narrative:      Fact sheet for providers: https://www.fda.gov/media/106203/download    Fact sheet for patients: https://www.fda.gov/media/083422/download    Test performed by PCR.    Urine Culture - Urine, Urine, Clean Catch [[id number]]  (Abnormal)  (Susceptibility) Collected: 09/10/24 2249    Lab Status: Final result Specimen: Urine, Clean Catch Updated: 09/13/24 1045     Urine Culture >100,000 CFU/mL Escherichia coli    Narrative:      Colonization of the urinary tract without infection is common. Treatment is discouraged unless the patient is symptomatic, pregnant, or undergoing an invasive urologic procedure.    Susceptibility        Escherichia coli      VANCE      Amoxicillin + Clavulanate Susceptible      Ampicillin Resistant      Ampicillin + Sulbactam Intermediate      Cefazolin Susceptible      Cefepime Susceptible      Ceftazidime Susceptible      Ceftriaxone Susceptible      Gentamicin Susceptible      Levofloxacin Susceptible      Nitrofurantoin Susceptible      Piperacillin + Tazobactam Susceptible      Trimethoprim + Sulfamethoxazole Susceptible                           Blood Culture - Blood, Arm, Left [[id number]]  (Normal) Collected: 09/10/24 2154    Lab Status: Preliminary result Specimen: Blood from Arm, Left Updated: 09/12/24 2200     Blood Culture No growth at 2 days            RADIOLOGY:    MRI Brain With & Without Contrast    Result Date: 9/13/2024  Impression: Impression: Essentially stable exam from noncontrast comparison, demonstrating asymmetric right temporal lobe atrophy, as well as a tiny probable benign perivascular space within the left hippocampal formation. There is otherwise no evidence of acute  ischemia, hemorrhage, mass or abnormal enhancement. Electronically Signed: Victor M Johnson MD  9/13/2024 8:57 AM EDT  Workstation ID: [id number]    MRI Brain Without Contrast    Result Date: 9/11/2024  Impression: Impression: 1.No acute process is identified. 2.Progressive nonspecific white matter changes most frequently seen in the setting of chronic microvascular ischemic disease. 3.There is generalized cerebral atrophy with progressive focal atrophy involving the right mesial temporal lobe. 4.Right mastoid effusion. Electronically Signed: Calixto Edi MD  9/11/2024 9:21 PM EDT  Workstation ID: [id number]    CT Head Without Contrast    Result Date: 9/10/2024  Impression: No acute brain abnormality is appreciated. No acute intracranial hemorrhage. No definite acute infarct. Please see above comments for further detail.    Portions of this note were completed with a voice recognition program.   Electronically Signed By-Brannon Lan MD On:9/10/2024 11:26 PM      XR Chest 1 View    Result Date: 9/10/2024  Impression: Impression: 1.No acute process identified Electronically Signed: Calixto Eid MD  9/10/2024 10:26 PM EDT  Workstation ID: [id number]             Results for orders placed during the hospital encounter of 09/10/24    Adult Transthoracic Echo Complete W/ Cont if Necessary Per Protocol    Interpretation Summary    The study is technically difficult for diagnosis.    Left ventricular systolic function is hyperdynamic (EF > 70%).    Left ventricular wall thickness is consistent with mild to moderate concentric hypertrophy.    Left ventricular diastolic function was normal.      Labs Pending at Discharge:  Pending Labs       Order Current Status    Blood Culture - Blood, Arm, Left Preliminary result    Blood Culture - Blood, Arm, Right Preliminary result              Time spent on Discharge including face to face service:  >35 minutes

## 2024-09-13 NOTE — OUTREACH NOTE
Prep Survey      Flowsheet Row Responses   [facility] facility patient discharged from? [facility]   Is LACE score < 7 ? No   Eligibility [facility]   Date of Admission 09/10/24   Date of Discharge 09/13/24   Discharge Disposition Home-Health Care Svc   Discharge diagnosis Seizure   Does the patient have one of the following disease processes/diagnoses(primary or secondary)? Other   Does the patient have Home health ordered? No   Is there a DME ordered? No   Prep survey completed? Yes            OBINNA MCELROY - Registered Nurse

## 2024-09-13 NOTE — PLAN OF CARE
Goal Outcome Evaluation:  Plan of Care Reviewed With: patient        Progress: no change  Outcome Evaluation: Patient alert and oriented.  no changes still awaiting MRI. plan of care ongoing.

## 2024-09-14 ENCOUNTER — TRANSITIONAL CARE MANAGEMENT TELEPHONE ENCOUNTER (OUTPATIENT)
Dept: CALL CENTER | Facility: HOSPITAL | Age: 69
End: 2024-09-14

## 2024-09-14 NOTE — OUTREACH NOTE
Call Center TCM Note      Flowsheet Row Responses   [facility] patient discharged from? [facility]   Does the patient have one of the following disease processes/diagnoses(primary or secondary)? Other   TCM attempt successful? No   Unsuccessful attempts Attempt 1  [No person listed on verbal release]            Alana Newberry RN    9/14/2024, 13:03 EDT

## 2024-09-14 NOTE — OUTREACH NOTE
Call Center TCM Note      Flowsheet Row Responses   [facility] patient discharged from? [facility]   Does the patient have one of the following disease processes/diagnoses(primary or secondary)? Other   TCM attempt successful? Yes   Call start time 1341   Call end time 1344   Discharge diagnosis Seizure   Is patient permission given to speak with other caregiver? Yes   List who call center can speak with Fransisco jimenez   Person spoke with today (if not patient) and relationship Fransisco jimenez   Meds reviewed with patient/caregiver? Yes   Is the patient having any side effects they believe may be caused by any medication additions or changes? No   Does the patient have all medications ordered at discharge? No   What is keeping the patient from filling the prescriptions? --  [see note]   Notified Case Management Script issues   Prescription comments Pharmacy states their records indicate pt is allergic to Keflex-pharmacy requesting clarification from prescriber-note sent to CM   Is the patient taking all medications as directed (includes completed medication regime)? Yes   Comments Hosp dc fu apt on 9/17/24 with PCP   Does the patient have an appointment with their PCP within 7-14 days of discharge? Yes   Has home health visited the patient within 72 hours of discharge? N/A   Psychosocial issues? No   Did the patient receive a copy of their discharge instructions? Yes   Nursing interventions Reviewed instructions with patient   What is the patient's perception of their health status since discharge? Improving   Is the patient/caregiver able to teach back signs and symptoms related to disease process for when to call PCP? Yes   Is the patient/caregiver able to teach back signs and symptoms related to disease process for when to call 911? Yes   Is the patient/caregiver able to teach back the hierarchy of who to call/visit for symptoms/problems? PCP, Specialist, Home health nurse, Urgent Care, ED, 911 Yes   If the patient is a  current smoker, are they able to teach back resources for cessation? Not a smoker   TCM call completed? Yes   Call end time [id number]            Alana Newberry RN    9/14/2024, 13:49 EDT

## 2024-09-15 LAB — BACTERIA SPEC AEROBE CULT: NORMAL

## 2024-09-16 ENCOUNTER — TELEPHONE (OUTPATIENT)
Dept: INTERNAL MEDICINE | Age: 69
End: 2024-09-16

## 2024-09-16 DIAGNOSIS — N30.00 ACUTE CYSTITIS WITHOUT HEMATURIA: Primary | ICD-10-CM

## 2024-09-16 LAB — BACTERIA SPEC AEROBE CULT: NORMAL

## 2024-09-16 RX ORDER — CEPHALEXIN 500 MG/1
500 CAPSULE ORAL 3 TIMES DAILY
Qty: 15 CAPSULE | Refills: 0 | Status: SHIPPED | OUTPATIENT
Start: 2024-09-16 | End: 2024-09-21

## 2024-09-17 LAB
QT INTERVAL: 324 MS
QTC INTERVAL: 473 MS

## (undated) DEVICE — PK CATH LAB CUST HAR